# Patient Record
Sex: MALE | Race: BLACK OR AFRICAN AMERICAN | URBAN - METROPOLITAN AREA
[De-identification: names, ages, dates, MRNs, and addresses within clinical notes are randomized per-mention and may not be internally consistent; named-entity substitution may affect disease eponyms.]

---

## 2017-01-15 ENCOUNTER — INPATIENT (INPATIENT)
Facility: HOSPITAL | Age: 32
LOS: 4 days | Discharge: HOME | DRG: 881 | End: 2017-01-20
Attending: PSYCHIATRY & NEUROLOGY | Admitting: PSYCHIATRY & NEUROLOGY
Payer: MEDICAID

## 2017-01-15 VITALS
DIASTOLIC BLOOD PRESSURE: 69 MMHG | HEART RATE: 89 BPM | WEIGHT: 160.06 LBS | TEMPERATURE: 98 F | SYSTOLIC BLOOD PRESSURE: 107 MMHG | RESPIRATION RATE: 18 BRPM | OXYGEN SATURATION: 100 %

## 2017-01-15 PROCEDURE — 99285 EMERGENCY DEPT VISIT HI MDM: CPT | Mod: 25

## 2017-01-15 PROCEDURE — 93010 ELECTROCARDIOGRAM REPORT: CPT

## 2017-01-15 PROCEDURE — 99053 MED SERV 10PM-8AM 24 HR FAC: CPT

## 2017-01-15 NOTE — ED ADULT NURSE NOTE - OBJECTIVE STATEMENT
pt received into  spot A&Ox3 ambulatory appears comfortable stating he has been depressed and wants to kill himself. No hx of psychiatric diagnoses, no hx of psych admission, no hx of suicide attempts. Pt states recently multiple immediate family members have  and he is having trouble coping. Denies HI, viual/ auditory hallucinations, denies drug or ETOH use. 4 [facility] hospital bags of belongings and black duffle bag taken and placed under nursing station. Pt on 1:1 CO for safety and SI

## 2017-01-16 DIAGNOSIS — F33.2 MAJOR DEPRESSIVE DISORDER, RECURRENT SEVERE WITHOUT PSYCHOTIC FEATURES: ICD-10-CM

## 2017-01-16 LAB
ANION GAP SERPL CALC-SCNC: 6 MMOL/L — LOW (ref 9–16)
APAP SERPL-MCNC: <2 UG/ML — LOW (ref 10–30)
APPEARANCE UR: CLEAR — SIGNIFICANT CHANGE UP
BASOPHILS NFR BLD AUTO: 0.4 % — SIGNIFICANT CHANGE UP (ref 0–2)
BILIRUB UR-MCNC: NEGATIVE — SIGNIFICANT CHANGE UP
BUN SERPL-MCNC: 13 MG/DL — SIGNIFICANT CHANGE UP (ref 7–23)
CALCIUM SERPL-MCNC: 8.6 MG/DL — SIGNIFICANT CHANGE UP (ref 8.5–10.5)
CHLORIDE SERPL-SCNC: 108 MMOL/L — SIGNIFICANT CHANGE UP (ref 96–108)
CO2 SERPL-SCNC: 28 MMOL/L — SIGNIFICANT CHANGE UP (ref 22–31)
COLOR SPEC: YELLOW — SIGNIFICANT CHANGE UP
CREAT SERPL-MCNC: 0.74 MG/DL — SIGNIFICANT CHANGE UP (ref 0.5–1.3)
DIFF PNL FLD: NEGATIVE — SIGNIFICANT CHANGE UP
EOSINOPHIL NFR BLD AUTO: 4.7 % — SIGNIFICANT CHANGE UP (ref 0–6)
ETHANOL SERPL-MCNC: <3 MG/DL — SIGNIFICANT CHANGE UP
GLUCOSE SERPL-MCNC: 102 MG/DL — HIGH (ref 70–99)
GLUCOSE UR QL: NEGATIVE — SIGNIFICANT CHANGE UP
HCT VFR BLD CALC: 36 % — LOW (ref 39–50)
HGB BLD-MCNC: 11.8 G/DL — LOW (ref 13–17)
KETONES UR-MCNC: NEGATIVE — SIGNIFICANT CHANGE UP
LEUKOCYTE ESTERASE UR-ACNC: NEGATIVE — SIGNIFICANT CHANGE UP
LYMPHOCYTES # BLD AUTO: 40.2 % — SIGNIFICANT CHANGE UP (ref 13–44)
MCHC RBC-ENTMCNC: 23.6 PG — LOW (ref 27–34)
MCHC RBC-ENTMCNC: 32.8 G/DL — SIGNIFICANT CHANGE UP (ref 32–36)
MCV RBC AUTO: 72 FL — LOW (ref 80–100)
MONOCYTES NFR BLD AUTO: 7.7 % — SIGNIFICANT CHANGE UP (ref 2–14)
NEUTROPHILS NFR BLD AUTO: 47 % — SIGNIFICANT CHANGE UP (ref 43–77)
NITRITE UR-MCNC: NEGATIVE — SIGNIFICANT CHANGE UP
PCP SPEC-MCNC: SIGNIFICANT CHANGE UP
PH UR: 6.5 — SIGNIFICANT CHANGE UP (ref 4–8)
PLATELET # BLD AUTO: 187 K/UL — SIGNIFICANT CHANGE UP (ref 150–400)
POTASSIUM SERPL-MCNC: 3.7 MMOL/L — SIGNIFICANT CHANGE UP (ref 3.5–5.3)
POTASSIUM SERPL-SCNC: 3.7 MMOL/L — SIGNIFICANT CHANGE UP (ref 3.5–5.3)
PROT UR-MCNC: NEGATIVE MG/DL — SIGNIFICANT CHANGE UP
RBC # BLD: 5 M/UL — SIGNIFICANT CHANGE UP (ref 4.2–5.8)
RBC # FLD: 15 % — SIGNIFICANT CHANGE UP (ref 10.3–16.9)
SALICYLATES SERPL-MCNC: <1.7 MG/DL — LOW (ref 2.8–20)
SODIUM SERPL-SCNC: 142 MMOL/L — SIGNIFICANT CHANGE UP (ref 135–145)
SP GR SPEC: 1.02 — SIGNIFICANT CHANGE UP (ref 1–1.03)
UROBILINOGEN FLD QL: 1 E.U./DL — SIGNIFICANT CHANGE UP
WBC # BLD: 5.6 K/UL — SIGNIFICANT CHANGE UP (ref 3.8–10.5)
WBC # FLD AUTO: 5.6 K/UL — SIGNIFICANT CHANGE UP (ref 3.8–10.5)

## 2017-01-16 PROCEDURE — 90792 PSYCH DIAG EVAL W/MED SRVCS: CPT

## 2017-01-16 RX ORDER — LOPERAMIDE HCL 2 MG
2 TABLET ORAL ONCE
Qty: 0 | Refills: 0 | Status: COMPLETED | OUTPATIENT
Start: 2017-01-16 | End: 2017-01-16

## 2017-01-16 RX ORDER — SERTRALINE 25 MG/1
25 TABLET, FILM COATED ORAL DAILY
Qty: 0 | Refills: 0 | Status: DISCONTINUED | OUTPATIENT
Start: 2017-01-16 | End: 2017-01-20

## 2017-01-16 RX ORDER — INFLUENZA VIRUS VACCINE 15; 15; 15; 15 UG/.5ML; UG/.5ML; UG/.5ML; UG/.5ML
0.5 SUSPENSION INTRAMUSCULAR ONCE
Qty: 0 | Refills: 0 | Status: COMPLETED | OUTPATIENT
Start: 2017-01-16 | End: 2017-01-16

## 2017-01-16 RX ADMIN — Medication 2 MILLIGRAM(S): at 22:56

## 2017-01-16 NOTE — ED BEHAVIORAL HEALTH ASSESSMENT NOTE - SUMMARY
This is a 30 y/o man with no psych hx, presenting with 3 days of SI in setting of prolonged depressive episode. He endorses multiple neurovegetative symptoms including active SI, though is help seeking, which is why he presented. He denies having any close contacts and denies having told anyone about how he is feeling, thus no collateral could be contacted. Given his elevated risk of harm, will hospitalized and start sertraline to reduce his mood symptoms

## 2017-01-16 NOTE — ED BEHAVIORAL HEALTH ASSESSMENT NOTE - AXIS IV
Problems with primary support/Problem related to social environment/Economic problems/Occupational problems

## 2017-01-16 NOTE — ED BEHAVIORAL HEALTH ASSESSMENT NOTE - HPI (INCLUDE ILLNESS QUALITY, SEVERITY, DURATION, TIMING, CONTEXT, MODIFYING FACTORS, ASSOCIATED SIGNS AND SYMPTOMS)
This is a 30 y/o man, living with roommates, unemployed living on savings, no formal psych hx, no prior mental health treatment or evaluations, denies hx of suicidality/violence/legal trouble/substance abuse, self presenting with suicidal ideation for 3 days.    The patient reports that he's had intermittent low mood for several months, worse recently, culminating with SI for three days. He describes this in the context of his mother's death twelve years ago, and the more recent loss of a brother and another family member in the last few years. He has felt down, with difficulty focusing, which led to him losing his job as a  last year. He also notes anhedonia, as well as insomnia and low energy. He has isolated himself, stating he is no longer in touch with family, and doesn't have any friends. He's been contemplating overdosing on "pain pills," though denies that he has access to any or thought about how to get them. He's also thought about "jumping off a bridge," but has not made specific plans. His protective factors only include a desire to get help. He denies manic or psychotic symptoms, as well as substance/etOH abuse.

## 2017-01-16 NOTE — ED BEHAVIORAL HEALTH ASSESSMENT NOTE - DESCRIPTION
calm on 1:1 none Born/raised in [place]. 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 Had been working in [employer]. Never , no children. Lives in [place] with roommates

## 2017-01-16 NOTE — ED PROVIDER NOTE - ATTENDING CONTRIBUTION TO CARE
31M with hx of asthma presenting with suicidal ideation, no prior SI or attempt, presenting with worsening depression and SI. Pt has plan for jumping in front of subway. Plan for admit to psych.

## 2017-01-16 NOTE — ED PROVIDER NOTE - OBJECTIVE STATEMENT
Patient is a 31 year old male with PMH asthma who presents to ED c/o suicidal ideation. Pt reports that his mother  when he was 18, his brother murdered 2 years ago. Since then has never faced the loss of either. Over the past 2 weeks he is having worsening depression/SI. Today had thoughts of jumping in front of a subway as well as taking medications to OD. No prior history of hospitalizations, no prior SI or attempt. Is not on any medications. Denies illicit drug use.

## 2017-01-16 NOTE — ED PROVIDER NOTE - MEDICAL DECISION MAKING DETAILS
31 year old male with suicidal ideation, no prior hospitalizations or attempts. Placed on 1:1. Medical clearance labs ordered, EKG. Psych consulted, came to ED to evaluate the patient-will admit to psych floor for further evaluation.

## 2017-01-17 PROCEDURE — 99223 1ST HOSP IP/OBS HIGH 75: CPT

## 2017-01-18 PROCEDURE — 99232 SBSQ HOSP IP/OBS MODERATE 35: CPT

## 2017-01-18 RX ORDER — LOPERAMIDE HCL 2 MG
2 TABLET ORAL
Qty: 0 | Refills: 0 | Status: DISCONTINUED | OUTPATIENT
Start: 2017-01-18 | End: 2017-01-20

## 2017-01-18 RX ORDER — ACETAMINOPHEN 500 MG
650 TABLET ORAL EVERY 6 HOURS
Qty: 0 | Refills: 0 | Status: DISCONTINUED | OUTPATIENT
Start: 2017-01-18 | End: 2017-01-20

## 2017-01-18 RX ADMIN — Medication 2 MILLIGRAM(S): at 22:52

## 2017-01-18 RX ADMIN — Medication 1 TABLET(S): at 11:27

## 2017-01-18 RX ADMIN — Medication 2 MILLIGRAM(S): at 11:27

## 2017-01-19 PROCEDURE — 99232 SBSQ HOSP IP/OBS MODERATE 35: CPT

## 2017-01-19 RX ADMIN — Medication 650 MILLIGRAM(S): at 22:28

## 2017-01-19 RX ADMIN — Medication 1 TABLET(S): at 08:57

## 2017-01-19 RX ADMIN — Medication 2 MILLIGRAM(S): at 08:59

## 2017-01-20 VITALS
SYSTOLIC BLOOD PRESSURE: 108 MMHG | HEART RATE: 82 BPM | RESPIRATION RATE: 20 BRPM | DIASTOLIC BLOOD PRESSURE: 75 MMHG | TEMPERATURE: 97 F

## 2017-01-20 PROCEDURE — 99238 HOSP IP/OBS DSCHRG MGMT 30/<: CPT

## 2017-01-20 PROCEDURE — 80048 BASIC METABOLIC PNL TOTAL CA: CPT

## 2017-01-20 PROCEDURE — 85025 COMPLETE CBC W/AUTO DIFF WBC: CPT

## 2017-01-20 PROCEDURE — 80307 DRUG TEST PRSMV CHEM ANLYZR: CPT

## 2017-01-20 PROCEDURE — 36415 COLL VENOUS BLD VENIPUNCTURE: CPT

## 2017-01-20 PROCEDURE — 99285 EMERGENCY DEPT VISIT HI MDM: CPT | Mod: 25

## 2017-01-20 PROCEDURE — 81003 URINALYSIS AUTO W/O SCOPE: CPT

## 2017-01-20 PROCEDURE — 93005 ELECTROCARDIOGRAM TRACING: CPT

## 2017-01-20 RX ADMIN — Medication 1 TABLET(S): at 10:31

## 2017-01-24 DIAGNOSIS — F39 UNSPECIFIED MOOD [AFFECTIVE] DISORDER: ICD-10-CM

## 2017-01-24 DIAGNOSIS — F32.9 MAJOR DEPRESSIVE DISORDER, SINGLE EPISODE, UNSPECIFIED: ICD-10-CM

## 2017-01-24 DIAGNOSIS — Z56.0 UNEMPLOYMENT, UNSPECIFIED: ICD-10-CM

## 2017-01-24 DIAGNOSIS — J45.909 UNSPECIFIED ASTHMA, UNCOMPLICATED: ICD-10-CM

## 2017-01-24 SDOH — ECONOMIC STABILITY - INCOME SECURITY: UNEMPLOYMENT, UNSPECIFIED: Z56.0

## 2018-06-28 ENCOUNTER — EMERGENCY (EMERGENCY)
Facility: HOSPITAL | Age: 33
LOS: 1 days | Discharge: HOME | End: 2018-06-28
Admitting: EMERGENCY MEDICINE
Payer: COMMERCIAL

## 2018-06-28 VITALS
SYSTOLIC BLOOD PRESSURE: 127 MMHG | WEIGHT: 164.91 LBS | TEMPERATURE: 98 F | OXYGEN SATURATION: 100 % | HEART RATE: 63 BPM | DIASTOLIC BLOOD PRESSURE: 61 MMHG | RESPIRATION RATE: 16 BRPM

## 2018-06-28 DIAGNOSIS — Z88.0 ALLERGY STATUS TO PENICILLIN: ICD-10-CM
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
## 2018-06-28 DIAGNOSIS — Z23 ENCOUNTER FOR IMMUNIZATION: ICD-10-CM

## 2018-06-28 DIAGNOSIS — L02.213 CUTANEOUS ABSCESS OF CHEST WALL: ICD-10-CM

## 2018-06-28 DIAGNOSIS — S80.212A ABRASION, LEFT KNEE, INITIAL ENCOUNTER: ICD-10-CM

## 2018-06-28 DIAGNOSIS — J45.909 UNSPECIFIED ASTHMA, UNCOMPLICATED: ICD-10-CM
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
## 2018-06-28 DIAGNOSIS — Z91.018 ALLERGY TO OTHER FOODS: ICD-10-CM

## 2018-06-28 DIAGNOSIS — Z88.8 ALLERGY STATUS TO OTHER DRUGS, MEDICAMENTS AND BIOLOGICAL SUBSTANCES: ICD-10-CM
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
## 2018-06-28 DIAGNOSIS — B35.6 TINEA CRURIS: ICD-10-CM

## 2018-06-28 PROCEDURE — 90715 TDAP VACCINE 7 YRS/> IM: CPT

## 2018-06-28 PROCEDURE — 73562 X-RAY EXAM OF KNEE 3: CPT | Mod: 26,LT

## 2018-06-28 PROCEDURE — 10061 I&D ABSCESS COMP/MULTIPLE: CPT

## 2018-06-28 PROCEDURE — 90471 IMMUNIZATION ADMIN: CPT

## 2018-06-28 PROCEDURE — 99283 EMERGENCY DEPT VISIT LOW MDM: CPT | Mod: 25

## 2018-06-28 PROCEDURE — 73562 X-RAY EXAM OF KNEE 3: CPT

## 2018-06-28 RX ORDER — IBUPROFEN 200 MG
1 TABLET ORAL
Qty: 20 | Refills: 0
Start: 2018-06-28 | End: 2018-07-02

## 2018-06-28 RX ORDER — NYSTATIN CREAM 100000 [USP'U]/G
1 CREAM TOPICAL ONCE
Qty: 0 | Refills: 0 | Status: COMPLETED | OUTPATIENT
Start: 2018-06-28 | End: 2018-06-28

## 2018-06-28 RX ORDER — IBUPROFEN 200 MG
800 TABLET ORAL ONCE
Qty: 0 | Refills: 0 | Status: COMPLETED | OUTPATIENT
Start: 2018-06-28 | End: 2018-06-28

## 2018-06-28 RX ORDER — TETANUS TOXOID, REDUCED DIPHTHERIA TOXOID AND ACELLULAR PERTUSSIS VACCINE, ADSORBED 5; 2.5; 8; 8; 2.5 [IU]/.5ML; [IU]/.5ML; UG/.5ML; UG/.5ML; UG/.5ML
0.5 SUSPENSION INTRAMUSCULAR ONCE
Qty: 0 | Refills: 0 | Status: COMPLETED | OUTPATIENT
Start: 2018-06-28 | End: 2018-06-28

## 2018-06-28 RX ORDER — NYSTATIN CREAM 100000 [USP'U]/G
1 CREAM TOPICAL
Qty: 1 | Refills: 0
Start: 2018-06-28

## 2018-06-28 RX ADMIN — NYSTATIN CREAM 1 APPLICATION(S): 100000 CREAM TOPICAL at 06:14

## 2018-06-28 RX ADMIN — Medication 800 MILLIGRAM(S): at 06:30

## 2018-06-28 RX ADMIN — Medication 100 MILLIGRAM(S): at 06:13

## 2018-06-28 RX ADMIN — TETANUS TOXOID, REDUCED DIPHTHERIA TOXOID AND ACELLULAR PERTUSSIS VACCINE, ADSORBED 0.5 MILLILITER(S): 5; 2.5; 8; 8; 2.5 SUSPENSION INTRAMUSCULAR at 06:12

## 2018-06-28 NOTE — ED PROVIDER NOTE - CARE PLAN
Principal Discharge DX:	Abscess of chest wall Principal Discharge DX:	Abscess of chest wall  Secondary Diagnosis:	Tinea cruris  Secondary Diagnosis:	Abrasion

## 2018-06-28 NOTE — ED PROVIDER NOTE - MEDICAL DECISION MAKING DETAILS
local I& D and placed on antibiotics for localized abscess local I& D and placed on antibiotics for localized abscess, nystatin for tinea and xray reviewed for knee local I& D and placed on antibiotics for localized abscess, nystatin for tinea and xray reviewed for knee -> no acute fracture + hardware noted in plosition
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
## 2018-06-28 NOTE — ED PROVIDER NOTE - PHYSICAL EXAMINATION
localized flucuant indurated abscess at right anterior chest wall noted localized flucuant indurated abscess at right anterior chest wall noted + left knee superficial  abrasion and age indeterminate surgical; scars to LLE ( prior orif reported ) + tinea Cruis noted
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
## 2018-07-19 ENCOUNTER — EMERGENCY (EMERGENCY)
Facility: HOSPITAL | Age: 33
LOS: 1 days | Discharge: HOME | End: 2018-07-19
Attending: EMERGENCY MEDICINE | Admitting: EMERGENCY MEDICINE
Payer: COMMERCIAL

## 2018-07-19 VITALS
DIASTOLIC BLOOD PRESSURE: 69 MMHG | RESPIRATION RATE: 18 BRPM | HEART RATE: 63 BPM | TEMPERATURE: 99 F | OXYGEN SATURATION: 99 % | SYSTOLIC BLOOD PRESSURE: 120 MMHG

## 2018-07-19 VITALS
WEIGHT: 164.91 LBS | RESPIRATION RATE: 18 BRPM | HEART RATE: 57 BPM | DIASTOLIC BLOOD PRESSURE: 55 MMHG | SYSTOLIC BLOOD PRESSURE: 115 MMHG | OXYGEN SATURATION: 98 % | TEMPERATURE: 98 F

## 2018-07-19 DIAGNOSIS — Z88.1 ALLERGY STATUS TO OTHER ANTIBIOTIC AGENTS STATUS: ICD-10-CM

## 2018-07-19 DIAGNOSIS — Z79.1 LONG TERM (CURRENT) USE OF NON-STEROIDAL ANTI-INFLAMMATORIES (NSAID): ICD-10-CM

## 2018-07-19 DIAGNOSIS — S01.112A LACERATION WITHOUT FOREIGN BODY OF LEFT EYELID AND PERIOCULAR AREA, INITIAL ENCOUNTER: ICD-10-CM

## 2018-07-19 DIAGNOSIS — S09.90XA UNSPECIFIED INJURY OF HEAD, INITIAL ENCOUNTER: ICD-10-CM
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
## 2018-07-19 DIAGNOSIS — Z79.2 LONG TERM (CURRENT) USE OF ANTIBIOTICS: ICD-10-CM

## 2018-07-19 DIAGNOSIS — Z88.8 ALLERGY STATUS TO OTHER DRUGS, MEDICAMENTS AND BIOLOGICAL SUBSTANCES: ICD-10-CM

## 2018-07-19 DIAGNOSIS — J45.909 UNSPECIFIED ASTHMA, UNCOMPLICATED: ICD-10-CM
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
## 2018-07-19 DIAGNOSIS — Z91.018 ALLERGY TO OTHER FOODS: ICD-10-CM
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
## 2018-07-19 DIAGNOSIS — K04.7 PERIAPICAL ABSCESS WITHOUT SINUS: ICD-10-CM

## 2018-07-19 LAB
APPEARANCE UR: CLEAR — SIGNIFICANT CHANGE UP
BILIRUB UR-MCNC: NEGATIVE — SIGNIFICANT CHANGE UP
COLOR SPEC: YELLOW — SIGNIFICANT CHANGE UP
DIFF PNL FLD: NEGATIVE — SIGNIFICANT CHANGE UP
GLUCOSE UR QL: NEGATIVE — SIGNIFICANT CHANGE UP
KETONES UR-MCNC: NEGATIVE — SIGNIFICANT CHANGE UP
LEUKOCYTE ESTERASE UR-ACNC: NEGATIVE — SIGNIFICANT CHANGE UP
NITRITE UR-MCNC: NEGATIVE — SIGNIFICANT CHANGE UP
PH UR: 7 — SIGNIFICANT CHANGE UP (ref 5–8)
PROT UR-MCNC: NEGATIVE MG/DL — SIGNIFICANT CHANGE UP
SP GR SPEC: 1.01 — SIGNIFICANT CHANGE UP (ref 1–1.03)
UROBILINOGEN FLD QL: 0.2 E.U./DL — SIGNIFICANT CHANGE UP

## 2018-07-19 PROCEDURE — 81003 URINALYSIS AUTO W/O SCOPE: CPT

## 2018-07-19 PROCEDURE — 99283 EMERGENCY DEPT VISIT LOW MDM: CPT | Mod: 25

## 2018-07-19 PROCEDURE — 12011 RPR F/E/E/N/L/M 2.5 CM/<: CPT

## 2018-07-19 PROCEDURE — 96372 THER/PROPH/DIAG INJ SC/IM: CPT | Mod: XU

## 2018-07-19 RX ORDER — AZITHROMYCIN 500 MG/1
1000 TABLET, FILM COATED ORAL ONCE
Qty: 0 | Refills: 0 | Status: DISCONTINUED | OUTPATIENT
Start: 2018-07-19 | End: 2018-07-19

## 2018-07-19 RX ORDER — CEFTRIAXONE 500 MG/1
250 INJECTION, POWDER, FOR SOLUTION INTRAMUSCULAR; INTRAVENOUS ONCE
Qty: 0 | Refills: 0 | Status: COMPLETED | OUTPATIENT
Start: 2018-07-19 | End: 2018-07-19

## 2018-07-19 RX ORDER — AZITHROMYCIN 500 MG/1
1 TABLET, FILM COATED ORAL ONCE
Qty: 0 | Refills: 0 | Status: COMPLETED | OUTPATIENT
Start: 2018-07-19 | End: 2018-07-19

## 2018-07-19 RX ADMIN — AZITHROMYCIN 1 GRAM(S): 500 TABLET, FILM COATED ORAL at 12:38

## 2018-07-19 RX ADMIN — CEFTRIAXONE 250 MILLIGRAM(S): 500 INJECTION, POWDER, FOR SOLUTION INTRAMUSCULAR; INTRAVENOUS at 12:38
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
## 2018-07-19 NOTE — ED PROVIDER NOTE - SKIN, MLM
Skin normal color for race, warm, dry and intact. No evidence of rash. LACERATION 1.5 cm in left eyebrow

## 2018-07-19 NOTE — ED ADULT NURSE REASSESSMENT NOTE - NS ED NURSE REASSESS COMMENT FT1
laceration area irrigated with 20 ml of NS, awaiting MD Aguiar re-evaluation and stitches. Will continue to monitor. laceration area irrigated with 20 ml of NS, awaiting MD Aguiar re-evaluation and sutures. Will continue to monitor.

## 2018-07-19 NOTE — ED ADULT NURSE REASSESSMENT NOTE - NS ED NURSE REASSESS COMMENT FT1
pt remains under stable condition, pt awaiting STI treatment as requested, MD Agiuar informed and remained, pending medications orders.

## 2018-07-19 NOTE — ED PROVIDER NOTE - ENMT, MLM
Airway patent, Nasal mucosa clear. Mouth with normal mucosa. Throat has no vesicles, no oropharyngeal exudates and uvula is midline. exception: right sided fullness to gumline with tenderness but no fluctuance , molar tooth tender to palpation, small apthous ulceration

## 2018-07-19 NOTE — ED PROVIDER NOTE - MEDICAL DECISION MAKING DETAILS
+ head trauma, doubt ICH , no current symptoms cw concussion, lac repaired, dyuria likely STD, empirically treated, pt with dental infection, has dentist appt tomm, advised to keep appt.

## 2018-07-28 ENCOUNTER — EMERGENCY (EMERGENCY)
Facility: HOSPITAL | Age: 33
LOS: 1 days | Discharge: HOME | End: 2018-07-28
Attending: EMERGENCY MEDICINE | Admitting: EMERGENCY MEDICINE
Payer: MEDICAID

## 2018-07-28 ENCOUNTER — EMERGENCY (EMERGENCY)
Facility: HOSPITAL | Age: 33
LOS: 1 days | Discharge: HOME | End: 2018-07-28
Admitting: EMERGENCY MEDICINE
Payer: COMMERCIAL

## 2018-07-28 VITALS
SYSTOLIC BLOOD PRESSURE: 124 MMHG | TEMPERATURE: 98 F | HEART RATE: 63 BPM | RESPIRATION RATE: 18 BRPM | DIASTOLIC BLOOD PRESSURE: 75 MMHG | OXYGEN SATURATION: 100 %

## 2018-07-28 VITALS
SYSTOLIC BLOOD PRESSURE: 112 MMHG | OXYGEN SATURATION: 100 % | RESPIRATION RATE: 16 BRPM | WEIGHT: 169.98 LBS | DIASTOLIC BLOOD PRESSURE: 66 MMHG | TEMPERATURE: 98 F | HEART RATE: 81 BPM

## 2018-07-28 VITALS
WEIGHT: 164.91 LBS | TEMPERATURE: 98 F | SYSTOLIC BLOOD PRESSURE: 116 MMHG | RESPIRATION RATE: 16 BRPM | DIASTOLIC BLOOD PRESSURE: 73 MMHG | OXYGEN SATURATION: 100 % | HEART RATE: 73 BPM

## 2018-07-28 DIAGNOSIS — R19.7 DIARRHEA, UNSPECIFIED: ICD-10-CM

## 2018-07-28 DIAGNOSIS — Z79.1 LONG TERM (CURRENT) USE OF NON-STEROIDAL ANTI-INFLAMMATORIES (NSAID): ICD-10-CM

## 2018-07-28 DIAGNOSIS — Z48.02 ENCOUNTER FOR REMOVAL OF SUTURES: ICD-10-CM

## 2018-07-28 DIAGNOSIS — Z79.2 LONG TERM (CURRENT) USE OF ANTIBIOTICS: ICD-10-CM

## 2018-07-28 DIAGNOSIS — R11.0 NAUSEA: ICD-10-CM

## 2018-07-28 DIAGNOSIS — Z88.8 ALLERGY STATUS TO OTHER DRUGS, MEDICAMENTS AND BIOLOGICAL SUBSTANCES: ICD-10-CM

## 2018-07-28 DIAGNOSIS — S01.112D LACERATION WITHOUT FOREIGN BODY OF LEFT EYELID AND PERIOCULAR AREA, SUBSEQUENT ENCOUNTER: ICD-10-CM

## 2018-07-28 DIAGNOSIS — Z91.018 ALLERGY TO OTHER FOODS: ICD-10-CM

## 2018-07-28 DIAGNOSIS — R10.84 GENERALIZED ABDOMINAL PAIN: ICD-10-CM

## 2018-07-28 DIAGNOSIS — Z79.899 OTHER LONG TERM (CURRENT) DRUG THERAPY: ICD-10-CM

## 2018-07-28 DIAGNOSIS — Z88.0 ALLERGY STATUS TO PENICILLIN: ICD-10-CM

## 2018-07-28 DIAGNOSIS — J45.909 UNSPECIFIED ASTHMA, UNCOMPLICATED: ICD-10-CM

## 2018-07-28 DIAGNOSIS — Z88.1 ALLERGY STATUS TO OTHER ANTIBIOTIC AGENTS STATUS: ICD-10-CM

## 2018-07-28 LAB
ALBUMIN SERPL ELPH-MCNC: 3.7 G/DL — SIGNIFICANT CHANGE UP (ref 3.4–5)
ALP SERPL-CCNC: 113 U/L — SIGNIFICANT CHANGE UP (ref 40–120)
ALT FLD-CCNC: 21 U/L — SIGNIFICANT CHANGE UP (ref 12–42)
ANION GAP SERPL CALC-SCNC: 6 MMOL/L — LOW (ref 9–16)
APPEARANCE UR: CLEAR — SIGNIFICANT CHANGE UP
APTT BLD: 32.7 SEC — SIGNIFICANT CHANGE UP (ref 27.5–36.5)
AST SERPL-CCNC: 20 U/L — SIGNIFICANT CHANGE UP (ref 15–37)
BASOPHILS NFR BLD AUTO: 0.5 % — SIGNIFICANT CHANGE UP (ref 0–2)
BILIRUB SERPL-MCNC: 0.3 MG/DL — SIGNIFICANT CHANGE UP (ref 0.2–1.2)
BILIRUB UR-MCNC: NEGATIVE — SIGNIFICANT CHANGE UP
BUN SERPL-MCNC: 19 MG/DL — SIGNIFICANT CHANGE UP (ref 7–23)
CALCIUM SERPL-MCNC: 8.8 MG/DL — SIGNIFICANT CHANGE UP (ref 8.5–10.5)
CHLORIDE SERPL-SCNC: 105 MMOL/L — SIGNIFICANT CHANGE UP (ref 96–108)
CO2 SERPL-SCNC: 28 MMOL/L — SIGNIFICANT CHANGE UP (ref 22–31)
COLOR SPEC: YELLOW — SIGNIFICANT CHANGE UP
CREAT SERPL-MCNC: 0.92 MG/DL — SIGNIFICANT CHANGE UP (ref 0.5–1.3)
DIFF PNL FLD: NEGATIVE — SIGNIFICANT CHANGE UP
EOSINOPHIL NFR BLD AUTO: 4.4 % — SIGNIFICANT CHANGE UP (ref 0–6)
GLUCOSE SERPL-MCNC: 77 MG/DL — SIGNIFICANT CHANGE UP (ref 70–99)
GLUCOSE UR QL: NEGATIVE — SIGNIFICANT CHANGE UP
HCT VFR BLD CALC: 39.7 % — SIGNIFICANT CHANGE UP (ref 39–50)
HGB BLD-MCNC: 12.4 G/DL — LOW (ref 13–17)
IMM GRANULOCYTES NFR BLD AUTO: 0.2 % — SIGNIFICANT CHANGE UP (ref 0–1.5)
INR BLD: 1.06 — SIGNIFICANT CHANGE UP (ref 0.88–1.16)
KETONES UR-MCNC: NEGATIVE — SIGNIFICANT CHANGE UP
LEUKOCYTE ESTERASE UR-ACNC: NEGATIVE — SIGNIFICANT CHANGE UP
LIDOCAIN IGE QN: 246 U/L — SIGNIFICANT CHANGE UP (ref 73–393)
LYMPHOCYTES # BLD AUTO: 39.1 % — SIGNIFICANT CHANGE UP (ref 13–44)
MCHC RBC-ENTMCNC: 22.9 PG — LOW (ref 27–34)
MCHC RBC-ENTMCNC: 31.2 G/DL — LOW (ref 32–36)
MCV RBC AUTO: 73.2 FL — LOW (ref 80–100)
MONOCYTES NFR BLD AUTO: 10.5 % — SIGNIFICANT CHANGE UP (ref 2–14)
NEUTROPHILS NFR BLD AUTO: 45.3 % — SIGNIFICANT CHANGE UP (ref 43–77)
NITRITE UR-MCNC: NEGATIVE — SIGNIFICANT CHANGE UP
PH UR: 5 — SIGNIFICANT CHANGE UP (ref 5–8)
PLATELET # BLD AUTO: 175 K/UL — SIGNIFICANT CHANGE UP (ref 150–400)
POTASSIUM SERPL-MCNC: 4.1 MMOL/L — SIGNIFICANT CHANGE UP (ref 3.5–5.3)
POTASSIUM SERPL-SCNC: 4.1 MMOL/L — SIGNIFICANT CHANGE UP (ref 3.5–5.3)
PROT SERPL-MCNC: 7.8 G/DL — SIGNIFICANT CHANGE UP (ref 6.4–8.2)
PROT UR-MCNC: NEGATIVE MG/DL — SIGNIFICANT CHANGE UP
PROTHROM AB SERPL-ACNC: 11.7 SEC — SIGNIFICANT CHANGE UP (ref 9.8–12.7)
RBC # BLD: 5.42 M/UL — SIGNIFICANT CHANGE UP (ref 4.2–5.8)
RBC # FLD: 14.5 % — SIGNIFICANT CHANGE UP (ref 10.3–16.9)
SODIUM SERPL-SCNC: 139 MMOL/L — SIGNIFICANT CHANGE UP (ref 132–145)
SP GR SPEC: <=1.005 — SIGNIFICANT CHANGE UP (ref 1–1.03)
UROBILINOGEN FLD QL: 0.2 E.U./DL — SIGNIFICANT CHANGE UP
WBC # BLD: 4.3 K/UL — SIGNIFICANT CHANGE UP (ref 3.8–10.5)
WBC # FLD AUTO: 4.3 K/UL — SIGNIFICANT CHANGE UP (ref 3.8–10.5)

## 2018-07-28 PROCEDURE — 74177 CT ABD & PELVIS W/CONTRAST: CPT | Mod: 26

## 2018-07-28 PROCEDURE — 99284 EMERGENCY DEPT VISIT MOD MDM: CPT

## 2018-07-28 PROCEDURE — 99212 OFFICE O/P EST SF 10 MIN: CPT

## 2018-07-28 RX ORDER — PROCHLORPERAZINE MALEATE 5 MG
10 TABLET ORAL ONCE
Qty: 0 | Refills: 0 | Status: COMPLETED | OUTPATIENT
Start: 2018-07-28 | End: 2018-07-28

## 2018-07-28 RX ORDER — PROCHLORPERAZINE MALEATE 5 MG
1 TABLET ORAL
Qty: 21 | Refills: 0 | OUTPATIENT
Start: 2018-07-28 | End: 2018-08-03

## 2018-07-28 RX ORDER — IOHEXOL 300 MG/ML
30 INJECTION, SOLUTION INTRAVENOUS ONCE
Qty: 0 | Refills: 0 | Status: COMPLETED | OUTPATIENT
Start: 2018-07-28 | End: 2018-07-28

## 2018-07-28 RX ORDER — SODIUM CHLORIDE 9 MG/ML
3 INJECTION INTRAMUSCULAR; INTRAVENOUS; SUBCUTANEOUS ONCE
Qty: 0 | Refills: 0 | Status: COMPLETED | OUTPATIENT
Start: 2018-07-28 | End: 2018-07-28

## 2018-07-28 RX ORDER — DIPHENHYDRAMINE HCL 50 MG
25 CAPSULE ORAL ONCE
Qty: 0 | Refills: 0 | Status: COMPLETED | OUTPATIENT
Start: 2018-07-28 | End: 2018-07-28

## 2018-07-28 RX ORDER — SODIUM CHLORIDE 9 MG/ML
1000 INJECTION INTRAMUSCULAR; INTRAVENOUS; SUBCUTANEOUS ONCE
Qty: 0 | Refills: 0 | Status: COMPLETED | OUTPATIENT
Start: 2018-07-28 | End: 2018-07-28

## 2018-07-28 RX ORDER — METOCLOPRAMIDE HCL 10 MG
10 TABLET ORAL ONCE
Qty: 0 | Refills: 0 | Status: COMPLETED | OUTPATIENT
Start: 2018-07-28 | End: 2018-07-28

## 2018-07-28 RX ORDER — PROCHLORPERAZINE MALEATE 5 MG
1 TABLET ORAL
Qty: 21 | Refills: 0
Start: 2018-07-28 | End: 2018-08-03

## 2018-07-28 RX ORDER — FAMOTIDINE 10 MG/ML
20 INJECTION INTRAVENOUS ONCE
Qty: 0 | Refills: 0 | Status: COMPLETED | OUTPATIENT
Start: 2018-07-28 | End: 2018-07-28

## 2018-07-28 RX ADMIN — Medication 25 MILLIGRAM(S): at 20:15

## 2018-07-28 RX ADMIN — SODIUM CHLORIDE 1000 MILLILITER(S): 9 INJECTION INTRAMUSCULAR; INTRAVENOUS; SUBCUTANEOUS at 18:30

## 2018-07-28 RX ADMIN — Medication 104 MILLIGRAM(S): at 19:27

## 2018-07-28 RX ADMIN — Medication 10 MILLIGRAM(S): at 22:35

## 2018-07-28 RX ADMIN — SODIUM CHLORIDE 3 MILLILITER(S): 9 INJECTION INTRAMUSCULAR; INTRAVENOUS; SUBCUTANEOUS at 19:27

## 2018-07-28 RX ADMIN — IOHEXOL 30 MILLILITER(S): 300 INJECTION, SOLUTION INTRAVENOUS at 19:27

## 2018-07-28 RX ADMIN — Medication 10 MILLIGRAM(S): at 20:00

## 2018-07-28 RX ADMIN — Medication 101 MILLIGRAM(S): at 19:45

## 2018-07-28 RX ADMIN — FAMOTIDINE 20 MILLIGRAM(S): 10 INJECTION INTRAVENOUS at 19:27

## 2018-07-28 RX ADMIN — SODIUM CHLORIDE 1000 MILLILITER(S): 9 INJECTION INTRAMUSCULAR; INTRAVENOUS; SUBCUTANEOUS at 19:27

## 2018-07-28 NOTE — ED PROVIDER NOTE - PROGRESS NOTE DETAILS
pt feeling strange after reglan, will give benadryl 25mg IV for symptomatic relief Pt improved asking to leave, tolerating po. Precautions reviewed.

## 2018-07-28 NOTE — ED PROVIDER NOTE - MEDICAL DECISION MAKING DETAILS
abdominal pain with nausea and diarrhea, will send labs, give antiemetic, get CT a/p to r/o appy or other intra-abdominal infection, reassess

## 2018-07-28 NOTE — ED ADULT NURSE NOTE - OBJECTIVE STATEMENT
Pt presents to ED c/o suture removal to L eyebrow, no redness/swelling, no fevers, no drainage. Pt presents in NAD speaking full sentences ambulatory through triage.

## 2018-07-28 NOTE — ED PROVIDER NOTE - OBJECTIVE STATEMENT
pt c/o nausea, abd pain, diarrhea, non-bloody, pain generalized, non-radiating, constant, no exacerbating/alleviating factors, 6/10, thinks it was from dairy he ate by accident, no vomiting, no other complaints
(4) walks frequently

## 2018-07-28 NOTE — ED PROVIDER NOTE - MEDICAL DECISION MAKING DETAILS
pt returned for suture removal, no signs of inf, sutures removed w/o dif, no further tx indicated at this time

## 2018-07-28 NOTE — ED ADULT NURSE NOTE - PAIN: PRESENCE, MLM
Physical Therapy Daily Progress Note      Visit # : 5    [place] reports 0/10 pain today at rest.  Pt reports he back to doing about everything he wants to.  He has some discomfort after last PT but otherwise pain free.         Objective Pt present to PT today with no distress noted at rest.     Minimal edema in the arm and wrist.  Pt with firm MM noted in the Bicep.     Pt with the area of tightness in the Bicep MM is about 13 mm in diameter.     See Exercise, Manual, and Modality Logs for complete treatment.     Assessment/Plan  Pt is responding well to treatment and the knot is greatly decreasing.       Progress per Plan of Care  Re-assess next visit for possible D/C.            Manual Therapy:    12     mins  78516;  Therapeutic Exercise:    31     mins  57495;     Neuromuscular [place]:        mins  68632;    Therapeutic Activity:          mins  20566;     Gait Training:        ___  mins  65694;     Ultrasound:          mins  33885;    Electrical Stimulation:         mins  96120 ( );  Dry Needling          mins self-pay    Timed Treatment:   43   mins   Total Treatment:     48   mins    Florian Sarmiento, PT  Physical Therapist        
denies pain/discomfort

## 2018-07-28 NOTE — ED PROVIDER NOTE - PHYSICAL EXAMINATION
Comfortable, no acute distress  NCAT  PERRL, EOMI  RRR  CTAB  soft, diffusely ttp, no r/g  skin normal, no rashes  AAOx3, motor/sensory grossly intact

## 2018-07-28 NOTE — ED PROVIDER NOTE - OBJECTIVE STATEMENT
The pt is a 33 y/o M, who returns to ED for suture removal - lac repaired a wk ago. Denies pus, bleeding, swelling

## 2018-07-28 NOTE — ED PROVIDER NOTE - SKIN, MLM
fully healed, sutured L eyebrow lac w/3 sutures in place, no swelling, no erythema, no pus, no bleeding

## 2018-07-28 NOTE — ED ADULT NURSE NOTE - OBJECTIVE STATEMENT
pt is a 33 y/o male presents ambulatory with periumbilical cramps over one week with nausea, worsening today after drinking iced coffee. denies vomiting, urinary/bowel s/s, weakness. pt in NAD, resting comfortably and will continue to monitor. pt states he is lactose intolerant and does not know if the deli put soy or regular milk.

## 2018-07-28 NOTE — ED ADULT NURSE REASSESSMENT NOTE - NS ED NURSE REASSESS COMMENT FT1
Pt ambulating around department. Pt states that he finished his PO contrast. Will continue to monitor.

## 2018-11-05 ENCOUNTER — EMERGENCY (EMERGENCY)
Facility: HOSPITAL | Age: 33
LOS: 1 days | Discharge: HOME | End: 2018-11-05
Attending: EMERGENCY MEDICINE | Admitting: EMERGENCY MEDICINE
Payer: MEDICAID

## 2018-11-05 VITALS
SYSTOLIC BLOOD PRESSURE: 129 MMHG | WEIGHT: 175.05 LBS | OXYGEN SATURATION: 97 % | TEMPERATURE: 98 F | DIASTOLIC BLOOD PRESSURE: 55 MMHG | HEART RATE: 81 BPM | RESPIRATION RATE: 18 BRPM

## 2018-11-05 DIAGNOSIS — Z79.2 LONG TERM (CURRENT) USE OF ANTIBIOTICS: ICD-10-CM

## 2018-11-05 DIAGNOSIS — Z88.0 ALLERGY STATUS TO PENICILLIN: ICD-10-CM

## 2018-11-05 DIAGNOSIS — J45.909 UNSPECIFIED ASTHMA, UNCOMPLICATED: ICD-10-CM

## 2018-11-05 DIAGNOSIS — Z79.899 OTHER LONG TERM (CURRENT) DRUG THERAPY: ICD-10-CM

## 2018-11-05 DIAGNOSIS — Z79.1 LONG TERM (CURRENT) USE OF NON-STEROIDAL ANTI-INFLAMMATORIES (NSAID): ICD-10-CM

## 2018-11-05 DIAGNOSIS — R11.0 NAUSEA: ICD-10-CM

## 2018-11-05 DIAGNOSIS — Z91.010 ALLERGY TO PEANUTS: ICD-10-CM

## 2018-11-05 DIAGNOSIS — Z88.8 ALLERGY STATUS TO OTHER DRUGS, MEDICAMENTS AND BIOLOGICAL SUBSTANCES: ICD-10-CM

## 2018-11-05 PROCEDURE — 99283 EMERGENCY DEPT VISIT LOW MDM: CPT

## 2018-11-05 RX ORDER — IBUPROFEN 200 MG
1 TABLET ORAL
Qty: 12 | Refills: 0
Start: 2018-11-05 | End: 2018-11-07

## 2018-11-05 RX ORDER — IBUPROFEN 200 MG
600 TABLET ORAL ONCE
Qty: 0 | Refills: 0 | Status: COMPLETED | OUTPATIENT
Start: 2018-11-05 | End: 2018-11-05

## 2018-11-05 RX ADMIN — Medication 600 MILLIGRAM(S): at 20:01

## 2018-11-05 NOTE — ED PROVIDER NOTE - MEDICAL DECISION MAKING DETAILS
Mecca Oliva, DO: 33M with subjective fevers without focal objective findings. Counseled extensively on signs/symptoms of infection. Offered antiemetics but patient declined because patient "feels weird" from antiemetics which constitutes reported allergy. Will d/c with o/p f/u.

## 2018-11-05 NOTE — ED ADULT NURSE NOTE - NSIMPLEMENTINTERV_GEN_ALL_ED
Implemented All Universal Safety Interventions:  [place] to call system. Call bell, personal items and telephone within reach. Instruct patient to call for assistance. Room bathroom lighting operational. Non-slip footwear when patient is off stretcher. Physically safe environment: no spills, clutter or unnecessary equipment. Stretcher in lowest position, wheels locked, appropriate side rails in place.

## 2018-11-05 NOTE — ED ADULT TRIAGE NOTE - CHIEF COMPLAINT QUOTE
c/o subjective fevers, nausea, and HA x 2 days. pt in NAD, appears comfortable. as per pt, would like to be out by 10pm for his shelter.

## 2018-11-05 NOTE — ED PROVIDER NOTE - NSFOLLOWUPINSTRUCTIONS_ED_ALL_ED_FT
1. Please follow up with your PMD in 1-3 days.  2. Please return to the ED should you have any new or worsening symptoms or have any new concerns.   3. Read all attached.

## 2018-11-05 NOTE — ED PROVIDER NOTE - PHYSICAL EXAMINATION
Mecca Oliva, DO:   Gen: Well appearing, NAD  Head: NCAT  HEENT: PERRL, MMM, normal conjunctiva, anicteric, neck supple  Lung: CTAB, no adventitious sounds  CV: RRR, no murmurs  Abd: soft, NTND, no rebound or guarding, no CVAT  MSK: No edema, no visible deformities  Neuro: Ambulatory with stable gait.   Skin: Warm and dry, no evidence of rash  Psych: normal mood and affect

## 2018-11-05 NOTE — ED PROVIDER NOTE - OBJECTIVE STATEMENT
Mecca Oliva, DO: 33M hx of asthma here for nausea & subjective fevers x 2 days. No vomiting, abdominal pain, diarrhea, cough, sob, rhinorrhea, urinary symptoms, HA, visual changes, neck pain.

## 2018-11-05 NOTE — ED PROVIDER NOTE - ATTENDING CONTRIBUTION TO CARE
Patient presenting with apparent viral syndrome. VSS. MARLYS GARCIA. Happy to be tx'd Motrin. Supportive care.

## 2018-11-05 NOTE — ED ADULT NURSE NOTE - OBJECTIVE STATEMENT
c/o body aches nausea, HA, and subjective fevers. as per pt, has curfew at shelter and needs to be there by 10pm. pt in NAD, speaking in full sentences, in NAD, and will continue to monitor.

## 2019-07-03 NOTE — ED ADULT TRIAGE NOTE - ARRIVAL FROM
Contacted patient. Patient states that she lost her nortriptyline prescription. Told patient that she has refills on file at the pharmacy and that she should call the pharmacy to see if they can refill it for her. Informed patient to call back with any issues.   Home

## 2019-07-26 ENCOUNTER — EMERGENCY (EMERGENCY)
Facility: HOSPITAL | Age: 34
LOS: 1 days | Discharge: HOME | End: 2019-07-26
Attending: EMERGENCY MEDICINE | Admitting: EMERGENCY MEDICINE
Payer: SELF-PAY

## 2019-07-26 VITALS
HEIGHT: 69 IN | SYSTOLIC BLOOD PRESSURE: 126 MMHG | HEART RATE: 93 BPM | DIASTOLIC BLOOD PRESSURE: 77 MMHG | OXYGEN SATURATION: 98 % | TEMPERATURE: 99 F | WEIGHT: 169.98 LBS | RESPIRATION RATE: 18 BRPM

## 2019-07-26 LAB
ALBUMIN SERPL ELPH-MCNC: 3.9 G/DL — SIGNIFICANT CHANGE UP (ref 3.4–5)
ALP SERPL-CCNC: 120 U/L — SIGNIFICANT CHANGE UP (ref 40–120)
ALT FLD-CCNC: 19 U/L — SIGNIFICANT CHANGE UP (ref 12–42)
ANION GAP SERPL CALC-SCNC: 9 MMOL/L — SIGNIFICANT CHANGE UP (ref 9–16)
APPEARANCE UR: CLEAR — SIGNIFICANT CHANGE UP
AST SERPL-CCNC: 22 U/L — SIGNIFICANT CHANGE UP (ref 15–37)
BASOPHILS NFR BLD AUTO: 0.3 % — SIGNIFICANT CHANGE UP (ref 0–2)
BILIRUB SERPL-MCNC: 0.4 MG/DL — SIGNIFICANT CHANGE UP (ref 0.2–1.2)
BILIRUB UR-MCNC: NEGATIVE — SIGNIFICANT CHANGE UP
BUN SERPL-MCNC: 9 MG/DL — SIGNIFICANT CHANGE UP (ref 7–23)
CALCIUM SERPL-MCNC: 9.3 MG/DL — SIGNIFICANT CHANGE UP (ref 8.5–10.5)
CHLORIDE SERPL-SCNC: 104 MMOL/L — SIGNIFICANT CHANGE UP (ref 96–108)
CO2 SERPL-SCNC: 27 MMOL/L — SIGNIFICANT CHANGE UP (ref 22–31)
COLOR SPEC: YELLOW — SIGNIFICANT CHANGE UP
CREAT SERPL-MCNC: 0.91 MG/DL — SIGNIFICANT CHANGE UP (ref 0.5–1.3)
DIFF PNL FLD: NEGATIVE — SIGNIFICANT CHANGE UP
EOSINOPHIL NFR BLD AUTO: 0.6 % — SIGNIFICANT CHANGE UP (ref 0–6)
GLUCOSE SERPL-MCNC: 82 MG/DL — SIGNIFICANT CHANGE UP (ref 70–99)
GLUCOSE UR QL: NEGATIVE — SIGNIFICANT CHANGE UP
HCT VFR BLD CALC: 37.4 % — LOW (ref 39–50)
HGB BLD-MCNC: 11.7 G/DL — LOW (ref 13–17)
IMM GRANULOCYTES NFR BLD AUTO: 0.3 % — SIGNIFICANT CHANGE UP (ref 0–1.5)
KETONES UR-MCNC: NEGATIVE — SIGNIFICANT CHANGE UP
LACTATE SERPL-SCNC: 1.6 MMOL/L — SIGNIFICANT CHANGE UP (ref 0.4–2)
LEUKOCYTE ESTERASE UR-ACNC: NEGATIVE — SIGNIFICANT CHANGE UP
LYMPHOCYTES # BLD AUTO: 8.8 % — LOW (ref 13–44)
MCHC RBC-ENTMCNC: 23.1 PG — LOW (ref 27–34)
MCHC RBC-ENTMCNC: 31.3 G/DL — LOW (ref 32–36)
MCV RBC AUTO: 73.9 FL — LOW (ref 80–100)
MONOCYTES NFR BLD AUTO: 7.8 % — SIGNIFICANT CHANGE UP (ref 2–14)
NEUTROPHILS NFR BLD AUTO: 82.2 % — HIGH (ref 43–77)
NITRITE UR-MCNC: NEGATIVE — SIGNIFICANT CHANGE UP
PH UR: 7 — SIGNIFICANT CHANGE UP (ref 5–8)
PLATELET # BLD AUTO: 163 K/UL — SIGNIFICANT CHANGE UP (ref 150–400)
POTASSIUM SERPL-MCNC: 4.3 MMOL/L — SIGNIFICANT CHANGE UP (ref 3.5–5.3)
POTASSIUM SERPL-SCNC: 4.3 MMOL/L — SIGNIFICANT CHANGE UP (ref 3.5–5.3)
PROT SERPL-MCNC: 7.7 G/DL — SIGNIFICANT CHANGE UP (ref 6.4–8.2)
PROT UR-MCNC: NEGATIVE MG/DL — SIGNIFICANT CHANGE UP
RBC # BLD: 5.06 M/UL — SIGNIFICANT CHANGE UP (ref 4.2–5.8)
RBC # FLD: 14.5 % — SIGNIFICANT CHANGE UP (ref 10.3–14.5)
SODIUM SERPL-SCNC: 140 MMOL/L — SIGNIFICANT CHANGE UP (ref 132–145)
SP GR SPEC: 1.01 — SIGNIFICANT CHANGE UP (ref 1–1.03)
UROBILINOGEN FLD QL: 0.2 E.U./DL — SIGNIFICANT CHANGE UP
WBC # BLD: 10.7 K/UL — HIGH (ref 3.8–10.5)
WBC # FLD AUTO: 10.7 K/UL — HIGH (ref 3.8–10.5)

## 2019-07-26 PROCEDURE — 99284 EMERGENCY DEPT VISIT MOD MDM: CPT

## 2019-07-26 PROCEDURE — 74176 CT ABD & PELVIS W/O CONTRAST: CPT | Mod: 26

## 2019-07-26 RX ORDER — SODIUM CHLORIDE 9 MG/ML
1000 INJECTION INTRAMUSCULAR; INTRAVENOUS; SUBCUTANEOUS ONCE
Refills: 0 | Status: COMPLETED | OUTPATIENT
Start: 2019-07-26 | End: 2019-07-26

## 2019-07-26 RX ORDER — ACETAMINOPHEN 500 MG
325 TABLET ORAL ONCE
Refills: 0 | Status: COMPLETED | OUTPATIENT
Start: 2019-07-26 | End: 2019-07-26

## 2019-07-26 RX ORDER — ACETAMINOPHEN 500 MG
650 TABLET ORAL ONCE
Refills: 0 | Status: COMPLETED | OUTPATIENT
Start: 2019-07-26 | End: 2019-07-26

## 2019-07-26 RX ORDER — MULTIVIT WITH MIN/MFOLATE/K2 340-15/3 G
1 POWDER (GRAM) ORAL ONCE
Refills: 0 | Status: COMPLETED | OUTPATIENT
Start: 2019-07-26 | End: 2019-07-26

## 2019-07-26 RX ORDER — SODIUM CHLORIDE 9 MG/ML
500 INJECTION INTRAMUSCULAR; INTRAVENOUS; SUBCUTANEOUS ONCE
Refills: 0 | Status: COMPLETED | OUTPATIENT
Start: 2019-07-26 | End: 2019-07-26

## 2019-07-26 RX ORDER — IOHEXOL 300 MG/ML
30 INJECTION, SOLUTION INTRAVENOUS ONCE
Refills: 0 | Status: COMPLETED | OUTPATIENT
Start: 2019-07-26 | End: 2019-07-26

## 2019-07-26 RX ADMIN — Medication 325 MILLIGRAM(S): at 20:31

## 2019-07-26 RX ADMIN — Medication 650 MILLIGRAM(S): at 21:00

## 2019-07-26 RX ADMIN — Medication 325 MILLIGRAM(S): at 21:00

## 2019-07-26 RX ADMIN — IOHEXOL 30 MILLILITER(S): 300 INJECTION, SOLUTION INTRAVENOUS at 19:24

## 2019-07-26 RX ADMIN — SODIUM CHLORIDE 1000 MILLILITER(S): 9 INJECTION INTRAMUSCULAR; INTRAVENOUS; SUBCUTANEOUS at 20:30

## 2019-07-26 RX ADMIN — SODIUM CHLORIDE 500 MILLILITER(S): 9 INJECTION INTRAMUSCULAR; INTRAVENOUS; SUBCUTANEOUS at 20:30

## 2019-07-26 RX ADMIN — Medication 650 MILLIGRAM(S): at 20:26

## 2019-07-26 RX ADMIN — SODIUM CHLORIDE 1000 MILLILITER(S): 9 INJECTION INTRAMUSCULAR; INTRAVENOUS; SUBCUTANEOUS at 18:49

## 2019-07-26 NOTE — ED ADULT TRIAGE NOTE - CHIEF COMPLAINT QUOTE
pt states cold like symptoms x 1 month, does not want to talk about further reasons why here in triage only with MD

## 2019-07-26 NOTE — ED PROVIDER NOTE - OBJECTIVE STATEMENT
34 yo male with PMHx of asthma and syphilis , presenting to ED with complaints of abd pain, nausea, diarrhea shaking chills and some headache. Patient states he had unprotected sex last week with a new partner and although he saw the pts recent lab work with a negative status. Patient denies penile discharge or urinary frequency.

## 2019-07-26 NOTE — ED PROVIDER NOTE - CLINICAL SUMMARY MEDICAL DECISION MAKING FREE TEXT BOX
Based on exam and history will R/O appy, give fluids, obtain labs, urine, and reassess, patient allergic to contrast, zofran and toradol, will see what labs show then possible US.

## 2019-07-26 NOTE — ED PROVIDER NOTE - PROGRESS NOTE DETAILS
Due to elevated white count and tenderness RLQ, will give patient oral contrast, "allergy" was vomiting, no airway issues, hives or rash. Discussed with patient for best result will need to attempt to drink contrast, he agreed. discussed results of CT with patient, currently has no urinary symptoms of dysuria, or hematuria, ct consistent with fecal retention, discussed enlarged prostate and bladder findings, recommended outpatient follow up with urology, will add urine culture to work up.

## 2019-07-26 NOTE — ED ADULT NURSE NOTE - NSIMPLEMENTINTERV_GEN_ALL_ED
Implemented All Universal Safety Interventions:  [place] to call system. Call bell, personal items and telephone within reach. Instruct patient to call for assistance. Room bathroom lighting operational. Non-slip footwear when patient is off stretcher. Physically safe environment: no spills, clutter or unnecessary equipment. Stretcher in lowest position, wheels locked, appropriate side rails in place.

## 2019-07-26 NOTE — ED ADULT TRIAGE NOTE - NS ED NOTE AC HIGH RISK COUNTRIES
No How Severe Is Your Acne?: moderate Is This A New Presentation, Or A Follow-Up?: Acne What Type Of Note Output Would You Prefer (Optional)?: Bullet Format Additional Comments (Use Complete Sentences): Today is an average day for her acne.

## 2019-07-26 NOTE — ED PROVIDER NOTE - NSFOLLOWUPCLINICS_GEN_ALL_ED_FT
[facility]rology [facility]  Urology  [address] [place], [address]rd Floor  [place], [place] [address]  Phone: [phone]  Fax:   Follow Up Time:

## 2019-07-27 VITALS
OXYGEN SATURATION: 98 % | SYSTOLIC BLOOD PRESSURE: 123 MMHG | HEART RATE: 82 BPM | TEMPERATURE: 99 F | DIASTOLIC BLOOD PRESSURE: 87 MMHG | RESPIRATION RATE: 17 BRPM

## 2019-07-27 RX ADMIN — Medication 1 BOTTLE: at 00:26

## 2019-07-30 DIAGNOSIS — R19.7 DIARRHEA, UNSPECIFIED: ICD-10-CM

## 2019-07-30 DIAGNOSIS — R10.31 RIGHT LOWER QUADRANT PAIN: ICD-10-CM

## 2019-07-30 DIAGNOSIS — R10.33 PERIUMBILICAL PAIN: ICD-10-CM

## 2019-07-30 DIAGNOSIS — R11.0 NAUSEA: ICD-10-CM

## 2019-07-30 DIAGNOSIS — R50.9 FEVER, UNSPECIFIED: ICD-10-CM

## 2019-08-01 LAB
CULTURE RESULTS: SIGNIFICANT CHANGE UP
CULTURE RESULTS: SIGNIFICANT CHANGE UP
SPECIMEN SOURCE: SIGNIFICANT CHANGE UP
SPECIMEN SOURCE: SIGNIFICANT CHANGE UP

## 2019-08-23 ENCOUNTER — EMERGENCY (EMERGENCY)
Facility: HOSPITAL | Age: 34
LOS: 1 days | Discharge: HOME | End: 2019-08-23
Attending: EMERGENCY MEDICINE | Admitting: EMERGENCY MEDICINE
Payer: SELF-PAY

## 2019-08-23 VITALS
DIASTOLIC BLOOD PRESSURE: 71 MMHG | RESPIRATION RATE: 16 BRPM | TEMPERATURE: 98 F | WEIGHT: 182.54 LBS | HEART RATE: 96 BPM | OXYGEN SATURATION: 97 % | HEIGHT: 74 IN | SYSTOLIC BLOOD PRESSURE: 117 MMHG

## 2019-08-23 VITALS
RESPIRATION RATE: 19 BRPM | HEART RATE: 89 BPM | DIASTOLIC BLOOD PRESSURE: 68 MMHG | OXYGEN SATURATION: 99 % | TEMPERATURE: 98 F | SYSTOLIC BLOOD PRESSURE: 112 MMHG

## 2019-08-23 PROCEDURE — 99053 MED SERV 10PM-8AM 24 HR FAC: CPT

## 2019-08-23 PROCEDURE — 94640 AIRWAY INHALATION TREATMENT: CPT

## 2019-08-23 PROCEDURE — 99284 EMERGENCY DEPT VISIT MOD MDM: CPT

## 2019-08-23 PROCEDURE — 99284 EMERGENCY DEPT VISIT MOD MDM: CPT | Mod: 25

## 2019-08-23 RX ORDER — ALBUTEROL 90 UG/1
2.5 AEROSOL, METERED ORAL ONCE
Refills: 0 | Status: COMPLETED | OUTPATIENT
Start: 2019-08-23 | End: 2019-08-23

## 2019-08-23 RX ORDER — IPRATROPIUM BROMIDE 0.2 MG/ML
500 SOLUTION, NON-ORAL INHALATION ONCE
Refills: 0 | Status: COMPLETED | OUTPATIENT
Start: 2019-08-23 | End: 2019-08-23

## 2019-08-23 RX ADMIN — Medication 500 MICROGRAM(S): at 01:08

## 2019-08-23 RX ADMIN — ALBUTEROL 2.5 MILLIGRAM(S): 90 AEROSOL, METERED ORAL at 01:09

## 2019-08-23 NOTE — ED PROVIDER NOTE - OBJECTIVE STATEMENT
The pt is a 34 y/o M, who presents to ED stating "I'm here for a neb" The pt is a 32 y/o M, who presents to ED stating "I'm here for a neb" - states hx of asthma, used his MDI w/relief PTA, still feels like "chest is tight". Last attack a few mon ago, never intubated. Denies cp, cough, fevers, chills, n/v, any other c/o

## 2019-08-23 NOTE — ED PROVIDER NOTE - CLINICAL SUMMARY MEDICAL DECISION MAKING FREE TEXT BOX
pt w/hx of asthma, wanting a neb, declined steroids, used mdi pta w/some relief, never intubated/never hospitalized, well appearing, no hypoxia or tachypnea or tachycardia, given neb, pt sleeping in chair, suspect 2/2 gain for ed visit since raining outside, stable for dc, to f/u w/pmd

## 2019-08-23 NOTE — ED ADULT NURSE NOTE - NSIMPLEMENTINTERV_GEN_ALL_ED
Implemented All Universal Safety Interventions:  [place] to call system. Call bell, personal items and telephone within reach. Instruct patient to call for assistance. Room bathroom lighting operational. Non-slip footwear when patient is off stretcher. Physically safe environment: no spills, clutter or unnecessary equipment. Stretcher in lowest position, wheels locked, appropriate side rails in place.

## 2019-08-23 NOTE — ED ADULT NURSE REASSESSMENT NOTE - NS ED NURSE REASSESS COMMENT FT1
pt feels better now, instructed to take his nebulizer all the time and take 2 puffs whenever necessary and ff-up with his pulmonologist

## 2019-08-23 NOTE — ED ADULT TRIAGE NOTE - CHIEF COMPLAINT QUOTE
Patient presents to ED for SOB x 3 days.  Reports using his emergency inhaler twice a day with no relief.  "I am here for a breathing treatment." Denies any history of intubations, CP, cough.  Able to speak in full sentences with no difficulty.

## 2019-08-23 NOTE — ED ADULT TRIAGE NOTE - MODE OF ARRIVAL
Pt states he "just wants to get checked out quick and will go to my primary MD today", pt states he does not want to stay or receive any tx, pt aware of risks of leaving AMA, MD Hodges went over documents w/ pt, forms signed, pt able to ambulate safely and steadily w/out assistance, resp even and unlabored, LS clear and equal bilat, sat @ 96% on room air, preparing for d/c
Walk in

## 2019-08-23 NOTE — ED PROVIDER NOTE - ATTENDING CONTRIBUTION TO CARE
I have seen the pt, reviewed all pertinent clinical data, and I agree with the documentation/care/plan executed by ALISE Brewer.

## 2019-08-23 NOTE — ED ADULT NURSE NOTE - CHPI ED NUR SYMPTOMS NEG
no edema/no body aches/no chest pain/no chills/no fever/no cough/no headache/no diaphoresis/no hemoptysis

## 2019-08-27 DIAGNOSIS — R06.02 SHORTNESS OF BREATH: ICD-10-CM

## 2019-11-10 ENCOUNTER — EMERGENCY (EMERGENCY)
Facility: HOSPITAL | Age: 34
LOS: 1 days | Discharge: HOME | End: 2019-11-10
Attending: EMERGENCY MEDICINE | Admitting: EMERGENCY MEDICINE
Payer: COMMERCIAL

## 2019-11-10 VITALS
OXYGEN SATURATION: 97 % | SYSTOLIC BLOOD PRESSURE: 124 MMHG | RESPIRATION RATE: 16 BRPM | HEART RATE: 79 BPM | TEMPERATURE: 98 F | DIASTOLIC BLOOD PRESSURE: 61 MMHG

## 2019-11-10 LAB
APPEARANCE UR: CLEAR — SIGNIFICANT CHANGE UP
BILIRUB UR-MCNC: NEGATIVE — SIGNIFICANT CHANGE UP
COLOR SPEC: YELLOW — SIGNIFICANT CHANGE UP
DIFF PNL FLD: NEGATIVE — SIGNIFICANT CHANGE UP
GLUCOSE UR QL: NEGATIVE — SIGNIFICANT CHANGE UP
KETONES UR-MCNC: NEGATIVE — SIGNIFICANT CHANGE UP
LEUKOCYTE ESTERASE UR-ACNC: NEGATIVE — SIGNIFICANT CHANGE UP
NITRITE UR-MCNC: NEGATIVE — SIGNIFICANT CHANGE UP
PH UR: 6 — SIGNIFICANT CHANGE UP (ref 5–8)
PROT UR-MCNC: NEGATIVE MG/DL — SIGNIFICANT CHANGE UP
SP GR SPEC: 1.02 — SIGNIFICANT CHANGE UP (ref 1–1.03)
UROBILINOGEN FLD QL: 0.2 E.U./DL — SIGNIFICANT CHANGE UP

## 2019-11-10 PROCEDURE — 94640 AIRWAY INHALATION TREATMENT: CPT

## 2019-11-10 PROCEDURE — 71046 X-RAY EXAM CHEST 2 VIEWS: CPT

## 2019-11-10 PROCEDURE — 71046 X-RAY EXAM CHEST 2 VIEWS: CPT | Mod: 26

## 2019-11-10 PROCEDURE — 81003 URINALYSIS AUTO W/O SCOPE: CPT

## 2019-11-10 PROCEDURE — 99284 EMERGENCY DEPT VISIT MOD MDM: CPT

## 2019-11-10 PROCEDURE — 99283 EMERGENCY DEPT VISIT LOW MDM: CPT | Mod: 25

## 2019-11-10 RX ORDER — FLUTICASONE PROPIONATE AND SALMETEROL 50; 250 UG/1; UG/1
1 POWDER ORAL; RESPIRATORY (INHALATION)
Qty: 1 | Refills: 0
Start: 2019-11-10 | End: 2019-11-16

## 2019-11-10 RX ORDER — ALBUTEROL 90 UG/1
1 AEROSOL, METERED ORAL
Qty: 1 | Refills: 0
Start: 2019-11-10 | End: 2019-11-16

## 2019-11-10 RX ORDER — IBUPROFEN 200 MG
600 TABLET ORAL ONCE
Refills: 0 | Status: COMPLETED | OUTPATIENT
Start: 2019-11-10 | End: 2019-11-10

## 2019-11-10 RX ORDER — IPRATROPIUM/ALBUTEROL SULFATE 18-103MCG
3 AEROSOL WITH ADAPTER (GRAM) INHALATION ONCE
Refills: 0 | Status: COMPLETED | OUTPATIENT
Start: 2019-11-10 | End: 2019-11-10

## 2019-11-10 RX ADMIN — Medication 3 MILLILITER(S): at 05:27

## 2019-11-10 RX ADMIN — Medication 600 MILLIGRAM(S): at 05:50

## 2019-11-10 NOTE — ED PROVIDER NOTE - PROGRESS NOTE DETAILS
[place]: Feeling much better, CXR wnl, no SOB even w/ ambulating, comfortable for dc, outpt pmd/pulm f/u.

## 2019-11-10 NOTE — ED PROVIDER NOTE - PATIENT PORTAL LINK FT
You can access the FollowMyHealth Patient Portal offered by [facility] by registering at the following website: [email or web address]. By joining [email or web address]’s FollowMyHealth portal, you will also be able to view your health information using other applications (apps) compatible with our system.

## 2019-11-10 NOTE — ED PROVIDER NOTE - ATTENDING CONTRIBUTION TO CARE
none 34M PMH asthma p/w 2w sob, feels similar to multiple prior episodes attributed to asthma. Also since yesterday has subjective fevers, rhinorrhea/congestion, sore throat. No other systemic symptoms. Vitals wnl, exam as above. No resp distress.   ddx: Likely URI/RAD.   CXR, symptom control, reassess.

## 2019-11-10 NOTE — ED PROVIDER NOTE - CLINICAL SUMMARY MEDICAL DECISION MAKING FREE TEXT BOX
33 y/o male here with sob and uri symptoms. pt also reports dry mouth. he then complains about constipation and sometimes HA. Possible wheezing vs pt purposely trying to mimic wheezing. Suspect possible somatization. UA negative. CXR negative. Advised PMD follow-up. Do not suspect acute illness.

## 2019-11-10 NOTE — ED PROVIDER NOTE - NSFOLLOWUPINSTRUCTIONS_ED_ALL_ED_FT
Asthma, Adult  Image   Asthma is a long-term (chronic) condition that causes recurrent episodes in which the airways become tight and narrow. The airways are the passages that lead from the nose and mouth down into the lungs. Asthma episodes, also called asthma attacks, can cause coughing, wheezing, shortness of breath, and chest pain. The airways can also fill with mucus. During an attack, it can be difficult to breathe. Asthma attacks can range from minor to life threatening.  Asthma cannot be cured, but medicines and lifestyle changes can help control it and treat acute attacks.  What are the causes?  This condition is believed to be caused by inherited (genetic) and environmental factors, but its exact cause is not known.  There are many things that can bring on an asthma attack or make asthma symptoms worse (triggers). Asthma triggers are different for each person. Common triggers include:  Mold.Dust.Cigarette smoke.Cockroaches.Things that can cause allergy symptoms (allergens), such as animal dander or pollen from trees or grass.Air pollutants such as household , wood smoke, smog, or chemical odors.Cold air, weather changes, and winds (which increase molds and pollen in the air).Strong emotional expressions such as crying or laughing hard.Stress.Certain medicines (such as aspirin) or types of medicines (such as beta-blockers).Sulfites in foods and drinks. Foods and drinks that may contain sulfites include dried fruit, potato chips, and sparkling grape juice.Infections or inflammatory conditions such as the flu, a cold, or inflammation of the nasal membranes (rhinitis).Gastroesophageal reflux disease (GERD).Exercise or strenuous activity.What are the signs or symptoms?  Symptoms of this condition may occur right after asthma is triggered or many hours later. Symptoms include:  Wheezing. This can sound like whistling when you breathe.Excessive nighttime or early morning coughing.Frequent or severe coughing with a common cold.Chest tightness.Shortness of breath.Tiredness (fatigue) with minimal activity.How is this diagnosed?  This condition is diagnosed based on:  Your medical history.A physical exam.Tests, which may include:  Lung function studies and pulmonary studies (spirometry). These tests can evaluate the flow of air in your lungs.Allergy tests.Imaging tests, such as X-rays.How is this treated?  There is no cure for this condition, but treatment can help control your symptoms. Treatment for asthma usually involves:  Identifying and avoiding your asthma triggers.Using medicines to control your symptoms. Generally, two types of medicines are used to treat asthma:  Controller medicines. These help prevent asthma symptoms from occurring. They are usually taken every day.Fast-acting reliever or rescue medicines. These quickly relieve asthma symptoms by widening the narrow and tight airways. They are used as needed and provide short-term relief.Using supplemental oxygen. This may be needed during a severe episode.Using other medicines, such as:  Allergy medicines, such as antihistamines, if your asthma attacks are triggered by allergens.Immune medicines (immunomodulators). These are medicines that help control the immune system.Creating an asthma action plan. An asthma action plan is a written plan for managing and treating your asthma attacks. This plan includes:  A list of your asthma triggers and how to avoid them.Information about when medicines should be taken and when their dosage should be changed.Instructions about using a device called a peak flow meter. A peak flow meter measures how well the lungs are working and the severity of your asthma. It helps you monitor your condition.Follow these instructions at home:  Controlling your home environment     Control your home environment in the following ways to help avoid triggers and prevent asthma attacks:  Change your heating and air conditioning filter regularly.Limit your use of fireplaces and wood stoves.Get rid of pests (such as roaches and mice) and their droppings.Throw away plants if you see mold on them.Clean floors and dust surfaces regularly. Use unscented cleaning products.Try to have someone else vacuum for you regularly. Stay out of rooms while they are being vacuumed and for a short while afterward. If you vacuum, use a dust mask from a hardware store, a double-layered or microfilter vacuum  bag, or a vacuum  with a HEPA filter.Replace carpet with wood, tile, or vinyl navid. Carpet can trap dander and dust.Use allergy-proof pillows, mattress covers, and box spring covers.Keep your bedroom a trigger-free room. Avoid pets and keep windows closed when allergens are in the air.Wash beddings every week in hot water and dry them in a dryer.Use blankets that are made of polyester or cotton.Clean bathrooms and matthew with bleach. If possible, have someone repaint the walls in these rooms with mold-resistant paint. Stay out of the rooms that are being cleaned and painted.Wash your hands often with soap and water. If soap and water are not available, use hand .Do not allow anyone to smoke in your home.General instructions    Take over-the-counter and prescription medicines only as told by your health care provider.  Speak with your health care provider if you have questions about how or when to take the medicines.Make note if you are requiring more frequent dosages.Do not use any products that contain nicotine or tobacco, such as cigarettes and e-cigarettes. If you need help quitting, ask your health care provider. Also, avoid being exposed to secondhand smoke.Use a peak flow meter as told by your health care provider. Record and keep track of the readings.Understand and use the asthma action plan to help minimize, or stop an asthma attack, without needing to seek medical care.Make sure you stay up to date on your yearly vaccinations as told by your health care provider. This may include vaccines for the flu and pneumonia.Avoid outdoor activities when allergen counts are high and when air quality is low.Wear a ski mask that covers your nose and mouth during outdoor winter activities. Exercise indoors on cold days if you can.Warm up before exercising, and take time for a cool-down period after exercise.Keep all follow-up visits as told by your health care provider. This is important.Where to find more information  For information about asthma, turn to the Centers for Disease Control and Prevention at www.cdc.gov/asthma/faqs.htmFor air quality information, turn to AirNow at https://airnow.gov/Contact a health care provider if:  You have wheezing, shortness of breath, or a cough even while you are taking medicine to prevent attacks.The mucus you cough up (sputum) is thicker than usual.Your sputum changes from clear or white to yellow, green, gray, or bloody.Your medicines are causing side effects, such as a rash, itching, swelling, or trouble breathing.You need to use a reliever medicine more than 2–3 times a week.Your peak flow reading is still at 50–79% of your personal best after following your action plan for 1 hour.You have a fever.Get help right away if:  You are getting worse and do not respond to treatment during an asthma attack.You are short of breath when at rest or when doing very little physical activity.You have difficulty eating, drinking, or talking.You have chest pain or tightness.You develop a fast heartbeat or palpitations.You have a bluish color to your lips or fingernails.You are light-headed or dizzy, or you faint.Your peak flow reading is less than 50% of your personal best.You feel too tired to breathe normally.Summary  Asthma is a long-term (chronic) condition that causes recurrent episodes in which the airways become tight and narrow. These episodes can cause coughing, wheezing, shortness of breath, and chest pain.Asthma cannot be cured, but medicines and lifestyle changes can help control it and treat acute attacks.Make sure you understand how to avoid triggers and how and when to use your medicines.Asthma attacks can range from minor to life threatening. Get help right away if you have an asthma attack and do not respond to treatment with your usual rescue medicines.This information is not intended to replace advice given to you by your health care provider. Make sure you discuss any questions you have with your health care provider.    Document Released: 12/18/2006 Document Revised: 01/22/2018 Document Reviewed: 01/22/2018  [employer] Interactive Patient Education © 2019 [employer]

## 2019-11-10 NOTE — ED ADULT NURSE NOTE - NSIMPLEMENTINTERV_GEN_ALL_ED
Implemented All Fall Risk Interventions:  [place] to call system. Call bell, personal items and telephone within reach. Instruct patient to call for assistance. Room bathroom lighting operational. Non-slip footwear when patient is off stretcher. Physically safe environment: no spills, clutter or unnecessary equipment. Stretcher in lowest position, wheels locked, appropriate side rails in place. Provide visual cue, wrist band, yellow gown, etc. Monitor gait and stability. Monitor for mental status changes and reorient to person, place, and time. Review medications for side effects contributing to fall risk. Reinforce activity limits and safety measures with patient and family. Implemented All Universal Safety Interventions:  [place] to call system. Call bell, personal items and telephone within reach. Instruct patient to call for assistance. Room bathroom lighting operational. Non-slip footwear when patient is off stretcher. Physically safe environment: no spills, clutter or unnecessary equipment. Stretcher in lowest position, wheels locked, appropriate side rails in place.

## 2019-11-10 NOTE — ED PROVIDER NOTE - OBJECTIVE STATEMENT
35 y/o male with a PMHx of asthma is present here in the ED c/o sob x1.5 weeks. Pt states he sob is associated with fever x1 episode yesterday, body aches, sore throat, earache, rhinorrhea. No hx of intubation/hospitalization. Denies the following: chills, syncope, dizziness, HA, N/V, chest pain, difficulty breathing. Pt states he has been exposed to several sick people on the train.

## 2019-11-10 NOTE — ED ADULT NURSE NOTE - OBJECTIVE STATEMENT
Patient to the ED s/p fall, presents with R forehead/temporal abrasion, patient was drinking, stated that he had 3 mixed drinks. Received a 34 year old male with a chief complaint of shortness of breath and generalized body aches two weeks prior.

## 2019-11-15 DIAGNOSIS — R06.2 WHEEZING: ICD-10-CM

## 2019-11-15 DIAGNOSIS — Z79.1 LONG TERM (CURRENT) USE OF NON-STEROIDAL ANTI-INFLAMMATORIES (NSAID): ICD-10-CM

## 2019-11-15 DIAGNOSIS — Z91.018 ALLERGY TO OTHER FOODS: ICD-10-CM

## 2019-11-15 DIAGNOSIS — H92.09 OTALGIA, UNSPECIFIED EAR: ICD-10-CM

## 2019-11-15 DIAGNOSIS — Z88.6 ALLERGY STATUS TO ANALGESIC AGENT: ICD-10-CM

## 2019-11-15 DIAGNOSIS — Z79.899 OTHER LONG TERM (CURRENT) DRUG THERAPY: ICD-10-CM

## 2019-11-15 DIAGNOSIS — R50.9 FEVER, UNSPECIFIED: ICD-10-CM

## 2019-11-15 DIAGNOSIS — Z88.8 ALLERGY STATUS TO OTHER DRUGS, MEDICAMENTS AND BIOLOGICAL SUBSTANCES STATUS: ICD-10-CM

## 2019-11-15 DIAGNOSIS — R52 PAIN, UNSPECIFIED: ICD-10-CM

## 2019-11-15 DIAGNOSIS — J02.9 ACUTE PHARYNGITIS, UNSPECIFIED: ICD-10-CM

## 2019-11-15 DIAGNOSIS — Z88.1 ALLERGY STATUS TO OTHER ANTIBIOTIC AGENTS STATUS: ICD-10-CM

## 2019-11-25 VITALS
HEART RATE: 88 BPM | TEMPERATURE: 98 F | DIASTOLIC BLOOD PRESSURE: 79 MMHG | OXYGEN SATURATION: 96 % | HEIGHT: 74 IN | SYSTOLIC BLOOD PRESSURE: 128 MMHG | WEIGHT: 179.9 LBS | RESPIRATION RATE: 16 BRPM

## 2019-11-25 LAB
ALBUMIN SERPL ELPH-MCNC: 3.4 G/DL — SIGNIFICANT CHANGE UP (ref 3.4–5)
ALP SERPL-CCNC: 90 U/L — SIGNIFICANT CHANGE UP (ref 40–120)
ALT FLD-CCNC: 20 U/L — SIGNIFICANT CHANGE UP (ref 12–42)
ANION GAP SERPL CALC-SCNC: 6 MMOL/L — LOW (ref 9–16)
APPEARANCE UR: CLEAR — SIGNIFICANT CHANGE UP
AST SERPL-CCNC: 18 U/L — SIGNIFICANT CHANGE UP (ref 15–37)
BILIRUB SERPL-MCNC: 0.2 MG/DL — SIGNIFICANT CHANGE UP (ref 0.2–1.2)
BILIRUB UR-MCNC: NEGATIVE — SIGNIFICANT CHANGE UP
BUN SERPL-MCNC: 17 MG/DL — SIGNIFICANT CHANGE UP (ref 7–23)
CALCIUM SERPL-MCNC: 8.8 MG/DL — SIGNIFICANT CHANGE UP (ref 8.5–10.5)
CHLORIDE SERPL-SCNC: 108 MMOL/L — SIGNIFICANT CHANGE UP (ref 96–108)
CO2 SERPL-SCNC: 30 MMOL/L — SIGNIFICANT CHANGE UP (ref 22–31)
COLOR SPEC: YELLOW — SIGNIFICANT CHANGE UP
CREAT SERPL-MCNC: 0.89 MG/DL — SIGNIFICANT CHANGE UP (ref 0.5–1.3)
DIFF PNL FLD: NEGATIVE — SIGNIFICANT CHANGE UP
ETHANOL SERPL-MCNC: <3 MG/DL — SIGNIFICANT CHANGE UP
GLUCOSE SERPL-MCNC: 121 MG/DL — HIGH (ref 70–99)
GLUCOSE UR QL: NEGATIVE — SIGNIFICANT CHANGE UP
HCT VFR BLD CALC: 39.1 % — SIGNIFICANT CHANGE UP (ref 39–50)
HGB BLD-MCNC: 11.8 G/DL — LOW (ref 13–17)
KETONES UR-MCNC: NEGATIVE — SIGNIFICANT CHANGE UP
LEUKOCYTE ESTERASE UR-ACNC: NEGATIVE — SIGNIFICANT CHANGE UP
MCHC RBC-ENTMCNC: 22.4 PG — LOW (ref 27–34)
MCHC RBC-ENTMCNC: 30.2 GM/DL — LOW (ref 32–36)
MCV RBC AUTO: 74.2 FL — LOW (ref 80–100)
NITRITE UR-MCNC: NEGATIVE — SIGNIFICANT CHANGE UP
NRBC # BLD: 0 /100 WBCS — SIGNIFICANT CHANGE UP (ref 0–0)
PCP SPEC-MCNC: SIGNIFICANT CHANGE UP
PH UR: 6 — SIGNIFICANT CHANGE UP (ref 5–8)
PLATELET # BLD AUTO: 237 K/UL — SIGNIFICANT CHANGE UP (ref 150–400)
POTASSIUM SERPL-MCNC: 4.1 MMOL/L — SIGNIFICANT CHANGE UP (ref 3.5–5.3)
POTASSIUM SERPL-SCNC: 4.1 MMOL/L — SIGNIFICANT CHANGE UP (ref 3.5–5.3)
PROT SERPL-MCNC: 7.2 G/DL — SIGNIFICANT CHANGE UP (ref 6.4–8.2)
PROT UR-MCNC: NEGATIVE MG/DL — SIGNIFICANT CHANGE UP
RBC # BLD: 5.27 M/UL — SIGNIFICANT CHANGE UP (ref 4.2–5.8)
RBC # FLD: 15.6 % — HIGH (ref 10.3–14.5)
SODIUM SERPL-SCNC: 144 MMOL/L — SIGNIFICANT CHANGE UP (ref 132–145)
SP GR SPEC: 1.01 — SIGNIFICANT CHANGE UP (ref 1–1.03)
UROBILINOGEN FLD QL: 0.2 E.U./DL — SIGNIFICANT CHANGE UP
WBC # BLD: 5.74 K/UL — SIGNIFICANT CHANGE UP (ref 3.8–10.5)
WBC # FLD AUTO: 5.74 K/UL — SIGNIFICANT CHANGE UP (ref 3.8–10.5)

## 2019-11-25 PROCEDURE — 99232 SBSQ HOSP IP/OBS MODERATE 35: CPT

## 2019-11-25 PROCEDURE — 90792 PSYCH DIAG EVAL W/MED SRVCS: CPT | Mod: GT

## 2019-11-25 RX ORDER — ALBUTEROL 90 UG/1
2 AEROSOL, METERED ORAL EVERY 6 HOURS
Refills: 0 | Status: DISCONTINUED | OUTPATIENT
Start: 2019-11-27 | End: 2019-12-04

## 2019-11-25 RX ORDER — FOLIC ACID 0.8 MG
1 TABLET ORAL DAILY
Refills: 0 | Status: DISCONTINUED | OUTPATIENT
Start: 2019-11-27 | End: 2019-12-04

## 2019-11-25 RX ORDER — ALBUTEROL 90 UG/1
1 AEROSOL, METERED ORAL
Refills: 0 | Status: DISCONTINUED | OUTPATIENT
Start: 2019-11-27 | End: 2019-11-28

## 2019-11-25 RX ORDER — THIAMINE MONONITRATE (VIT B1) 100 MG
100 TABLET ORAL DAILY
Refills: 0 | Status: COMPLETED | OUTPATIENT
Start: 2019-11-27 | End: 2019-11-30

## 2019-11-25 RX ORDER — HALOPERIDOL DECANOATE 100 MG/ML
2 INJECTION INTRAMUSCULAR EVERY 6 HOURS
Refills: 0 | Status: DISCONTINUED | OUTPATIENT
Start: 2019-11-27 | End: 2019-12-02

## 2019-11-25 NOTE — ED BEHAVIORAL HEALTH ASSESSMENT NOTE - HPI (INCLUDE ILLNESS QUALITY, SEVERITY, DURATION, TIMING, CONTEXT, MODIFYING FACTORS, ASSOCIATED SIGNS AND SYMPTOMS)
This is a 33 y/o man, living with roommates, unemployed living on savings, PPhx MDD, 3 previous [facility] admits with last one a few years ago, not in outpatient, denies substance use history, reports 2 past SAs a few years back, Pmhx Asthma , self presenting with suicidal ideation for 3 days.    Pt reports feeling depressed This is a 35 y/o man, living with roommates, unemployed living on savings, PPhx MDD, 3 previous [facility] admits with last one a few years ago, not in outpatient, denies substance use history, reports 2 past SAs a few years back, Pmhx Asthma , self presenting with suicidal ideation for 3 days.    Pt reports feeling depressed for 3 days because the holidays have brought up thoughts of his  family members. He says its always hard during holidays since they  but this holiday he started to get Si with plan to drink himself to death. He bought liquor (8 bottles) yesterday with that plan and started to drink it but stopped himself. After thinking more he came in today to get help since he still has Si with plan  to OD on alcohol or jump in front of a car or something else. he states he would still hurt himself if he went home.   He reports changes in sleep, energy, and guilt. some anhedonia.   Reports 2 past SAs years ago of trying to jump in front of cars which swerved.     He denies HI, denies AVH/PI.   Denies manic episodes.     STates he has never been in outpatient and doesn't take psych meds, states he didn't follow up after discharges. Reports he had 2 psych admissions years ago, one at [facility] and another at [facility]. When asked about the [facility] one he states he doesn't remember.     He requests psychiatric admission for his current suicidality and to discuss meds with the inpatient provider.

## 2019-11-25 NOTE — ED BEHAVIORAL HEALTH ASSESSMENT NOTE - SUICIDE RISK FACTORS
Unable to engage in safety planning/Anhedonia/Mood Disorder current/past/Hopelessness or despair/Current mood episode

## 2019-11-25 NOTE — ED BEHAVIORAL HEALTH ASSESSMENT NOTE - ACTIVATING EVENTS/STRESSORS
Current or pending social isolation/Triggering events leading to humiliation, shame, and/or despair (e.g. Loss of relationship, financial or health status) (real or anticipated)/Non-compliant or not receiving treatment

## 2019-11-25 NOTE — ED PROVIDER NOTE - OBJECTIVE STATEMENT
Patient states he is depressed and suicidal.  Patient states that he had 2 family members that were killed in an accident many years ago and the time around the holidays are very hard for him psychiatrically.  Patient states that his suicidal thoughts started yesterday with a plan to drink himself to death with alcohol.  Patient states that he bought "20 dollars worth" of cheap liquor but only drank "2 dollars worth" then threw the rest away and decided to come here seeking help instead.  Patient reports he is still suicidal with a continued plan to drink himself to death if he "can't find help".  Patient denies hallucinations or HI.  Patient reports being psychiatrically hospitalized "many years ago" for similar symptoms.  No medical complaints.

## 2019-11-25 NOTE — ED BEHAVIORAL HEALTH ASSESSMENT NOTE - MEDICAL ISSUES AND PLAN (INCLUDE STANDING AND PRN MEDICATION)
monitor vitals as per unit protocol monitor vitals as per unit protocol. The system is not allowing me to order he advair diskus, thus ordered the standing and prn albuterol (please follow up in the AM re advair)

## 2019-11-25 NOTE — ED BEHAVIORAL HEALTH ASSESSMENT NOTE - OTHER
roommates [facility], [facility], [facility], [facility], [facility], [facility], [facility], [facility], [facility], [facility], [facility], [facility], [facility], [facility], [facility], [facility], [facility], [facility], [facility], [facility] wants to get help tele external billing

## 2019-11-25 NOTE — ED ADULT TRIAGE NOTE - CHIEF COMPLAINT QUOTE
c/o suicidal ideation/attempt. reports buying several bottles of alcohol so he can drink until he  "when you drink, it takes the pain away." reports drinking half a bottle last night and stopped drinking because he could no longer go through the plan. denies drugs. pt states "I don't want the whole hospital to know my story, I don't want to tell my story to everyone, only to a few who need to hear it."

## 2019-11-25 NOTE — ED BEHAVIORAL HEALTH ASSESSMENT NOTE - AXIS IV
Occupational problems/Problem related to social environment/Problems with primary support/Economic problems

## 2019-11-25 NOTE — ED PROVIDER NOTE - CLINICAL SUMMARY MEDICAL DECISION MAKING FREE TEXT BOX
Patient reports depressed and suicidal with plan to drink self to death.  Admits to some alcohol intake, denies drugs.  Will check labs, EKG, tox, alcohol level and consult psych when appropriate.  1:1 at all times.  In behavioral health room, all possessions wanded and with security.  Will closely observe.  No evidence of acuite medical conditions.

## 2019-11-25 NOTE — ED BEHAVIORAL HEALTH ASSESSMENT NOTE - SUMMARY
This is a 35 y/o man, living with roommates, unemployed living on savings, PPhx MDD, 3 previous [facility] admits with last one a few years ago, not in outpatient, denies substance use history, reports 2 past SAs a few years back, Pmhx Asthma , self presenting with suicidal ideation for 3 days.    Pt reports depression and SI with plan and intent in the setting of loneliness during holidays after his family members passed away years ago. He continues to report current SI with plan and intent and is unable to engage in safety planning. Pt requests and consents to inpatient psychiatric admission.   Given current SI with plan and intent, inability to engage in safety planning, not in treatment, patient is a risk to himself and would benefit from This is a 35 y/o man, living with roommates, unemployed living on savings, PPhx MDD, 3 previous [facility] admits with last one a few years ago, not in outpatient, denies substance use history, reports 2 past SAs a few years back, Pmhx Asthma , self presenting with suicidal ideation for 3 days.    Pt reports depression and SI with plan and intent in the setting of loneliness during holidays after his family members passed away years ago. He continues to report current SI with plan and intent and is unable to engage in safety planning. Pt requests and consents to inpatient psychiatric admission.   Given current SI with plan and intent, inability to engage in safety planning, not in treatment, patient is a risk to himself and would benefit from a psychiatric admission for safety and stabilization.

## 2019-11-25 NOTE — ED ADULT NURSE NOTE - NSIMPLEMENTINTERV_GEN_ALL_ED
Implemented All Universal Safety Interventions:  [place] to call system. Call bell, personal items and telephone within reach. Instruct patient to call for assistance. Room bathroom lighting operational. Non-slip footwear when patient is off stretcher. Physically safe environment: no spills, clutter or unnecessary equipment. Stretcher in lowest position, wheels locked, appropriate side rails in place.

## 2019-11-25 NOTE — ED PROVIDER NOTE - PROGRESS NOTE DETAILS
Patient remains calm and cooperative.  Case d/w Telepsych who will evaluate patient shortly. patient accepted voluntary for [facility].

## 2019-11-25 NOTE — ED BEHAVIORAL HEALTH ASSESSMENT NOTE - PSYCHIATRIC ISSUES AND PLAN (INCLUDE STANDING AND PRN MEDICATION)
defer to inpatient team since pt doesn't want to start anything right now and wants to wait for the inpatient attending to discuss options. PRN if emergent issues

## 2019-11-25 NOTE — ED BEHAVIORAL HEALTH ASSESSMENT NOTE - DESCRIPTION
Pre-Hospital Course: Patient arrived to the ED via walk-in by himself complaining of suicidal ideation with thoughts of suicide and an aborted plan last night to drink Etoh to excess in order to kill himself .    ED Course:  Patient arrived to the ED approximately 4 hours prior to consultation. Per ED and documentation patient arrived alert and oriented x3, patient had normal grooming and hygiene, patient was cooperative with ED medical and safety protocols. Patient reported depressed mood, his affect was congruent and noted to be somewhat guarded with information. Patient denied SI/HI, patient did not report or exhibit symptoms of psychosis or namrata. Patient had linear thought process and normal speech. Patient remained in good behavioral control while in the ED, did not require PRN psychiatric medication prior to assessment. Patient was able to sleep while in the ED. Patient did not have visitors to bedside. No other issues noted with ED course.     BTCM called emergency contacts in computer but no answer none Born/raised in [place]. 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 Had been working in [place]. Never , no children. Lives in [place] with roommates

## 2019-11-25 NOTE — ED BEHAVIORAL HEALTH ASSESSMENT NOTE - CURRENT MEDICATION
albuterol PRN, advair BID Advair Diskus 100 mcg-50 mcg inhalation powder:  1 each inhaled 2 times a day,   albuterol 90 mcg/inh inhalation aerosol:  1 puff(s) inhaled 2 times a day

## 2019-11-26 ENCOUNTER — INPATIENT (INPATIENT)
Facility: HOSPITAL | Age: 34
LOS: 7 days | Discharge: HOME | DRG: 885 | End: 2019-12-04
Attending: PSYCHIATRY & NEUROLOGY | Admitting: PSYCHIATRY & NEUROLOGY
Payer: MEDICAID

## 2019-11-26 DIAGNOSIS — J45.909 UNSPECIFIED ASTHMA, UNCOMPLICATED: ICD-10-CM

## 2019-11-26 DIAGNOSIS — F33.3 MAJOR DEPRESSIVE DISORDER, RECURRENT, SEVERE WITH PSYCHOTIC SYMPTOMS: ICD-10-CM

## 2019-11-26 DIAGNOSIS — F32.9 MAJOR DEPRESSIVE DISORDER, SINGLE EPISODE, UNSPECIFIED: ICD-10-CM

## 2019-11-26 LAB — HBA1C BLD-MCNC: 5.5 % — SIGNIFICANT CHANGE UP (ref 4–5.6)

## 2019-11-26 PROCEDURE — 90792 PSYCH DIAG EVAL W/MED SRVCS: CPT

## 2019-11-26 PROCEDURE — 99285 EMERGENCY DEPT VISIT HI MDM: CPT

## 2019-11-26 PROCEDURE — 93010 ELECTROCARDIOGRAM REPORT: CPT

## 2019-11-26 RX ORDER — HYDROXYZINE HCL 10 MG
50 TABLET ORAL EVERY 6 HOURS
Refills: 0 | Status: DISCONTINUED | OUTPATIENT
Start: 2019-11-26 | End: 2019-11-26

## 2019-11-26 RX ORDER — DIPHENHYDRAMINE HCL 50 MG
50 CAPSULE ORAL EVERY 6 HOURS
Refills: 0 | Status: DISCONTINUED | OUTPATIENT
Start: 2019-11-26 | End: 2019-12-03

## 2019-11-26 RX ORDER — SERTRALINE 25 MG/1
25 TABLET, FILM COATED ORAL DAILY
Refills: 0 | Status: DISCONTINUED | OUTPATIENT
Start: 2019-11-27 | End: 2019-12-03

## 2019-11-26 RX ORDER — INFLUENZA VIRUS VACCINE 15; 15; 15; 15 UG/.5ML; UG/.5ML; UG/.5ML; UG/.5ML
0.5 SUSPENSION INTRAMUSCULAR ONCE
Refills: 0 | Status: COMPLETED | OUTPATIENT
Start: 2019-11-26 | End: 2019-11-29

## 2019-11-26 RX ORDER — MIRTAZAPINE 45 MG/1
15 TABLET, ORALLY DISINTEGRATING ORAL AT BEDTIME
Refills: 0 | Status: DISCONTINUED | OUTPATIENT
Start: 2019-11-26 | End: 2019-11-29

## 2019-11-26 RX ORDER — ALBUTEROL 90 UG/1
2 AEROSOL, METERED ORAL EVERY 6 HOURS
Refills: 0 | Status: DISCONTINUED | OUTPATIENT
Start: 2019-11-26 | End: 2019-11-27

## 2019-11-26 RX ORDER — SERTRALINE 25 MG/1
25 TABLET, FILM COATED ORAL ONCE
Refills: 0 | Status: COMPLETED | OUTPATIENT
Start: 2019-11-26 | End: 2019-11-26

## 2019-11-26 RX ORDER — ACETAMINOPHEN 500 MG
500 TABLET ORAL EVERY 6 HOURS
Refills: 0 | Status: DISCONTINUED | OUTPATIENT
Start: 2019-11-26 | End: 2019-12-04

## 2019-11-26 RX ORDER — POLYETHYLENE GLYCOL 3350 17 G/17G
17 POWDER, FOR SOLUTION ORAL AT BEDTIME
Refills: 0 | Status: DISCONTINUED | OUTPATIENT
Start: 2019-11-26 | End: 2019-12-04

## 2019-11-26 RX ORDER — PANTOPRAZOLE SODIUM 20 MG/1
40 TABLET, DELAYED RELEASE ORAL
Refills: 0 | Status: DISCONTINUED | OUTPATIENT
Start: 2019-11-26 | End: 2019-12-04

## 2019-11-26 RX ADMIN — Medication 50 MILLIGRAM(S): at 23:04

## 2019-11-26 RX ADMIN — MIRTAZAPINE 15 MILLIGRAM(S): 45 TABLET, ORALLY DISINTEGRATING ORAL at 23:04

## 2019-11-26 NOTE — BEHAVIORAL HEALTH ASSESSMENT NOTE - CASE SUMMARY
34M pph MDD will be started on medications he is familiar with in order to promote compliance. Risks, benefits, and potential side effects of all medications discussed with patient. Current risk of self-harm due to mood episode will be mitigated by use of medication i.e. sertraline. Inpatient Hospitalization is necessary at this time to ensure pt safety, stabilize symptoms, and plan a safe discharge.

## 2019-11-26 NOTE — BEHAVIORAL HEALTH ASSESSMENT NOTE - SUMMARY
The patient is a 33yr old AA male, domiciled, presently unemployed, single male with past psychiatric history of possible MDD and at least one inpatient hospitalization, and PMH significant for Asthma. was transferred from the [facility] ED after he presented there reporting suicidal ideations. The patient was interviewed this morning by the inpatient team, he was extremely guarded and irritable throughout the interview, oftentimes replying in monosyllabic answers and maintaining a passive aggressive attitude towards the interviewers. Also frequently reported self conflicting claims. The ED note mentions that he had past Suicidal attempts and had been psychiatrically hospitalised 3 times in the past, But today he denied both these items. Past psychiatric history revealed that the patient had one prior hospitlaization under similar circumstances. He imparted the current crisis to the passing away of his family members, and there is a possibility of anniversary reactions as he mentioned that its mostly during the festive season that he feels like this, The psychosocial history is relevant for the murder of his brother and passing away of both parents in his 20's. MSE revealed an irritable and passive aggressive demeanor with limited verbal output and severe guarding and ambivalence towards any suggested therapy principles, in particularly psychopharmacological methods, the patient did however request multiple times for a 1:1 therapist.

## 2019-11-26 NOTE — BEHAVIORAL HEALTH ASSESSMENT NOTE - NSBHREFERDETAILS_PSY_A_CORE_FT
Pt presented to the ED with reports of depression and SI with plan and intent in the setting of loneliness during holidays after his family members passed away years ago. He continues to report current SI with plan and intent and is unable to engage in safety planning. Pt requests and consents to inpatient psychiatric admission.   Given current SI with plan and intent, inability to engage in safety planning, not in treatment, patient is a risk to himself and would benefit from a psychiatric admission for safety and stabilization

## 2019-11-26 NOTE — BEHAVIORAL HEALTH ASSESSMENT NOTE - RISK ASSESSMENT
Moderate Acute Suicide Risk - Past psychiatric history   - Relatively anomie nature ( not much social support)  - Reluctant to treatment engagement  - withdrawn to self  - Had some degree of ideation ( drinking himself to death)

## 2019-11-26 NOTE — BEHAVIORAL HEALTH ASSESSMENT NOTE - COMMENTS ON SUICIDE RISK/PROTECTIVE FACTORS:
Presently the patient himself to the ED, although not forthcoming, mentioned that he would like to engage in proper treatment planning, specially 1:1 therapy

## 2019-11-26 NOTE — BEHAVIORAL HEALTH ASSESSMENT NOTE - DIFFERENTIAL
-Major depressive disorder  - Cluster A ( paranoid ) personality disorder -Major depressive disorder  -r/o Cluster A ( paranoid ) personality disorder

## 2019-11-26 NOTE — BEHAVIORAL HEALTH ASSESSMENT NOTE - ORIENTATION OTHER
mentioned day being wednesday, but corrected himself mentioned day being Wednesday, but corrected himself

## 2019-11-26 NOTE — BEHAVIORAL HEALTH ASSESSMENT NOTE - HPI (INCLUDE ILLNESS QUALITY, SEVERITY, DURATION, TIMING, CONTEXT, MODIFYING FACTORS, ASSOCIATED SIGNS AND SYMPTOMS)
The patient is a 33yr old AA male, domiciled, presently unemployed, single male with past psychiatric history of possible MDD and at least one inpatient hospitalization, and PMH significant for Asthma. was transferred from the [facility] ED after he presented there reporting suicidal ideations. The patient was interviewed this morning by the inpatient team, he was extremely guarded and irritable thorughout the interview, oftentimes replying in monosyllabic answers and maintaining a passive aggressive attitude towards the interviewers. Also frequently reported self conflicting claims. The ED note mentions that he had past Suicidal attempts and had been psychiatrically hospitalised 3 times in the past, But today he denied both these items.    During the initial part of the interview he mentioned that he was feeling suicidal and had planned on drinking alcohol non-stop as a means to reach that target, also mentioned feeling depressed, he imparted the depressive spells of having a cyclical character and happening mostly during the festive seasons (" I lost a couple of close relatives some years back"), However the depressive episodes are ill-characterized as he denied any loss in concentration, fatigue, loss in appetite. and in the later part of the interview stated that he had been feeling depressed only for a couple of days/24hrs. The patient also did not confirm feelings of hopelessness and worthlessness even when explicitly asked for it. He denied any other concrete plans for suicide  except for the idea of drinking himself to death, ( " I just thought like I am going to kill myself by drinking alcohol"). Presently denied any current suicidal thoughts/ideas/intent.    Past psychiatric history: Extremely withdrawn initially about disclosing any past psychiatric history, mentioned that he had been in [facility] some times in the past ("I don't remember when"), and had been here for about 3-4 days, the patient also stated that he remembered being offered medications but refusing them "I just didn't want to take medications". but reported that therapy ( especially 1:1) helped a bit, the patient was also trying to hide the circumstances which bought him to the hospital initially, but later mentioned that he was feeling depressed at that time as well. The patient did not endorse any symptoms suggestive of Manic epsiodes during his lifetime, also denied any perceptual problems/ paranoid ideations/ delusional belief system. The pateint also denied any past suicidal intent/ideations/attempts    Chart review showed that the patient had presented with similar symptoms ( voicing suicidal ideations; hinting towards jumping on the tracks), in 2017, the patient was admitted and was offered Zoloft, but he refused, He was also interested in getting proper social and financial support after getting discharge during that admission. Past history as reported in that visit was significant for his brother being murdered and loosing both his parents in his early 20s, the patient had also mentioned that he had moved from [place], was sharing room with 4 people and was under stress due to the failure of acquiring a proper job and also the addictive habits of his roommates    Addiction history: Denied any alcohol/drugs history  Family history: Refused to answer  Psychosocial: Mentioned living in an apartment in [address] [place] [address], refused to let the team speak with any collateral, stated that he has a SW, will contemplate on agreeing for the team to speak with her The patient is a 33yr old AA male, domiciled, presently unemployed, single male with past psychiatric history of possible MDD and at least one inpatient hospitalization, and PMH significant for Asthma. was transferred from the [facility] ED after he presented there reporting suicidal ideations. The patient was interviewed this morning by the inpatient team, he was extremely guarded and irritable thorughout the interview, oftentimes replying in monosyllabic answers and maintaining a passive aggressive attitude towards the interviewers. Also frequently reported self conflicting claims. The ED note mentions that he had past Suicidal attempts and had been psychiatrically hospitalised 3 times in the past, But today he denied both these items.    During the initial part of the interview he mentioned that he was feeling suicidal and had planned on drinking alcohol non-stop as a means to reach that target, also mentioned feeling depressed, he imparted the depressive spells of having a cyclical character and happening mostly during the festive seasons (" I lost a couple of close relatives some years back"), However the depressive episodes are ill-characterized as he denied any loss in concentration, fatigue, loss in appetite. and in the later part of the interview stated that he had been feeling depressed only for a couple of days/24hrs. The patient also did not quite confirm feelings of hopelessness and worthlessness even when explicitly asked for it. ( " yeah I was feeling like it before coming here") He denied any other concrete plans for suicide  except for the idea of drinking himself to death, ( " I just thought like I am going to kill myself by drinking alcohol"). Presently denied any current suicidal thoughts/ideas/intent.    Past psychiatric history: Extremely withdrawn initially about disclosing any past psychiatric history, mentioned that he had been in [facility] some times in the past ("I don't remember when"), and had been here for about 3-4 days, the patient also stated that he remembered being offered medications but refusing them "I just didn't want to take medications". but reported that therapy ( especially 1:1) helped a bit, the patient was also trying to hide the circumstances which bought him to the hospital initially, but later mentioned that he was feeling depressed at that time as well. The patient did not endorse any symptoms suggestive of Manic epsiodes during his lifetime, also denied any perceptual problems/ paranoid ideations/ delusional belief system. The pateint also denied any past suicidal intent/ideations/attempts    Chart review showed that the patient had presented with similar symptoms ( voicing suicidal ideations; hinting towards jumping on the tracks), in 2017, the patient was admitted and was offered Zoloft, but he refused, He was also interested in getting proper social and financial support after getting discharge during that admission. Past history as reported in that visit was significant for his brother being murdered and loosing both his parents in his early 20s, the patient had also mentioned that he had moved from [place], was sharing room with 4 people and was under stress due to the failure of acquiring a proper job and also the addictive habits of his roommates    Addiction history: Denied any alcohol/drugs history  Family history: Refused to answer  Psychosocial: Mentioned living in an apartment in [address] [place] [address], refused to let the team speak with any collateral, stated that he has a SW, will contemplate on agreeing for the team to speak with her The patient is a 33yr old AA male, domiciled, presently unemployed, single male with past psychiatric history of possible MDD and at least one inpatient hospitalization, and PMH significant for Asthma. was transferred from the [facility] ED after he presented there reporting suicidal ideations. The patient was interviewed this morning by the inpatient team, he was extremely guarded and irritable throughout the interview, oftentimes replying in monosyllabic answers and maintaining a passive aggressive attitude towards the interviewers. Also frequently reported self conflicting claims. The ED note mentions that he had past Suicidal attempts and had been psychiatrically hospitalised 3 times in the past, But today he denied both these items.    During the initial part of the interview he mentioned that he was feeling suicidal and had planned on drinking alcohol non-stop as a means to reach that target, also mentioned feeling depressed, he imparted the depressive spells of having a cyclical character and happening mostly during the festive seasons (" I lost a couple of close relatives some years back"), However the depressive episodes are ill-characterized as he denied any loss in concentration, fatigue, loss in appetite. and in the later part of the interview stated that he had been feeling depressed only for a couple of days/24hrs. The patient also did not quite confirm feelings of hopelessness and worthlessness even when explicitly asked for it. ( " yeah I was feeling like it before coming here") He denied any other concrete plans for suicide  except for the idea of drinking himself to death, ( " I just thought like I am going to kill myself by drinking alcohol"). Presently denied any current suicidal thoughts/ideas/intent.    Past psychiatric history: Extremely withdrawn initially about disclosing any past psychiatric history, mentioned that he had been in [facility] some times in the past ("I don't remember when"), and had been here for about 3-4 days, the patient also stated that he remembered being offered medications but refusing them "I just didn't want to take medications". but reported that therapy ( especially 1:1) helped a bit, the patient was also trying to hide the circumstances which bought him to the hospital initially, but later mentioned that he was feeling depressed at that time as well. The patient did not endorse any symptoms suggestive of Manic epsiodes during his lifetime, also denied any perceptual problems/ paranoid ideations/ delusional belief system. The pateint also denied any past suicidal intent/ideations/attempts    Chart review showed that the patient had presented with similar symptoms ( voicing suicidal ideations; hinting towards jumping on the tracks), in 2017, the patient was admitted and was offered Zoloft, but he refused, He was also interested in getting proper social and financial support after getting discharge during that admission. Past history as reported in that visit was significant for his brother being murdered and loosing both his parents in his early 20s, the patient had also mentioned that he had moved from [place], was sharing room with 4 people and was under stress due to the failure of acquiring a proper job and also the addictive habits of his roommates    Addiction history: Denied any alcohol/drugs history  Family history: Refused to answer  Psychosocial: Mentioned living in an apartment in [address] [place] [address], refused to let the team speak with any collateral, stated that he has a SW, will contemplate on agreeing for the team to speak with her

## 2019-11-26 NOTE — BEHAVIORAL HEALTH ASSESSMENT NOTE - OTHER
mentioned living with roommates, was reticent to disclose much passive aggressive low frustration tolerance has lisping occasionally possible overvalued ideas possible anniversary reaction wants to engage in care

## 2019-11-26 NOTE — BEHAVIORAL HEALTH ASSESSMENT NOTE - NSBHCHARTREVIEWVS_PSY_A_CORE FT
Vital Signs Last 24 Hrs  T(C): 36.9 (26 Nov 2019 09:28), Max: 36.9 (26 Nov 2019 09:28)  T(F): 98.5 (26 Nov 2019 09:28), Max: 98.5 (26 Nov 2019 09:28)  HR: 86 (26 Nov 2019 09:28) (68 - 88)  BP: 124/72 (26 Nov 2019 09:28) (110/72 - 128/79)  BP(mean): --  RR: 18 (26 Nov 2019 09:28) (16 - 18)  SpO2: 99% (26 Nov 2019 09:28) (96% - 100%)

## 2019-11-27 PROCEDURE — 99232 SBSQ HOSP IP/OBS MODERATE 35: CPT

## 2019-11-27 NOTE — PROGRESS NOTE BEHAVIORAL HEALTH - NSBHCHARTREVIEWVS_PSY_A_CORE FT
Vital Signs Last 24 Hrs  T(C): 36.6 (27 Nov 2019 09:00), Max: 36.6 (27 Nov 2019 09:00)  T(F): 97.9 (27 Nov 2019 09:00), Max: 97.9 (27 Nov 2019 09:00)  HR: 82 (27 Nov 2019 09:00) (82 - 89)  BP: 118/84 (27 Nov 2019 09:00) (118/84 - 127/82)  BP(mean): --  RR: 18 (27 Nov 2019 09:00) (18 - 19)  SpO2: 93% (27 Nov 2019 09:00) (93% - 98%)

## 2019-11-27 NOTE — PROGRESS NOTE BEHAVIORAL HEALTH - RISK ASSESSMENT
- Past psychiatric history   - Relatively anomie nature ( not much social support)  - Reluctant to treatment engagement  - withdrawn to self  - Had some degree of ideation ( drinking himself to death) Risk at presentation  MODERATE    static factors  -Male sex  - Past psychiatric history   - Relatively anomie nature ( not much social support)  Modifiable factors  - Reluctant to treatment engagement  - withdrawn to self  - Had some degree of ideation ( drinking himself to death)    Modifiable factors like depressed mood. and treatment non-adherence would be targeted during this hospital stay and the patient would be engaged in 1:1 as well as group psychotherapy, treatment team would try to initiate and keep him on antidepressants, presently the patient endorses compliance to Mirtazapine. Risk at presentation MODERATE    Static factors:  -Male sex  - Past psychiatric history   - Relatively anomie nature ( not much social support)  Modifiable factors on admission:  - Reluctant to treatment engagement  - withdrawn to self  - Had some degree of ideation ( drinking himself to death)    Modifiable factors like depressed mood. and treatment non-adherence would be targeted during this hospital stay and the patient would be engaged in 1:1 as well as group psychotherapy, treatment team would try to initiate and keep him on antidepressants, presently the patient endorses compliance to Mirtazapine.

## 2019-11-27 NOTE — PROGRESS NOTE BEHAVIORAL HEALTH - NSBHFUPINTERVALHXFT_PSY_A_CORE
The patient was discussed in the interdisciplinary team meeting, other clinical staff brought up the apparent demanding/accusatory nature of the patient ( wanted no one to come in his room), this morning when the writer met the patient he endorsed compliance to the Mirtazapine but refusal to the Zoloft (" I don't want to take it, let me take the Remaron now"), mentioned that he had slept well. denied any current suicidal ideations/ impulses/ thoughts. In the later half of the day mentioned that he wanted to speak with a medical doctor but was guarded about any complaints and refused to discuss with the psychiatrist (" I don't want to tell you, know what forget it...."), got agitated when the patient was enquired about the collateral contact which he had mentioned during yesterday intake interview (" Yeah I would look for it, sometimes today....") The patient was discussed in the interdisciplinary team meeting, other clinical staff brought up the apparent demanding/accusatory nature of the patient (wanted no one to come in his room), this morning when the writer met the patient he endorsed compliance to the mirtazapine but refusal to the Zoloft ("I don't want to take it, let me take the Remeron now"), mentioned that he had slept well. denied any current suicidal ideations/ impulses/ thoughts. In the later half of the day mentioned that he wanted to speak with a medical doctor but was guarded about any complaints and refused to discuss with the psychiatrist (" I don't want to tell you, know what forget it...."), got agitated when the patient was enquired about the collateral contact which he had mentioned during yesterday intake interview (" Yeah I would look for it, sometimes today....")

## 2019-11-27 NOTE — PROGRESS NOTE BEHAVIORAL HEALTH - SUMMARY
The patient is a 33yr old AA male, domiciled, presently unemployed, single male with past psychiatric history of possible MDD and at least one inpatient hospitalization, and PMH significant for Asthma. was transferred from the [facility] ED after he presented there reporting suicidal ideations. The patient was interviewed this morning by the inpatient team, he was extremely guarded and irritable throughout the interview, oftentimes replying in monosyllabic answers and maintaining a passive aggressive attitude towards the interviewers. Also frequently reported self conflicting claims. The ED note mentions that he had past Suicidal attempts and had been psychiatrically hospitalised 3 times in the past, But today he denied both these items. Past psychiatric history revealed that the patient had one prior hospitlaization under similar circumstances. He imparted the current crisis to the passing away of his family members, and there is a possibility of anniversary reactions as he mentioned that its mostly during the festive season that he feels like this, The psychosocial history is relevant for the murder of his brother and passing away of both parents in his 20's. MSE revealed an irritable and passive aggressive demeanor with limited verbal output and severe guarding and ambivalence towards any suggested therapy principles, in particularly psychopharmacological methods, the patient did however request multiple times for a 1:1 therapist.  Update  11/27: There is a possibility of secondary gain going by the patient's apparent unwillingness to engage in a co-operative manner with the team , relative withdrawn and guarded demeanor as well as occasional demanding nature, But keeping in mind the past psych history, the index complaint and apparent anomie status, major depressive disorder/underlying character pathology need to be ruled out. The patient is a 33yr old AA male, domiciled, presently unemployed, single male with past psychiatric history of possible MDD and at least one inpatient hospitalization, and PMH significant for Asthma. was transferred from the [facility] ED after he presented there reporting suicidal ideations. The patient was interviewed this morning by the inpatient team, he was extremely guarded and irritable throughout the interview, oftentimes replying in monosyllabic answers and maintaining a passive aggressive attitude towards the interviewers. Also frequently reported self conflicting claims. The ED note mentions that he had past Suicidal attempts and had been psychiatrically hospitalised 3 times in the past, But today he denied both these items. Past psychiatric history revealed that the patient had one prior hospitalization under similar circumstances. He imparted the current crisis to the passing away of his family members, and there is a possibility of anniversary reactions as he mentioned that its mostly during the festive season that he feels like this, The psychosocial history is relevant for the murder of his brother and passing away of both parents in his 20's. MSE revealed an irritable and passive aggressive demeanor with limited verbal output and severe guarding and ambivalence towards any suggested therapy principles, in particularly psychopharmacological methods, the patient did however request multiple times for a 1:1 therapist.  Update  11/27: There is a possibility of secondary gain going by the patient's apparent unwillingness to engage in a co-operative manner with the team, relative withdrawn and guarded demeanor as well as occasional demanding nature, But keeping in mind the past psych history, the index complaint and apparent anomie status, major depressive disorder/underlying character pathology need to be ruled out.

## 2019-11-28 LAB
CHOLEST SERPL-MCNC: 137 MG/DL — SIGNIFICANT CHANGE UP (ref 10–199)
HDLC SERPL-MCNC: 34 MG/DL — LOW
LIPID PNL WITH DIRECT LDL SERPL: 81 MG/DL — SIGNIFICANT CHANGE UP
TOTAL CHOLESTEROL/HDL RATIO MEASUREMENT: 4 RATIO — SIGNIFICANT CHANGE UP (ref 3.4–9.6)
TRIGL SERPL-MCNC: 111 MG/DL — SIGNIFICANT CHANGE UP (ref 10–149)

## 2019-11-28 RX ORDER — BUDESONIDE AND FORMOTEROL FUMARATE DIHYDRATE 160; 4.5 UG/1; UG/1
2 AEROSOL RESPIRATORY (INHALATION)
Refills: 0 | Status: DISCONTINUED | OUTPATIENT
Start: 2019-11-28 | End: 2019-12-04

## 2019-11-28 RX ADMIN — Medication 50 MILLIGRAM(S): at 00:05

## 2019-11-28 RX ADMIN — Medication 50 MILLIGRAM(S): at 23:35

## 2019-11-28 RX ADMIN — Medication 100 MILLIGRAM(S): at 10:53

## 2019-11-28 RX ADMIN — Medication 1 MILLIGRAM(S): at 10:52

## 2019-11-28 RX ADMIN — Medication 1 TABLET(S): at 10:53

## 2019-11-28 RX ADMIN — MIRTAZAPINE 15 MILLIGRAM(S): 45 TABLET, ORALLY DISINTEGRATING ORAL at 23:36

## 2019-11-28 RX ADMIN — BUDESONIDE AND FORMOTEROL FUMARATE DIHYDRATE 2 PUFF(S): 160; 4.5 AEROSOL RESPIRATORY (INHALATION) at 17:31

## 2019-11-28 RX ADMIN — MIRTAZAPINE 15 MILLIGRAM(S): 45 TABLET, ORALLY DISINTEGRATING ORAL at 00:03

## 2019-11-29 PROCEDURE — 99232 SBSQ HOSP IP/OBS MODERATE 35: CPT

## 2019-11-29 RX ORDER — MIRTAZAPINE 45 MG/1
30 TABLET, ORALLY DISINTEGRATING ORAL AT BEDTIME
Refills: 0 | Status: DISCONTINUED | OUTPATIENT
Start: 2019-11-29 | End: 2019-12-02

## 2019-11-29 RX ADMIN — INFLUENZA VIRUS VACCINE 0.5 MILLILITER(S): 15; 15; 15; 15 SUSPENSION INTRAMUSCULAR at 18:45

## 2019-11-29 RX ADMIN — MIRTAZAPINE 30 MILLIGRAM(S): 45 TABLET, ORALLY DISINTEGRATING ORAL at 23:47

## 2019-11-29 RX ADMIN — Medication 100 MILLIGRAM(S): at 10:57

## 2019-11-29 RX ADMIN — BUDESONIDE AND FORMOTEROL FUMARATE DIHYDRATE 2 PUFF(S): 160; 4.5 AEROSOL RESPIRATORY (INHALATION) at 23:46

## 2019-11-29 RX ADMIN — Medication 50 MILLIGRAM(S): at 23:47

## 2019-11-29 RX ADMIN — BUDESONIDE AND FORMOTEROL FUMARATE DIHYDRATE 2 PUFF(S): 160; 4.5 AEROSOL RESPIRATORY (INHALATION) at 13:28

## 2019-11-29 RX ADMIN — Medication 1 MILLIGRAM(S): at 10:56

## 2019-11-29 RX ADMIN — Medication 1 TABLET(S): at 10:57

## 2019-11-29 NOTE — PROGRESS NOTE BEHAVIORAL HEALTH - NSBHCHARTREVIEWVS_PSY_A_CORE FT
Vital Signs Last 24 Hrs  T(C): 36.6 (29 Nov 2019 10:00), Max: 36.8 (28 Nov 2019 16:25)  T(F): 97.8 (29 Nov 2019 10:00), Max: 98.2 (28 Nov 2019 16:25)  HR: 82 (29 Nov 2019 10:00) (80 - 82)  BP: 116/80 (29 Nov 2019 10:00) (116/80 - 121/71)  BP(mean): --  RR: 18 (29 Nov 2019 10:00) (18 - 18)  SpO2: 100% (29 Nov 2019 10:00) (97% - 100%)

## 2019-11-29 NOTE — PROGRESS NOTE BEHAVIORAL HEALTH - SUMMARY
The patient is a 33yr old AA male, domiciled, presently unemployed, single male with past psychiatric history of possible MDD and at least one inpatient hospitalization, and PMH significant for Asthma. was transferred from the [facility] ED after he presented there reporting suicidal ideations. The patient was interviewed this morning by the inpatient team, he was extremely guarded and irritable throughout the interview, oftentimes replying in monosyllabic answers and maintaining a passive aggressive attitude towards the interviewers. Also frequently reported self conflicting claims. The ED note mentions that he had past Suicidal attempts and had been psychiatrically hospitalised 3 times in the past, But today he denied both these items. Past psychiatric history revealed that the patient had one prior hospitalization under similar circumstances. He imparted the current crisis to the passing away of his family members, and there is a possibility of anniversary reactions as he mentioned that its mostly during the festive season that he feels like this, The psychosocial history is relevant for the murder of his brother and passing away of both parents in his 20's. MSE revealed an irritable and passive aggressive demeanor with limited verbal output and severe guarding and ambivalence towards any suggested therapy principles, in particularly psychopharmacological methods, the patient did however request multiple times for a 1:1 therapist.  Update  11/27: There is a possibility of secondary gain going by the patient's apparent unwillingness to engage in a co-operative manner with the team, relative withdrawn and guarded demeanor as well as occasional demanding nature, But keeping in mind the past psych history, the index complaint and apparent anomie status, major depressive disorder/underlying character pathology need to be ruled out.  11/29: The patient is still guarded, and paranoid, fixated on the agitation showed by another patient in the unit. the patient is refusing Zoloft, however amenable to an increase in the Remaron dosing, ( going to 30mg) 1:1 Psychotherapy would started from today.  Still refusing any collateral for the team. As per previous records: "The patient is a 33yr old AA male, domiciled, presently unemployed, single male with past psychiatric history of possible MDD and at least one inpatient hospitalization, and PMH significant for Asthma. was transferred from the [facility] ED after he presented there reporting suicidal ideations. The patient was interviewed this morning by the inpatient team, he was extremely guarded and irritable throughout the interview, oftentimes replying in monosyllabic answers and maintaining a passive aggressive attitude towards the interviewers. Also frequently reported self conflicting claims. The ED note mentions that he had past Suicidal attempts and had been psychiatrically hospitalised 3 times in the past, But today he denied both these items. Past psychiatric history revealed that the patient had one prior hospitalization under similar circumstances. He imparted the current crisis to the passing away of his family members, and there is a possibility of anniversary reactions as he mentioned that its mostly during the festive season that he feels like this, The psychosocial history is relevant for the murder of his brother and passing away of both parents in his 20's. MSE revealed an irritable and passive aggressive demeanor with limited verbal output and severe guarding and ambivalence towards any suggested therapy principles, in particularly psychopharmacological methods, the patient did however request multiple times for a 1:1 therapist.  Update  11/27: There is a possibility of secondary gain going by the patient's apparent unwillingness to engage in a co-operative manner with the team, relative withdrawn and guarded demeanor as well as occasional demanding nature, But keeping in mind the past psych history, the index complaint and apparent anomie status, major depressive disorder/underlying character pathology need to be ruled out."  11/29: The patient is still guarded, and paranoid, fixated on the agitation showed by another patient in the unit. the patient is refusing Zoloft, however amenable to an increase in the Remeron dosing, (going to 30mg) 1:1 Psychotherapy would started from today.  Still refusing any collateral for the team.

## 2019-11-29 NOTE — CHART NOTE - NSCHARTNOTEFT_GEN_A_CORE
Date 11/29/19     PSYCHOLOGY CLINICIAN PROGRESS NOTE    SUBJECTIVE (IN THE PATIENT’S WORDS): “Everything is good”    OBJECTIVE:   Pt was seen and interviewed in a common area by patient’s choice. He stated that he has been having a restful stay and is taking Remeron but refusing the Zoloft. Pt stated that he came into the hospital after having suicidal thoughts and had considered drinking himself to death. Pt said that these chronic suicidal thoughts started when he was 20 or 21 years old and that they were related to “family issues.” Pt stated that his mood gets worse around holidays due to reminders of these “family issues.” Pt denied significant anxiety and denied intractable depression stating that his mood fluctuates within each day. Pt was guarded throughout the encounter and stated that he did not want to talk in more detail about his family or these family issues. Pt responded positively to writer stating that his decision to not talk about it would be respected. Pt stated he did not want to go to groups but that he was open to working with an individual therapist. He stated he takes him a while to trust people. Pt noted feeling uncomfortable around intrusive questions. Pt was concrete in his thinking and discussed his ideas about the importance of healthy food, utilizing his spirituality to cope, traditional gender roles and his ideas about why the unit functions the way it does. The pt reported concern about another pt who had been saying threatening things. 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 He stated that he lives with roommates who can cover rent/utilities and that he is officially a “guest” and will contribute when he can but does not have to pay each month. Pt was open to outpatient psychiatry and individual therapy services for discharge. He stated that he is ready to go home early next week. Pt reported that his enjoyable activities that help him cope include watching comedy, listening to music, and “staying positive.”    MENTAL STATUS EXAM    BEHAVIOR: [X] cooperative [] uncooperative [X] good EC [] poor EC [] well related [X] oddly related [X] guarded []PMA [] PMR []abnormal movements [] Other:   SPEED: [X] normal rate/rhythm/volume [] loud [] quiet [] slow  [] rapid [] pressured [] Other: _________   MOOD: [X] euthymic [] dysphoric []anxious [] irritable [] Other: ___________   AFFECT: [X] full [] expansive [] constricted [] blunted [] flat [] stable [] labile [] Other: ________________ THOUGHT PROCESS: [X] organized [] disorganized [] goal-directed [X] concrete [X] logical  [] illogical   [] circumstantial [X] tangential [] impoverished [] effusive [] repetitive [] Other:  THOUGHT CONTENT: [X] negative for delusions/suicidal ideation /homicidal ideation  [ ] positive for delusions/suicidal ideation/homicidal ideation Describe:_denied current SI  PERCEPTION: [ ] negative for auditory/ visual hallucinations  [] positive for auditory/ visual hallucinations Describe: unable to be assessed, to be followed up about ________________________________________________________   INSIGHT/JUDGMENT: [] good [)fair [X] poor        IMPULSE CONTROL: [] good [X]fair [] poor         COGNITION: [X] alert and oriented to person,time,place [] Lacks orientation to person/ time/ place. Describe: ___________________________    ASSESSMENT/DIAGNOSES (include psychological formulation, diagnosis, diagnostic rule-outs and additional considerations):   Brendon Jacobo is a 34yr old AA male, domiciled with roommates, presently unemployed, single male with past psychiatric history of possible MDD and at least one inpatient hospitalization, and PMH significant for Asthma. was transferred from the [facility] ED after he presented there reporting suicidal ideations. When the patient was interviewed by the inpatient team, he was extremely guarded and irritable throughout the interview, oftentimes replying in monosyllabic answers and maintaining a passive aggressive attitude towards the interviewers. Also frequently reported self conflicting claims. The ED note mentions that he had past Suicidal attempts and had been psychiatrically hospitalised 3 times in the past, But today he denied both these items. Past psychiatric history revealed that the patient had one prior hospitalization under similar circumstances. He imparted the current crisis to the passing away of his family members, and there is a possibility of anniversary reactions as he mentioned that its mostly during the festive season that he feels like this, The psychosocial history is relevant for the murder of his brother and passing away of both parents in his 20's. MSE revealed an irritable and passive aggressive demeanor with limited verbal output and severe guarding and ambivalence towards any suggested therapy principles, in particularly psychopharmacological methods, the patient did however request multiple times for a 1:1 therapist. Mr. 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    Pt has difficulty accepting and expressing negative emotions and wants to keep negative thoughts and emotions separate from his conscious experience and sense of self. Suicidal thoughts may be way in which pt’s distress and difficulties in functioning and relating are expressed. Pt is concrete and guarded which limit effectiveness of talk therapy but he may be responsive to forming a trusted relationship which could support him in becoming more open about the difficulties he is experiencing.     Risk Assessment (consider static vs modifiable risk factors and protective factors; comment on level of risk for dangerous behavior):     [X] suicide [ ] self-harm [] elopement [] aggression [] other:   [X] ideation	 [] intent	 [X] plan   [X] prior incidences (if checked, elaborate): Unable to assess  [ ] family history of suicide	[] family history of aggression--unable to assess family history    Current Risk Factors:   Current/Recent Stressors:  Unemployed, holiday season  Static: h/o depression, h/o prior psychiatric hospitalizations, trauma h/o, past loss, non-compliant with psychiatric outpatient follow-up    Modifiable: suicidal ideations, depressed mood, anxiety, not in therapy, poor coping skills    Protective Factors: domiciled, stated openness to individual therapy    Plan (including specific details about this individual’s assessment, treatment and plans for discharge):   1. Individual, group, and milieu therapy: (specify therapeutic orientation or specific interventions if warranted). Interpersonal therapy to address mood sx, suicidal thoughts, and interpersonal functioning  2.    Ongoing medication evaluation with: Dr. Bolaños  3.   Collateral communication with: Aim to gather some collateral contacts though pt appears to be quite isolated  4.    Discharge planning: (indicate who is participating and any plans or recommendations for treatment post-discharge): Recommended for outpatient psychiatry and psychotherapy services  5.  Specify if CAMS or other suicide-specific intervention will be initiated: Conduct safety planning with pt    Case discussed and plan reviewed with supervisor.

## 2019-11-29 NOTE — PROGRESS NOTE BEHAVIORAL HEALTH - NSBHFUPINTERVALHXFT_PSY_A_CORE
The patient was seen in his room today, he mentioned feeling a little bit better, As like his previous self, the intense guarding and paranoia continue. maintains a passive aggressive stance to the interview, enquires about every question asked, before answering "why do you ask this?", he stated sleeping well, endorsed compliance to the Mirtazapine , but not to the Zoloft (" I don't want to try two medications"),an increase in his Mirtazapine dosage was discussed with the patient and he agreed to the plan, the patient was fixated on the behavior of another patient in the unite who has been agitated as well in the recent past " its not only me, he is threatening everyone ,he is making us uncomfortable", this fact had been discussed in the interdisciplinary morning meeting as well. The patient was seen in his room today, he mentioned feeling a little bit better, the intense guarding and paranoia continue. Maintains a passive aggressive stance to the interview, enquires about every question asked, before answering "why do you ask this?", he stated sleeping well, endorsed compliance to the Mirtazapine , but not to the Zoloft (" I don't want to try two medications"),an increase in his Mirtazapine dosage was discussed with the patient and he agreed to the plan, the patient was fixated on the behavior of another patient in the unite who has been agitated as well in the recent past " its not only me, he is threatening everyone ,he is making us uncomfortable", this fact had been discussed in the interdisciplinary morning meeting as well. Pt. denies SI/HI/AH/VH.

## 2019-11-29 NOTE — PROGRESS NOTE BEHAVIORAL HEALTH - RISK ASSESSMENT
Risk at presentation MODERATE    Static factors:  -Male sex  - Past psychiatric history   - Relatively anomie nature ( not much social support)  Modifiable factors on admission:  - Reluctant to treatment engagement  - withdrawn to self  - Had some degree of ideation ( drinking himself to death)    Modifiable factors like depressed mood. and treatment non-adherence would be targeted during this hospital stay and the patient would be engaged in 1:1 as well as group psychotherapy, treatment team would try to initiate and keep him on antidepressants, presently the patient endorses compliance to Mirtazapine.

## 2019-11-30 RX ADMIN — Medication 1 TABLET(S): at 10:16

## 2019-11-30 RX ADMIN — Medication 1 MILLIGRAM(S): at 10:16

## 2019-11-30 RX ADMIN — Medication 50 MILLIGRAM(S): at 22:52

## 2019-11-30 RX ADMIN — MIRTAZAPINE 30 MILLIGRAM(S): 45 TABLET, ORALLY DISINTEGRATING ORAL at 22:52

## 2019-11-30 RX ADMIN — BUDESONIDE AND FORMOTEROL FUMARATE DIHYDRATE 2 PUFF(S): 160; 4.5 AEROSOL RESPIRATORY (INHALATION) at 22:53

## 2019-11-30 RX ADMIN — BUDESONIDE AND FORMOTEROL FUMARATE DIHYDRATE 2 PUFF(S): 160; 4.5 AEROSOL RESPIRATORY (INHALATION) at 10:21

## 2019-11-30 RX ADMIN — Medication 100 MILLIGRAM(S): at 10:16

## 2019-12-01 RX ORDER — GUANFACINE 3 MG/1
1 TABLET, EXTENDED RELEASE ORAL AT BEDTIME
Refills: 0 | Status: DISCONTINUED | OUTPATIENT
Start: 2019-12-01 | End: 2019-12-01

## 2019-12-01 RX ADMIN — Medication 1 TABLET(S): at 09:20

## 2019-12-01 RX ADMIN — Medication 1 MILLIGRAM(S): at 09:19

## 2019-12-01 RX ADMIN — Medication 50 MILLIGRAM(S): at 23:40

## 2019-12-01 RX ADMIN — Medication 100 MILLIGRAM(S): at 18:36

## 2019-12-01 RX ADMIN — BUDESONIDE AND FORMOTEROL FUMARATE DIHYDRATE 2 PUFF(S): 160; 4.5 AEROSOL RESPIRATORY (INHALATION) at 18:36

## 2019-12-01 RX ADMIN — Medication 100 MILLIGRAM(S): at 23:41

## 2019-12-01 RX ADMIN — BUDESONIDE AND FORMOTEROL FUMARATE DIHYDRATE 2 PUFF(S): 160; 4.5 AEROSOL RESPIRATORY (INHALATION) at 09:20

## 2019-12-01 RX ADMIN — MIRTAZAPINE 30 MILLIGRAM(S): 45 TABLET, ORALLY DISINTEGRATING ORAL at 23:40

## 2019-12-02 PROCEDURE — 99232 SBSQ HOSP IP/OBS MODERATE 35: CPT

## 2019-12-02 RX ORDER — MIRTAZAPINE 45 MG/1
45 TABLET, ORALLY DISINTEGRATING ORAL AT BEDTIME
Refills: 0 | Status: DISCONTINUED | OUTPATIENT
Start: 2019-12-02 | End: 2019-12-04

## 2019-12-02 RX ORDER — HALOPERIDOL DECANOATE 100 MG/ML
5 INJECTION INTRAMUSCULAR EVERY 8 HOURS
Refills: 0 | Status: DISCONTINUED | OUTPATIENT
Start: 2019-12-02 | End: 2019-12-03

## 2019-12-02 RX ADMIN — Medication 100 MILLIGRAM(S): at 23:09

## 2019-12-02 RX ADMIN — MIRTAZAPINE 45 MILLIGRAM(S): 45 TABLET, ORALLY DISINTEGRATING ORAL at 23:09

## 2019-12-02 RX ADMIN — Medication 50 MILLIGRAM(S): at 23:09

## 2019-12-02 RX ADMIN — Medication 1 MILLIGRAM(S): at 11:45

## 2019-12-02 RX ADMIN — Medication 1 TABLET(S): at 11:46

## 2019-12-02 RX ADMIN — Medication 100 MILLIGRAM(S): at 11:48

## 2019-12-02 NOTE — PROGRESS NOTE BEHAVIORAL HEALTH - NSBHFUPINTERVALHXFT_PSY_A_CORE
The patient was spoken to this morning, at first he asked whether the writer had a private chamber " I want to keep things private", interview was carried out in a secluded nook in the milieu as his roommate was in the room ,the patient mentioned that he was doing well with the Risperidone, and was lolking forwards to his discharge soon. About his aftercare plans endorsed plans of going to outpatient rehab and following up with a therapist, The patient mentioned that the suicidal ideations/thoughts had receded and mentioned off handedly that it had the recurrence akin to an anniversary reaction "you know,,i already spoke to you guys, ,about this,,,,no use going through it again", the patient mentioned that he had been speaking to the psychotherapist " I have been speaking to her, that's what 1:1 is all about right" .The patient's medication dosage for Mirtazapine would be increased in contingence to the betterment of mood with the increase in dose. The patient was spoken to this morning, at first he asked whether the writer had a private chamber "I want to keep things private", interview was carried out in a secluded nook in the milieu as his roommate was in the room ,the patient mentioned that he was doing well with the Remeron, and was looking forwards to his discharge soon. About his aftercare plans endorsed plans of going to outpatient rehab and following up with a therapist, The patient mentioned that the suicidal ideations/thoughts had receded and mentioned off handedly that it had the recurrence akin to an anniversary reaction "you know, I already spoke to you guys, ,about this, no use going through it again", the patient mentioned that he had been speaking to the psychotherapist " I have been speaking to her, that's what 1:1 is all about right."  Pt. denies SI/HI/AH/VH.

## 2019-12-02 NOTE — PROGRESS NOTE BEHAVIORAL HEALTH - SUMMARY
As per previous records: "The patient is a 33yr old AA male, domiciled, presently unemployed, single male with past psychiatric history of possible MDD and at least one inpatient hospitalization, and PMH significant for Asthma. was transferred from the [facility] ED after he presented there reporting suicidal ideations. The patient was interviewed this morning by the inpatient team, he was extremely guarded and irritable throughout the interview, oftentimes replying in monosyllabic answers and maintaining a passive aggressive attitude towards the interviewers. Also frequently reported self conflicting claims. The ED note mentions that he had past Suicidal attempts and had been psychiatrically hospitalised 3 times in the past, But today he denied both these items. Past psychiatric history revealed that the patient had one prior hospitalization under similar circumstances. He imparted the current crisis to the passing away of his family members, and there is a possibility of anniversary reactions as he mentioned that its mostly during the festive season that he feels like this, The psychosocial history is relevant for the murder of his brother and passing away of both parents in his 20's. MSE revealed an irritable and passive aggressive demeanor with limited verbal output and severe guarding and ambivalence towards any suggested therapy principles, in particularly psychopharmacological methods, the patient did however request multiple times for a 1:1 therapist.  Update  11/27: There is a possibility of secondary gain going by the patient's apparent unwillingness to engage in a co-operative manner with the team, relative withdrawn and guarded demeanor as well as occasional demanding nature, But keeping in mind the past psych history, the index complaint and apparent anomie status, major depressive disorder/underlying character pathology need to be ruled out."  11/29: The patient is still guarded, and paranoid, fixated on the agitation showed by another patient in the unit. the patient is refusing Zoloft, however amenable to an increase in the Remeron dosing, (going to 30mg) 1:1 Psychotherapy would started from today.  Still refusing any collateral for the team.  12/2: Reports improved mood and sleep, denies any suicidal ideation/impulse/thougts, Receiving 1:1 therapy, still refusing team members to speak with any collateral,. As per previous records: "The patient is a 33yr old AA male, domiciled, presently unemployed, single male with past psychiatric history of possible MDD and at least one inpatient hospitalization, and PMH significant for Asthma. was transferred from the [facility] ED after he presented there reporting suicidal ideations. The patient was interviewed this morning by the inpatient team, he was extremely guarded and irritable throughout the interview, oftentimes replying in monosyllabic answers and maintaining a passive aggressive attitude towards the interviewers. Also frequently reported self conflicting claims. The ED note mentions that he had past Suicidal attempts and had been psychiatrically hospitalised 3 times in the past, But today he denied both these items. Past psychiatric history revealed that the patient had one prior hospitalization under similar circumstances. He imparted the current crisis to the passing away of his family members, and there is a possibility of anniversary reactions as he mentioned that its mostly during the festive season that he feels like this, The psychosocial history is relevant for the murder of his brother and passing away of both parents in his 20's. MSE revealed an irritable and passive aggressive demeanor with limited verbal output and severe guarding and ambivalence towards any suggested therapy principles, in particularly psychopharmacological methods, the patient did however request multiple times for a 1:1 therapist.  Update  11/27: There is a possibility of secondary gain going by the patient's apparent unwillingness to engage in a co-operative manner with the team, relative withdrawn and guarded demeanor as well as occasional demanding nature, But keeping in mind the past psych history, the index complaint and apparent anomie status, major depressive disorder/underlying character pathology need to be ruled out.  11/29: The patient is still guarded, and paranoid, fixated on the agitation showed by another patient in the unit. the patient is refusing Zoloft, however amenable to an increase in the Remeron dosing, (going to 30mg) 1:1 Psychotherapy would started from today.  Still refusing any collateral for the team."  12/2: Reports improved mood and sleep, denies any suicidal ideation/impulse/thougts, Receiving 1:1 therapy, still refusing team members to speak with any collateral,. Will increase dose of antidepressant (Remeron).

## 2019-12-02 NOTE — CHART NOTE - NSCHARTNOTEFT_GEN_A_CORE
Date 12/2/19     PSYCHOLOGY CLINICIAN PROGRESS NOTE    SUBJECTIVE (IN THE PATIENT’S WORDS): “I'm ready to leave”    OBJECTIVE:   Pt was seen and interviewed in dining puga. Pt continues to be preoccupied with the agitation/provocations of other pts on the unit and his observations of ways that staff manage the milieu. Pt reports concern that because of the disruptions by pts and dishonesty by staff that the unit isn't safe. When asked how this makes him feel or what it brings up for him, pt declines to respond and deflects the question. Pt was more talkative in general but remains guarded when it comes to discussing anything personal. Pt denies SI, reports stable mood, and says that he wants to leave tomorrow. Pt says he is open to working with a therapist one on one when he leaves. When asked what has changed since his last hospital discharges that make him open to following through now, the pt stated "the past is the past. If I want something now that is what I want."     MENTAL STATUS EXAM    BEHAVIOR: [X] cooperative [] uncooperative [X] good EC [] poor EC [] well related [X] oddly related [X] guarded []PMA [] PMR []abnormal movements [] Other:   SPEED: [X] normal rate/rhythm/volume [] loud [] quiet [] slow  [] rapid [] pressured [] Other: _________   MOOD: [X] euthymic [] dysphoric []anxious [] irritable [] Other: ___________   AFFECT: [X] full [] expansive [] constricted [] blunted [] flat [] stable [] labile [] Other: ________________ THOUGHT PROCESS: [X] organized [] disorganized [] goal-directed [X] concrete [X] logical  [] illogical   [] circumstantial [X] tangential [] impoverished [] effusive [] repetitive [] Other:  THOUGHT CONTENT: [X] negative for delusions/suicidal ideation /homicidal ideation  [ ] positive for delusions/suicidal ideation/homicidal ideation Describe:_denied current SI  PERCEPTION: [ ] negative for auditory/ visual hallucinations  [] positive for auditory/ visual hallucinations Describe: unable to be assessed, to be followed up about ________________________________________________________   INSIGHT/JUDGMENT: [] good [)fair [X] poor        IMPULSE CONTROL: [] good [X]fair [] poor         COGNITION: [X] alert and oriented to person,time,place [] Lacks orientation to person/ time/ place. Describe: ___________________________    ASSESSMENT/DIAGNOSES (include psychological formulation, diagnosis, diagnostic rule-outs and additional considerations):   Brendon Jacobo is a 34yr old AA male, domiciled with roommates, presently unemployed, single male with past psychiatric history of possible MDD and at least one inpatient hospitalization, and PMH significant for Asthma. was transferred from the [facility] ED after he presented there reporting suicidal ideations. When the patient was interviewed by the inpatient team, he was extremely guarded and irritable throughout the interview, oftentimes replying in monosyllabic answers and maintaining a passive aggressive attitude towards the interviewers. Also frequently reported self conflicting claims. The ED note mentions that he had past Suicidal attempts and had been psychiatrically hospitalised 3 times in the past, But today he denied both these items. Past psychiatric history revealed that the patient had one prior hospitalization under similar circumstances. He imparted the current crisis to the passing away of his family members, and there is a possibility of anniversary reactions as he mentioned that its mostly during the festive season that he feels like this, The psychosocial history is relevant for the murder of his brother and passing away of both parents in his 20's. MSE revealed an irritable and passive aggressive demeanor with limited verbal output and severe guarding and ambivalence towards any suggested therapy principles, in particularly psychopharmacological methods, the patient did however request multiple times for a 1:1 therapist. Mr. 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    Pt has difficulty accepting and expressing negative emotions and wants to keep negative thoughts and emotions separate from his conscious experience and sense of self. Suicidal thoughts may be way in which pt’s distress and difficulties in functioning and relating are expressed. Pt is concrete and guarded which limit effectiveness of talk therapy but he may be responsive to forming a trusted relationship which could support him in becoming more open about the difficulties he is experiencing.     Risk Assessment (consider static vs modifiable risk factors and protective factors; comment on level of risk for dangerous behavior):     [X] suicide [ ] self-harm [] elopement [] aggression [] other:   [X] ideation	 [] intent	 [X] plan   [X] prior incidences (if checked, elaborate): Unable to assess  [ ] family history of suicide	[] family history of aggression--unable to assess family history    Current Risk Factors:   Current/Recent Stressors:  Unemployed, holiday season  Static: h/o depression, h/o prior psychiatric hospitalizations, trauma h/o, past loss, non-compliant with psychiatric outpatient follow-up    Modifiable: suicidal ideations, depressed mood, anxiety, not in therapy, poor coping skills    Protective Factors: domiciled, stated openness to individual therapy    Plan (including specific details about this individual’s assessment, treatment and plans for discharge):   1. Individual, group, and milieu therapy: (specify therapeutic orientation or specific interventions if warranted). Interpersonal therapy to address mood sx, suicidal thoughts, and interpersonal functioning  2.    Ongoing medication evaluation with: Dr. Bolaños  3.   Collateral communication with: Aim to gather some collateral contacts though pt appears to be quite isolated  4.    Discharge planning: (indicate who is participating and any plans or recommendations for treatment post-discharge): Recommended for outpatient psychiatry and psychotherapy services  5.  Specify if CAMS or other suicide-specific intervention will be initiated: Conduct safety planning with pt    Case discussed and plan reviewed with supervisor.

## 2019-12-02 NOTE — PROGRESS NOTE BEHAVIORAL HEALTH - NSBHCHARTREVIEWVS_PSY_A_CORE FT
Vital Signs Last 24 Hrs  T(C): 36.8 (02 Dec 2019 09:57), Max: 36.8 (02 Dec 2019 09:57)  T(F): 98.3 (02 Dec 2019 09:57), Max: 98.3 (02 Dec 2019 09:57)  HR: 89 (02 Dec 2019 09:57) (89 - 89)  BP: 111/62 (02 Dec 2019 09:57) (111/62 - 111/62)  BP(mean): --  RR: 20 (02 Dec 2019 09:57) (20 - 20)  SpO2: 100% (02 Dec 2019 09:57) (100% - 100%)

## 2019-12-02 NOTE — PROGRESS NOTE BEHAVIORAL HEALTH - OTHER
passive aggressive frustration tolerance improving has lisping occasionally possible overvalued ideas

## 2019-12-03 PROCEDURE — 80053 COMPREHEN METABOLIC PANEL: CPT

## 2019-12-03 PROCEDURE — 85027 COMPLETE CBC AUTOMATED: CPT

## 2019-12-03 PROCEDURE — 90686 IIV4 VACC NO PRSV 0.5 ML IM: CPT

## 2019-12-03 PROCEDURE — 94640 AIRWAY INHALATION TREATMENT: CPT

## 2019-12-03 PROCEDURE — 36415 COLL VENOUS BLD VENIPUNCTURE: CPT

## 2019-12-03 PROCEDURE — 99232 SBSQ HOSP IP/OBS MODERATE 35: CPT

## 2019-12-03 PROCEDURE — 83036 HEMOGLOBIN GLYCOSYLATED A1C: CPT

## 2019-12-03 PROCEDURE — 80061 LIPID PANEL: CPT

## 2019-12-03 PROCEDURE — 80307 DRUG TEST PRSMV CHEM ANLYZR: CPT

## 2019-12-03 PROCEDURE — 93005 ELECTROCARDIOGRAM TRACING: CPT

## 2019-12-03 PROCEDURE — 81003 URINALYSIS AUTO W/O SCOPE: CPT

## 2019-12-03 PROCEDURE — 99285 EMERGENCY DEPT VISIT HI MDM: CPT | Mod: 25

## 2019-12-03 RX ORDER — DIPHENHYDRAMINE HCL 50 MG
50 CAPSULE ORAL EVERY 6 HOURS
Refills: 0 | Status: DISCONTINUED | OUTPATIENT
Start: 2019-12-03 | End: 2019-12-04

## 2019-12-03 RX ORDER — HALOPERIDOL DECANOATE 100 MG/ML
5 INJECTION INTRAMUSCULAR EVERY 8 HOURS
Refills: 0 | Status: DISCONTINUED | OUTPATIENT
Start: 2019-12-03 | End: 2019-12-04

## 2019-12-03 RX ADMIN — Medication 50 MILLIGRAM(S): at 23:17

## 2019-12-03 RX ADMIN — Medication 100 MILLIGRAM(S): at 23:17

## 2019-12-03 RX ADMIN — Medication 1 TABLET(S): at 09:26

## 2019-12-03 RX ADMIN — Medication 1 MILLIGRAM(S): at 09:26

## 2019-12-03 RX ADMIN — Medication 100 MILLIGRAM(S): at 16:02

## 2019-12-03 RX ADMIN — Medication 100 MILLIGRAM(S): at 09:26

## 2019-12-03 NOTE — PROGRESS NOTE BEHAVIORAL HEALTH - NSBHFUPINTERVALHXFT_PSY_A_CORE
The patient was discussed in the interdisciplinary team meeting, He was found to be discharge focused and demanding for discharge, Team tried to reach the case workers number provided by the patient ( Mary Anne [phone]) but no one picked up , team also called the organization office ([facility]), as per the  the patient does not have a housing with them, The patient was intimated all the communication, however he still remained fixated on discharge, denied any suicidal/homicidal ideation/impulse/thoughts (' they are long gone "), he however retains that characteristic flavor of paranoia / suspicion which is generalized in nature, the patient has been engaging in group/milieu activities in a limited manner and has been actively engaged in 1:1 therapy with psychotherapist The patient was discussed in the interdisciplinary team meeting, He was found to be discharge focused and demanding for discharge, Team tried to reach the case workers number provided by the patient ( Mary Anne [phone]) but no one picked up , team also called the organization office ([facility]), as per the  the patient does not have a housing with them, The patient was intimated all the communication, however he still remained fixated on discharge, denied any suicidal/homicidal ideation/impulse/thoughts (' they are long gone "), he however retains that characteristic flavor of paranoia / suspicion which is generalized in nature, the patient has been engaging in group/milieu activities in a limited manner and has been actively engaged in 1:1 therapy with psychotherapist. Pt. has been eating and sleeping well. He denies SI/HI/AH/VH.

## 2019-12-03 NOTE — CHART NOTE - NSCHARTNOTEFT_GEN_A_CORE
Date 12/3/19     PSYCHOLOGY CLINICIAN PROGRESS NOTE      SUBJECTIVE (IN THE PATIENT’S WORDS): “I’m ready to go today”    OBJECTIVE:   Pt was seen and interviewed in the hallway. Initially, pt was cooperative and again preoccupied with his observations of pt and staff behavior on the unit. After writer stated that pt is planned for discharge tomorrow he verbalized that this made him upset and that “there is no reason for me to not leave today.” Pt stated that his  was on the phone with him yesterday so she should be available for consult. Pt then declined to speak further with the writer stating that he was feeling angry that he is not going home today.     MENTAL STATUS EXAM    BEHAVIOR: [X] cooperative [X] uncooperative [X] good EC [] poor EC [] well related [X] oddly related [X] guarded []PMA [] PMR []abnormal movements [] Other:   SPEED: [X] normal rate/rhythm/volume [] loud [] quiet [] slow  [] rapid [] pressured [] Other: _________   MOOD: [X] euthymic [] dysphoric []anxious [] irritable [] Other: ___________   AFFECT: [X] full [] expansive [] constricted [] blunted [] flat [] stable [] labile [] Other: ________________ THOUGHT PROCESS: [X] organized [] disorganized [] goal-directed [X] concrete [X] logical  [] illogical   [] circumstantial [X] tangential [] impoverished [] effusive [] repetitive [] Other:  THOUGHT CONTENT: [X] negative for delusions/suicidal ideation /homicidal ideation  [ ] positive for delusions/suicidal ideation/homicidal ideation Describe:_denied current   PERCEPTION: [ ] negative for auditory/ visual hallucinations  [] positive for auditory/ visual hallucinations Describe: unable to be assessed, to be followed up about ________________________________________________________   INSIGHT/JUDGMENT: [] good []fair [X] poor        IMPULSE CONTROL: [] good [X]fair [] poor         COGNITION: [X] alert and oriented to person,time,place [] Lacks orientation to person/ time/ place. Describe: ___________________________    ASSESSMENT/DIAGNOSES (include psychological formulation, diagnosis, diagnostic rule-outs and additional considerations):   Brendon Jacobo is a 34yr old AA male, domiciled with roommates, presently unemployed, single male with past psychiatric history of possible MDD and at least one inpatient hospitalization, and PMH significant for Asthma. was transferred from the [facility] ED after he presented there reporting suicidal ideations. When the patient was interviewed by the inpatient team, he was extremely guarded and irritable throughout the interview, oftentimes replying in monosyllabic answers and maintaining a passive aggressive attitude towards the interviewers. Also frequently reported self conflicting claims. The ED note mentions that he had past Suicidal attempts and had been psychiatrically hospitalised 3 times in the past, But today he denied both these items. Past psychiatric history revealed that the patient had one prior hospitalization under similar circumstances. He imparted the current crisis to the passing away of his family members, and there is a possibility of anniversary reactions as he mentioned that its mostly during the festive season that he feels like this, The psychosocial history is relevant for the murder of his brother and passing away of both parents in his 20's. MSE revealed an irritable and passive aggressive demeanor with limited verbal output and severe guarding and ambivalence towards any suggested therapy principles, in particularly psychopharmacological methods, the patient did however request multiple times for a 1:1 therapist. Mr. 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    Pt has difficulty accepting and expressing negative emotions and aims to keep negative thoughts and emotions separate from his conscious experience and sense of self. Suicidal thoughts may be way in which pt’s distress and difficulties in functioning and relating are expressed. Pt is concrete and guarded which limit effectiveness of talk therapy but he may be responsive to forming a trusted relationship which could support him in becoming more open about the difficulties he is experiencing.     Differential paranoid personality versus mood disorder with psychotic features    Risk Assessment (consider static vs modifiable risk factors and protective factors; comment on level of risk for dangerous behavior):     [X] suicide [X] self-harm [] elopement [] aggression [] other:   [X] ideation	 [] intent	 [] plan   [] prior incidences (if checked, elaborate): unable to assess; pt provides contradictory information to prior hospital reports  [] family history of suicide	[] family history of aggression    Current Risk Factors:   Current/Recent Stressors: pt unemployed, holiday season triggers "family history"  Static: h/o depression, h/o prior psychiatric hospitalizations, trauma h/o, past loss    Modifiable: suicidal ideations, depressed mood, anxiety, not in therapy, poor coping skills    Protective Factors: domiciled with roommates    Plan (including specific details about this individual’s assessment, treatment and plans for discharge):   1. Individual, group, and milieu therapy: (specify therapeutic orientation or specific interventions if warranted). Interpersonal therapy to address mood sx, suicidal thoughts, and interpersonal functioning  2.    Ongoing medication evaluation with: Dr. Bolaños  3.   Collateral communication with: Aim to gather some collateral contact through pt’s   4.    Discharge planning: (indicate who is participating and any plans or recommendations for treatment post-discharge): Recommended for outpatient psychiatry and psychotherapy services, despite not following through with past outpatient referrals pt states that he is open to therapy following discharge  5.  Specify if CAMS or other suicide-specific intervention will be initiated: Conduct safety planning with pt    Case discussed and plan reviewed with supervisor.

## 2019-12-03 NOTE — PROGRESS NOTE BEHAVIORAL HEALTH - NSBHCHARTREVIEWVS_PSY_A_CORE FT
Vital Signs Last 24 Hrs  T(C): 37 (03 Dec 2019 09:39), Max: 37.3 (02 Dec 2019 17:42)  T(F): 98.6 (03 Dec 2019 09:39), Max: 99.2 (02 Dec 2019 17:42)  HR: 90 (03 Dec 2019 09:39) (90 - 91)  BP: 106/69 (03 Dec 2019 09:39) (106/69 - 113/74)  BP(mean): --  RR: 20 (03 Dec 2019 09:39) (18 - 20)  SpO2: 98% (03 Dec 2019 09:39) (98% - 98%)

## 2019-12-03 NOTE — PROGRESS NOTE BEHAVIORAL HEALTH - OTHER
passive aggressive attitude resolving frustration tolerance improving has lisping occasionally possible overvalued ideas of paranoia

## 2019-12-03 NOTE — PROGRESS NOTE BEHAVIORAL HEALTH - SUMMARY
As per previous records: "The patient is a 33yr old AA male, domiciled, presently unemployed, single male with past psychiatric history of possible MDD and at least one inpatient hospitalization, and PMH significant for Asthma. was transferred from the [facility] ED after he presented there reporting suicidal ideations. The patient was interviewed this morning by the inpatient team, he was extremely guarded and irritable throughout the interview, oftentimes replying in monosyllabic answers and maintaining a passive aggressive attitude towards the interviewers. Also frequently reported self conflicting claims. The ED note mentions that he had past Suicidal attempts and had been psychiatrically hospitalised 3 times in the past, But today he denied both these items. Past psychiatric history revealed that the patient had one prior hospitalization under similar circumstances. He imparted the current crisis to the passing away of his family members, and there is a possibility of anniversary reactions as he mentioned that its mostly during the festive season that he feels like this, The psychosocial history is relevant for the murder of his brother and passing away of both parents in his 20's. MSE revealed an irritable and passive aggressive demeanor with limited verbal output and severe guarding and ambivalence towards any suggested therapy principles, in particularly psychopharmacological methods, the patient did however request multiple times for a 1:1 therapist.  Update  11/27: There is a possibility of secondary gain going by the patient's apparent unwillingness to engage in a co-operative manner with the team, relative withdrawn and guarded demeanor as well as occasional demanding nature, But keeping in mind the past psych history, the index complaint and apparent anomie status, major depressive disorder/underlying character pathology need to be ruled out.  11/29: The patient is still guarded, and paranoid, fixated on the agitation showed by another patient in the unit. the patient is refusing Zoloft, however amenable to an increase in the Remeron dosing, (going to 30mg) 1:1 Psychotherapy would started from today.  Still refusing any collateral for the team."  12/2: Reports improved mood and sleep, denies any suicidal ideation/impulse/thougts, Receiving 1:1 therapy, still refusing team members to speak with any collateral,. Will increase dose of antidepressant (Remeron).  12/3: Compliant to medication ( Remaron), has been consistently refusing the Zoloft, and the team feels that it is unnecessary to offer it to him in context of the mood state and the possible underlying paranoia,Hence presently the Zoloft would be discontinued. As per previous records: "The patient is a 33yr old AA male, domiciled, presently unemployed, single male with past psychiatric history of possible MDD and at least one inpatient hospitalization, and PMH significant for Asthma. was transferred from the [facility] ED after he presented there reporting suicidal ideations. The patient was interviewed this morning by the inpatient team, he was extremely guarded and irritable throughout the interview, oftentimes replying in monosyllabic answers and maintaining a passive aggressive attitude towards the interviewers. Also frequently reported self conflicting claims. The ED note mentions that he had past Suicidal attempts and had been psychiatrically hospitalised 3 times in the past, But today he denied both these items. Past psychiatric history revealed that the patient had one prior hospitalization under similar circumstances. He imparted the current crisis to the passing away of his family members, and there is a possibility of anniversary reactions as he mentioned that its mostly during the festive season that he feels like this, The psychosocial history is relevant for the murder of his brother and passing away of both parents in his 20's. MSE revealed an irritable and passive aggressive demeanor with limited verbal output and severe guarding and ambivalence towards any suggested therapy principles, in particularly psychopharmacological methods, the patient did however request multiple times for a 1:1 therapist.  Update  11/27: There is a possibility of secondary gain going by the patient's apparent unwillingness to engage in a co-operative manner with the team, relative withdrawn and guarded demeanor as well as occasional demanding nature, But keeping in mind the past psych history, the index complaint and apparent anomie status, major depressive disorder/underlying character pathology need to be ruled out.  11/29: The patient is still guarded, and paranoid, fixated on the agitation showed by another patient in the unit. the patient is refusing Zoloft, however amenable to an increase in the Remeron dosing, (going to 30mg) 1:1 Psychotherapy would started from today.  Still refusing any collateral for the team.  12/2: Reports improved mood and sleep, denies any suicidal ideation/impulse/thougts, Receiving 1:1 therapy, still refusing team members to speak with any collateral,. Will increase dose of antidepressant (Remeron)."  12/3: Compliant to medication ( Remeron), has been consistently refusing the Zoloft, and the team feels that it is unnecessary to offer it to him in context of the current mood state and the possible underlying paranoia, Hence Zoloft will be no longer offered to the pt. Paranoid Personality Disorder may be included in pt's differential diagnosis.

## 2019-12-03 NOTE — PROGRESS NOTE BEHAVIORAL HEALTH - NS ED BHA SUICIDALITY PRESENT CURRENT INTENT DETAILS COLLATERAL FT
none available at the moment
Patient not willing to share any collateral contact
Patient still not willing to share any collateral contact: mentioned that he will talk to  at 1300hrs today
see interval C/C

## 2019-12-03 NOTE — PROGRESS NOTE BEHAVIORAL HEALTH - PROBLEM SELECTOR PLAN 1
- increase Mirtazapine to 45mg QHS  - D/C Sertraline - c/w Mirtazapine to 45mg QHS  - D/C Sertraline

## 2019-12-04 VITALS
TEMPERATURE: 98 F | OXYGEN SATURATION: 96 % | RESPIRATION RATE: 18 BRPM | DIASTOLIC BLOOD PRESSURE: 76 MMHG | HEART RATE: 90 BPM | SYSTOLIC BLOOD PRESSURE: 128 MMHG

## 2019-12-04 PROCEDURE — 99232 SBSQ HOSP IP/OBS MODERATE 35: CPT

## 2019-12-04 RX ORDER — MIRTAZAPINE 45 MG/1
1 TABLET, ORALLY DISINTEGRATING ORAL
Qty: 14 | Refills: 0
Start: 2019-12-04 | End: 2019-12-17

## 2019-12-04 RX ORDER — FOLIC ACID 0.8 MG
1 TABLET ORAL
Qty: 14 | Refills: 0
Start: 2019-12-04 | End: 2019-12-17

## 2019-12-04 NOTE — PROGRESS NOTE BEHAVIORAL HEALTH - CASE SUMMARY
34M pph MDD tolerating current medications with no side effects reported or observed. Risks, benefits, and potential side effects of all medications discussed with patient. Current risk of self-harm due to mood episode will be mitigated by use of medication i.e. Remeron. Inpatient Hospitalization is necessary at this time to ensure pt safety, stabilize symptoms, and plan a safe discharge.
At time of admission:  35 y/o man, living with roommates, unemployed living on savings, PPhx MDD, 3 previous [facility] admits with last one a few years ago, not in outpatient, denies substance use history, reports 2 past SAs a few years back, Pmhx Asthma , self presenting with suicidal ideation for 3 days. ; ;Pt reports feeling depressed for 3 days because the holidays have brought up thoughts of his  family members. He says its always hard during holidays since they  but this holiday he started to get Si with plan to drink himself to death. He bought liquor (8 bottles) yesterday with that plan and started to drink it but stopped himself. After thinking more he came in today to get help since he still has Si with plan to OD on alcohol or jump in front of a car or something else. he states he would still hurt himself if he went home. ;He reports changes in sleep, energy, and guilt. some anhedonia. ;Reports 2 past SAs years ago of trying to jump in front of cars which swerved. ; ;He denies HI, denies AVH/PI. ;Denies manic episodes. ; ;STates he has never been in outpatient and doesn't take psych meds, states he didn't follow up after discharges. Reports he had 2 psych admissions years ago, one at [facility] and another at [facility]. When asked about the [facility] one he states he doesn't remember. ; ;He requests psychiatric admission for his current suicidality and to discuss meds with the inpatient provider. ; ;;:; anxous to leave after lunch; wants to go see a movie; tends to go alone; alert; oriented; no s/h i/i/p or avh; mildly anxious thought congruebnt; cog intact; normal gait; visible social in the TV lounge; fair adls; good eye contact; On Remeron for sleep;  Symbicort for asthma; stable sufficient for discharge today.  ...of a car or something else. he states he would still hurt himself if he went home. ;He reports changes in sleep, energy, and guilt. some anhedonia. ;Reports 2 past SAs years ago of trying to jump in front of cars which swerved. ; ;He denies HI, denies AVH/PI. ;Denies manic episodes. ; ;STates he has never been in outpatient and doesn't take psych meds, states he didn't follow up after discharges. Reports he had 2 psych admissions years ago, one at [facility] and another at [facility]. When asked about the [facility] one he states he doesn't remember. ; ;He requests psychiatric admission for his current suicidality and to discuss meds with the inpatient provider. ; ;;:; anxous to leave after lunch; wants to go see a movie; tends to go alone; alert; oriented; no s/h i/i/p or avh; mildly anxious thought congruebnt; cog intact; normal gait; visible social in the TV lounge; fair adls; good eye contact; On Remeron for sleep;  Symbicort for asthma; stable sufficient for discharge today.
34M pph Depression responding well to current medications with no side effects reported or observed. Risks, benefits, and potential side effects of all medications discussed with patient. Current risk of self-harm due to mood episode will be mitigated by use of medication i.e. Remeron Inpatient Hospitalization is necessary at this time to ensure pt safety, stabilize symptoms, and plan a safe discharge.
43F pph Schizoaffective D/o responding well to current medications with no side effects reported or observed. Risks, benefits, and potential side effects of all medications discussed with patient. Current risk of self-harm due to mood episode will be mitigated by use of medication i.e. Remeron. Inpatient Hospitalization is necessary at this time to ensure pt safety, stabilize symptoms, and plan a safe discharge.
Case discussed with interdisciplinary team.  Per staff, pt has been visible, pacing, oddly related, passive SI without intent/plan, guarded. This morning, pt is calm, cooperative.  Patient denies SI/HI/AH/VH, stating he feels "ok".  Sleep is "fair", appetite "average".  Compliant with meds, denies medication side effects.  Constricted affect.  Continue current medications. IGM tx.  Discharge planning.

## 2019-12-04 NOTE — PROGRESS NOTE BEHAVIORAL HEALTH - DETAILS
Denies any present suicidal ideation/impulse/thoughts
Denied any present suicidal ideation/impulse/thoughts.
Denied any present suicidal ideation/impulse/thoughts.
Denied any suicidal ideation/impulse/thoughts " Those thoughts are long gone"
Denied any suicidal ideation/impulse/thoughts.

## 2019-12-04 NOTE — PROGRESS NOTE BEHAVIORAL HEALTH - NS ED BHA MED ROS SKIN
"Nutrition Plan:  1. Breakfast daily: lean protein + whole grain carbohydrates + fruits   a. Lean protein: eggs, egg white, sliced deli meat, peanut butter, [place] lezama, low-fat cheese, low fat yogurt  b. Whole grain carbohydrates: wheat toast/English muffin/pancakes/waffles, fruit, cereals  c. Low sugar cereals: corn flakes, rice Krispy, oatmeal squares, kix   d. NOTES:  Focus on having fruits with breakfast daily    2. Healthy snacks: 1-2x/day, 150-200 calories include fruit, vegetable or low fat dairy     A. NOTES: Check nutrition fact label for serving size and calories to make smart snack choices     3. Zero calorie beverages: Water, Crystal light, Sugar free punch, Diet soda, G2, PowerAde Zero, Skim or 1%milk  a. Limit intake juice 6-8oz/day   b. NOTES: Continue with zero calorie drink choices     4. Healthy plate method using proper portions   a. Use fist to measure vegetables and starch and use palm to measure meats  b. Decrease high calorie high fat foods like avocado, cheese, eggs  c. Use healthy cooking techniques like baking, stewing roasting, grilling. Avoid frying or excessive fats like butter or oils   d. NOTES: Keep portions appropriate with one palm meat, one fist ( 1-1.25 c ) starch, and two fists fruits or vegetables ( 2-2.5c)   e. Limit intake of high fat meats like lezama, sausage, bologna, salami, fried chicken, nuggets, fast food burgers, etc - 10% or 3x/month     5. Round out fast food to look like the healthy plate!  a. Skip the fries and the sugary drink and head home for salad, steamable vegetables and a zero calorie beverage  b. Keep intake 600 calories or less when eating fast foods     6. Continue Multivitamin ONCE daily      7. Physical activity: Ensure 60+ mins "out of breath" activity daily   a. Three must haves: 1. Heart pumping 2. Sweating! 3. Breathing heavy         Yaquelin Nloan, RD, LDN  Pediatric Dietitian  [facility]   [phone]    "
No complaints

## 2019-12-04 NOTE — PROGRESS NOTE BEHAVIORAL HEALTH - SUMMARY
As per previous records: "The patient is a 33yr old AA male, domiciled, presently unemployed, single male with past psychiatric history of possible MDD and at least one inpatient hospitalization, and PMH significant for Asthma. was transferred from the [facility] ED after he presented there reporting suicidal ideations. The patient was interviewed this morning by the inpatient team, he was extremely guarded and irritable throughout the interview, oftentimes replying in monosyllabic answers and maintaining a passive aggressive attitude towards the interviewers. Also frequently reported self conflicting claims. The ED note mentions that he had past Suicidal attempts and had been psychiatrically hospitalised 3 times in the past, But today he denied both these items. Past psychiatric history revealed that the patient had one prior hospitalization under similar circumstances. He imparted the current crisis to the passing away of his family members, and there is a possibility of anniversary reactions as he mentioned that its mostly during the festive season that he feels like this, The psychosocial history is relevant for the murder of his brother and passing away of both parents in his 20's. MSE revealed an irritable and passive aggressive demeanor with limited verbal output and severe guarding and ambivalence towards any suggested therapy principles, in particularly psychopharmacological methods, the patient did however request multiple times for a 1:1 therapist.  Update  11/27: There is a possibility of secondary gain going by the patient's apparent unwillingness to engage in a co-operative manner with the team, relative withdrawn and guarded demeanor as well as occasional demanding nature, But keeping in mind the past psych history, the index complaint and apparent anomie status, major depressive disorder/underlying character pathology need to be ruled out.  11/29: The patient is still guarded, and paranoid, fixated on the agitation showed by another patient in the unit. the patient is refusing Zoloft, however amenable to an increase in the Remeron dosing, (going to 30mg) 1:1 Psychotherapy would started from today.  Still refusing any collateral for the team.  12/2: Reports improved mood and sleep, denies any suicidal ideation/impulse/thougts, Receiving 1:1 therapy, still refusing team members to speak with any collateral,. Will increase dose of antidepressant (Remeron)."  12/3: Compliant to medication ( Remeron), has been consistently refusing the Zoloft, and the team feels that it is unnecessary to offer it to him in context of the current mood state and the possible underlying paranoia, Hence Zoloft will be no longer offered to the pt. Paranoid Personality Disorder may be included in pt's differential diagnosis.  12/4: Patient stable on the medication... (Mirtazapine), zoloft stopped yesterday, presently discharge focused, endorsed outpatient treatment, discussed in interdisciplinary morning rounds and with attending a, thought to be fit for discharge.

## 2019-12-04 NOTE — PROGRESS NOTE BEHAVIORAL HEALTH - NSBHFUPINTERVALHXFT_PSY_A_CORE
The patient was discussed in the morning interdisciplinary team meeting, the patient was spoken to both before and after the team meeting, the patient denied any suicidal/homicidal ideation/impulse/thoughts, He has been stable in the unit, compliant with the medication, and was future oriented, the [facility] his [facility] had called the team yesterday and had also contracted for safety for the patient outside in the community. he also endorsed compliance to the medications, and mentioned that he would be attending outpatient medications, the patient also denied vehemently any suicidal ideation/impulse/thoughts and also didn't endorse any paranoid ideations/perceptual problems.

## 2019-12-04 NOTE — PROGRESS NOTE BEHAVIORAL HEALTH - NSBHCHARTREVIEWVS_PSY_A_CORE FT
Vital Signs Last 24 Hrs  T(C): 37 (03 Dec 2019 16:11), Max: 37 (03 Dec 2019 09:39)  T(F): 98.6 (03 Dec 2019 16:11), Max: 98.6 (03 Dec 2019 09:39)  HR: 90 (03 Dec 2019 16:11) (90 - 90)  BP: 125/70 (03 Dec 2019 16:11) (106/69 - 125/70)  BP(mean): --  RR: 20 (03 Dec 2019 16:11) (20 - 20)  SpO2: 98% (03 Dec 2019 16:11) (98% - 98%) Vital Signs Last 24 Hrs  T(C): 36.7 (04 Dec 2019 09:00), Max: 37 (03 Dec 2019 16:11)  T(F): 98 (04 Dec 2019 09:00), Max: 98.6 (03 Dec 2019 16:11)  HR: 90 (04 Dec 2019 09:00) (90 - 90)  BP: 128/76 (04 Dec 2019 09:00) (125/70 - 128/76)  BP(mean): --  RR: 18 (04 Dec 2019 09:00) (18 - 20)  SpO2: 96% (04 Dec 2019 09:00) (96% - 98%)

## 2019-12-04 NOTE — PROGRESS NOTE BEHAVIORAL HEALTH - OTHER
frustration tolerance improving has lisping occasionally possible overvalued ideas of paranoia frustration tolerance much improved

## 2019-12-04 NOTE — PROGRESS NOTE BEHAVIORAL HEALTH - RISK ASSESSMENT
Risk at presentation MODERATE    Static factors:  -Male sex  - Past psychiatric history   - Relatively anomie nature ( not much social support)  Modifiable factors on admission:  - Reluctant to treatment engagement  - withdrawn to self  - Had some degree of ideation ( drinking himself to death)    Modifiable factors like depressed mood. and treatment non-adherence would be targeted during this hospital stay and the patient would be engaged in 1:1 as well as group psychotherapy, treatment team would try to initiate and keep him on antidepressants, presently the patient endorses compliance to Mirtazapine.  The modifiable factors were targeted and managed during his inpatient stay, he was also compliant with the medication, engaged in 1:1 psychotherapy and applied himself productively in some of the group and milieu activities . Consequently there was some upliftment in the mood, the irritability subsided, he was able to plan in future treatment plans after discharge. During the time of discharge the patient denied any suicidal plans/thoughts/ideas/impulses and mentioned that he would follow up as an outpatient basis. Presently the Acute suicidal risk is Low/Minimal.

## 2019-12-04 NOTE — PROGRESS NOTE BEHAVIORAL HEALTH - NS ED BHA MED ROS CARDIOVASCULAR
No complaints
no loss of consciousness, no gait abnormality, no headache, no sensory deficits, and no weakness.

## 2019-12-04 NOTE — PROGRESS NOTE BEHAVIORAL HEALTH - NSBHCHARTREVIEWLAB_PSY_A_CORE FT
Hemoglobin A1C, Whole Blood (11.26.19 @ 15:42)    Hemoglobin A1C, Whole Blood: 5.5  Lipid Profile (11.28.19 @ 06:43)    Total Cholesterol/HDL Ratio Measurement: 4.0 RATIO    Cholesterol, Serum: 137 mg/dL    Triglycerides, Serum: 111 mg/dL    HDL Cholesterol, Serum: 34: HDL Levels >/= 60 mg/dL are considered beneficial and a "negative" risk  factor.    Comprehensive Metabolic Panel (11.25.19 @ 15:54)    Sodium, Serum: 144 mmoL/L    Potassium, Serum: 4.1 mmol/L    Chloride, Serum: 108 mmol/L    Carbon Dioxide, Serum: 30 mmol/L    Anion Gap, Serum: 6 mmoL/L    Blood Urea Nitrogen, Serum: 17 mg/dL    Creatinine, Serum: 0.89 mg/dL    Glucose, Serum: 121 mg/dL    Calcium, Total Serum: 8.8 mg/dL    Protein Total, Serum: 7.2 g/dL    Albumin, Serum: 3.4 g/dL    Bilirubin Total, Serum: 0.2 mg/dL    Alkaline Phosphatase, Serum: 90 U/L    Aspartate Aminotransferase (AST/SGOT): 18 U/L    Alanine Aminotransferase (ALT/SGPT): 20 U/L    eGFR if Non : 113: 3M2    eGFR if African American: 131 mL/min/1.73M2
Patient refused
Hemoglobin A1C, Whole Blood (11.26.19 @ 15:42)    Hemoglobin A1C, Whole Blood: 5.5: Reference Range                 4.0-5.6%       High risk (prediabetic)        5.7-6.4%       Diabetic, diagnostic             >=6.5%       ADA diabetic treatment goal       <7.0%  Reference ranges are based upon the 2010 recommendations of the American  Diabetes Association.  Interpretation may vary for children and  adolescents. %  Lipid Profile (11.28.19 @ 06:43)    Total Cholesterol/HDL Ratio Measurement: 4.0 RATIO    Cholesterol, Serum: 137 mg/dL    Triglycerides, Serum: 111 mg/dL    HDL Cholesterol, Serum: 34: HDL Levels >/= 60 mg/dL are considered beneficial and a "negative" risk  factor.  Effective 08/15/2018: New reference range and interpretive comment. mg/dL    Direct LDL: 81: LDL Cholesterol --- Interpretive Comment (for adults 18 and over)  Below 70                  Ideal for people at very high risk of heart  disease  Below 100                Ideal for people at risk of heart disease  100 - 129                  Near [place]  130 - 159                  Borderline high  160 - 189                  High  190 and Above        Very high mg/dL

## 2019-12-04 NOTE — PROGRESS NOTE BEHAVIORAL HEALTH - PRIMARY DX
Severe episode of recurrent major depressive disorder, with psychotic features

## 2019-12-04 NOTE — PROGRESS NOTE BEHAVIORAL HEALTH - NSBHADMITMEDEDUDETAILS_A_CORE FT
Risks, benefits, and potential side effects of all medications discussed with patient.

## 2019-12-06 DIAGNOSIS — F33.3 MAJOR DEPRESSIVE DISORDER, RECURRENT, SEVERE WITH PSYCHOTIC SYMPTOMS: ICD-10-CM

## 2019-12-06 DIAGNOSIS — R45.851 SUICIDAL IDEATIONS: ICD-10-CM

## 2019-12-06 DIAGNOSIS — F17.200 NICOTINE DEPENDENCE, UNSPECIFIED, UNCOMPLICATED: ICD-10-CM

## 2019-12-06 DIAGNOSIS — J45.909 UNSPECIFIED ASTHMA, UNCOMPLICATED: ICD-10-CM

## 2019-12-26 NOTE — ED BEHAVIORAL HEALTH ASSESSMENT NOTE - NS ED BHA REVIEW OF ED CHART AVAILABLE IMAGING REVIEWED
Pt admitted d/t complete heart block; pt is sp PPM placement. 12/25 +2 edema L ankle. Skin assessment shows nose laceration and L chest surgical incision. Pt reports that he has been eating well with a good appetite. Po intake is varied 0-100% per EMR and is at % as of recent (pt didn't feel well on one day). Pt denies any nasuea/abdominal pain with meals. Pt denies chewing/swallowing difficulties. Pt has NKFA/intolerances. Pt does not take vitamins/supplements pta. No food preferences r/t culture/Mandaeism. Last BM was this am (12/26), pt denies diarrhea/constipation. Pt denies any recent changes in wt but is unsure UBW. Reports that dosing wt of 168 lbs sounds accurate. No physical signs of muscle wasting/fat loss detected upon RD observation. None available

## 2020-01-01 ENCOUNTER — EMERGENCY (EMERGENCY)
Facility: HOSPITAL | Age: 35
LOS: 1 days | Discharge: HOME | End: 2020-01-01
Attending: EMERGENCY MEDICINE | Admitting: EMERGENCY MEDICINE
Payer: MEDICAID

## 2020-01-01 VITALS
TEMPERATURE: 98 F | DIASTOLIC BLOOD PRESSURE: 68 MMHG | HEART RATE: 80 BPM | SYSTOLIC BLOOD PRESSURE: 109 MMHG | OXYGEN SATURATION: 100 % | RESPIRATION RATE: 18 BRPM

## 2020-01-01 VITALS
OXYGEN SATURATION: 100 % | DIASTOLIC BLOOD PRESSURE: 78 MMHG | HEIGHT: 74 IN | WEIGHT: 179.9 LBS | HEART RATE: 75 BPM | SYSTOLIC BLOOD PRESSURE: 117 MMHG | RESPIRATION RATE: 16 BRPM | TEMPERATURE: 97 F

## 2020-01-01 DIAGNOSIS — R05 COUGH: ICD-10-CM

## 2020-01-01 DIAGNOSIS — J45.901 UNSPECIFIED ASTHMA WITH (ACUTE) EXACERBATION: ICD-10-CM

## 2020-01-01 DIAGNOSIS — J06.9 ACUTE UPPER RESPIRATORY INFECTION, UNSPECIFIED: ICD-10-CM

## 2020-01-01 LAB
ALBUMIN SERPL ELPH-MCNC: 4.5 G/DL — SIGNIFICANT CHANGE UP (ref 3.3–5)
ALP SERPL-CCNC: 97 U/L — SIGNIFICANT CHANGE UP (ref 40–120)
ALT FLD-CCNC: 17 U/L — SIGNIFICANT CHANGE UP (ref 10–45)
ANION GAP SERPL CALC-SCNC: 11 MMOL/L — SIGNIFICANT CHANGE UP (ref 5–17)
AST SERPL-CCNC: 24 U/L — SIGNIFICANT CHANGE UP (ref 10–40)
BASOPHILS # BLD AUTO: 0.05 K/UL — SIGNIFICANT CHANGE UP (ref 0–0.2)
BASOPHILS NFR BLD AUTO: 0.9 % — SIGNIFICANT CHANGE UP (ref 0–2)
BILIRUB SERPL-MCNC: 0.3 MG/DL — SIGNIFICANT CHANGE UP (ref 0.2–1.2)
BUN SERPL-MCNC: 25 MG/DL — HIGH (ref 7–23)
CALCIUM SERPL-MCNC: 9.6 MG/DL — SIGNIFICANT CHANGE UP (ref 8.4–10.5)
CHLORIDE SERPL-SCNC: 102 MMOL/L — SIGNIFICANT CHANGE UP (ref 96–108)
CO2 SERPL-SCNC: 25 MMOL/L — SIGNIFICANT CHANGE UP (ref 22–31)
CREAT SERPL-MCNC: 0.93 MG/DL — SIGNIFICANT CHANGE UP (ref 0.5–1.3)
EOSINOPHIL # BLD AUTO: 0.14 K/UL — SIGNIFICANT CHANGE UP (ref 0–0.5)
EOSINOPHIL NFR BLD AUTO: 2.8 % — SIGNIFICANT CHANGE UP (ref 0–6)
FLU A RESULT: SIGNIFICANT CHANGE UP
FLU A RESULT: SIGNIFICANT CHANGE UP
FLUAV AG NPH QL: SIGNIFICANT CHANGE UP
FLUBV AG NPH QL: SIGNIFICANT CHANGE UP
GLUCOSE SERPL-MCNC: 78 MG/DL — SIGNIFICANT CHANGE UP (ref 70–99)
HCT VFR BLD CALC: 42.2 % — SIGNIFICANT CHANGE UP (ref 39–50)
HGB BLD-MCNC: 12.9 G/DL — LOW (ref 13–17)
LYMPHOCYTES # BLD AUTO: 1.92 K/UL — SIGNIFICANT CHANGE UP (ref 1–3.3)
LYMPHOCYTES # BLD AUTO: 37.6 % — SIGNIFICANT CHANGE UP (ref 13–44)
MCHC RBC-ENTMCNC: 22.6 PG — LOW (ref 27–34)
MCHC RBC-ENTMCNC: 30.6 GM/DL — LOW (ref 32–36)
MCV RBC AUTO: 74 FL — LOW (ref 80–100)
MONOCYTES # BLD AUTO: 0.56 K/UL — SIGNIFICANT CHANGE UP (ref 0–0.9)
MONOCYTES NFR BLD AUTO: 11 % — SIGNIFICANT CHANGE UP (ref 2–14)
NEUTROPHILS # BLD AUTO: 2.15 K/UL — SIGNIFICANT CHANGE UP (ref 1.8–7.4)
NEUTROPHILS NFR BLD AUTO: 42.2 % — LOW (ref 43–77)
PLATELET # BLD AUTO: 230 K/UL — SIGNIFICANT CHANGE UP (ref 150–400)
POTASSIUM SERPL-MCNC: 4.2 MMOL/L — SIGNIFICANT CHANGE UP (ref 3.5–5.3)
POTASSIUM SERPL-SCNC: 4.2 MMOL/L — SIGNIFICANT CHANGE UP (ref 3.5–5.3)
PROT SERPL-MCNC: 7.9 G/DL — SIGNIFICANT CHANGE UP (ref 6–8.3)
RBC # BLD: 5.7 M/UL — SIGNIFICANT CHANGE UP (ref 4.2–5.8)
RBC # FLD: 14.9 % — HIGH (ref 10.3–14.5)
RSV RESULT: SIGNIFICANT CHANGE UP
RSV RNA RESP QL NAA+PROBE: SIGNIFICANT CHANGE UP
SODIUM SERPL-SCNC: 138 MMOL/L — SIGNIFICANT CHANGE UP (ref 135–145)
WBC # BLD: 5.1 K/UL — SIGNIFICANT CHANGE UP (ref 3.8–10.5)
WBC # FLD AUTO: 5.1 K/UL — SIGNIFICANT CHANGE UP (ref 3.8–10.5)

## 2020-01-01 PROCEDURE — 36415 COLL VENOUS BLD VENIPUNCTURE: CPT

## 2020-01-01 PROCEDURE — 99285 EMERGENCY DEPT VISIT HI MDM: CPT

## 2020-01-01 PROCEDURE — 87631 RESP VIRUS 3-5 TARGETS: CPT

## 2020-01-01 PROCEDURE — 99284 EMERGENCY DEPT VISIT MOD MDM: CPT | Mod: 25

## 2020-01-01 PROCEDURE — 96360 HYDRATION IV INFUSION INIT: CPT

## 2020-01-01 PROCEDURE — 80053 COMPREHEN METABOLIC PANEL: CPT

## 2020-01-01 PROCEDURE — 94640 AIRWAY INHALATION TREATMENT: CPT

## 2020-01-01 PROCEDURE — 85025 COMPLETE CBC W/AUTO DIFF WBC: CPT

## 2020-01-01 RX ORDER — ALBUTEROL 90 UG/1
2 AEROSOL, METERED ORAL
Qty: 1 | Refills: 0
Start: 2020-01-01 | End: 2020-01-30

## 2020-01-01 RX ORDER — SODIUM CHLORIDE 9 MG/ML
1000 INJECTION INTRAMUSCULAR; INTRAVENOUS; SUBCUTANEOUS ONCE
Refills: 0 | Status: COMPLETED | OUTPATIENT
Start: 2020-01-01 | End: 2020-01-01

## 2020-01-01 RX ORDER — IPRATROPIUM/ALBUTEROL SULFATE 18-103MCG
3 AEROSOL WITH ADAPTER (GRAM) INHALATION ONCE
Refills: 0 | Status: COMPLETED | OUTPATIENT
Start: 2020-01-01 | End: 2020-01-01

## 2020-01-01 RX ORDER — IBUPROFEN 200 MG
600 TABLET ORAL ONCE
Refills: 0 | Status: COMPLETED | OUTPATIENT
Start: 2020-01-01 | End: 2020-01-01

## 2020-01-01 RX ORDER — PSEUDOEPHEDRINE HCL 30 MG
30 TABLET ORAL ONCE
Refills: 0 | Status: COMPLETED | OUTPATIENT
Start: 2020-01-01 | End: 2020-01-01

## 2020-01-01 RX ADMIN — SODIUM CHLORIDE 1000 MILLILITER(S): 9 INJECTION INTRAMUSCULAR; INTRAVENOUS; SUBCUTANEOUS at 13:34

## 2020-01-01 RX ADMIN — Medication 60 MILLIGRAM(S): at 14:41

## 2020-01-01 RX ADMIN — SODIUM CHLORIDE 1000 MILLILITER(S): 9 INJECTION INTRAMUSCULAR; INTRAVENOUS; SUBCUTANEOUS at 12:25

## 2020-01-01 RX ADMIN — Medication 600 MILLIGRAM(S): at 13:00

## 2020-01-01 RX ADMIN — Medication 30 MILLIGRAM(S): at 12:24

## 2020-01-01 RX ADMIN — Medication 600 MILLIGRAM(S): at 12:24

## 2020-01-01 RX ADMIN — Medication 3 MILLILITER(S): at 12:25

## 2020-01-01 NOTE — ED PROVIDER NOTE - OBJECTIVE STATEMENT
35 yo male with h/o asthma, in the ER c/o sore throat, nasal congestion, sinus HA, cough, chest tightness, wheezes, constipations. Pt states his symptoms started 2 weeks ago, but asthma exacerbation is more for the past week. Pt has been using his rescue inhaler and advair with minimal relief.   Pt also reports fever yesterday.

## 2020-01-01 NOTE — ED ADULT NURSE NOTE - OBJECTIVE STATEMENT
pt with hx asthma states that he is having trouble breathing x 2 weeks , gradually getting worse with productive cough, yellow sputum ,vomited once yesterday and had a fever

## 2020-01-01 NOTE — ED PROVIDER NOTE - MUSCULOSKELETAL, MLM
Spine and all extremities grossly appears normal, range of motion is not limited, no muscle or joint tenderness

## 2020-01-01 NOTE — ED ADULT TRIAGE NOTE - CHIEF COMPLAINT QUOTE
Patient c/o cough with trouble breathing and chest pain for 1 week , also constipation for 4 days , denies any vomiting nor abdominal pain . No fever nor chills . History of asthma .

## 2020-01-01 NOTE — ED PROVIDER NOTE - CLINICAL SUMMARY MEDICAL DECISION MAKING FREE TEXT BOX
35 yo male with h/o asthma, in the ER with flu-like symptoms and asthma exacerbations. pt afebrile, in NAD. labs- no acute findings, flu- negative. Pt improved after nebulizer treatment. plan : dc home with Po Prednisone, albuterol, colace.

## 2020-01-01 NOTE — ED PROVIDER NOTE - NSFOLLOWUPINSTRUCTIONS_ED_ALL_ED_FT
I have discussed the discharge plan with the patient. The patient agrees with the plan, as discussed.  The patient understands Emergency Department diagnosis is a preliminary diagnosis often based on limited information and that the patient must adhere to the follow-up plan as discussed.  The patient understands that if the symptoms worsen or if prescribed medications do not have the desired/planned effect that the patient may return to the Emergency Department at any time for further evaluation and treatment.    Asthma, Adult     Asthma is a long-term (chronic) condition that causes recurrent episodes in which the airways become tight and narrow. The airways are the passages that lead from the nose and mouth down into the lungs. Asthma episodes, also called asthma attacks, can cause coughing, wheezing, shortness of breath, and chest pain. The airways can also fill with mucus. During an attack, it can be difficult to breathe. Asthma attacks can range from minor to life threatening.  Asthma cannot be cured, but medicines and lifestyle changes can help control it and treat acute attacks.  What are the causes?  This condition is believed to be caused by inherited (genetic) and environmental factors, but its exact cause is not known.  There are many things that can bring on an asthma attack or make asthma symptoms worse (triggers). Asthma triggers are different for each person. Common triggers include:  Mold.Dust.Cigarette smoke.Cockroaches.Things that can cause allergy symptoms (allergens), such as animal dander or pollen from trees or grass.Air pollutants such as household , wood smoke, smog, or chemical odors.Cold air, weather changes, and winds (which increase molds and pollen in the air).Strong emotional expressions such as crying or laughing hard.Stress.Certain medicines (such as aspirin) or types of medicines (such as beta-blockers).Sulfites in foods and drinks. Foods and drinks that may contain sulfites include dried fruit, potato chips, and sparkling grape juice.Infections or inflammatory conditions such as the flu, a cold, or inflammation of the nasal membranes (rhinitis).Gastroesophageal reflux disease (GERD).Exercise or strenuous activity.What are the signs or symptoms?  Symptoms of this condition may occur right after asthma is triggered or many hours later. Symptoms include:  Wheezing. This can sound like whistling when you breathe.Excessive nighttime or early morning coughing.Frequent or severe coughing with a common cold.Chest tightness.Shortness of breath.Tiredness (fatigue) with minimal activity.How is this diagnosed?  This condition is diagnosed based on:  Your medical history.A physical exam.Tests, which may include:  Lung function studies and pulmonary studies (spirometry). These tests can evaluate the flow of air in your lungs.Allergy tests.Imaging tests, such as X-rays.How is this treated?  There is no cure for this condition, but treatment can help control your symptoms. Treatment for asthma usually involves:  Identifying and avoiding your asthma triggers.Using medicines to control your symptoms. Generally, two types of medicines are used to treat asthma:  Controller medicines. These help prevent asthma symptoms from occurring. They are usually taken every day.Fast-acting reliever or rescue medicines. These quickly relieve asthma symptoms by widening the narrow and tight airways. They are used as needed and provide short-term relief.Using supplemental oxygen. This may be needed during a severe episode.Using other medicines, such as:  Allergy medicines, such as antihistamines, if your asthma attacks are triggered by allergens.Immune medicines (immunomodulators). These are medicines that help control the immune system.Creating an asthma action plan. An asthma action plan is a written plan for managing and treating your asthma attacks. This plan includes:  A list of your asthma triggers and how to avoid them.Information about when medicines should be taken and when their dosage should be changed.Instructions about using a device called a peak flow meter. A peak flow meter measures how well the lungs are working and the severity of your asthma. It helps you monitor your condition.Follow these instructions at home:  Controlling your home environment     Control your home environment in the following ways to help avoid triggers and prevent asthma attacks:  Change your heating and air conditioning filter regularly.Limit your use of fireplaces and wood stoves.Get rid of pests (such as roaches and mice) and their droppings.Throw away plants if you see mold on them.Clean floors and dust surfaces regularly. Use unscented cleaning products.Try to have someone else vacuum for you regularly. Stay out of rooms while they are being vacuumed and for a short while afterward. If you vacuum, use a dust mask from a hardware store, a double-layered or microfilter vacuum  bag, or a vacuum  with a HEPA filter.Replace carpet with wood, tile, or vinyl navid. Carpet can trap dander and dust.Use allergy-proof pillows, mattress covers, and box spring covers.Keep your bedroom a trigger-free room. Avoid pets and keep windows closed when allergens are in the air.Wash beddings every week in hot water and dry them in a dryer.Use blankets that are made of polyester or cotton.Clean bathrooms and matthew with bleach. If possible, have someone repaint the walls in these rooms with mold-resistant paint. Stay out of the rooms that are being cleaned and painted.Wash your hands often with soap and water. If soap and water are not available, use hand .Do not allow anyone to smoke in your home.General instructions    Take over-the-counter and prescription medicines only as told by your health care provider.  Speak with your health care provider if you have questions about how or when to take the medicines.Make note if you are requiring more frequent dosages.Do not use any products that contain nicotine or tobacco, such as cigarettes and e-cigarettes. If you need help quitting, ask your health care provider. Also, avoid being exposed to secondhand smoke.Use a peak flow meter as told by your health care provider. Record and keep track of the readings.Understand and use the asthma action plan to help minimize, or stop an asthma attack, without needing to seek medical care.Make sure you stay up to date on your yearly vaccinations as told by your health care provider. This may include vaccines for the flu and pneumonia.Avoid outdoor activities when allergen counts are high and when air quality is low.Wear a ski mask that covers your nose and mouth during outdoor winter activities. Exercise indoors on cold days if you can.Warm up before exercising, and take time for a cool-down period after exercise.Keep all follow-up visits as told by your health care provider. This is important.Where to find more information  For information about asthma, turn to the Centers for Disease Control and Prevention at www.cdc.gov/asthma/faqs.htmFor air quality information, turn to AirNow at https://airnow.gov/Contact a health care provider if:  You have wheezing, shortness of breath, or a cough even while you are taking medicine to prevent attacks.The mucus you cough up (sputum) is thicker than usual.Your sputum changes from clear or white to yellow, green, gray, or bloody.Your medicines are causing side effects, such as a rash, itching, swelling, or trouble breathing.You need to use a reliever medicine more than 2–3 times a week.Your peak flow reading is still at 50–79% of your personal best after following your action plan for 1 hour.You have a fever.Get help right away if:  You are getting worse and do not respond to treatment during an asthma attack.You are short of breath when at rest or when doing very little physical activity.You have difficulty eating, drinking, or talking.You have chest pain or tightness.You develop a fast heartbeat or palpitations.You have a bluish color to your lips or fingernails.You are light-headed or dizzy, or you faint.Your peak flow reading is less than 50% of your personal best.You feel too tired to breathe normally.Summary  Asthma is a long-term (chronic) condition that causes recurrent episodes in which the airways become tight and narrow. These episodes can cause coughing, wheezing, shortness of breath, and chest pain.Asthma cannot be cured, but medicines and lifestyle changes can help control it and treat acute attacks.Make sure you understand how to avoid triggers and how and when to use your medicines.Asthma attacks can range from minor to life threatening. Get help right away if you have an asthma attack and do not respond to treatment with your usual rescue medicines.This information is not intended to replace advice given to you by your health care provider. Make sure you discuss any questions you have with your health care provider.

## 2020-01-01 NOTE — ED PROVIDER NOTE - ATTENDING CONTRIBUTION TO CARE
I discussed the plan of care of the patient directly with the PA and examined the patient while in the Emergency Department. I agree with the HPI and PE as documented by the PA.  Pt is a 35yo m, h/o asthma, who p/w 2 wks of uri sx's including cough, sorethroat, nasal congestion, chest tightness, wheezing. + fever yesterday. No n/v/d, abd pain. No leg pain, swelling. Has been using alb/ advair with no improvement. WNWD m in nad. + s1, s2, rrr. Lungs with minimal exp wheezes b/l, no rales/ rhonchi. No leg edema, calf tenderness/ cords. 2+ pulses b/l. Labs reviewed and wnl including wbc. Flu neg. Pt tx'd w/ prednisone, albuterol, and sudafed w/ improvement of sx's.  post tx. Suspect viral uri w/ associated asthma exacerbation. ED evaluation and management discussed with the patient in detail.  Close PMD follow up encouraged.  Strict ED return instructions discussed in detail and patient given the opportunity to ask any questions about their discharge diagnosis and instructions. Patient verbalized understanding.

## 2020-02-08 ENCOUNTER — EMERGENCY (EMERGENCY)
Facility: HOSPITAL | Age: 35
LOS: 1 days | Discharge: HOME | End: 2020-02-08
Admitting: EMERGENCY MEDICINE
Payer: MEDICAID

## 2020-02-08 VITALS
HEART RATE: 82 BPM | DIASTOLIC BLOOD PRESSURE: 74 MMHG | SYSTOLIC BLOOD PRESSURE: 113 MMHG | OXYGEN SATURATION: 97 % | TEMPERATURE: 98 F | RESPIRATION RATE: 18 BRPM

## 2020-02-08 VITALS
SYSTOLIC BLOOD PRESSURE: 112 MMHG | RESPIRATION RATE: 18 BRPM | OXYGEN SATURATION: 100 % | HEART RATE: 78 BPM | TEMPERATURE: 98 F | DIASTOLIC BLOOD PRESSURE: 70 MMHG

## 2020-02-08 PROCEDURE — 99284 EMERGENCY DEPT VISIT MOD MDM: CPT

## 2020-02-08 RX ORDER — IPRATROPIUM/ALBUTEROL SULFATE 18-103MCG
3 AEROSOL WITH ADAPTER (GRAM) INHALATION ONCE
Refills: 0 | Status: COMPLETED | OUTPATIENT
Start: 2020-02-08 | End: 2020-02-08

## 2020-02-08 RX ORDER — AZITHROMYCIN 500 MG/1
2000 TABLET, FILM COATED ORAL ONCE
Refills: 0 | Status: COMPLETED | OUTPATIENT
Start: 2020-02-08 | End: 2020-02-08

## 2020-02-08 RX ORDER — GENTAMICIN SULFATE 40 MG/ML
240 VIAL (ML) INJECTION ONCE
Refills: 0 | Status: COMPLETED | OUTPATIENT
Start: 2020-02-08 | End: 2020-02-08

## 2020-02-08 RX ADMIN — Medication 3 MILLILITER(S): at 09:04

## 2020-02-08 RX ADMIN — AZITHROMYCIN 2000 MILLIGRAM(S): 500 TABLET, FILM COATED ORAL at 09:04

## 2020-02-08 RX ADMIN — Medication 240 MILLIGRAM(S): at 09:04

## 2020-02-08 NOTE — ED PROVIDER NOTE - CLINICAL SUMMARY MEDICAL DECISION MAKING FREE TEXT BOX
Pt with URI symptoms including chest tightness and wheezing and possible STD exposure s/p episode of unprotected sex. Will give duoneb for symptom relief. Pt refused  exam and HIV/syphilis testing and is requesting treatment. Will empirically treat for GC.  GC culture sent. Abstinence and safe sex precautions given. Will send prescription of inhaler to pharmacy if needed. Pt with URI symptoms including chest tightness and wheezing and possible STD exposure s/p episode of unprotected sex. Will give duoneb for symptom relief. Pt refused  exam and HIV/syphilis testing and is requesting treatment. Will empirically treat for GC/CT.  GC/CT labs sent. Abstinence and safe sex precautions given.

## 2020-02-08 NOTE — ED PROVIDER NOTE - ENMT, MLM
Airway patent, Nasal mucosa clear. Mouth with normal mucosa. Throat: slight pharyngeal erythema, no vesicles, no oropharyngeal exudates and uvula is midline.

## 2020-02-08 NOTE — ED PROVIDER NOTE - NSFOLLOWUPCLINICS_GEN_ALL_ED_FT
[facility]  Family Medicine  [address]  [place], [place] [address]  Phone: [phone]  Fax:   Follow Up Time: 1-3 Days

## 2020-02-08 NOTE — ED ADULT NURSE NOTE - NSIMPLEMENTINTERV_GEN_ALL_ED
Implemented All Universal Safety Interventions:  [place] to call system. Call bell, personal items and telephone within reach. Instruct patient to call for assistance. Room bathroom lighting operational. Non-slip footwear when patient is off stretcher. Physically safe environment: no spills, clutter or unnecessary equipment. Stretcher in lowest position, wheels locked, appropriate side rails in place.

## 2020-02-08 NOTE — ED PROVIDER NOTE - OBJECTIVE STATEMENT
33 yo M w/ PMHx of asthma presents with 1 week of URI symptoms. Pt c/o bilateral earache, nasal congestion, sore throat, dry cough; pt states URI symptoms triggered asthma for the last 2-3 days. Pt also reports slight chest tightness and wheezing, but pt no longer has an inhaler. While in the ED, pt requests STD testing and empirical treatment due to unprotected sexual intercourse with a new female partner 2 days ago and c/o dysuria x 2 days. Pt is refusing  exam and HIV and syphilis testing; pt wants routine urethritis empirical treatment. Denies fever, chills, hematuria, penile d/c, testicular pain, scrotal swelling, oral/genital lesions, drooling, aphasia, abdominal pain, N/V/D/C, change in bowel function, flank pain, rash, HA, dizziness, SOB, CP, palpitations, diaphoresis, cough, and malaise.

## 2020-02-08 NOTE — ED ADULT TRIAGE NOTE - CHIEF COMPLAINT QUOTE
walk in patient with complaints of bilateral ear ache and sore throat as well as burning on urination.

## 2020-02-08 NOTE — ED PROVIDER NOTE - PATIENT PORTAL LINK FT
You can access the FollowMyHealth Patient Portal offered by [facility] by registering at the following website: [email or web address]. By joining [employer]’s FollowMyHealth portal, you will also be able to view your health information using other applications (apps) compatible with our system.

## 2020-02-08 NOTE — ED PROVIDER NOTE - NSFOLLOWUPINSTRUCTIONS_ED_ALL_ED_FT
Viral Respiratory Infection    A viral respiratory infection is an illness that affects parts of the body used for breathing, like the lungs, nose, and throat. It is caused by a germ called a virus. Symptoms can include runny nose, coughing, sneezing, fatigue, body aches, sore throat, fever, or headache. Over the counter medicine can be used to manage the symptoms but the infection typically goes away on its own in 5 to 10 days.     SEEK IMMEDIATE MEDICAL CARE IF YOU HAVE ANY OF THE FOLLOWING SYMPTOMS: shortness of breath, chest pain, fever over 10 days, or lightheadedness/dizziness.    PLEASE FOLLOW-UP WITH YOUR PRIMARY CARE DOCTOR IN 1-3 DAYS FOR FURTHER EVALUATION.  PLEASE TAKE ALL PAPERWORK FROM TODAY'S VISIT TO YOUR PRIMARY DOCTOR.  IF YOU DO NOT HAVE A PRIMARY CARE DOCTOR PLEASE REFER TO THE OFFICE/CLINIC INFORMATION GIVEN ABOVE.  YOU MAY ALSO CALL [phone] AND ASK FOR MS. CRISTIAN OLIVO.  SHE CAN HELP YOU MAKE A FOLLOW-UP APPOINTMENT.  HER HOURS ARE 11AM-7PM MONDAY - FRIDAY.    PLEASE RETURN TO THE ER IMMEDIATELY OR CALL [phone] FOR ANY HIGH FEVER, CHEST PAIN, TROUBLE BREATHING, VOMITING, SEVERE PAIN, OR ANY OTHER CONCERNS

## 2020-02-09 LAB
CULTURE RESULTS: SIGNIFICANT CHANGE UP
SPECIMEN SOURCE: SIGNIFICANT CHANGE UP

## 2020-02-10 LAB
C TRACH RRNA SPEC QL NAA+PROBE: SIGNIFICANT CHANGE UP
N GONORRHOEA RRNA SPEC QL NAA+PROBE: SIGNIFICANT CHANGE UP
SPECIMEN SOURCE: SIGNIFICANT CHANGE UP

## 2020-02-14 DIAGNOSIS — R05 COUGH: ICD-10-CM

## 2020-02-14 DIAGNOSIS — B34.9 VIRAL INFECTION, UNSPECIFIED: ICD-10-CM

## 2020-02-14 DIAGNOSIS — J45.909 UNSPECIFIED ASTHMA, UNCOMPLICATED: ICD-10-CM

## 2020-02-14 DIAGNOSIS — Z79.51 LONG TERM (CURRENT) USE OF INHALED STEROIDS: ICD-10-CM

## 2020-03-01 ENCOUNTER — OUTPATIENT (OUTPATIENT)
Dept: OUTPATIENT SERVICES | Facility: HOSPITAL | Age: 35
LOS: 1 days | End: 2020-03-01
Payer: MEDICAID

## 2020-03-01 PROCEDURE — G9001: CPT

## 2020-03-11 NOTE — ED BEHAVIORAL HEALTH ASSESSMENT NOTE - ADDITIONAL DETAILS ALL
Date of Service: 03/10/2020    HISTORY:  This is a 58-year-old retired [employer] gentleman with left knee pain.  Working diagnosis of arthritis with an MCL strain along with patellofemoral chondrosis.  When I saw him last on 01/20/2020, I put him on Diclofenac, that has helped quite a bit.  In the interim, he did get a dry socket after a molar removal and the Diclofenac did not help very much, so he went on Ibuprofen, which did help.  He suspended the Diclofenac, but now is back on the Diclofenac after the dry socket improved with treatment.  He has been back on the Diclofenac.  He does feel better.  Pain is 2/10.  He has not been through any kind of therapy.  He has his own home gym.  No other associated signs or symptoms.    REVIEW OF SYSTEMS:  Does not volunteer fever, chills, constitutional symptoms, chest pain, shortness of breath.    PHYSICAL EXAMINATION:    Well-developed, well-nourished 58-year-old gentleman in no distress.  He is alert, oriented, appropriate to exam.  Left knee does not show profound effusion.  Range of motion grossly intact.  No calf pain, cords or evidence of DVT.  Does walk with a minimally antalgic gait.    RADIOGRAPHS:  Deferred.    IMPRESSION:  Patellofemoral chondrosis osteoarthritis, left knee, with medial collateral ligament strain.    PLAN:  He is doing quite well.  I will refill his Diclofenac.  He is doing well with that.  We spent quite a bit of time talking about the pathology.  Refill his Diclofenac.  We did talk about physical therapy.  He is interested in doing that, but more of a home exercise approach.  I will write for that.  He will see me in about 2 months to monitor his progress.  He seems to be extremely pleased with this course of action.      Dictated By: Nicolas Jara MD  Signing Provider: Nicolas Jara MD    SS/luis daniel ([id number])  DD: 03/10/2020 15:34:58 TD: 03/11/2020 08:33:01    Copy Sent To:    see HPI

## 2020-03-27 ENCOUNTER — EMERGENCY (EMERGENCY)
Facility: HOSPITAL | Age: 35
LOS: 1 days | Discharge: HOME | End: 2020-03-27
Attending: EMERGENCY MEDICINE
Payer: MEDICAID

## 2020-03-27 VITALS
RESPIRATION RATE: 17 BRPM | HEART RATE: 80 BPM | DIASTOLIC BLOOD PRESSURE: 57 MMHG | SYSTOLIC BLOOD PRESSURE: 104 MMHG | TEMPERATURE: 98 F | OXYGEN SATURATION: 99 %

## 2020-03-27 VITALS
WEIGHT: 184.97 LBS | SYSTOLIC BLOOD PRESSURE: 119 MMHG | HEIGHT: 74 IN | HEART RATE: 94 BPM | TEMPERATURE: 98 F | OXYGEN SATURATION: 98 % | RESPIRATION RATE: 16 BRPM | DIASTOLIC BLOOD PRESSURE: 76 MMHG

## 2020-03-27 DIAGNOSIS — F43.23 ADJUSTMENT DISORDER WITH MIXED ANXIETY AND DEPRESSED MOOD: ICD-10-CM

## 2020-03-27 LAB
AMPHET UR-MCNC: NEGATIVE — SIGNIFICANT CHANGE UP
ANION GAP SERPL CALC-SCNC: 4 MMOL/L — LOW (ref 5–17)
ANISOCYTOSIS BLD QL: SLIGHT — SIGNIFICANT CHANGE UP
APAP SERPL-MCNC: <2 UG/ML — LOW (ref 10–30)
APPEARANCE UR: CLEAR — SIGNIFICANT CHANGE UP
BARBITURATES UR SCN-MCNC: NEGATIVE — SIGNIFICANT CHANGE UP
BASOPHILS # BLD AUTO: 0.02 K/UL — SIGNIFICANT CHANGE UP (ref 0–0.2)
BASOPHILS NFR BLD AUTO: 0.3 % — SIGNIFICANT CHANGE UP (ref 0–2)
BENZODIAZ UR-MCNC: NEGATIVE — SIGNIFICANT CHANGE UP
BILIRUB UR-MCNC: NEGATIVE — SIGNIFICANT CHANGE UP
BUN SERPL-MCNC: 20 MG/DL — HIGH (ref 7–18)
CALCIUM SERPL-MCNC: 8.4 MG/DL — SIGNIFICANT CHANGE UP (ref 8.4–10.5)
CHLORIDE SERPL-SCNC: 105 MMOL/L — SIGNIFICANT CHANGE UP (ref 96–108)
CO2 SERPL-SCNC: 27 MMOL/L — SIGNIFICANT CHANGE UP (ref 22–31)
COCAINE METAB.OTHER UR-MCNC: NEGATIVE — SIGNIFICANT CHANGE UP
COLOR SPEC: YELLOW — SIGNIFICANT CHANGE UP
CREAT SERPL-MCNC: 0.91 MG/DL — SIGNIFICANT CHANGE UP (ref 0.5–1.3)
DIFF PNL FLD: NEGATIVE — SIGNIFICANT CHANGE UP
EOSINOPHIL # BLD AUTO: 0.13 K/UL — SIGNIFICANT CHANGE UP (ref 0–0.5)
EOSINOPHIL NFR BLD AUTO: 2.3 % — SIGNIFICANT CHANGE UP (ref 0–6)
ETHANOL SERPL-MCNC: <3 MG/DL — SIGNIFICANT CHANGE UP (ref 0–10)
GLUCOSE SERPL-MCNC: 86 MG/DL — SIGNIFICANT CHANGE UP (ref 70–99)
GLUCOSE UR QL: NEGATIVE — SIGNIFICANT CHANGE UP
HCT VFR BLD CALC: 39.1 % — SIGNIFICANT CHANGE UP (ref 39–50)
HGB BLD-MCNC: 12.3 G/DL — LOW (ref 13–17)
HYPOCHROMIA BLD QL: SLIGHT — SIGNIFICANT CHANGE UP
IMM GRANULOCYTES NFR BLD AUTO: 0.2 % — SIGNIFICANT CHANGE UP (ref 0–1.5)
KETONES UR-MCNC: NEGATIVE — SIGNIFICANT CHANGE UP
LEUKOCYTE ESTERASE UR-ACNC: NEGATIVE — SIGNIFICANT CHANGE UP
LYMPHOCYTES # BLD AUTO: 1.66 K/UL — SIGNIFICANT CHANGE UP (ref 1–3.3)
LYMPHOCYTES # BLD AUTO: 28.9 % — SIGNIFICANT CHANGE UP (ref 13–44)
MANUAL SMEAR VERIFICATION: SIGNIFICANT CHANGE UP
MCHC RBC-ENTMCNC: 23.2 PG — LOW (ref 27–34)
MCHC RBC-ENTMCNC: 31.5 GM/DL — LOW (ref 32–36)
MCV RBC AUTO: 73.6 FL — LOW (ref 80–100)
METHADONE UR-MCNC: NEGATIVE — SIGNIFICANT CHANGE UP
MICROCYTES BLD QL: SLIGHT — SIGNIFICANT CHANGE UP
MONOCYTES # BLD AUTO: 0.87 K/UL — SIGNIFICANT CHANGE UP (ref 0–0.9)
MONOCYTES NFR BLD AUTO: 15.2 % — HIGH (ref 2–14)
NEUTROPHILS # BLD AUTO: 3.05 K/UL — SIGNIFICANT CHANGE UP (ref 1.8–7.4)
NEUTROPHILS NFR BLD AUTO: 53.1 % — SIGNIFICANT CHANGE UP (ref 43–77)
NITRITE UR-MCNC: NEGATIVE — SIGNIFICANT CHANGE UP
NRBC # BLD: 0 /100 WBCS — SIGNIFICANT CHANGE UP (ref 0–0)
OPIATES UR-MCNC: NEGATIVE — SIGNIFICANT CHANGE UP
OVALOCYTES BLD QL SMEAR: SLIGHT — SIGNIFICANT CHANGE UP
PCP SPEC-MCNC: SIGNIFICANT CHANGE UP
PCP UR-MCNC: NEGATIVE — SIGNIFICANT CHANGE UP
PH UR: 6 — SIGNIFICANT CHANGE UP (ref 5–8)
PLAT MORPH BLD: NORMAL — SIGNIFICANT CHANGE UP
PLATELET # BLD AUTO: 193 K/UL — SIGNIFICANT CHANGE UP (ref 150–400)
PLATELET COUNT - ESTIMATE: NORMAL — SIGNIFICANT CHANGE UP
POIKILOCYTOSIS BLD QL AUTO: SLIGHT — SIGNIFICANT CHANGE UP
POTASSIUM SERPL-MCNC: 4.1 MMOL/L — SIGNIFICANT CHANGE UP (ref 3.5–5.3)
POTASSIUM SERPL-SCNC: 4.1 MMOL/L — SIGNIFICANT CHANGE UP (ref 3.5–5.3)
PROT UR-MCNC: NEGATIVE — SIGNIFICANT CHANGE UP
RBC # BLD: 5.31 M/UL — SIGNIFICANT CHANGE UP (ref 4.2–5.8)
RBC # FLD: 14.7 % — HIGH (ref 10.3–14.5)
RBC BLD AUTO: ABNORMAL
SALICYLATES SERPL-MCNC: <1.7 MG/DL — LOW (ref 2.8–20)
SODIUM SERPL-SCNC: 136 MMOL/L — SIGNIFICANT CHANGE UP (ref 135–145)
SP GR SPEC: 1.02 — SIGNIFICANT CHANGE UP (ref 1.01–1.02)
THC UR QL: NEGATIVE — SIGNIFICANT CHANGE UP
UROBILINOGEN FLD QL: NEGATIVE — SIGNIFICANT CHANGE UP
WBC # BLD: 5.74 K/UL — SIGNIFICANT CHANGE UP (ref 3.8–10.5)
WBC # FLD AUTO: 5.74 K/UL — SIGNIFICANT CHANGE UP (ref 3.8–10.5)

## 2020-03-27 PROCEDURE — 80048 BASIC METABOLIC PNL TOTAL CA: CPT

## 2020-03-27 PROCEDURE — 90792 PSYCH DIAG EVAL W/MED SRVCS: CPT | Mod: 95

## 2020-03-27 PROCEDURE — 36415 COLL VENOUS BLD VENIPUNCTURE: CPT

## 2020-03-27 PROCEDURE — 85027 COMPLETE CBC AUTOMATED: CPT

## 2020-03-27 PROCEDURE — 81003 URINALYSIS AUTO W/O SCOPE: CPT

## 2020-03-27 PROCEDURE — 99285 EMERGENCY DEPT VISIT HI MDM: CPT

## 2020-03-27 PROCEDURE — 73130 X-RAY EXAM OF HAND: CPT | Mod: 26,RT

## 2020-03-27 PROCEDURE — 80307 DRUG TEST PRSMV CHEM ANLYZR: CPT

## 2020-03-27 PROCEDURE — 73130 X-RAY EXAM OF HAND: CPT

## 2020-03-27 PROCEDURE — 93005 ELECTROCARDIOGRAM TRACING: CPT
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
## 2020-03-27 NOTE — ED BEHAVIORAL HEALTH ASSESSMENT NOTE - SUICIDE RISK FACTORS
Current mood episode/Mood Disorder current/past/Unable to engage in safety planning/Anhedonia/Hopelessness or despair Unable to engage in safety planning None Known

## 2020-03-27 NOTE — ED PROVIDER NOTE - PROGRESS NOTE DETAILS
10:45AM: Discussed with Dr. Nguyen, telepsych, will evaluate patient   12:10PM: Cleared by Telepsych, Dr. Nguyen. Can follow up as outpatient.

## 2020-03-27 NOTE — ED PROVIDER NOTE - CLINICAL SUMMARY MEDICAL DECISION MAKING FREE TEXT BOX
35 yo M with depression and suicidal ideations. Also with depression. Evaluated by telepsych who recommends that patient follow up outpatient. No current need to inpatient admission. Discussed follow up care with patient and agreeable for discharge. Nontoxic and medically stable for discharge. Return precautions provided and patient understands to return to the ED for worsening signs and symptoms. Instructed to follow up with primary care physician/specialist and agreeable. Patient's questions answered and given copies of lab results.

## 2020-03-27 NOTE — ED BEHAVIORAL HEALTH ASSESSMENT NOTE - DIFFERENTIAL
adjustment disorder with disturbance of mood and anxiety  r/o JAMES  r/o schizoid personality disorder  r/o malingering

## 2020-03-27 NOTE — ED BEHAVIORAL HEALTH ASSESSMENT NOTE - RISK ASSESSMENT
See assessment of intrusive thoughts above, which while they may be frightening, do not themselves represent increased risk of SA. Patient denies risk of SA and NSSI. He denies chronic risk factors and acute risk factors modifiable by admission. Low Acute Suicide Risk

## 2020-03-27 NOTE — ED BEHAVIORAL HEALTH ASSESSMENT NOTE - CURRENT MEDICATION
Advair Diskus 100 mcg-50 mcg inhalation powder:  1 each inhaled 2 times a day,   albuterol 90 mcg/inh inhalation aerosol:  1 puff(s) inhaled 2 times a day denies
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
## 2020-03-27 NOTE — ED PROVIDER NOTE - NSFOLLOWUPCLINICS_GEN_ALL_ED_FT
[facility]  Psychiatry  [id number] [place] [address]  Phone: [phone]  Fax:   Follow Up Time:

## 2020-03-27 NOTE — ED BEHAVIORAL HEALTH ASSESSMENT NOTE - MARITAL STATUS
Single Cheiloplasty (Less Than 50%) Text: A decision was made to reconstruct the defect with a  cheiloplasty.  The defect was undermined extensively.  Additional obicularis oris muscle was excised with a 15 blade scalpel.  The defect was converted into a full thickness wedge, of less than 50% of the vertical height of the lip, to facilite a better cosmetic result.  Small vessels were then tied off with 5-0 monocyrl. The obicularis oris, superficial fascia, adipose and dermis were then reapproximated.  After the deeper layers were approximated the epidermis was reapproximated with particular care given to realign the vermillion border.

## 2020-03-27 NOTE — ED PROVIDER NOTE - NSFOLLOWUPINSTRUCTIONS_ED_ALL_ED_FT
you were seen today for your depression. you were evaluated by the psychiatrist and they recommended you follow up was an outpatient. Please return to the Emergency Department for worsening signs or symptoms.
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
## 2020-03-27 NOTE — ED PROVIDER NOTE - PATIENT PORTAL LINK FT
You can access the FollowMyHealth Patient Portal offered by [facility] by registering at the following website: [email or web address]. By joining [facility]’s FollowMyHealth portal, you will also be able to view your health information using other applications (apps) compatible with our system.

## 2020-03-29 LAB — DRUG SCREEN, SERUM: SIGNIFICANT CHANGE UP

## 2020-03-30 DIAGNOSIS — Z71.89 OTHER SPECIFIED COUNSELING: ICD-10-CM

## 2020-04-11 ENCOUNTER — EMERGENCY (EMERGENCY)
Facility: HOSPITAL | Age: 35
LOS: 1 days | Discharge: HOME | End: 2020-04-11
Attending: EMERGENCY MEDICINE | Admitting: EMERGENCY MEDICINE
Payer: MEDICAID

## 2020-04-11 ENCOUNTER — INPATIENT (INPATIENT)
Facility: HOSPITAL | Age: 35
LOS: 12 days | Discharge: HOME | End: 2020-04-24
Attending: PSYCHIATRY & NEUROLOGY | Admitting: PSYCHIATRY & NEUROLOGY
Payer: MEDICAID

## 2020-04-11 VITALS
TEMPERATURE: 99 F | SYSTOLIC BLOOD PRESSURE: 128 MMHG | HEART RATE: 87 BPM | OXYGEN SATURATION: 97 % | DIASTOLIC BLOOD PRESSURE: 64 MMHG | RESPIRATION RATE: 18 BRPM

## 2020-04-11 VITALS
HEART RATE: 95 BPM | RESPIRATION RATE: 18 BRPM | SYSTOLIC BLOOD PRESSURE: 140 MMHG | TEMPERATURE: 97 F | DIASTOLIC BLOOD PRESSURE: 56 MMHG | WEIGHT: 175.05 LBS | HEIGHT: 69 IN | OXYGEN SATURATION: 98 %

## 2020-04-11 DIAGNOSIS — R45.851 SUICIDAL IDEATIONS: ICD-10-CM

## 2020-04-11 DIAGNOSIS — Z88.0 ALLERGY STATUS TO PENICILLIN: ICD-10-CM

## 2020-04-11 DIAGNOSIS — Z79.51 LONG TERM (CURRENT) USE OF INHALED STEROIDS: ICD-10-CM

## 2020-04-11 DIAGNOSIS — F32.9 MAJOR DEPRESSIVE DISORDER, SINGLE EPISODE, UNSPECIFIED: ICD-10-CM

## 2020-04-11 DIAGNOSIS — Z88.8 ALLERGY STATUS TO OTHER DRUGS, MEDICAMENTS AND BIOLOGICAL SUBSTANCES: ICD-10-CM

## 2020-04-11 DIAGNOSIS — F33.2 MAJOR DEPRESSIVE DISORDER, RECURRENT SEVERE WITHOUT PSYCHOTIC FEATURES: ICD-10-CM

## 2020-04-11 DIAGNOSIS — Z79.52 LONG TERM (CURRENT) USE OF SYSTEMIC STEROIDS: ICD-10-CM

## 2020-04-11 DIAGNOSIS — J45.909 UNSPECIFIED ASTHMA, UNCOMPLICATED: ICD-10-CM

## 2020-04-11 DIAGNOSIS — Z91.018 ALLERGY TO OTHER FOODS: ICD-10-CM

## 2020-04-11 LAB
ALBUMIN SERPL ELPH-MCNC: 3.7 G/DL — SIGNIFICANT CHANGE UP (ref 3.4–5)
ALP SERPL-CCNC: 114 U/L — SIGNIFICANT CHANGE UP (ref 40–120)
ALT FLD-CCNC: 71 U/L — HIGH (ref 12–42)
ANION GAP SERPL CALC-SCNC: 8 MMOL/L — LOW (ref 9–16)
APPEARANCE UR: CLEAR — SIGNIFICANT CHANGE UP
AST SERPL-CCNC: 42 U/L — HIGH (ref 15–37)
BASOPHILS NFR BLD AUTO: 0.4 % — SIGNIFICANT CHANGE UP (ref 0–2)
BILIRUB SERPL-MCNC: 0.2 MG/DL — SIGNIFICANT CHANGE UP (ref 0.2–1.2)
BILIRUB UR-MCNC: NEGATIVE — SIGNIFICANT CHANGE UP
BUN SERPL-MCNC: 24 MG/DL — HIGH (ref 7–23)
CALCIUM SERPL-MCNC: 9 MG/DL — SIGNIFICANT CHANGE UP (ref 8.5–10.5)
CHLORIDE SERPL-SCNC: 104 MMOL/L — SIGNIFICANT CHANGE UP (ref 96–108)
CO2 SERPL-SCNC: 26 MMOL/L — SIGNIFICANT CHANGE UP (ref 22–31)
COLOR SPEC: YELLOW — SIGNIFICANT CHANGE UP
CREAT SERPL-MCNC: 1.01 MG/DL — SIGNIFICANT CHANGE UP (ref 0.5–1.3)
DIFF PNL FLD: NEGATIVE — SIGNIFICANT CHANGE UP
EOSINOPHIL NFR BLD AUTO: 2.3 % — SIGNIFICANT CHANGE UP (ref 0–6)
ETHANOL SERPL-MCNC: <3 MG/DL — SIGNIFICANT CHANGE UP
GLUCOSE SERPL-MCNC: 96 MG/DL — SIGNIFICANT CHANGE UP (ref 70–99)
GLUCOSE UR QL: NEGATIVE — SIGNIFICANT CHANGE UP
HCT VFR BLD CALC: 42.1 % — SIGNIFICANT CHANGE UP (ref 39–50)
HGB BLD-MCNC: 13.2 G/DL — SIGNIFICANT CHANGE UP (ref 13–17)
KETONES UR-MCNC: NEGATIVE — SIGNIFICANT CHANGE UP
LEUKOCYTE ESTERASE UR-ACNC: NEGATIVE — SIGNIFICANT CHANGE UP
LYMPHOCYTES # BLD AUTO: 25.8 % — SIGNIFICANT CHANGE UP (ref 13–44)
MAGNESIUM SERPL-MCNC: 2.2 MG/DL — SIGNIFICANT CHANGE UP (ref 1.6–2.6)
MCHC RBC-ENTMCNC: 23 PG — LOW (ref 27–34)
MCHC RBC-ENTMCNC: 31.4 GM/DL — LOW (ref 32–36)
MCV RBC AUTO: 73.5 FL — LOW (ref 80–100)
MONOCYTES NFR BLD AUTO: 9.1 % — SIGNIFICANT CHANGE UP (ref 2–14)
NEUTROPHILS NFR BLD AUTO: 62.3 % — SIGNIFICANT CHANGE UP (ref 43–77)
NITRITE UR-MCNC: NEGATIVE — SIGNIFICANT CHANGE UP
PCP SPEC-MCNC: SIGNIFICANT CHANGE UP
PH UR: 7 — SIGNIFICANT CHANGE UP (ref 5–8)
PLATELET # BLD AUTO: 260 K/UL — SIGNIFICANT CHANGE UP (ref 150–400)
POTASSIUM SERPL-MCNC: 4.1 MMOL/L — SIGNIFICANT CHANGE UP (ref 3.5–5.3)
POTASSIUM SERPL-SCNC: 4.1 MMOL/L — SIGNIFICANT CHANGE UP (ref 3.5–5.3)
PROT SERPL-MCNC: 8.1 G/DL — SIGNIFICANT CHANGE UP (ref 6.4–8.2)
PROT UR-MCNC: NEGATIVE MG/DL — SIGNIFICANT CHANGE UP
RAPID RVP RESULT: SIGNIFICANT CHANGE UP
RBC # BLD: 5.73 M/UL — SIGNIFICANT CHANGE UP (ref 4.2–5.8)
RBC # FLD: 14.7 % — HIGH (ref 10.3–14.5)
SARS-COV-2 RNA SPEC QL NAA+PROBE: SIGNIFICANT CHANGE UP
SODIUM SERPL-SCNC: 138 MMOL/L — SIGNIFICANT CHANGE UP (ref 132–145)
SP GR SPEC: 1.01 — SIGNIFICANT CHANGE UP (ref 1–1.03)
UROBILINOGEN FLD QL: 0.2 E.U./DL — SIGNIFICANT CHANGE UP
WBC # BLD: 7.28 K/UL — SIGNIFICANT CHANGE UP (ref 3.8–10.5)
WBC # FLD AUTO: 7.28 K/UL — SIGNIFICANT CHANGE UP (ref 3.8–10.5)

## 2020-04-11 PROCEDURE — 99218: CPT

## 2020-04-11 PROCEDURE — 93010 ELECTROCARDIOGRAM REPORT: CPT

## 2020-04-11 PROCEDURE — 90792 PSYCH DIAG EVAL W/MED SRVCS: CPT | Mod: GC,95

## 2020-04-11 NOTE — ED CDU PROVIDER INITIAL DAY NOTE - ATTENDING CONTRIBUTION TO CARE
34 y.o. male with SI, states he wants to drown himself, medically clear for psych eval, COVID negative, accepted to psych hospital for inpatient stabilization at [facility].

## 2020-04-11 NOTE — ED BEHAVIORAL HEALTH ASSESSMENT NOTE - SUMMARY
Patient is a 34 year old domiciled, intermittently employed male with pmhx of moderate intermittent asthma (triggered by dust and cold air; using inhaler daily) and pphx of MDD presenting with worsening suicidal ideation and insomnia in the context of non compliance with outpatient care. He notes suicidal ideation with plan to jump into lake but has not made any further preparatory acts and denies prior aborted or interrupted suicide attempts. Patient notes that he stopped taking remeron medication as he felt he no longer needed it which seems to coincide with subsequent decline in mood and further deterioration. Patient presented to  with similar presentation within the past 2 weeks. Patient is evasive about his roommate and denies any sources of collateral and also endorses worsening social isolation. Given sore throat, elevation in LFTs and asthma hx as well as elevated risk of viral exposure due to ED presentations, will need to r/o Covid-19 prior to admission to inpatient psychiatry.

## 2020-04-11 NOTE — ED CDU PROVIDER INITIAL DAY NOTE - MEDICAL DECISION MAKING DETAILS
35 y/o M presents to ED c/o SI.  Pt evaluated by psych.  Covid negative.  Pt admitted voluntary to [facility] with Dr. Olmos accepting.

## 2020-04-11 NOTE — ED ADULT TRIAGE NOTE - CHIEF COMPLAINT QUOTE
Pt walked in with complaint of suicidal ideations, denies homicidal ideation.  History of depression, noncompliant with medication.  Stop taking six months ago.

## 2020-04-11 NOTE — ED CDU PROVIDER DISPOSITION NOTE - ATTENDING CONTRIBUTION TO CARE
34 y.o. male with SI, states he wants to drown himself, medically clear for psych eval, COVID negative, accepted to psych hospital for inpatient stabilization at [facility].

## 2020-04-11 NOTE — ED BEHAVIORAL HEALTH ASSESSMENT NOTE - DESCRIPTION
Patient walked into ED. Started on 1:1 and compliant with mask.  VSS throughout admission to ED and patient calm, compliant and sleeping throughout admission to ED. CMP with mild transaminitis. Endorses moderate asthma triggered by cold weather and allergens and uses rescue inhaler daily; to his knowledge, was intubated as a child for asthma exacerbation Endorses 12th grade education.

## 2020-04-11 NOTE — ED BEHAVIORAL HEALTH ASSESSMENT NOTE - OTHER
Roommate - living with roommate for the past 3 years erythematous sclera bilaterally rocking at times injected conjuctiva  bilaterally external billing

## 2020-04-11 NOTE — ED PROVIDER NOTE - PROGRESS NOTE DETAILS
Psychiatry recommends admission but requests pt be tested for Covid prior to admission.  Test ordered.

## 2020-04-11 NOTE — ED BEHAVIORAL HEALTH ASSESSMENT NOTE - HPI (INCLUDE ILLNESS QUALITY, SEVERITY, DURATION, TIMING, CONTEXT, MODIFYING FACTORS, ASSOCIATED SIGNS AND SYMPTOMS)
34 year old male with hx of moderate intermittent asthma (triggered by dust and cold air; using inhaler daily), MDD presenting with worsening suicidal ideation, insomnia. notes SI with plan to jump into a lake and that these thoughts are worse at night; has not done any research in terms of identifying a specific lake but states that he cannot swim. Denies any other plans.  Denies prior suicide attempts. Notes 4 prior lifetime hospitalizations. Notes an increased amount of sleep during the day with worse sleep at night with less energy during the day for the past 2 months. Denies issues with concentration or focus. Notes worsening appetite. Denies feelings of guilt or worthlessness. Endorses depressed mood and desire to self isolate. Denies auditory or visual hallucinations. Denies HI, intent or plan. Denies anxiety. Denies issues in relationship with his roommate. Has not been working as much due to covid and denies anxiety about getting ill. Denies ETOH use, or THC use. Denies cocaine use. Denies fhx of psychiatric illness or suicide. Currently, he is not taking any medication for depression as he stopped taking it, stating that he felt well enough to stop taking it after being prescribed at discharge during last hospitalization. Does not see a therapist currently. Has not followed up with outpatient since discharge from hospital as he did not think that he needed it. [place] and that he only has a few friends in [place]; has lived in [place] for 3 years. Has 12th grade education. Denies access to firearms or to weapons. 34 year old male with hx of moderate intermittent asthma (triggered by dust and cold air; using inhaler daily), MDD presenting with worsening suicidal ideation, insomnia. notes SI with plan to jump into a lake and that these thoughts are worse at night; has not done any research in terms of identifying a specific lake but states that he cannot swim. Denies any other plans.  Denies prior suicide attempts. Notes 4 prior lifetime hospitalizations. Notes an increased amount of sleep during the day with worse sleep at night with less energy during the day for the past 2 months. Denies issues with concentration or focus. Notes worsening appetite. Denies feelings of guilt or worthlessness. Endorses depressed mood and desire to self isolate. Denies auditory or visual hallucinations. Denies HI, intent or plan. Denies anxiety. Denies issues in relationship with his roommate. Has not been working as much due to covid and denies anxiety about getting ill. Denies ETOH use, or THC use. Denies cocaine use. Denies fhx of psychiatric illness or suicide. Currently, he is not taking any medication for depression as he stopped taking it, stating that he felt well enough to stop taking it after being prescribed at discharge during last hospitalization. Does not see a therapist currently. Has not followed up with outpatient since discharge from hospital as he did not think that he needed it. States [place]" and that he only has a few friends in [place]; has lived in [place] for 3 years. Has 12th grade education. Denies access to firearms or to weapons.    When asked for collateral information, patient states that Zarina (name in the chart) is his aunt, and that his roommate is "on vacation in [place]." Patient then states that he doesn't have anyone else that can be contacted for collateral. Patient is a 34 year old domiciled, intermittently employed male with pmhx of moderate intermittent asthma (triggered by dust and cold air; using inhaler daily) and pphx of MDD presenting with worsening suicidal ideation and insomnia. He notes SI with plan to jump into a lake and says that these thoughts are worse at night. He has not done any research in terms of identifying a specific lake but states that he cannot swim. Denies any triggering events. Denies any other plans.  Denies prior suicide attempts. He notes 4 prior lifetime hospitalizations. He notes an increased amount of sleep during the day with worse sleep at night and less energy during the day for the past 2 months. He denies issues with concentration or focus. Notes worsening appetite. Denies feelings of guilt or worthlessness but does endorse depressed mood and desire to self isolate. He denies auditory or visual hallucinations. He denies HI, intent or plan. He denies anxiety. Denies issues in relationship with his roommate. Has not been working as much due to covid and denies anxiety about getting ill. Denies ETOH use, or THC use. Denies cocaine use. Denies fhx of psychiatric illness or suicide. Currently, he is not taking any medication for depression as he stopped taking it, stating that he felt well enough to stop taking it after being prescribed at discharge during last hospitalization. Does not see a therapist currently. Has not followed up with outpatient since discharge from hospital as he did not think that he needed it. States that [place]" and that he has a few friends in [place]; has lived in [place] for 3 years. Has 12th grade education. Denies access to firearms or to weapons.    When asked for collateral information, patient states that Zarina (name in the chart) is his aunt, and that his roommate is "on vacation in [place]." Patient then states that he doesn't have anyone else that can be contacted for collateral. Patient also provides different address than home address that is documented in the chart.     Per chart review, patient with similar presentation to outside hospital within the past 2 weeks.

## 2020-04-11 NOTE — ED BEHAVIORAL HEALTH NOTE - BEHAVIORAL HEALTH NOTE
Patient with negative result for COVID19. Continues to endorse suicidal ideation with a plan to jump into a lake and drown self. Can plan to admit to inpatient pending bed availability. Patient made aware of plan.     Tessy Hughes MD MPH   Tele Psychiatry Service

## 2020-04-11 NOTE — ED BEHAVIORAL HEALTH ASSESSMENT NOTE - OTHER PAST PSYCHIATRIC HISTORY (INCLUDE DETAILS REGARDING ONSET, COURSE OF ILLNESS, INPATIENT/OUTPATIENT TREATMENT)
Notes 4 prior hospitalizations. Per chart review, previously saw a therapist intermittently 1 year ago.

## 2020-04-11 NOTE — ED CDU PROVIDER INITIAL DAY NOTE - OBJECTIVE STATEMENT
Pt is 35 yo male with pmhx of depression presents with SI x 1 day.  Pt reports feelings of wanting to drown himself.  Pt denies any alcohol, drug use, denies any ingestion.  Pt well appearing, NAD.  No other medical complaints.  Per old charts pt was evaluated by psych 2 weeks ago while at [facility].

## 2020-04-11 NOTE — ED BEHAVIORAL HEALTH ASSESSMENT NOTE - DETAILS
Intermittent thoughts of suicide; worse at night mild sore throat and ear pain Plan discussed with ED Nurse Practitioner

## 2020-04-11 NOTE — ED CDU PROVIDER DISPOSITION NOTE - CLINICAL COURSE
33 y/o M presents to ED c/o SI.  Pt evaluated by psych.  Covid negative.  Pt admitted voluntary to [facility] with Dr. Shepard accepting.

## 2020-04-11 NOTE — ED ADULT NURSE REASSESSMENT NOTE - NS ED NURSE REASSESS COMMENT FT1
EMS present for transfer to [facility]. Patient is AOx4, cooperative with staff, telepsych called for huddle. VS approved for transfer, plan of care explained.

## 2020-04-11 NOTE — ED BEHAVIORAL HEALTH ASSESSMENT NOTE - SUICIDE PROTECTIVE FACTORS
Identifies reasons for living/Anabaptist beliefs/Supportive social network of family or friends/Engaged in work or school

## 2020-04-11 NOTE — ED BEHAVIORAL HEALTH ASSESSMENT NOTE - CASE SUMMARY
Patient is a 34 year old domiciled, intermittently employed male with pmhx of moderate intermittent asthma (triggered by dust and cold air; using inhaler daily) and pphx of MDD presenting with worsening suicidal ideation and insomnia in the context of non compliance with outpatient care. He notes suicidal ideation with plan to jump into lake but has not made any further preparatory acts and denies prior aborted or interrupted suicide attempts. Patient notes that he stopped taking remeron medication as he felt he no longer needed it which seems to coincide with subsequent decline in mood and further deterioration. Patient presented to  with similar presentation within the past 2 weeks. Patient is evasive about his roommate and denies any sources of collateral and also endorses worsening social isolation. Given sore throat, elevation in LFTs and asthma hx as well as elevated risk of viral exposure due to ED presentations, covid swab completed and pending results at this time. Will hold in ED until results and then likely transfer to psychiatric facility if patient continues to be suicidal

## 2020-04-11 NOTE — ED PROVIDER NOTE - OBJECTIVE STATEMENT
Pt is 35 yo male with pmhx of depression presents with SI x 1 day.  Pt reports feelings of wanting to drown himself.  Pt denies any alcohol, drug use, denies any ingestion.  Pt well appearing, NAD.  No other medical complaints.  Per old charts pt was evaluated by psych 2 weeks ago while at [facility].

## 2020-04-12 VITALS — HEIGHT: 74 IN | WEIGHT: 198.42 LBS | TEMPERATURE: 99 F

## 2020-04-12 RX ORDER — IBUPROFEN 200 MG
400 TABLET ORAL EVERY 6 HOURS
Refills: 0 | Status: DISCONTINUED | OUTPATIENT
Start: 2020-04-12 | End: 2020-04-24

## 2020-04-12 RX ORDER — BUDESONIDE AND FORMOTEROL FUMARATE DIHYDRATE 160; 4.5 UG/1; UG/1
2 AEROSOL RESPIRATORY (INHALATION)
Refills: 0 | Status: DISCONTINUED | OUTPATIENT
Start: 2020-04-12 | End: 2020-04-24

## 2020-04-12 RX ORDER — MIRTAZAPINE 45 MG/1
7.5 TABLET, ORALLY DISINTEGRATING ORAL AT BEDTIME
Refills: 0 | Status: DISCONTINUED | OUTPATIENT
Start: 2020-04-12 | End: 2020-04-13

## 2020-04-12 RX ORDER — SENNA PLUS 8.6 MG/1
1 TABLET ORAL DAILY
Refills: 0 | Status: DISCONTINUED | OUTPATIENT
Start: 2020-04-12 | End: 2020-04-24

## 2020-04-12 RX ORDER — POLYETHYLENE GLYCOL 3350 17 G/17G
17 POWDER, FOR SOLUTION ORAL DAILY
Refills: 0 | Status: DISCONTINUED | OUTPATIENT
Start: 2020-04-12 | End: 2020-04-24

## 2020-04-12 RX ORDER — MIRTAZAPINE 45 MG/1
7.5 TABLET, ORALLY DISINTEGRATING ORAL ONCE
Refills: 0 | Status: COMPLETED | OUTPATIENT
Start: 2020-04-12 | End: 2020-04-12

## 2020-04-12 RX ORDER — ALBUTEROL 90 UG/1
2 AEROSOL, METERED ORAL EVERY 6 HOURS
Refills: 0 | Status: DISCONTINUED | OUTPATIENT
Start: 2020-04-12 | End: 2020-04-24

## 2020-04-12 RX ORDER — DIPHENHYDRAMINE HCL 50 MG
50 CAPSULE ORAL EVERY 6 HOURS
Refills: 0 | Status: DISCONTINUED | OUTPATIENT
Start: 2020-04-12 | End: 2020-04-24

## 2020-04-12 RX ORDER — DIPHENHYDRAMINE HCL 50 MG
50 CAPSULE ORAL ONCE
Refills: 0 | Status: DISCONTINUED | OUTPATIENT
Start: 2020-04-12 | End: 2020-04-24

## 2020-04-12 RX ORDER — DIPHENHYDRAMINE HCL 50 MG
50 CAPSULE ORAL EVERY 6 HOURS
Refills: 0 | Status: DISCONTINUED | OUTPATIENT
Start: 2020-04-12 | End: 2020-04-12

## 2020-04-12 RX ORDER — HYDROXYZINE HCL 10 MG
25 TABLET ORAL EVERY 6 HOURS
Refills: 0 | Status: DISCONTINUED | OUTPATIENT
Start: 2020-04-12 | End: 2020-04-24

## 2020-04-12 RX ADMIN — Medication 50 MILLIGRAM(S): at 21:48

## 2020-04-12 RX ADMIN — Medication 400 MILLIGRAM(S): at 21:45

## 2020-04-12 RX ADMIN — BUDESONIDE AND FORMOTEROL FUMARATE DIHYDRATE 2 PUFF(S): 160; 4.5 AEROSOL RESPIRATORY (INHALATION) at 21:44

## 2020-04-12 RX ADMIN — MIRTAZAPINE 7.5 MILLIGRAM(S): 45 TABLET, ORALLY DISINTEGRATING ORAL at 21:44

## 2020-04-12 RX ADMIN — MIRTAZAPINE 7.5 MILLIGRAM(S): 45 TABLET, ORALLY DISINTEGRATING ORAL at 01:11

## 2020-04-12 RX ADMIN — Medication 400 MILLIGRAM(S): at 01:12

## 2020-04-12 RX ADMIN — BUDESONIDE AND FORMOTEROL FUMARATE DIHYDRATE 2 PUFF(S): 160; 4.5 AEROSOL RESPIRATORY (INHALATION) at 09:45

## 2020-04-12 RX ADMIN — Medication 50 MILLIGRAM(S): at 01:11

## 2020-04-12 NOTE — CHART NOTE - NSCHARTNOTEFT_GEN_A_CORE
Screening Medical Evaluation  Patient Admitted from: [facility] ED    [facility] admitting diagnosis: Major Depressive Disorder with single episode    PAST MEDICAL & SURGICAL HISTORY:  Asthma  No significant past surgical history        Allergies    amoxicillin (Unknown)  Ativan (Hives)  iodine (Stomach Upset; Vomiting; Nausea)  Nuts (Unknown)  Reglan (Unknown)  Toradol (Hives)  Zofran (Unknown)    Intolerances        Social History:     FAMILY HISTORY:  No pertinent family history in first degree relatives      MEDICATIONS  (STANDING):  budesonide  80 MICROgram(s)/formoterol 4.5 MICROgram(s) Inhaler 2 Puff(s) Inhalation two times a day  mirtazapine 7.5 milliGRAM(s) Oral once  mirtazapine 7.5 milliGRAM(s) Oral at bedtime    MEDICATIONS  (PRN):  ALBUTerol    90 MICROgram(s) HFA Inhaler 2 Puff(s) Inhalation every 6 hours PRN Bronchospasm  diphenhydrAMINE 50 milliGRAM(s) Oral every 6 hours PRN insomnia/agitation  diphenhydrAMINE   Injectable 50 milliGRAM(s) IntraMuscular once PRN Combative behavior  hydrOXYzine hydrochloride 25 milliGRAM(s) Oral every 6 hours PRN anxiety  ibuprofen  Tablet. 400 milliGRAM(s) Oral every 6 hours PRN Moderate Pain (4 - 6)  polyethylene glycol 3350 17 Gram(s) Oral daily PRN constipation  senna 1 Tablet(s) Oral daily PRN constipation      Vital Signs Last 24 Hrs  T(C): 37.1 (2020 00:12), Max: 37.1 (2020 21:13)  T(F): 98.7 (2020 00:12), Max: 98.7 (2020 21:13)  HR: 87 (2020 21:13) (82 - 95)  BP: 128/64 (2020 21:13) (127/65 - 140/56)  BP(mean): --  RR: 18 (2020 21:13) (18 - 18)  SpO2: 97% (2020 21:13) (97% - 98%)  CAPILLARY BLOOD GLUCOSE            PHYSICAL EXAM:  GENERAL: NAD, well-developed  HEAD:  Atraumatic, Normocephalic  EYES: EOMI, PERRLA, conjunctiva and sclera clear  NECK: Supple, No JVD  CHEST/LUNG: Clear to auscultation bilaterally; No wheeze  HEART: Regular rate and rhythm; No murmurs, rubs, or gallops  ABDOMEN: Soft, Nontender, Nondistended; Bowel sounds present  EXTREMITIES:  2+ Peripheral Pulses, No clubbing, cyanosis, or edema  PSYCH: AAOx3  NEUROLOGY: non-focal  SKIN: No rashes or lesions    LABS:                        13.2   7.28  )-----------( 260      ( 2020 06:37 )             42.1     04-11    138  |  104  |  24<H>  ----------------------------<  96  4.1   |  26  |  1.01    Ca    9.0      2020 06:37  Mg     2.2     -    TPro  8.1  /  Alb  3.7  /  TBili  0.2  /  DBili  x   /  AST  42<H>  /  ALT  71<H>  /  AlkPhos  114  04-11          Urinalysis Basic - ( 2020 06:37 )    Color: Yellow / Appearance: Clear / S.015 / pH: x  Gluc: x / Ketone: NEGATIVE  / Bili: NEGATIVE / Urobili: 0.2 E.U./dL   Blood: x / Protein: NEGATIVE mg/dL / Nitrite: NEGATIVE   Leuk Esterase: NEGATIVE / RBC: x / WBC x   Sq Epi: x / Non Sq Epi: x / Bacteria: x        RADIOLOGY & ADDITIONAL TESTS:    Assessment and Plan:  33 y/o male with PMHx of asthma presents to [facility] with an admitting diagnosis of Major Depressive Disorder with single episode. Pt states he fell down stairs 2 days ago and landed on side. Pt complains of pain to lower back, Motrin ordered. Pt also complains of constipation. Pt denies any other medical complaints including chest pain, N/V/D, abdominal pain, SOB, dysuria, blurry vision, headache, dizziness.     1. Major Depressive Disorder- will continue medications as per psychiatric team     2. Asthma- continue Albuterol inhaler    3. Constipation- Miralax and Senna ordered

## 2020-04-13 LAB — TSH SERPL-MCNC: 3.21 UIU/ML — SIGNIFICANT CHANGE UP (ref 0.27–4.2)

## 2020-04-13 PROCEDURE — 99222 1ST HOSP IP/OBS MODERATE 55: CPT

## 2020-04-13 PROCEDURE — 99232 SBSQ HOSP IP/OBS MODERATE 35: CPT | Mod: GC

## 2020-04-13 RX ORDER — HALOPERIDOL DECANOATE 100 MG/ML
5 INJECTION INTRAMUSCULAR EVERY 6 HOURS
Refills: 0 | Status: DISCONTINUED | OUTPATIENT
Start: 2020-04-13 | End: 2020-04-24

## 2020-04-13 RX ORDER — HALOPERIDOL DECANOATE 100 MG/ML
5 INJECTION INTRAMUSCULAR ONCE
Refills: 0 | Status: DISCONTINUED | OUTPATIENT
Start: 2020-04-13 | End: 2020-04-24

## 2020-04-13 RX ORDER — MIRTAZAPINE 45 MG/1
15 TABLET, ORALLY DISINTEGRATING ORAL AT BEDTIME
Refills: 0 | Status: DISCONTINUED | OUTPATIENT
Start: 2020-04-13 | End: 2020-04-15

## 2020-04-13 RX ORDER — BACITRACIN ZINC 500 UNIT/G
1 OINTMENT IN PACKET (EA) TOPICAL
Refills: 0 | Status: COMPLETED | OUTPATIENT
Start: 2020-04-13 | End: 2020-04-18

## 2020-04-13 RX ORDER — EPINEPHRINE 0.3 MG/.3ML
0.3 INJECTION INTRAMUSCULAR; SUBCUTANEOUS ONCE
Refills: 0 | Status: DISCONTINUED | OUTPATIENT
Start: 2020-04-13 | End: 2020-04-24

## 2020-04-13 RX ORDER — RISPERIDONE 4 MG/1
0.5 TABLET ORAL AT BEDTIME
Refills: 0 | Status: DISCONTINUED | OUTPATIENT
Start: 2020-04-13 | End: 2020-04-23

## 2020-04-13 RX ADMIN — Medication 50 MILLIGRAM(S): at 21:27

## 2020-04-13 RX ADMIN — MIRTAZAPINE 15 MILLIGRAM(S): 45 TABLET, ORALLY DISINTEGRATING ORAL at 21:24

## 2020-04-13 RX ADMIN — Medication 1 TABLET(S): at 10:20

## 2020-04-13 RX ADMIN — BUDESONIDE AND FORMOTEROL FUMARATE DIHYDRATE 2 PUFF(S): 160; 4.5 AEROSOL RESPIRATORY (INHALATION) at 08:28

## 2020-04-13 RX ADMIN — Medication 400 MILLIGRAM(S): at 10:20

## 2020-04-13 NOTE — CONSULT NOTE ADULT - ASSESSMENT
34 y.o. M with h/o asthma, admitted for depression, developed a small scrotal folliculitis, no signs of cellulitis.     1: Scrotal folliculitis:  - warm water soaks and bacitracin ointment 2X daily    -Observe for signs of cellulitis.     2. Elevated AST/ALT, mild, asymptomatic.   -Trend LFT  -Check acute viral hepatitis panel   -Outpatient f/u     3. H/o asthma, asymptomatic.  -Monitor for signs of exac.  -Albuterol MDI PRN for SOB    4. Depression  -management as per Psych

## 2020-04-13 NOTE — CONSULT NOTE ADULT - SUBJECTIVE AND OBJECTIVE BOX
HPI: 34 y.o. M with h/o asthma, admitted for depression developed a small lump at scrotum. denies any fever or chills, no dysuria.     PAST MEDICAL & SURGICAL HISTORY:  Asthma  No significant past surgical history      Review of Systems:   CONSTITUTIONAL: No fever, weight loss, or fatigue  EYES: No eye pain, visual disturbances, or discharge  ENMT:  No difficulty hearing, tinnitus, vertigo; No sinus or throat pain  NECK: No pain or stiffness  RESPIRATORY: No cough, wheezing, chills or hemoptysis; No shortness of breath  CARDIOVASCULAR: No chest pain, palpitations, dizziness, or leg swelling  GASTROINTESTINAL: No abdominal or epigastric pain. No nausea, vomiting, or hematemesis; No diarrhea or constipation. No melena or hematochezia.  GENITOURINARY: No dysuria, frequency, hematuria, or incontinence, (+) a small lump at scrotum   NEUROLOGICAL: No headaches, memory loss, loss of strength, numbness, or tremors  SKIN: No itching, burning, rashes, or lesions   LYMPH NODES: No enlarged glands  ENDOCRINE: No heat or cold intolerance; No hair loss  MUSCULOSKELETAL: No joint pain or swelling; No muscle, back, or extremity pain  HEME/LYMPH: No easy bruising, or bleeding gums  ALLERY AND IMMUNOLOGIC: No hives or eczema    Allergies    amoxicillin (Unknown)  Ativan (Hives)  iodine (Stomach Upset; Vomiting; Nausea)  Nuts (Unknown)  Reglan (Unknown)  Toradol (Hives)  Zofran (Unknown)    Intolerances        Social History:  Not sexually active X 1 month     FAMILY HISTORY:  No pertinent family history in first degree relatives      MEDICATIONS  (STANDING):  budesonide  80 MICROgram(s)/formoterol 4.5 MICROgram(s) Inhaler 2 Puff(s) Inhalation two times a day  mirtazapine 15 milliGRAM(s) Oral at bedtime  multivitamin 1 Tablet(s) Oral daily  risperiDONE   Tablet 0.5 milliGRAM(s) Oral at bedtime    MEDICATIONS  (PRN):  ALBUTerol    90 MICROgram(s) HFA Inhaler 2 Puff(s) Inhalation every 6 hours PRN Bronchospasm  diphenhydrAMINE 50 milliGRAM(s) Oral every 6 hours PRN insomnia/agitation  diphenhydrAMINE   Injectable 50 milliGRAM(s) IntraMuscular once PRN Combative behavior  hydrOXYzine hydrochloride 25 milliGRAM(s) Oral every 6 hours PRN anxiety  ibuprofen  Tablet. 400 milliGRAM(s) Oral every 6 hours PRN Moderate Pain (4 - 6)  polyethylene glycol 3350 17 Gram(s) Oral daily PRN constipation  senna 1 Tablet(s) Oral daily PRN constipation      Vital Signs Last 24 Hrs  T(C): 36.9 (13 Apr 2020 10:51), Max: 37.2 (12 Apr 2020 22:38)  T(F): 98.4 (13 Apr 2020 10:51), Max: 98.9 (12 Apr 2020 22:38)  HR: -- 84  BP: --110/68  BP(mean): --  RR: --16  SpO2: --  CAPILLARY BLOOD GLUCOSE            PHYSICAL EXAM: Focused exam performed due to Covid 19 pandemic.   GENERAL: NAD, well-developed  HEAD:  Atraumatic, Normocephalic  EYES: EOMI, conjunctiva and sclera clear  NECK: Supple, No JVD  CHEST/LUNG: Breathing comfortably   HEART: P 84  ABDOMEN: Soft, Nontender, Nondistended  : (+) a small folliculitis on the scrotum skin, no surrounding erythema    EXTREMITIES:  No edema  NEUROLOGY: Moves all extremities      LABS: Comprehensive Metabolic Panel (04.11.20 @ 06:37)    Sodium, Serum: 138 mmoL/L    Potassium, Serum: 4.1 mmol/L    Chloride, Serum: 104 mmol/L    Carbon Dioxide, Serum: 26 mmol/L    Anion Gap, Serum: 8 mmoL/L    Blood Urea Nitrogen, Serum: 24 mg/dL    Creatinine, Serum: 1.01 mg/dL    Glucose, Serum: 96 mg/dL    Calcium, Total Serum: 9.0 mg/dL    Protein Total, Serum: 8.1 g/dL    Albumin, Serum: 3.7 g/dL    Bilirubin Total, Serum: 0.2: Use of this assay is not recommended for patients undergoing treatment  with eltrombopag due to the potential for falsely elevated results mg/dL    Alkaline Phosphatase, Serum: 114 U/L    Aspartate Aminotransferase (AST/SGOT): 42 U/L    Alanine Aminotransferase (ALT/SGPT): 71 U/L    eGFR if Non : 98: Interpretative comment  The units for eGFR are mL/min/1.73M2 (normalized body surface area). The  eGFR is calculated from a serum creatinine using the CKD-EPI equation.  Other variables required for calculation are race, age and sex. Among  patients with chronic kidney disease (CKD), the eGFR is useful in  determining the stage of disease according to KDOQI CKD classification.  All eGFR results are reported numerically with the following  interpretation.          GFR                    With                 Without     (ml/min/1.73 m2)    Kidney Damage       Kidney Damage        >= 90                    Stage 1                     Normal        60-89                    Stage 2                     Decreased GFR        30-59     Stage 3                     Stage 3        15-29                    Stage 4                     Stage 4        < 15                      Stage 5                     Stage 5  Each stage of CKD assumes that the associated GFR level has been in  effect for at least 3 months. Determination of stages one and two (with  eGFR > 59 ml/min/m2) requires estimation of kidney damage for at least 3  months as defined by structural or functional abnormalities.  Limitations: All estimates of GFR will be less accurate for patients at  extremes of muscle mass (including but not limited to frail elderly,  critically ill, or cancer patients), those with unusual diets, and those  with conditions associated with reduced secretion or extrarenal  elimination of creatinine. The eGFR equation is not recommended for use  in patients with unstable creatinine levels. mL/min/1.73M2    eGFR if African American: 114 mL/min/1.73M2    Complete Blood Count + Automated Diff (04.11.20 @ 06:37)    WBC Count: 7.28 K/uL    RBC Count: 5.73 M/uL    Hemoglobin: 13.2 g/dL    Hematocrit: 42.1 %    Mean Cell Volume: 73.5 fl    Mean Cell Hemoglobin: 23.0 pg    Mean Cell Hemoglobin Conc: 31.4 gm/dL    Red Cell Distrib Width: 14.7 %    Platelet Count - Automated: 260 K/uL    Auto Neutrophil %: 62.3: Differential percentages must be correlated with absolute numbers for  clinical significance. %    Auto Lymphocyte %: 25.8 %    Auto Monocyte %: 9.1 %    Auto Eosinophil %: 2.3 %    Auto Basophil %: 0.4 %                        EKG(personally reviewed):    RADIOLOGY & ADDITIONAL TESTS:    Imaging Personally Reviewed:    Consultant(s) Notes Reviewed:      Care Discussed with Consultants/Other Providers:

## 2020-04-14 LAB
ALBUMIN SERPL ELPH-MCNC: 4.2 G/DL — SIGNIFICANT CHANGE UP (ref 3.3–5)
ALP SERPL-CCNC: 78 U/L — SIGNIFICANT CHANGE UP (ref 40–120)
ALT FLD-CCNC: 63 U/L — HIGH (ref 4–41)
ANION GAP SERPL CALC-SCNC: 10 MMO/L — SIGNIFICANT CHANGE UP (ref 7–14)
AST SERPL-CCNC: 32 U/L — SIGNIFICANT CHANGE UP (ref 4–40)
BILIRUB SERPL-MCNC: 0.4 MG/DL — SIGNIFICANT CHANGE UP (ref 0.2–1.2)
BUN SERPL-MCNC: 16 MG/DL — SIGNIFICANT CHANGE UP (ref 7–23)
CALCIUM SERPL-MCNC: 9.7 MG/DL — SIGNIFICANT CHANGE UP (ref 8.4–10.5)
CHLORIDE SERPL-SCNC: 101 MMOL/L — SIGNIFICANT CHANGE UP (ref 98–107)
CHOLEST SERPL-MCNC: 155 MG/DL — SIGNIFICANT CHANGE UP (ref 120–199)
CO2 SERPL-SCNC: 26 MMOL/L — SIGNIFICANT CHANGE UP (ref 22–31)
CREAT SERPL-MCNC: 0.86 MG/DL — SIGNIFICANT CHANGE UP (ref 0.5–1.3)
GLUCOSE SERPL-MCNC: 104 MG/DL — HIGH (ref 70–99)
HAV IGM SER-ACNC: NONREACTIVE — SIGNIFICANT CHANGE UP
HBA1C BLD-MCNC: 5.8 % — HIGH (ref 4–5.6)
HBV CORE IGM SER-ACNC: NONREACTIVE — SIGNIFICANT CHANGE UP
HBV SURFACE AG SER-ACNC: NONREACTIVE — SIGNIFICANT CHANGE UP
HCV AB S/CO SERPL IA: 0.13 S/CO — SIGNIFICANT CHANGE UP (ref 0–0.99)
HCV AB SERPL-IMP: SIGNIFICANT CHANGE UP
HDLC SERPL-MCNC: 34 MG/DL — LOW (ref 35–55)
LIPID PNL WITH DIRECT LDL SERPL: 110 MG/DL — SIGNIFICANT CHANGE UP
POTASSIUM SERPL-MCNC: 3.8 MMOL/L — SIGNIFICANT CHANGE UP (ref 3.5–5.3)
POTASSIUM SERPL-SCNC: 3.8 MMOL/L — SIGNIFICANT CHANGE UP (ref 3.5–5.3)
PROT SERPL-MCNC: 7.7 G/DL — SIGNIFICANT CHANGE UP (ref 6–8.3)
SODIUM SERPL-SCNC: 137 MMOL/L — SIGNIFICANT CHANGE UP (ref 135–145)
TRIGL SERPL-MCNC: 83 MG/DL — SIGNIFICANT CHANGE UP (ref 10–149)

## 2020-04-14 PROCEDURE — 99232 SBSQ HOSP IP/OBS MODERATE 35: CPT | Mod: GC

## 2020-04-14 RX ADMIN — Medication 1 APPLICATION(S): at 08:29

## 2020-04-14 RX ADMIN — Medication 1 TABLET(S): at 08:29

## 2020-04-14 RX ADMIN — Medication 400 MILLIGRAM(S): at 22:52

## 2020-04-14 RX ADMIN — Medication 1 APPLICATION(S): at 21:48

## 2020-04-14 RX ADMIN — MIRTAZAPINE 15 MILLIGRAM(S): 45 TABLET, ORALLY DISINTEGRATING ORAL at 21:48

## 2020-04-14 RX ADMIN — Medication 50 MILLIGRAM(S): at 22:17

## 2020-04-15 PROCEDURE — 99232 SBSQ HOSP IP/OBS MODERATE 35: CPT | Mod: GC

## 2020-04-15 RX ORDER — MIRTAZAPINE 45 MG/1
30 TABLET, ORALLY DISINTEGRATING ORAL AT BEDTIME
Refills: 0 | Status: DISCONTINUED | OUTPATIENT
Start: 2020-04-15 | End: 2020-04-24

## 2020-04-15 RX ADMIN — MIRTAZAPINE 30 MILLIGRAM(S): 45 TABLET, ORALLY DISINTEGRATING ORAL at 21:56

## 2020-04-15 RX ADMIN — BUDESONIDE AND FORMOTEROL FUMARATE DIHYDRATE 2 PUFF(S): 160; 4.5 AEROSOL RESPIRATORY (INHALATION) at 08:27

## 2020-04-15 RX ADMIN — Medication 50 MILLIGRAM(S): at 21:58

## 2020-04-15 RX ADMIN — Medication 1 TABLET(S): at 08:28

## 2020-04-15 RX ADMIN — Medication 400 MILLIGRAM(S): at 21:58

## 2020-04-15 RX ADMIN — Medication 400 MILLIGRAM(S): at 08:27

## 2020-04-15 RX ADMIN — HALOPERIDOL DECANOATE 5 MILLIGRAM(S): 100 INJECTION INTRAMUSCULAR at 22:19

## 2020-04-16 RX ADMIN — Medication 1 TABLET(S): at 10:00

## 2020-04-16 RX ADMIN — RISPERIDONE 0.5 MILLIGRAM(S): 4 TABLET ORAL at 22:11

## 2020-04-16 RX ADMIN — Medication 1 APPLICATION(S): at 22:11

## 2020-04-16 RX ADMIN — MIRTAZAPINE 30 MILLIGRAM(S): 45 TABLET, ORALLY DISINTEGRATING ORAL at 22:11

## 2020-04-16 RX ADMIN — BUDESONIDE AND FORMOTEROL FUMARATE DIHYDRATE 2 PUFF(S): 160; 4.5 AEROSOL RESPIRATORY (INHALATION) at 10:00

## 2020-04-16 RX ADMIN — BUDESONIDE AND FORMOTEROL FUMARATE DIHYDRATE 2 PUFF(S): 160; 4.5 AEROSOL RESPIRATORY (INHALATION) at 22:11

## 2020-04-16 RX ADMIN — Medication 50 MILLIGRAM(S): at 22:11

## 2020-04-17 PROCEDURE — 99231 SBSQ HOSP IP/OBS SF/LOW 25: CPT | Mod: GC

## 2020-04-17 RX ADMIN — Medication 400 MILLIGRAM(S): at 21:53

## 2020-04-17 RX ADMIN — Medication 1 APPLICATION(S): at 22:08

## 2020-04-17 RX ADMIN — Medication 1 APPLICATION(S): at 10:23

## 2020-04-17 RX ADMIN — Medication 1 TABLET(S): at 12:15

## 2020-04-17 RX ADMIN — BUDESONIDE AND FORMOTEROL FUMARATE DIHYDRATE 2 PUFF(S): 160; 4.5 AEROSOL RESPIRATORY (INHALATION) at 21:54

## 2020-04-17 RX ADMIN — Medication 400 MILLIGRAM(S): at 12:14

## 2020-04-17 RX ADMIN — MIRTAZAPINE 30 MILLIGRAM(S): 45 TABLET, ORALLY DISINTEGRATING ORAL at 21:52

## 2020-04-17 RX ADMIN — Medication 50 MILLIGRAM(S): at 21:52

## 2020-04-18 RX ADMIN — Medication 50 MILLIGRAM(S): at 21:58

## 2020-04-18 RX ADMIN — Medication 1 APPLICATION(S): at 09:48

## 2020-04-18 RX ADMIN — BUDESONIDE AND FORMOTEROL FUMARATE DIHYDRATE 2 PUFF(S): 160; 4.5 AEROSOL RESPIRATORY (INHALATION) at 09:49

## 2020-04-18 RX ADMIN — MIRTAZAPINE 30 MILLIGRAM(S): 45 TABLET, ORALLY DISINTEGRATING ORAL at 21:59

## 2020-04-18 RX ADMIN — Medication 400 MILLIGRAM(S): at 09:49

## 2020-04-18 RX ADMIN — Medication 1 TABLET(S): at 09:49

## 2020-04-18 RX ADMIN — Medication 400 MILLIGRAM(S): at 21:58

## 2020-04-19 RX ADMIN — Medication 400 MILLIGRAM(S): at 08:41

## 2020-04-19 RX ADMIN — MIRTAZAPINE 30 MILLIGRAM(S): 45 TABLET, ORALLY DISINTEGRATING ORAL at 21:44

## 2020-04-19 RX ADMIN — Medication 1 TABLET(S): at 08:40

## 2020-04-19 RX ADMIN — Medication 400 MILLIGRAM(S): at 21:44

## 2020-04-19 RX ADMIN — BUDESONIDE AND FORMOTEROL FUMARATE DIHYDRATE 2 PUFF(S): 160; 4.5 AEROSOL RESPIRATORY (INHALATION) at 08:40

## 2020-04-20 PROCEDURE — 99231 SBSQ HOSP IP/OBS SF/LOW 25: CPT

## 2020-04-20 RX ADMIN — Medication 50 MILLIGRAM(S): at 22:35

## 2020-04-20 RX ADMIN — Medication 400 MILLIGRAM(S): at 22:35

## 2020-04-20 RX ADMIN — MIRTAZAPINE 30 MILLIGRAM(S): 45 TABLET, ORALLY DISINTEGRATING ORAL at 22:35

## 2020-04-21 RX ADMIN — BUDESONIDE AND FORMOTEROL FUMARATE DIHYDRATE 2 PUFF(S): 160; 4.5 AEROSOL RESPIRATORY (INHALATION) at 21:34

## 2020-04-21 RX ADMIN — Medication 400 MILLIGRAM(S): at 21:32

## 2020-04-21 RX ADMIN — Medication 1 TABLET(S): at 08:54

## 2020-04-21 RX ADMIN — Medication 400 MILLIGRAM(S): at 08:54

## 2020-04-21 RX ADMIN — Medication 50 MILLIGRAM(S): at 21:32

## 2020-04-21 RX ADMIN — MIRTAZAPINE 30 MILLIGRAM(S): 45 TABLET, ORALLY DISINTEGRATING ORAL at 21:32

## 2020-04-21 RX ADMIN — BUDESONIDE AND FORMOTEROL FUMARATE DIHYDRATE 2 PUFF(S): 160; 4.5 AEROSOL RESPIRATORY (INHALATION) at 08:54

## 2020-04-22 RX ADMIN — BUDESONIDE AND FORMOTEROL FUMARATE DIHYDRATE 2 PUFF(S): 160; 4.5 AEROSOL RESPIRATORY (INHALATION) at 21:54

## 2020-04-22 RX ADMIN — MIRTAZAPINE 30 MILLIGRAM(S): 45 TABLET, ORALLY DISINTEGRATING ORAL at 21:54

## 2020-04-22 RX ADMIN — BUDESONIDE AND FORMOTEROL FUMARATE DIHYDRATE 2 PUFF(S): 160; 4.5 AEROSOL RESPIRATORY (INHALATION) at 08:53

## 2020-04-22 RX ADMIN — Medication 1 TABLET(S): at 08:53

## 2020-04-22 RX ADMIN — Medication 50 MILLIGRAM(S): at 21:54

## 2020-04-22 RX ADMIN — Medication 400 MILLIGRAM(S): at 09:00

## 2020-04-23 VITALS — TEMPERATURE: 97 F

## 2020-04-23 PROCEDURE — 99232 SBSQ HOSP IP/OBS MODERATE 35: CPT

## 2020-04-23 RX ORDER — MIRTAZAPINE 45 MG/1
1 TABLET, ORALLY DISINTEGRATING ORAL
Qty: 30 | Refills: 0
Start: 2020-04-23 | End: 2020-05-22

## 2020-04-23 RX ORDER — ALBUTEROL 90 UG/1
2 AEROSOL, METERED ORAL
Qty: 1 | Refills: 0
Start: 2020-04-23

## 2020-04-23 RX ORDER — FLUTICASONE PROPIONATE AND SALMETEROL 50; 250 UG/1; UG/1
0 POWDER ORAL; RESPIRATORY (INHALATION)
Qty: 0 | Refills: 0 | DISCHARGE

## 2020-04-23 RX ORDER — BUDESONIDE AND FORMOTEROL FUMARATE DIHYDRATE 160; 4.5 UG/1; UG/1
2 AEROSOL RESPIRATORY (INHALATION)
Qty: 1 | Refills: 0
Start: 2020-04-23

## 2020-04-23 RX ADMIN — MIRTAZAPINE 30 MILLIGRAM(S): 45 TABLET, ORALLY DISINTEGRATING ORAL at 22:01

## 2020-04-23 RX ADMIN — Medication 400 MILLIGRAM(S): at 08:37

## 2020-04-23 RX ADMIN — Medication 1 TABLET(S): at 08:36

## 2020-04-23 RX ADMIN — Medication 50 MILLIGRAM(S): at 22:01

## 2020-04-23 RX ADMIN — BUDESONIDE AND FORMOTEROL FUMARATE DIHYDRATE 2 PUFF(S): 160; 4.5 AEROSOL RESPIRATORY (INHALATION) at 22:01

## 2020-04-23 RX ADMIN — BUDESONIDE AND FORMOTEROL FUMARATE DIHYDRATE 2 PUFF(S): 160; 4.5 AEROSOL RESPIRATORY (INHALATION) at 08:36

## 2020-04-23 RX ADMIN — Medication 400 MILLIGRAM(S): at 22:01

## 2020-04-24 PROCEDURE — 99238 HOSP IP/OBS DSCHRG MGMT 30/<: CPT

## 2020-04-24 RX ADMIN — Medication 400 MILLIGRAM(S): at 08:30

## 2020-04-24 RX ADMIN — Medication 1 TABLET(S): at 08:30

## 2020-04-24 RX ADMIN — BUDESONIDE AND FORMOTEROL FUMARATE DIHYDRATE 2 PUFF(S): 160; 4.5 AEROSOL RESPIRATORY (INHALATION) at 08:31

## 2020-05-08 ENCOUNTER — EMERGENCY (EMERGENCY)
Facility: HOSPITAL | Age: 35
LOS: 1 days | Discharge: HOME | End: 2020-05-08
Attending: EMERGENCY MEDICINE | Admitting: EMERGENCY MEDICINE
Payer: MEDICAID

## 2020-05-08 VITALS
WEIGHT: 195.11 LBS | RESPIRATION RATE: 18 BRPM | HEIGHT: 74 IN | SYSTOLIC BLOOD PRESSURE: 144 MMHG | TEMPERATURE: 98 F | HEART RATE: 84 BPM | OXYGEN SATURATION: 98 % | DIASTOLIC BLOOD PRESSURE: 72 MMHG

## 2020-05-08 PROCEDURE — 73130 X-RAY EXAM OF HAND: CPT | Mod: 26,RT

## 2020-05-08 PROCEDURE — 29125 APPL SHORT ARM SPLINT STATIC: CPT

## 2020-05-08 PROCEDURE — 26600 TREAT METACARPAL FRACTURE: CPT | Mod: 54

## 2020-05-08 PROCEDURE — 99284 EMERGENCY DEPT VISIT MOD MDM: CPT | Mod: 25,57

## 2020-05-08 RX ORDER — ACETAMINOPHEN 500 MG
650 TABLET ORAL ONCE
Refills: 0 | Status: COMPLETED | OUTPATIENT
Start: 2020-05-08 | End: 2020-05-08

## 2020-05-08 RX ADMIN — Medication 650 MILLIGRAM(S): at 16:27

## 2020-05-08 NOTE — ED PROVIDER NOTE - DIAGNOSTIC INTERPRETATION
ER Physician: Darren Beasley  INTERPRETATION:  + acute fracture; no soft tissue swelling noted; normal bony alignment.

## 2020-05-08 NOTE — ED PROVIDER NOTE - CARE PROVIDER_API CALL
Nelson Contreras)  [facility]  Orthopedics  [address] [place] [address]  Phone: [phone]  Fax: [phone]  Follow Up Time:

## 2020-05-08 NOTE — ED PROVIDER NOTE - MUSCULOSKELETAL, MLM
Normal distal motor and sensory of the median, ulnar and radial nerves.  Motor intact for ED, HARMONY, EDM of the involved digit exluding thumb.  AP, APB, APL intact for thumb.  Sensation intact to light touch of the involved digit.  Cap refill < 3 seconds. Normal distal motor and sensory of the median, ulnar and radial nerves.  Motor intact for ED, HARMONY, EDM of the involved digit excluding thumb.  AP, APB, APL intact for thumb.  Sensation intact to light touch of the involved digit.  Cap refill < 3 seconds.

## 2020-05-08 NOTE — ED PROVIDER NOTE - PATIENT PORTAL LINK FT
You can access the FollowMyHealth Patient Portal offered by [facility] by registering at the following website: [email or web address]. By joining [employer]’s FollowMyHealth portal, you will also be able to view your health information using other applications (apps) compatible with our system.
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
## 2020-05-08 NOTE — ED PROVIDER NOTE - CLINICAL SUMMARY MEDICAL DECISION MAKING FREE TEXT BOX
33 y/o RHD male with blunt injury to right hand. +Tenderness over dorsum of hand and 4th and 5th MCP area. No deformities. Neurovascularly intact distally. X-ray and ice packs ordered. 35 y/o RHD male with blunt injury to right hand. +Tenderness over dorsum of hand and 4th and 5th MCP area. No deformities. Neurovascularly intact distally. X-ray with evidence of 4th and 5th base of the metacarpal fracture.  Volar splint placed.  TIFFANIE, orthopedic follow up
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
## 2020-05-12 DIAGNOSIS — S62.314A DISPLACED FRACTURE OF BASE OF FOURTH METACARPAL BONE, RIGHT HAND, INITIAL ENCOUNTER FOR CLOSED FRACTURE: ICD-10-CM

## 2020-05-12 DIAGNOSIS — Z88.1 ALLERGY STATUS TO OTHER ANTIBIOTIC AGENTS STATUS: ICD-10-CM

## 2020-05-12 DIAGNOSIS — Z88.8 ALLERGY STATUS TO OTHER DRUGS, MEDICAMENTS AND BIOLOGICAL SUBSTANCES STATUS: ICD-10-CM
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
## 2020-05-12 DIAGNOSIS — S62.316A DISPLACED FRACTURE OF BASE OF FIFTH METACARPAL BONE, RIGHT HAND, INITIAL ENCOUNTER FOR CLOSED FRACTURE: ICD-10-CM

## 2020-05-12 DIAGNOSIS — Z91.018 ALLERGY TO OTHER FOODS: ICD-10-CM

## 2020-05-12 DIAGNOSIS — M79.641 PAIN IN RIGHT HAND: ICD-10-CM

## 2020-07-02 NOTE — BEHAVIORAL HEALTH ASSESSMENT NOTE - PROBLEM/PLAN-2
[FreeTextEntry1] : Referring Physician: Dr. Mando Vargas\par \par Dear Mando:\par \par Mr. Macdonald was seen in the [facility] today. For our records, please allow me to summarize the history and my findings.\par \par This pleasant 67 year old man has a cardiovascular history significant for obesity, CAD s/p stent to prox RCA and LAD (2018), Paroxysmal Afib s/p DCCV (9/2019), started on Amiodarone, and subsequently had an AF ablation (10/2019) by Dr. Franklin in [facility]. He is currently taking Xarelto for OAC. He is here for evaluation for an implantable loop recorder for Afib monitoring. \par \par Since his ablation in 10/2019, he has had two known recurrent episodes of AF, despite Amiodarone therapy. The first one was in 11/2019, two weeks after his ablation, which self resolved after a few hours. He subsequently had another episode of Afib during a routine stress test in 2/2020 and was sent to [facility] via ambulance for DCCV but self converted to NSR in the interim. He had a cardiac cath at that time which revealed normal cors/patent stents. \par \par Today, he reports no clinical symptoms of AF. He utilizes his apple watch to monitor his heart rate and feels that he is aware when he is in Afib. Mr. Macdonald  denies any recent history of chest pain, shortness of breath, palpitations, dizziness, or syncope.\par   DISPLAY PLAN FREE TEXT

## 2020-09-20 ENCOUNTER — EMERGENCY (EMERGENCY)
Facility: HOSPITAL | Age: 35
LOS: 1 days | Discharge: HOME | End: 2020-09-20
Attending: EMERGENCY MEDICINE
Payer: MEDICAID

## 2020-09-20 VITALS
DIASTOLIC BLOOD PRESSURE: 67 MMHG | TEMPERATURE: 98 F | RESPIRATION RATE: 16 BRPM | OXYGEN SATURATION: 98 % | HEIGHT: 74 IN | HEART RATE: 78 BPM | SYSTOLIC BLOOD PRESSURE: 106 MMHG | WEIGHT: 179.9 LBS

## 2020-09-20 LAB
ALBUMIN SERPL ELPH-MCNC: 3.4 G/DL — LOW (ref 3.5–5)
ALP SERPL-CCNC: 135 U/L — HIGH (ref 40–120)
ALT FLD-CCNC: 20 U/L DA — SIGNIFICANT CHANGE UP (ref 10–60)
ANION GAP SERPL CALC-SCNC: 4 MMOL/L — LOW (ref 5–17)
ANISOCYTOSIS BLD QL: SLIGHT — SIGNIFICANT CHANGE UP
AST SERPL-CCNC: 13 U/L — SIGNIFICANT CHANGE UP (ref 10–40)
BASOPHILS # BLD AUTO: 0.02 K/UL — SIGNIFICANT CHANGE UP (ref 0–0.2)
BASOPHILS NFR BLD AUTO: 0.4 % — SIGNIFICANT CHANGE UP (ref 0–2)
BILIRUB SERPL-MCNC: 0.2 MG/DL — SIGNIFICANT CHANGE UP (ref 0.2–1.2)
BUN SERPL-MCNC: 18 MG/DL — SIGNIFICANT CHANGE UP (ref 7–18)
CALCIUM SERPL-MCNC: 8.1 MG/DL — LOW (ref 8.4–10.5)
CHLORIDE SERPL-SCNC: 111 MMOL/L — HIGH (ref 96–108)
CK SERPL-CCNC: 180 U/L — SIGNIFICANT CHANGE UP (ref 35–232)
CO2 SERPL-SCNC: 26 MMOL/L — SIGNIFICANT CHANGE UP (ref 22–31)
CREAT SERPL-MCNC: 0.77 MG/DL — SIGNIFICANT CHANGE UP (ref 0.5–1.3)
ELLIPTOCYTES BLD QL SMEAR: SLIGHT — SIGNIFICANT CHANGE UP
EOSINOPHIL # BLD AUTO: 0.22 K/UL — SIGNIFICANT CHANGE UP (ref 0–0.5)
EOSINOPHIL NFR BLD AUTO: 4.1 % — SIGNIFICANT CHANGE UP (ref 0–6)
GLUCOSE SERPL-MCNC: 88 MG/DL — SIGNIFICANT CHANGE UP (ref 70–99)
HCT VFR BLD CALC: 34.5 % — LOW (ref 39–50)
HGB BLD-MCNC: 11.1 G/DL — LOW (ref 13–17)
HYPOCHROMIA BLD QL: SLIGHT — SIGNIFICANT CHANGE UP
IMM GRANULOCYTES NFR BLD AUTO: 0.2 % — SIGNIFICANT CHANGE UP (ref 0–1.5)
LYMPHOCYTES # BLD AUTO: 2.05 K/UL — SIGNIFICANT CHANGE UP (ref 1–3.3)
LYMPHOCYTES # BLD AUTO: 37.8 % — SIGNIFICANT CHANGE UP (ref 13–44)
MANUAL SMEAR VERIFICATION: SIGNIFICANT CHANGE UP
MCHC RBC-ENTMCNC: 23.6 PG — LOW (ref 27–34)
MCHC RBC-ENTMCNC: 32.2 GM/DL — SIGNIFICANT CHANGE UP (ref 32–36)
MCV RBC AUTO: 73.4 FL — LOW (ref 80–100)
MICROCYTES BLD QL: SLIGHT — SIGNIFICANT CHANGE UP
MONOCYTES # BLD AUTO: 0.56 K/UL — SIGNIFICANT CHANGE UP (ref 0–0.9)
MONOCYTES NFR BLD AUTO: 10.3 % — SIGNIFICANT CHANGE UP (ref 2–14)
NEUTROPHILS # BLD AUTO: 2.56 K/UL — SIGNIFICANT CHANGE UP (ref 1.8–7.4)
NEUTROPHILS NFR BLD AUTO: 47.2 % — SIGNIFICANT CHANGE UP (ref 43–77)
NRBC # BLD: 0 /100 WBCS — SIGNIFICANT CHANGE UP (ref 0–0)
PLAT MORPH BLD: NORMAL — SIGNIFICANT CHANGE UP
PLATELET # BLD AUTO: 213 K/UL — SIGNIFICANT CHANGE UP (ref 150–400)
PLATELET COUNT - ESTIMATE: NORMAL — SIGNIFICANT CHANGE UP
POIKILOCYTOSIS BLD QL AUTO: SLIGHT — SIGNIFICANT CHANGE UP
POLYCHROMASIA BLD QL SMEAR: SLIGHT — SIGNIFICANT CHANGE UP
POTASSIUM SERPL-MCNC: 3.7 MMOL/L — SIGNIFICANT CHANGE UP (ref 3.5–5.3)
POTASSIUM SERPL-SCNC: 3.7 MMOL/L — SIGNIFICANT CHANGE UP (ref 3.5–5.3)
PROT SERPL-MCNC: 7 G/DL — SIGNIFICANT CHANGE UP (ref 6–8.3)
RBC # BLD: 4.7 M/UL — SIGNIFICANT CHANGE UP (ref 4.2–5.8)
RBC # FLD: 14.8 % — HIGH (ref 10.3–14.5)
RBC BLD AUTO: ABNORMAL
SODIUM SERPL-SCNC: 141 MMOL/L — SIGNIFICANT CHANGE UP (ref 135–145)
TROPONIN I SERPL-MCNC: <0.015 NG/ML — SIGNIFICANT CHANGE UP (ref 0–0.04)
WBC # BLD: 5.42 K/UL — SIGNIFICANT CHANGE UP (ref 3.8–10.5)
WBC # FLD AUTO: 5.42 K/UL — SIGNIFICANT CHANGE UP (ref 3.8–10.5)

## 2020-09-20 PROCEDURE — 99284 EMERGENCY DEPT VISIT MOD MDM: CPT

## 2020-09-20 PROCEDURE — 84484 ASSAY OF TROPONIN QUANT: CPT

## 2020-09-20 PROCEDURE — 85025 COMPLETE CBC W/AUTO DIFF WBC: CPT

## 2020-09-20 PROCEDURE — 82550 ASSAY OF CK (CPK): CPT

## 2020-09-20 PROCEDURE — 80053 COMPREHEN METABOLIC PANEL: CPT

## 2020-09-20 PROCEDURE — 71046 X-RAY EXAM CHEST 2 VIEWS: CPT

## 2020-09-20 PROCEDURE — 99283 EMERGENCY DEPT VISIT LOW MDM: CPT | Mod: 25

## 2020-09-20 PROCEDURE — 36415 COLL VENOUS BLD VENIPUNCTURE: CPT

## 2020-09-20 PROCEDURE — 71046 X-RAY EXAM CHEST 2 VIEWS: CPT | Mod: 26

## 2020-09-20 PROCEDURE — 93005 ELECTROCARDIOGRAM TRACING: CPT

## 2020-09-20 RX ORDER — SODIUM CHLORIDE 9 MG/ML
1000 INJECTION INTRAMUSCULAR; INTRAVENOUS; SUBCUTANEOUS ONCE
Refills: 0 | Status: COMPLETED | OUTPATIENT
Start: 2020-09-20 | End: 2020-09-20

## 2020-09-20 RX ADMIN — SODIUM CHLORIDE 1000 MILLILITER(S): 9 INJECTION INTRAMUSCULAR; INTRAVENOUS; SUBCUTANEOUS at 04:26

## 2020-09-20 NOTE — ED PROVIDER NOTE - OBJECTIVE STATEMENT
34 yr old male with hx of asthma presents to ed c/o cp, sob, rib pain and myalgia x 2wks worse past 1 wk.  pt had covid 1 wk ago and was neg. recently had hiv also neg. no fever, no chills, no visual changes, no headache, no numbness or tingling, no cough, no palpitations, no leg swelling, no syncope, no abd pain, no n/v/d, no dysuria, no rashes. no sti, no drug or alcohol. no sick contact.

## 2020-09-20 NOTE — ED ADULT NURSE NOTE - OBJECTIVE STATEMENT
the patient is a 34 yrs old Male complaining of right rib pain  chest pain and generalized body pain

## 2020-09-20 NOTE — ED ADULT TRIAGE NOTE - CHIEF COMPLAINT QUOTE
" I have right rib pain, chest pain, body pain and short of breath for couple of weeks now "  patient claims had COVID 19 test last week with negative results

## 2020-09-20 NOTE — ED PROVIDER NOTE - CLINICAL SUMMARY MEDICAL DECISION MAKING FREE TEXT BOX
pt with asthma presents to ed c/o cp, sob, rib pain and myalgia x 2wks worse past 1 wk.  pt had covid 1 wk ago and was neg    likely anxiety r/o lytes imbalance vs DM vs anemia vs arrhthymia.  ekg normal. basic labs and cxr, fluids, re-assess.

## 2020-09-20 NOTE — ED PROVIDER NOTE - PATIENT PORTAL LINK FT
You can access the FollowMyHealth Patient Portal offered by [facility] by registering at the following website: [email or web address]. By joining [employer]’s FollowMyHealth portal, you will also be able to view your health information using other applications (apps) compatible with our system.

## 2020-10-01 NOTE — ED BEHAVIORAL HEALTH ASSESSMENT NOTE - COLLATERAL SOURCE
None available O-T Plasty Text: The defect edges were debeveled with a #15 scalpel blade.  Given the location of the defect, shape of the defect and the proximity to free margins an O-T plasty was deemed most appropriate.  Using a sterile surgical marker, an appropriate O-T plasty was drawn incorporating the defect and placing the expected incisions within the relaxed skin tension lines where possible.    The area thus outlined was incised deep to adipose tissue with a #15 scalpel blade.  The skin margins were undermined to an appropriate distance in all directions utilizing iris scissors.

## 2020-10-12 ENCOUNTER — EMERGENCY (EMERGENCY)
Facility: HOSPITAL | Age: 35
LOS: 1 days | Discharge: HOME | End: 2020-10-12
Admitting: EMERGENCY MEDICINE
Payer: MEDICAID

## 2020-10-12 VITALS
OXYGEN SATURATION: 97 % | RESPIRATION RATE: 16 BRPM | HEIGHT: 74 IN | HEART RATE: 72 BPM | DIASTOLIC BLOOD PRESSURE: 78 MMHG | TEMPERATURE: 98 F | SYSTOLIC BLOOD PRESSURE: 121 MMHG | WEIGHT: 179.9 LBS

## 2020-10-12 DIAGNOSIS — R42 DIZZINESS AND GIDDINESS: ICD-10-CM

## 2020-10-12 DIAGNOSIS — Z88.1 ALLERGY STATUS TO OTHER ANTIBIOTIC AGENTS STATUS: ICD-10-CM

## 2020-10-12 DIAGNOSIS — R51.9 HEADACHE, UNSPECIFIED: ICD-10-CM

## 2020-10-12 DIAGNOSIS — Z91.018 ALLERGY TO OTHER FOODS: ICD-10-CM

## 2020-10-12 DIAGNOSIS — R09.81 NASAL CONGESTION: ICD-10-CM

## 2020-10-12 DIAGNOSIS — R06.02 SHORTNESS OF BREATH: ICD-10-CM

## 2020-10-12 DIAGNOSIS — Z88.6 ALLERGY STATUS TO ANALGESIC AGENT: ICD-10-CM

## 2020-10-12 DIAGNOSIS — R07.89 OTHER CHEST PAIN: ICD-10-CM

## 2020-10-12 DIAGNOSIS — R53.83 OTHER FATIGUE: ICD-10-CM

## 2020-10-12 DIAGNOSIS — R50.9 FEVER, UNSPECIFIED: ICD-10-CM

## 2020-10-12 DIAGNOSIS — Z88.8 ALLERGY STATUS TO OTHER DRUGS, MEDICAMENTS AND BIOLOGICAL SUBSTANCES: ICD-10-CM

## 2020-10-12 LAB
ALBUMIN SERPL ELPH-MCNC: 3.9 G/DL — SIGNIFICANT CHANGE UP (ref 3.4–5)
ALP SERPL-CCNC: 86 U/L — SIGNIFICANT CHANGE UP (ref 40–120)
ALT FLD-CCNC: 22 U/L — SIGNIFICANT CHANGE UP (ref 12–42)
AMPHET UR-MCNC: NEGATIVE — SIGNIFICANT CHANGE UP
ANION GAP SERPL CALC-SCNC: 5 MMOL/L — LOW (ref 9–16)
ANISOCYTOSIS BLD QL: SLIGHT — SIGNIFICANT CHANGE UP
APPEARANCE UR: CLEAR — SIGNIFICANT CHANGE UP
AST SERPL-CCNC: 17 U/L — SIGNIFICANT CHANGE UP (ref 15–37)
BARBITURATES UR SCN-MCNC: NEGATIVE — SIGNIFICANT CHANGE UP
BASOPHILS # BLD AUTO: 0.02 K/UL — SIGNIFICANT CHANGE UP (ref 0–0.2)
BASOPHILS NFR BLD AUTO: 0.4 % — SIGNIFICANT CHANGE UP (ref 0–2)
BENZODIAZ UR-MCNC: NEGATIVE — SIGNIFICANT CHANGE UP
BILIRUB SERPL-MCNC: 0.3 MG/DL — SIGNIFICANT CHANGE UP (ref 0.2–1.2)
BILIRUB UR-MCNC: NEGATIVE — SIGNIFICANT CHANGE UP
BUN SERPL-MCNC: 17 MG/DL — SIGNIFICANT CHANGE UP (ref 7–23)
CALCIUM SERPL-MCNC: 8.7 MG/DL — SIGNIFICANT CHANGE UP (ref 8.5–10.5)
CHLORIDE SERPL-SCNC: 109 MMOL/L — HIGH (ref 96–108)
CO2 SERPL-SCNC: 31 MMOL/L — SIGNIFICANT CHANGE UP (ref 22–31)
COCAINE METAB.OTHER UR-MCNC: NEGATIVE — SIGNIFICANT CHANGE UP
COLOR SPEC: YELLOW — SIGNIFICANT CHANGE UP
CREAT SERPL-MCNC: 0.98 MG/DL — SIGNIFICANT CHANGE UP (ref 0.5–1.3)
DIFF PNL FLD: NEGATIVE — SIGNIFICANT CHANGE UP
EOSINOPHIL # BLD AUTO: 0.09 K/UL — SIGNIFICANT CHANGE UP (ref 0–0.5)
EOSINOPHIL NFR BLD AUTO: 1.6 % — SIGNIFICANT CHANGE UP (ref 0–6)
GLUCOSE BLDC GLUCOMTR-MCNC: 86 MG/DL — SIGNIFICANT CHANGE UP (ref 70–99)
GLUCOSE SERPL-MCNC: 86 MG/DL — SIGNIFICANT CHANGE UP (ref 70–99)
GLUCOSE UR QL: NEGATIVE — SIGNIFICANT CHANGE UP
HCT VFR BLD CALC: 41.9 % — SIGNIFICANT CHANGE UP (ref 39–50)
HGB BLD-MCNC: 12.9 G/DL — LOW (ref 13–17)
HIV 1 & 2 AB SERPL IA.RAPID: SIGNIFICANT CHANGE UP
IMM GRANULOCYTES NFR BLD AUTO: 0.2 % — SIGNIFICANT CHANGE UP (ref 0–1.5)
KETONES UR-MCNC: NEGATIVE — SIGNIFICANT CHANGE UP
LEUKOCYTE ESTERASE UR-ACNC: NEGATIVE — SIGNIFICANT CHANGE UP
LYMPHOCYTES # BLD AUTO: 2.58 K/UL — SIGNIFICANT CHANGE UP (ref 1–3.3)
LYMPHOCYTES # BLD AUTO: 46.3 % — HIGH (ref 13–44)
MANUAL SMEAR VERIFICATION: SIGNIFICANT CHANGE UP
MCHC RBC-ENTMCNC: 22.7 PG — LOW (ref 27–34)
MCHC RBC-ENTMCNC: 30.8 GM/DL — LOW (ref 32–36)
MCV RBC AUTO: 73.8 FL — LOW (ref 80–100)
METHADONE UR-MCNC: NEGATIVE — SIGNIFICANT CHANGE UP
MICROCYTES BLD QL: SLIGHT — SIGNIFICANT CHANGE UP
MONOCYTES # BLD AUTO: 0.6 K/UL — SIGNIFICANT CHANGE UP (ref 0–0.9)
MONOCYTES NFR BLD AUTO: 10.8 % — SIGNIFICANT CHANGE UP (ref 2–14)
NEUTROPHILS # BLD AUTO: 2.27 K/UL — SIGNIFICANT CHANGE UP (ref 1.8–7.4)
NEUTROPHILS NFR BLD AUTO: 40.7 % — LOW (ref 43–77)
NITRITE UR-MCNC: NEGATIVE — SIGNIFICANT CHANGE UP
NRBC # BLD: 0 /100 WBCS — SIGNIFICANT CHANGE UP (ref 0–0)
OPIATES UR-MCNC: NEGATIVE — SIGNIFICANT CHANGE UP
OVALOCYTES BLD QL SMEAR: SLIGHT — SIGNIFICANT CHANGE UP
PCP SPEC-MCNC: SIGNIFICANT CHANGE UP
PCP UR-MCNC: NEGATIVE — SIGNIFICANT CHANGE UP
PH UR: 6 — SIGNIFICANT CHANGE UP (ref 5–8)
PLAT MORPH BLD: NORMAL — SIGNIFICANT CHANGE UP
PLATELET # BLD AUTO: 201 K/UL — SIGNIFICANT CHANGE UP (ref 150–400)
POIKILOCYTOSIS BLD QL AUTO: SLIGHT — SIGNIFICANT CHANGE UP
POTASSIUM SERPL-MCNC: 3.9 MMOL/L — SIGNIFICANT CHANGE UP (ref 3.5–5.3)
POTASSIUM SERPL-SCNC: 3.9 MMOL/L — SIGNIFICANT CHANGE UP (ref 3.5–5.3)
PROT SERPL-MCNC: 7.5 G/DL — SIGNIFICANT CHANGE UP (ref 6.4–8.2)
PROT UR-MCNC: NEGATIVE MG/DL — SIGNIFICANT CHANGE UP
RBC # BLD: 5.68 M/UL — SIGNIFICANT CHANGE UP (ref 4.2–5.8)
RBC # FLD: 15.5 % — HIGH (ref 10.3–14.5)
RBC BLD AUTO: ABNORMAL
SODIUM SERPL-SCNC: 145 MMOL/L — SIGNIFICANT CHANGE UP (ref 132–145)
SP GR SPEC: 1.02 — SIGNIFICANT CHANGE UP (ref 1–1.03)
THC UR QL: NEGATIVE — SIGNIFICANT CHANGE UP
UROBILINOGEN FLD QL: 0.2 E.U./DL — SIGNIFICANT CHANGE UP
WBC # BLD: 5.57 K/UL — SIGNIFICANT CHANGE UP (ref 3.8–10.5)
WBC # FLD AUTO: 5.57 K/UL — SIGNIFICANT CHANGE UP (ref 3.8–10.5)

## 2020-10-12 PROCEDURE — 99285 EMERGENCY DEPT VISIT HI MDM: CPT

## 2020-10-12 PROCEDURE — 93010 ELECTROCARDIOGRAM REPORT: CPT

## 2020-10-12 PROCEDURE — 70450 CT HEAD/BRAIN W/O DYE: CPT | Mod: 26

## 2020-10-12 RX ORDER — SODIUM CHLORIDE 9 MG/ML
1000 INJECTION INTRAMUSCULAR; INTRAVENOUS; SUBCUTANEOUS ONCE
Refills: 0 | Status: COMPLETED | OUTPATIENT
Start: 2020-10-12 | End: 2020-10-12

## 2020-10-12 RX ORDER — ACETAMINOPHEN 500 MG
2 TABLET ORAL
Qty: 20 | Refills: 0
Start: 2020-10-12

## 2020-10-12 RX ORDER — MECLIZINE HCL 12.5 MG
1 TABLET ORAL
Qty: 10 | Refills: 0
Start: 2020-10-12

## 2020-10-12 RX ADMIN — SODIUM CHLORIDE 1000 MILLILITER(S): 9 INJECTION INTRAMUSCULAR; INTRAVENOUS; SUBCUTANEOUS at 22:59

## 2020-10-12 NOTE — ED PROVIDER NOTE - PATIENT PORTAL LINK FT
You can access the FollowMyHealth Patient Portal offered by [facility] by registering at the following website: [email or web address]. By joining [employer]’s FollowMyHealth portal, you will also be able to view your health information using other applications (apps) compatible with our system.

## 2020-10-12 NOTE — ED PROVIDER NOTE - PROVIDER TOKENS
PROVIDER:[TOKEN:[[id number]]],PROVIDER:[TOKEN:[[id number]]],PROVIDER:[TOKEN:[[id number]]]

## 2020-10-12 NOTE — ED ADULT TRIAGE NOTE - CHIEF COMPLAINT QUOTE
headache/dizziness x 1 week   unable to differentiate what is worse today, gait steady, speech clear, no neuro deficits appreciated  in triage  additional multiple somatic complaints

## 2020-10-12 NOTE — ED PROVIDER NOTE - CARE PROVIDER_API CALL
Lora Grey)  Neurology; Vascular Neurology  [address], [address] [place] [address]  Phone: [phone]  Fax: [phone]  Follow Up Time:     Kam Alonzo  CARDIOVASCULAR DISEASE  [address] [facility] [place] [address]  Phone: [phone]  Fax: [phone]  Follow Up Time:     PATRICIA RAMOS  INTERNAL MEDICINE  [address] [address]  Phone: [phone]  Fax: [phone]  Follow Up Time:

## 2020-10-12 NOTE — ED PROVIDER NOTE - NSFOLLOWUPINSTRUCTIONS_ED_ALL_ED_FT
Chest Pain    Chest pain can be caused by many different conditions which may or may not be dangerous. Causes include heartburn, lung infections, heart attack, blood clot in lungs, skin infections, strain or damage to muscle, cartilage, or bones, etc. In addition to a history and physical examination, an electrocardiogram (ECG) or other lab tests may have been performed to determine the cause of your chest pain. Follow up with your primary care provider or with a cardiologist as instructed.     SEEK IMMEDIATE MEDICAL CARE IF YOU HAVE ANY OF THE FOLLOWING SYMPTOMS: worsening chest pain, coughing up blood, unexplained back/neck/jaw pain, severe abdominal pain, dizziness or lightheadedness, fainting, shortness of breath, sweaty or clammy skin, vomiting, or racing heart beat. These symptoms may represent a serious problem that is an emergency. Do not wait to see if the symptoms will go away. Get medical help right away. Call 911 and do not drive yourself to the hospital.     Headache    A headache is pain or discomfort felt around the head or neck area. The specific cause of a headache may not be found as there are many types including tension headaches, migraine headaches, and cluster headaches. Watch your condition for any changes. Things you can do to manage your pain include taking over the counter and prescription medications as instructed by your health care provider, lying down in a dark quiet room, limiting stress, getting regular sleep, and refraining from alcohol and tobacco products.    SEEK IMMEDIATE MEDICAL CARE IF YOU HAVE ANY OF THE FOLLOWING SYMPTOMS: fever, vomiting, stiff neck, loss of vision, problems with speech, muscle weakness, loss of balance, trouble walking, passing out, or confusion.

## 2020-10-12 NOTE — ED PROVIDER NOTE - CARE PROVIDERS DIRECT ADDRESSES
[email or web address]

## 2020-10-12 NOTE — ED PROVIDER NOTE - PHYSICAL EXAMINATION
Vital Signs - nursing notes reviewed and confirmed  Gen - WDWN, anxious appearing, comfortable and non-toxic appearing, speaking in full sentences   Skin - warm, dry, intact  HEENT - AT/NC, PERRL, EOMI, no conjunctival injection, moist oral mucosa, TM intact b/l with good cone of lights, o/p clear with no erythema, edema, or exudate, uvula midline, airway patent, neck supple and NT, FROM, no JVD or carotid bruits b/l, no palpable nodes  CV - S1S2, R/R/R  Resp - respiration non-labored, CTAB, symmetric bs b/l, no r/r/w  GI - NABS, soft, ND, NT, no rebound or guarding, no CVAT b/l   MS - w/w/p, no c/c/e, calves supple and NT, distal pulses symmetric b/l, brisk cap refills, +SILT  Neuro - AxOx3, no focal neuro deficits, CN II-XII grossly intact, cerebellar function intact, negative pronator drift, negative nystagmus, ambulatory without gait disturbance Vital Signs - nursing notes reviewed and confirmed  Gen - WDWN M, anxious appearing, comfortable and non-toxic appearing, speaking in full sentences   Skin - warm, dry, intact  HEENT - AT/NC, PERRL, EOMI, no conjunctival injection, moist oral mucosa, TM intact b/l with good cone of lights, o/p clear with no erythema, edema, or exudate, uvula midline, airway patent, neck supple and NT, FROM, no JVD or carotid bruits b/l, no palpable nodes  CV - S1S2, R/R/R  Resp - respiration non-labored, CTAB, symmetric bs b/l, no r/r/w  GI - NABS, soft, ND, NT, no rebound or guarding, no CVAT b/l   MS - w/w/p, no c/c/e, calves supple and NT, distal pulses symmetric b/l, brisk cap refills, +SILT  Neuro - AxOx3, no focal neuro deficits, CN II-XII grossly intact, cerebellar function intact, negative pronator drift, negative nystagmus, ambulatory without gait disturbance

## 2020-10-12 NOTE — ED ADULT NURSE NOTE - OBJECTIVE STATEMENT
patient complaining of headache, chest pain, sob, dizziness, anxiety. denies vertigo, loss/change in consciousness, tinnitus, rhinitis, focal cognitive deficits, changes in medications, diet, exercise, weight gain/weight loss. Multiple ER visits with similar complaints, no primary health care provider. Pertinent PMH includes Asthma, lungs are clear to auscultation. Will continue to assess.

## 2020-10-12 NOTE — ED PROVIDER NOTE - CLINICAL SUMMARY MEDICAL DECISION MAKING FREE TEXT BOX
pt p/w myriad of somatic complaints x 1 month, has been seen at [facility] couple of weeks ago for similar cx with unremarkable w/u, no outpt f/u thus far, and now with persistent sx, afebrile, neurologically intact on exam, EKG with non ischemic findings and labs consistent with baseline, counseling and reassurance provided, AFVSS at time of d/c, pt non-toxic appearing, results, ddx, and f/u plans discussed with pt at bedside, encouraged outpt f/u, strict return precautions discussed, prompt return to ER for any worsening or new sx, pt verbalized understanding.

## 2020-10-12 NOTE — ED PROVIDER NOTE - OBJECTIVE STATEMENT
35 yo M with PMHx of asthma, no h/o intubation, baseline peak flow unknown, presenting c/o CP, HA, dizziness, fatigue and generalized malaise x 1 month.  Pt was seen and evaluated at [facility] 2-3 wks ago for similar cx, s/p basic labs and CXR/EKG with unremarkable findings, s/p COVID and rapid HIV testing last month with negative findings.  Reports persistent sx with sharp pain across entire chest with associated HA, vertigo sensation, nasal congestion and subjective SOB.  Denies fever, chills, anosmia, ageusia, wheezing, hemoptysis, orthopnea, palpitations, peripheral edema, stridors, focal weakness, sore throat, tinnitus, lightheadedness, N/V/D/C, abdominal pain, change in urinary/bowel function, night sweats, rash, and weight loss.

## 2020-10-13 VITALS
RESPIRATION RATE: 16 BRPM | SYSTOLIC BLOOD PRESSURE: 125 MMHG | OXYGEN SATURATION: 98 % | DIASTOLIC BLOOD PRESSURE: 83 MMHG | TEMPERATURE: 98 F

## 2020-10-15 PROBLEM — Z00.00 ENCOUNTER FOR PREVENTIVE HEALTH EXAMINATION: Status: ACTIVE | Noted: 2020-10-15

## 2020-12-01 ENCOUNTER — OUTPATIENT (OUTPATIENT)
Dept: OUTPATIENT SERVICES | Facility: HOSPITAL | Age: 35
LOS: 1 days | End: 2020-12-01
Payer: MEDICAID

## 2020-12-11 ENCOUNTER — EMERGENCY (EMERGENCY)
Facility: HOSPITAL | Age: 35
LOS: 1 days | Discharge: ANOTHER HOSPITAL | End: 2020-12-11
Attending: EMERGENCY MEDICINE
Payer: MEDICAID

## 2020-12-11 ENCOUNTER — INPATIENT (INPATIENT)
Facility: HOSPITAL | Age: 35
LOS: 11 days | Discharge: HOME | DRG: 751 | End: 2020-12-23
Attending: PSYCHIATRY & NEUROLOGY | Admitting: PSYCHIATRY & NEUROLOGY
Payer: MEDICAID

## 2020-12-11 VITALS
OXYGEN SATURATION: 99 % | SYSTOLIC BLOOD PRESSURE: 120 MMHG | WEIGHT: 169.98 LBS | HEART RATE: 93 BPM | DIASTOLIC BLOOD PRESSURE: 65 MMHG | HEIGHT: 74 IN | TEMPERATURE: 98 F | RESPIRATION RATE: 18 BRPM

## 2020-12-11 VITALS — RESPIRATION RATE: 18 BRPM | WEIGHT: 162.04 LBS | HEIGHT: 74 IN | TEMPERATURE: 98 F | OXYGEN SATURATION: 100 %

## 2020-12-11 VITALS
DIASTOLIC BLOOD PRESSURE: 72 MMHG | SYSTOLIC BLOOD PRESSURE: 108 MMHG | RESPIRATION RATE: 20 BRPM | HEART RATE: 84 BPM | OXYGEN SATURATION: 98 % | TEMPERATURE: 99 F

## 2020-12-11 DIAGNOSIS — F33.9 MAJOR DEPRESSIVE DISORDER, RECURRENT, UNSPECIFIED: ICD-10-CM

## 2020-12-11 DIAGNOSIS — R45.851 SUICIDAL IDEATIONS: ICD-10-CM

## 2020-12-11 DIAGNOSIS — Z20.828 CONTACT WITH AND (SUSPECTED) EXPOSURE TO OTHER VIRAL COMMUNICABLE DISEASES: ICD-10-CM

## 2020-12-11 LAB
ALBUMIN SERPL ELPH-MCNC: 3.9 G/DL — SIGNIFICANT CHANGE UP (ref 3.4–5)
ALP SERPL-CCNC: 126 U/L — HIGH (ref 40–120)
ALT FLD-CCNC: 30 U/L — SIGNIFICANT CHANGE UP (ref 12–42)
ANION GAP SERPL CALC-SCNC: 9 MMOL/L — SIGNIFICANT CHANGE UP (ref 9–16)
ANISOCYTOSIS BLD QL: SLIGHT — SIGNIFICANT CHANGE UP
AST SERPL-CCNC: 28 U/L — SIGNIFICANT CHANGE UP (ref 15–37)
BASOPHILS # BLD AUTO: 0.02 K/UL — SIGNIFICANT CHANGE UP (ref 0–0.2)
BASOPHILS NFR BLD AUTO: 0.4 % — SIGNIFICANT CHANGE UP (ref 0–2)
BILIRUB SERPL-MCNC: 0.2 MG/DL — SIGNIFICANT CHANGE UP (ref 0.2–1.2)
BUN SERPL-MCNC: 27 MG/DL — HIGH (ref 7–23)
CALCIUM SERPL-MCNC: 8.8 MG/DL — SIGNIFICANT CHANGE UP (ref 8.5–10.5)
CHLORIDE SERPL-SCNC: 105 MMOL/L — SIGNIFICANT CHANGE UP (ref 96–108)
CO2 SERPL-SCNC: 26 MMOL/L — SIGNIFICANT CHANGE UP (ref 22–31)
CREAT SERPL-MCNC: 1.11 MG/DL — SIGNIFICANT CHANGE UP (ref 0.5–1.3)
EOSINOPHIL # BLD AUTO: 0.15 K/UL — SIGNIFICANT CHANGE UP (ref 0–0.5)
EOSINOPHIL NFR BLD AUTO: 2.7 % — SIGNIFICANT CHANGE UP (ref 0–6)
GLUCOSE SERPL-MCNC: 99 MG/DL — SIGNIFICANT CHANGE UP (ref 70–99)
IMM GRANULOCYTES NFR BLD AUTO: 0.2 % — SIGNIFICANT CHANGE UP (ref 0–1.5)
LYMPHOCYTES # BLD AUTO: 2.25 K/UL — SIGNIFICANT CHANGE UP (ref 1–3.3)
LYMPHOCYTES # BLD AUTO: 40.8 % — SIGNIFICANT CHANGE UP (ref 13–44)
MACROCYTES BLD QL: SLIGHT — SIGNIFICANT CHANGE UP
MANUAL SMEAR VERIFICATION: SIGNIFICANT CHANGE UP
MONOCYTES # BLD AUTO: 0.55 K/UL — SIGNIFICANT CHANGE UP (ref 0–0.9)
MONOCYTES NFR BLD AUTO: 10 % — SIGNIFICANT CHANGE UP (ref 2–14)
NEUTROPHILS # BLD AUTO: 2.53 K/UL — SIGNIFICANT CHANGE UP (ref 1.8–7.4)
NEUTROPHILS NFR BLD AUTO: 45.9 % — SIGNIFICANT CHANGE UP (ref 43–77)
NRBC # BLD: 0 /100 WBCS — SIGNIFICANT CHANGE UP (ref 0–0)
OVALOCYTES BLD QL SMEAR: SLIGHT — SIGNIFICANT CHANGE UP
PLAT MORPH BLD: NORMAL — SIGNIFICANT CHANGE UP
PLATELET COUNT - ESTIMATE: NORMAL — SIGNIFICANT CHANGE UP
POIKILOCYTOSIS BLD QL AUTO: SLIGHT — SIGNIFICANT CHANGE UP
POTASSIUM SERPL-MCNC: 4.4 MMOL/L — SIGNIFICANT CHANGE UP (ref 3.5–5.3)
POTASSIUM SERPL-SCNC: 4.4 MMOL/L — SIGNIFICANT CHANGE UP (ref 3.5–5.3)
PROT SERPL-MCNC: 7.8 G/DL — SIGNIFICANT CHANGE UP (ref 6.4–8.2)
RBC BLD AUTO: ABNORMAL
SARS-COV-2 RNA SPEC QL NAA+PROBE: SIGNIFICANT CHANGE UP
SODIUM SERPL-SCNC: 140 MMOL/L — SIGNIFICANT CHANGE UP (ref 132–145)

## 2020-12-11 PROCEDURE — 83036 HEMOGLOBIN GLYCOSYLATED A1C: CPT

## 2020-12-11 PROCEDURE — 87635 SARS-COV-2 COVID-19 AMP PRB: CPT

## 2020-12-11 PROCEDURE — 94640 AIRWAY INHALATION TREATMENT: CPT

## 2020-12-11 PROCEDURE — 90792 PSYCH DIAG EVAL W/MED SRVCS: CPT | Mod: 95

## 2020-12-11 PROCEDURE — 99218: CPT

## 2020-12-11 PROCEDURE — 86769 SARS-COV-2 COVID-19 ANTIBODY: CPT

## 2020-12-11 PROCEDURE — 83540 ASSAY OF IRON: CPT

## 2020-12-11 PROCEDURE — 85025 COMPLETE CBC W/AUTO DIFF WBC: CPT

## 2020-12-11 PROCEDURE — 36415 COLL VENOUS BLD VENIPUNCTURE: CPT

## 2020-12-11 PROCEDURE — 82728 ASSAY OF FERRITIN: CPT

## 2020-12-11 PROCEDURE — 93010 ELECTROCARDIOGRAM REPORT: CPT

## 2020-12-11 PROCEDURE — 80061 LIPID PANEL: CPT

## 2020-12-11 PROCEDURE — 84443 ASSAY THYROID STIM HORMONE: CPT

## 2020-12-11 PROCEDURE — 83020 HEMOGLOBIN ELECTROPHORESIS: CPT

## 2020-12-11 RX ORDER — DIPHENHYDRAMINE HCL 50 MG
50 CAPSULE ORAL EVERY 6 HOURS
Refills: 0 | Status: DISCONTINUED | OUTPATIENT
Start: 2020-12-11 | End: 2020-12-23

## 2020-12-11 RX ORDER — MIRTAZAPINE 45 MG/1
15 TABLET, ORALLY DISINTEGRATING ORAL AT BEDTIME
Refills: 0 | Status: DISCONTINUED | OUTPATIENT
Start: 2020-12-11 | End: 2020-12-18

## 2020-12-11 RX ORDER — CHLORPROMAZINE HCL 10 MG
50 TABLET ORAL EVERY 6 HOURS
Refills: 0 | Status: DISCONTINUED | OUTPATIENT
Start: 2020-12-11 | End: 2020-12-23

## 2020-12-11 RX ORDER — HALOPERIDOL DECANOATE 100 MG/ML
5 INJECTION INTRAMUSCULAR EVERY 6 HOURS
Refills: 0 | Status: DISCONTINUED | OUTPATIENT
Start: 2020-12-11 | End: 2020-12-23

## 2020-12-11 RX ADMIN — MIRTAZAPINE 15 MILLIGRAM(S): 45 TABLET, ORALLY DISINTEGRATING ORAL at 21:03

## 2020-12-11 NOTE — ED BEHAVIORAL HEALTH NOTE - BEHAVIORAL HEALTH NOTE
Received patient in signout:     Briefly:     Patient is a 35 year old domiciled, single, intermittently employed male with a hx of major depressive disorder, five prior psychiatric hospitalizations including [facility] L4 -20;  hx of suicidal ideation but no known suicide attempts/ self injury; pmhx of moderate intermittent asthma (triggered by dust and cold air; using inhaler daily)  presenting with suicidal ideations.    Initial assessment was limited as pt appeared inordinately guarded and unwilling to speak in the presence of 1:1. He maintained his fear about suicidal ideation and was held for reassessment.     On exam today, pt says that he lives in [place], does not pay rent at the moment and does not work due to COVID (previously was a caregiver for sick and disabled persons). He says he is living off of savings. He says he has limited social life, no friends. His mother has passed away, his brother has passed away and his grandmother was one of his only family members left. He found out 3 days ago that she had  - presumably of natural causes and he became acutely depressed and suicidal. he said that prior to receiving this news, he had not been depressed and that his life was usual state of health. Since finding out about grandma's death, he has had thoughts of overdosing, thoughts of jumping into traffic, thoughts of jumping into a lake. He said the thoughts were getting worse and worse and so yesterday presented to the hospital "before I did something." He continues to feel unsafe, feels that his behavior needs to be monitored and is requesting voluntary psychiatric admission.     MSE: calm, cooperative young man, dressed in street clothes. Slightly disheveled, poor eye contact, distantly related. Speech was soft but regular rhythm and volume. Mood is "sad", affect constricted, thought process grossly organized, thought content with acute SI, with method but no plan, no AVH, i/J fair.     Assessment  35y with history of depression - some psychosis in the past but unclear if full diagnosis of psychotic spectrum illness - presents with acute SI 3 days after learning of  grandmother's death. He continues to report SI with method, though no intent or plan, feels unsafe in the community and believes that his behavior needs to be monitored. Suspicion for malingering is low - pt is domiciled, lives rent free and is not known to overutilize hospital resources. Moreover - this question of psychosis in the past might explain why he seems to display limited ability to problem solve or grieve appropriately about this news and instead has become acutely suicidal. He is appropriate for voluntary admission which is what he seeks. Will plan for admission.     Plan  defer medicine to inpatient team  9.13 admission

## 2020-12-11 NOTE — ED BEHAVIORAL HEALTH ASSESSMENT NOTE - DESCRIPTION
pt walked into ED dressed in a sweater and jeans. cooperative and compliant with blood draw and procedures.   slept prior to interview but woke and cooperated.     COVID Exposure Screen- Patient    1.	*In the past 14 days, have you been around anyone with a positive COVID-19 test?*   (  ) Yes   ( x ) No   (  ) Unknown- Reason (e.g. patient uncertain, sedated, refusing to answer, etc.):  ______  IF YES PROCEED TO QUESTION #2. IF NO or UNKNOWN, PLEASE SKIP TO QUESTION #7  2.	Were you within 6 feet of them for at least 15 minutes? (  ) Yes   (  ) No   (  ) Unknown- Reason: ______    3.	Have you provided care for them? (  ) Yes   (  ) No   (  ) Unknown- Reason: ______    4.	Have you had direct physical contact with them (touched, hugged, or kissed them)?  (  ) Yes   (  ) No    (  ) Unknown- Reason: ______    5.	Have you shared eating or drinking utensils with them? (  ) Yes   (  ) No    (  ) Unknown- Reason: ______    6.	Have they sneezed, coughed, or somehow got respiratory droplets on you? (  ) Yes   (  ) No    (  ) Unknown- Reason: ______      7.	*Have you been out of [place] within the past 14 days?*  (  ) Yes   (  x) No   (  ) Unknown- Reason (e.g. patient uncertain, sedated, refusing to answer, etc.): _______  IF YES PLEASE ANSWER THE FOLLOWING QUESTIONS:  8.	Which state/country have you been to? ______   9.	Were you there over 24 hours? (  ) Yes   (  ) No    (  ) Unknown- Reason: ______    10.	What date did you return to [facility]? ______ Endorses moderate asthma triggered by cold weather and allergens and uses rescue inhaler daily; to his knowledge, was intubated as a child for asthma exacerbation Endorses 12th grade education. see ED course     COVID Exposure Screen- Patient    1.	*In the past 14 days, have you been around anyone with a positive COVID-19 test?*   (  ) Yes   ( x ) No   (  ) Unknown- Reason (e.g. patient uncertain, sedated, refusing to answer, etc.):  ______  IF YES PROCEED TO QUESTION #2. IF NO or UNKNOWN, PLEASE SKIP TO QUESTION #7  2.	Were you within 6 feet of them for at least 15 minutes? (  ) Yes   (  ) No   (  ) Unknown- Reason: ______    3.	Have you provided care for them? (  ) Yes   (  ) No   (  ) Unknown- Reason: ______    4.	Have you had direct physical contact with them (touched, hugged, or kissed them)?  (  ) Yes   (  ) No    (  ) Unknown- Reason: ______    5.	Have you shared eating or drinking utensils with them? (  ) Yes   (  ) No    (  ) Unknown- Reason: ______    6.	Have they sneezed, coughed, or somehow got respiratory droplets on you? (  ) Yes   (  ) No    (  ) Unknown- Reason: ______      7.	*Have you been out of [place] within the past 14 days?*  (  ) Yes   (  x) No   (  ) Unknown- Reason (e.g. patient uncertain, sedated, refusing to answer, etc.): _______  IF YES PLEASE ANSWER THE FOLLOWING QUESTIONS:  8.	Which state/country have you been to? ______   9.	Were you there over 24 hours? (  ) Yes   (  ) No    (  ) Unknown- Reason: ______    10.	What date did you return to [facility]? ______

## 2020-12-11 NOTE — ED CDU PROVIDER INITIAL DAY NOTE - CROS ED CONS ALL NEG
Ongoing SW/CM Assessment/Plan of Care Note     See SW/CM flowsheets for goals and other objective data.    Patient/Family discharge goal (s):  Goal #1: Adjustment/coping issues addressed  Goal #2: Establish present or future health care decison-maker  Goal #3: Discharge to other institution(s) arranged    PT Recommendation:  Recommendation for Discharge: PT: Sub-acute nursing home    OT Recommendation:  Recommendations for Discharge: OT: Sub-acute nursing home    SLP Recommendation:  Recommendations for Discharge: SLP: possible post acute speech therapy;  Needs 24/7 supervision and assist    Disposition:  Planned Discharge Destination: Rehabilitation/Skilled Care    Progress note:   DILLON spoke to Annika [phone] [facility]. Court date remains pending at this time.     SW informed Annika referral will be initiated to [facility] in [place] once court date is established. Annika requested SW also initiate another referral to [facility] once court date is established.     Per RN patient is currently on a mini team. Advised RN subacute placement may be difficult if patient is on a mini team. SW will continue to follow.          negative...

## 2020-12-11 NOTE — ED BEHAVIORAL HEALTH ASSESSMENT NOTE - RISK ASSESSMENT
hx of major depressive disorder, suicidal ideation, hospitalizations  seeming stressors of unemployment     acute risk elevated by report of active suicidal ideation Moderate Acute Suicide Risk

## 2020-12-11 NOTE — ED CDU PROVIDER DISPOSITION NOTE - CLINICAL COURSE
Pt is covid negative.  Stable on 1:1 here in ED and medically cleared.  Accepted to [facility] via volunatry admission.

## 2020-12-11 NOTE — PATIENT PROFILE BEHAVIORAL HEALTH - REASON FOR ADMISSION
Patient is a 35 year-old Black male, dom[place] the city with a roommate, intermittently employed, walked into the ED complained of depression with suicidal ideation,  s/p his grandmother’s death 5 days ago.  Patient has a previous history of Major Depressive Disorder, with five prior hospitalizations including last at [facility], [facility], on 11/4/2020; he has a history of suicidal ideation, but no known suicidal attempt or self-injury.  During admission interview at [facility], patient observed guarded and evasive, paranoid about the 1:1 and demanding to have a more private interview.  He admitted to having suicidal ideation with plan to OD on pills since the death of a family member; he denied manic symptoms and denied auditory/visual hallucinations.  He is admitted on a 913 voluntary status.  His admitting diagnosis is Severe Episode of Major Depressive Disorder, with Psychotic Features.  His admitting physician is Dr. Forrest.  During admission interview here at , patient hyper verbal, restless, but cooperative, pleasant upon approach, admitted to having suicidal ideation, but does not have any plans.  His medical history includes Asthma and has allergies to Amoxicillin, Toradol, Zofran, Reglan, Peanuts and Pine Nuts, Ativan and Iodine.  Patient stated he wants to restart Remeron which he has not been taking at home. He denied auditory/visual hallucinations.  He is currently pacing the hallway.  Continue supportive care and safety; continue monitor patient closely.

## 2020-12-11 NOTE — ED BEHAVIORAL HEALTH ASSESSMENT NOTE - SUMMARY
Patient is a 35 year old domiciled, single, intermittently employed male with a hx of major depressive disorder, five prior psychiatric hospitalizations including [facility] L4 4/11-4/21/20;  hx of suicidal ideation but no known suicide attempts/ self injury; pmhx of moderate intermittent asthma (triggered by dust and cold air; using inhaler daily)  presenting with suicidal ideations.  pt is guarded and evasive during assessment. he engages superficially citing the lack of privacy in his eyes. he endorses suicidal ideation with plan to overdose on pills. per chart review/psyckes he has a hx of major depressive disorder and prior hospitalizations, and episodes of suicidal ideation but no suicide attempts. utox is negative. no collateral provided. given the possibility of elevated risk pt should be held in ED, but a more thorough assessment and collateral would be indicated prior to an admission decision. will hold pt in ED and attempt to re engage in interview, and encourage pt to provide collateral contact.

## 2020-12-11 NOTE — ED ADULT NURSE NOTE - NS ED BHA HEROIN OPIATES
PRE-SEDATION ASSESSMENT    CONSENT  Consent for procedure and sedation obtained: Yes    MEDICAL HISTORY  Significant medical/surgical history: Yes  Past Complications with Sedation/Anesthesia: No  Significant Family History: No  Smoking History: Yes  Alcohol/Drug abuse: No  Possible Pregnancy (LMP): No  Cardiac History: Yes  Respiratory History: Yes    PHYSICAL EXAM  History and Physical Reviewed: Patient has valid H&P within 30 days. I have reviewed and there are no changes.  Airway Risk History: No previous complications  Airway Anatomy : Class III  Heart : Abnormal  Lungs : Normal  LOC/Mental Status : Normal    OTHER FINDINGS  Reviewed current medications and allergies: Yes  Pertinent lab/diagnostic test reviewed: Yes    SEDATION RISK ASSESSMENT  Risk Status ASA: Class IV - Incapacitating systemic disease that is a constant threat to life  Plan for Sedation: Moderate Sedation  Indications for Procedure/Pre-Procedure Diagnosis and Planned Procedure: Multivessel disease and severe aortic stenosis  EKG Monitoring: Yes     None known

## 2020-12-11 NOTE — ED BEHAVIORAL HEALTH ASSESSMENT NOTE - DETAILS
reports current suicidal ideation with plan since death of family member none Plan discussed with  attending MD

## 2020-12-11 NOTE — ED BEHAVIORAL HEALTH ASSESSMENT NOTE - HPI (INCLUDE ILLNESS QUALITY, SEVERITY, DURATION, TIMING, CONTEXT, MODIFYING FACTORS, ASSOCIATED SIGNS AND SYMPTOMS)
Patient is a 35 year old domiciled, single, intermittently employed male with a hx of major depressive disorder, five prior psychiatric hospitalizations including [facility] L4 4/11-4/21/20;  hx of suicidal ideation but no known suicide attempts/ self injury; pmhx of moderate intermittent asthma (triggered by dust and cold air; using inhaler daily)  presenting with suicidal ideations.   pt is guarded and evasive throughout interview, focussed on the presence of the 1:1, asking repeatedly for the 1:1 to leave so that he may speak more privately, when informed this wasn't possible, said "ok, I don't want to talk too much" and "i'll just be giving you the surface story." states he is depressed and having suicidal ideation with plan to overdose on pills since the death of a family member, but wont' say which family member. despite the electronic record of a hospitaliztion to [facility] this year in april he says his last psych hospitalizatio was "years ago" and he used to take remeron but has not taken it "for a while"   he endorses depressed mood, anhedonia, hopelessness and suicidal ideation as above, dneies neurovegetative sx's, denies homicidal ideation, denies ah/vh or any manic or psychotic sx's.   denies substance use. states he is unemployed and lives with a roomate, and that he takes albuterol and advair for asthma.   pt refused to provide any phone number for collateral contact, saying again his focus was on "privacy."  dc summary from [facility] hospitalization reviewed. pt diagnosed with mdd with psychosis, stabilized on remeron 30mg, offered risperdal as well but refused to take it.

## 2020-12-11 NOTE — ED BEHAVIORAL HEALTH NOTE - BEHAVIORAL HEALTH NOTE
===================  PRE-HOSPITAL COURSE  ===================  SOURCE: Chart    DETAILS: Patient arrived via walk-in complaining of SI    ============  ED COURSE   ============  SOURCE: Chart, ED     ARRIVAL: Patient arrived via walk-in    BELONGINGS: No items of note    BEHAVIOR: Patient arrived to the ED alert and oriented x3, patient was cooperative with ED medical and safety protocols. Patient had fair grooming/hygiene. He reported SI with thoughts of jumping in a lake, denied HI, did not report or exhibit overt psychotic/manic symptoms. Patient had linear thought process/normal speech. Affect appeared euthymic, eye contact fair, he remained in good behavioral control prior to assessment and was able to sleep during the ED course.     TREATMENT: No PRN psychiatric medication prior to assessment     VISITORS: None     ========================  COLLATERAL  ========================    None available at this time

## 2020-12-11 NOTE — ED CDU PROVIDER INITIAL DAY NOTE - MEDICAL DECISION MAKING DETAILS
Awaiting dispo from telepsych.  Remains on 1:1.  Medically cleared by Dr. Hope on the overnight shift.

## 2020-12-11 NOTE — ED PROVIDER NOTE - OBJECTIVE STATEMENT
35 yom pw SI.  reports recent death in family.  domiciled.  did not specify plan.  previous hospitalizations, most recent [facility].  denies hi/hallucination.  hx of MDD

## 2020-12-11 NOTE — ED PROVIDER NOTE - PROGRESS NOTE DETAILS
I accepted sign out from Dr. sen.  pt on 1:1 for SI 2/2 recent loss in his family.  Evaluated by telepsych overnight but would like to re-evaluate him this morning.  I will place on observation while we await psych dispo.

## 2020-12-11 NOTE — ED PROVIDER NOTE - PHYSICAL EXAMINATION
Physical Exam  GEN: Awake, alert, non-toxic appearing, NCAT  EYES: full EOMI,  ENT: External inspection normal, normal voice,   HEAD: atraumatic  NECK: FROM neck, supple, no meningismus, trachea midline, no JVD  CV: rrr,   RESP: cta bl, no tachypnea, no hypoxia, no resp distress,  GI: +BS, Soft, nontender, no guarding/rebound,   MSK: FROM all 4 extremities,   NEURO: Oriented x3, CN 2-12 grossly intact, normal motor, landeros x 4, steady gait  PSYCH: calm, cooperative, speech w/ normal rate/volume, logical thought process,

## 2020-12-11 NOTE — ED CDU PROVIDER INITIAL DAY NOTE - PROGRESS NOTE DETAILS
I spoke with  from Telepsych and they would like to admit pt via voluntary admission for SI.  Found bed at [facility] but would need negative covid swab.  Swab not performed overnight so will perform now.

## 2020-12-11 NOTE — ED ADULT TRIAGE NOTE - CHIEF COMPLAINT QUOTE
Pt walks in c/o suicidal thoughts related to his grandmother's death 1 year ago. Pt denies any plan and denies homicidal ideation. PMH of depression, no prior self-harm/suicide attempts.

## 2020-12-11 NOTE — ED ADULT NURSE REASSESSMENT NOTE - NS ED NURSE REASSESS COMMENT FT1
Pt resting in stretcher, nad at this time, spoke to telepsych and pending AM dispo. 1:1 constant observation and safety measures maintained.

## 2020-12-11 NOTE — ED ADULT NURSE NOTE - OBJECTIVE STATEMENT
35y male presents to ED c/o SI over last 3-4 days. States grandmother  a year ago. Has become depressed and have had "thoughts of jumping in a lake". No attempts in the past, denies HI or t/v/a hallucination. No drug use or ETOH use.

## 2020-12-11 NOTE — ED BEHAVIORAL HEALTH ASSESSMENT NOTE - OTHER PAST PSYCHIATRIC HISTORY (INCLUDE DETAILS REGARDING ONSET, COURSE OF ILLNESS, INPATIENT/OUTPATIENT TREATMENT)
Notes 5 prior hospitalizations last at [facility] 4 in april 2020.  Per chart review, previously saw a therapist intermittently 1 year ago.

## 2020-12-12 DIAGNOSIS — F33.9 MAJOR DEPRESSIVE DISORDER, RECURRENT, UNSPECIFIED: ICD-10-CM

## 2020-12-12 LAB
A1C WITH ESTIMATED AVERAGE GLUCOSE RESULT: 5.5 % — SIGNIFICANT CHANGE UP (ref 4–5.6)
CHOLEST SERPL-MCNC: 140 MG/DL — SIGNIFICANT CHANGE UP
ESTIMATED AVERAGE GLUCOSE: 111 MG/DL — SIGNIFICANT CHANGE UP (ref 68–114)
HDLC SERPL-MCNC: 37 MG/DL — LOW
LIPID PNL WITH DIRECT LDL SERPL: 87 MG/DL — SIGNIFICANT CHANGE UP
NON HDL CHOLESTEROL: 103 MG/DL — SIGNIFICANT CHANGE UP
TRIGL SERPL-MCNC: 79 MG/DL — SIGNIFICANT CHANGE UP
TSH SERPL-MCNC: 1.05 UU/ML — SIGNIFICANT CHANGE UP (ref 0.34–4.82)

## 2020-12-12 PROCEDURE — 99223 1ST HOSP IP/OBS HIGH 75: CPT

## 2020-12-12 RX ADMIN — MIRTAZAPINE 15 MILLIGRAM(S): 45 TABLET, ORALLY DISINTEGRATING ORAL at 22:09

## 2020-12-12 NOTE — BEHAVIORAL HEALTH ASSESSMENT NOTE - NSBHCHARTREVIEWVS_PSY_A_CORE FT
Vital Signs Last 24 Hrs  T(C): 36.6 (12 Dec 2020 08:40), Max: 37 (11 Dec 2020 13:30)  T(F): 97.8 (12 Dec 2020 08:40), Max: 98.6 (11 Dec 2020 13:30)  HR: 84 (11 Dec 2020 13:30) (84 - 84)  BP: 108/72 (11 Dec 2020 13:30) (108/72 - 108/72)  BP(mean): --  RR: 18 (12 Dec 2020 08:40) (18 - 20)  SpO2: 99% (12 Dec 2020 08:40) (98% - 100%)

## 2020-12-12 NOTE — BEHAVIORAL HEALTH ASSESSMENT NOTE - NSBHCHARTREVIEWINVESTIGATE_PSY_A_CORE FT
< from: 12 Lead ECG (09.20.20 @ 03:16) >    Ventricular Rate 73 BPM    Atrial Rate 73 BPM    P-R Interval 210 ms    QRS Duration 96 ms    Q-T Interval 356 ms    QTC Calculation(Bazett) 392 ms    P Axis 64 degrees    R Axis 48 degrees    T Axis 34 degrees    Diagnosis Line Sinus rhythm with 1st degree A-V block  Early repolarization  Otherwise normal ECG    Confirmed by CHARY SUTHERLAND, [facility] ([id number]) on 9/20/2020 1:03:16 PM    < end of copied text >

## 2020-12-12 NOTE — BEHAVIORAL HEALTH ASSESSMENT NOTE - HPI (INCLUDE ILLNESS QUALITY, SEVERITY, DURATION, TIMING, CONTEXT, MODIFYING FACTORS, ASSOCIATED SIGNS AND SYMPTOMS)
Patient is a 35 year old domiciled, single, intermittently employed male with a hx of major depressive disorder, five prior psychiatric hospitalizations including [facility] L4 4/11-4/21/20;  hx of suicidal ideation but no known suicide attempts/ self injury; pmhx of moderate intermittent asthma (triggered by dust and cold air; using inhaler daily)  presenting with suicidal ideations.   pt is guarded and evasive throughout interview, focussed on the presence of the 1:1, asking repeatedly for the 1:1 to leave so that he may speak more privately, when informed this wasn't possible, said "ok, I don't want to talk too much" and "i'll just be giving you the surface story." states he is depressed and having suicidal ideation with plan to overdose on pills since the death of a family member, but wont' say which family member. despite the electronic record of a hospitalization to [facility] this year in april he says his last psych hospitalization was "years ago" and he used to take remeron but has not taken it "for a while"   he endorses depressed mood, anhedonia, hopelessness and suicidal ideation as above, denies neurovegetative sx's, denies homicidal ideation, denies ah/vh or any manic or psychotic sx's.   Denies substance use. states he is unemployed and lives with a roommate, and that he takes albuterol and Advair for asthma.   pt refused to provide any phone number for collateral contact, saying again his focus was on "privacy."  dc summary from [facility] hospitalization reviewed. pt diagnosed with mdd with psychosis, stabilized on Remeron 30mg, offered Risperdal as well but refused to take it.    12/12/2020: Patient a 36 y/o single AAM, unemployed, on PA and domiciled with a roommate for past 3 years. He has hx of Depression, prefers to talk in private, suspicious that he prefers not to divulge his issues lucie others. He was last hospitalized at  for 1-2 weeks and was on Remeron, which helped him in the past as it helped with appetite and overall wellbeing. He endorses that he was last hospitalized in April, but does not remember the name of the hospital. He added that he is not hearing voices, feels a little depressed and wants to take Remeron, was explained the s/e of Remeron. He has fair sleep/appetite. He denies current S/H/I/P, denied any substance abuse hx including Cigarettes, ETOH and THC also. He also wants Benadryl to help with sleep.    Medically he has Asthma, not in crisis or has SOB, has albuterol as PRN and also has 30 lbs weight loss in 4 months, and wants to take Ensure BID and also was advised to speak with Hospitalist.    Plan: To continue with Remeron 15 mg HS and titrate as needed          Benadryl 50 mg HS-Sleep

## 2020-12-12 NOTE — BEHAVIORAL HEALTH ASSESSMENT NOTE - RISK ASSESSMENT
Moderate Acute Suicide Risk hx of major depressive disorder, suicidal ideation, hospitalizations  seeming stressors of unemployment     acute risk elevated by report of active suicidal ideation

## 2020-12-12 NOTE — BEHAVIORAL HEALTH ASSESSMENT NOTE - DESCRIPTION
Endorses moderate asthma triggered by cold weather and allergens and uses rescue inhaler daily; to his knowledge, was intubated as a child for asthma exacerbation

## 2020-12-12 NOTE — BEHAVIORAL HEALTH ASSESSMENT NOTE - NSBHCHARTREVIEWLAB_PSY_A_CORE FT
12.4   5.51  )-----------( 271      ( 11 Dec 2020 01:44 )             40.0   12-11    140  |  105  |  27<H>  ----------------------------<  99  4.4   |  26  |  1.11    Ca    8.8      11 Dec 2020 01:44    TPro  7.8  /  Alb  3.9  /  TBili  0.2  /  DBili  x   /  AST  28  /  ALT  30  /  AlkPhos  126<H>  12-11

## 2020-12-13 PROCEDURE — 99231 SBSQ HOSP IP/OBS SF/LOW 25: CPT

## 2020-12-13 RX ADMIN — MIRTAZAPINE 15 MILLIGRAM(S): 45 TABLET, ORALLY DISINTEGRATING ORAL at 21:41

## 2020-12-13 NOTE — H&P ADULT - NSHPPHYSICALEXAM_GEN_ALL_CORE
HEENT:   pupils equal and reactive, EOMI, no oropharyngeal lesions, erythema, exudates, oral thrush    NECK:   supple, no carotid bruits, no palpable lymph nodes, no thyromegaly    CV:  +S1, +S2, regular, no murmurs or rubs    RESP:   lungs clear to auscultation bilaterally, no wheezing, rales, rhonchi, good air entry bilaterally    BREAST:  not examined    GI:  abdomen soft, non-tender, non-distended, normal BS, no bruits, no abdominal masses, no palpable masses    RECTAL:  not examined    :  not examined    MSK:   normal muscle tone, no atrophy, no rigidity, no contractions    EXT:   no clubbing, no cyanosis, no edema, no calf pain, swelling or erythema    VASCULAR:  pulses equal and symmetric in the upper and lower extremities    NEURO:  AAOX3, no focal neurological deficits, follows all commands, able to move extremities spontaneously    SKIN:  no ulcers, lesions or rashes

## 2020-12-13 NOTE — H&P ADULT - HISTORY OF PRESENT ILLNESS
Patient is a 35 year old domiciled, single, intermittently employed male transferred from [facility]  with a hx of major depressive disorder, five prior psychiatric hospitalizations including [facility] L4 -20;  hx of suicidal ideation but no known suicide attempts/ self injury; PmHx of moderate persistent asthma (triggered by dust and cold air; using Advair inhaler daily and PRN Albuterol) and "anemia" for which he has taken Iron and folic acid in the past, presented with suicidal ideations.   Patient reports a 30 Lb weight loss (190 lbs to 160 lbs) over the past few months. He reports that since his grandmothers  this year (from dementia and "diabetic coma") he's been more depressed. Denies cough, night sweats, fever/chills, hemoptysis or fatigue. Patient is requesting a MVI and Ensure twice a day.

## 2020-12-13 NOTE — H&P ADULT - PROBLEM SELECTOR PLAN 3
- Likely Thal Minor  - No prior work up available in EMR  - check Fe, Ferritin  - Consider Hb electrophoresis as out pt - Likely Thal Minor  - No prior work up available in EMR  - check Fe, Ferritin  - Consider Hb electrophoresis

## 2020-12-13 NOTE — PROGRESS NOTE BEHAVIORAL HEALTH - NSBHFUPINTERVALHXFT_PSY_A_CORE
Patient is a 35 year old domiciled, single, intermittently employed male with a hx of major depressive disorder, five prior psychiatric hospitalizations including [facility] L4 4/11-4/21/20;  hx of suicidal ideation but no known suicide attempts/ self injury; pmhx of moderate intermittent asthma (triggered by dust and cold air; using inhaler daily)  presenting with suicidal ideations. Patient is guarded and evasive during assessment. he engages superficially citing the lack of privacy in his eyes. He endorses suicidal ideation.    Patient was seen today AM on arrival. He was visible in unit most of the time with no hesitation. He soon make friends with other peers, he added that he lives in [place] and was admitted by Tele-Psych due to SI and also wanted to get back on Remeron  which was given when he was admitted at [facility]. He agreed to take Remeron 30 mg HS and later wanted to have a dose of Benadryl 50 mg to help with sleep. He denied drug abuse hx, U-Tox was negative.

## 2020-12-14 DIAGNOSIS — D50.9 IRON DEFICIENCY ANEMIA, UNSPECIFIED: ICD-10-CM

## 2020-12-14 LAB
FERRITIN SERPL-MCNC: 52 NG/ML — SIGNIFICANT CHANGE UP (ref 30–400)
IRON SATN MFR SERPL: 51 UG/DL — SIGNIFICANT CHANGE UP (ref 45–165)
SARS-COV-2 IGG SERPL QL IA: NEGATIVE — SIGNIFICANT CHANGE UP
SARS-COV-2 IGM SERPL IA-ACNC: <0.1 INDEX — SIGNIFICANT CHANGE UP

## 2020-12-14 PROCEDURE — 99222 1ST HOSP IP/OBS MODERATE 55: CPT

## 2020-12-14 PROCEDURE — 83020 HEMOGLOBIN ELECTROPHORESIS: CPT | Mod: 26

## 2020-12-14 PROCEDURE — 99232 SBSQ HOSP IP/OBS MODERATE 35: CPT

## 2020-12-14 RX ORDER — ALBUTEROL 90 UG/1
2 AEROSOL, METERED ORAL EVERY 6 HOURS
Refills: 0 | Status: DISCONTINUED | OUTPATIENT
Start: 2020-12-14 | End: 2020-12-23

## 2020-12-14 RX ORDER — BUDESONIDE AND FORMOTEROL FUMARATE DIHYDRATE 160; 4.5 UG/1; UG/1
2 AEROSOL RESPIRATORY (INHALATION)
Refills: 0 | Status: DISCONTINUED | OUTPATIENT
Start: 2020-12-14 | End: 2020-12-23

## 2020-12-14 RX ADMIN — MIRTAZAPINE 15 MILLIGRAM(S): 45 TABLET, ORALLY DISINTEGRATING ORAL at 22:42

## 2020-12-14 RX ADMIN — BUDESONIDE AND FORMOTEROL FUMARATE DIHYDRATE 2 PUFF(S): 160; 4.5 AEROSOL RESPIRATORY (INHALATION) at 09:10

## 2020-12-14 NOTE — PROGRESS NOTE BEHAVIORAL HEALTH - NSBHFUPINTERVALHXFT_PSY_A_CORE
Patient is a 35 year old domiciled, single, intermittently employed male with a hx of major depressive disorder, five prior psychiatric hospitalizations including H L4 4/11-4/21/20;  hx of suicidal ideation but no known suicide attempts/ self injury; pmhx of moderate intermittent asthma treated with Advair and prn Albuterol at home.    Pt seen  in his room . Pt sleepy  and frequently dozing off mid sentence. The pt was amenable to ongoing treatment and was seen later in the day , the pt's  appearing more alert and engaged with therapeutic activities on the unit. Plan discussed to observe the pt with current Remeron dose of 15 mg po qhs  and to continue to observe the pt for signs of more overt depression or anxiety. Currently, the pt denies SI /HI . The pt also denies any h/o AH or VH  though he remains somewhat guarded and cautious as to what he reveals.

## 2020-12-14 NOTE — PROGRESS NOTE BEHAVIORAL HEALTH - NSBHFUPINTERVALCCFT_PSY_A_CORE
" I'm a little sleepy because I didn't get to sleep until 3 am because there was a lot of drama going on."

## 2020-12-15 PROCEDURE — 99232 SBSQ HOSP IP/OBS MODERATE 35: CPT

## 2020-12-15 RX ORDER — ACETAMINOPHEN 500 MG
650 TABLET ORAL EVERY 6 HOURS
Refills: 0 | Status: DISCONTINUED | OUTPATIENT
Start: 2020-12-15 | End: 2020-12-23

## 2020-12-15 RX ORDER — MAGNESIUM HYDROXIDE 400 MG/1
30 TABLET, CHEWABLE ORAL DAILY
Refills: 0 | Status: DISCONTINUED | OUTPATIENT
Start: 2020-12-15 | End: 2020-12-23

## 2020-12-15 RX ORDER — MULTIVIT-MIN/FERROUS GLUCONATE 9 MG/15 ML
1 LIQUID (ML) ORAL DAILY
Refills: 0 | Status: DISCONTINUED | OUTPATIENT
Start: 2020-12-16 | End: 2020-12-23

## 2020-12-15 RX ADMIN — BUDESONIDE AND FORMOTEROL FUMARATE DIHYDRATE 2 PUFF(S): 160; 4.5 AEROSOL RESPIRATORY (INHALATION) at 09:18

## 2020-12-15 RX ADMIN — Medication 1 TABLET(S): at 13:03

## 2020-12-15 RX ADMIN — Medication 650 MILLIGRAM(S): at 21:58

## 2020-12-15 RX ADMIN — Medication 650 MILLIGRAM(S): at 20:14

## 2020-12-15 RX ADMIN — MIRTAZAPINE 15 MILLIGRAM(S): 45 TABLET, ORALLY DISINTEGRATING ORAL at 20:13

## 2020-12-15 NOTE — PROGRESS NOTE BEHAVIORAL HEALTH - NSBHFUPINTERVALCCFT_PSY_A_CORE
" My right ear is hurting and it's connected to my throat so I know that will start hurting too. "     Hospitalist consult requested 12/15/2020 as to pt 1 day h/o right ear pain. Pt afebrile. Vital signs stable. PMH significant for moderate asthma well controlled with Spiriva .

## 2020-12-15 NOTE — PROGRESS NOTE BEHAVIORAL HEALTH - NSBHFUPINTERVALHXFT_PSY_A_CORE
Pt seen . Pt sleepy  and frequently dozing off mid sentence. The pt was amenable to ongoing treatment and was seen later in the day , the pt's  appearing more alert and engaged with therapeutic activities on the unit. Plan discussed to observe the pt with current Remeron dose of 15 mg po qhs  and to continue to observe the pt for signs of more overt depression or anxiety. Currently, the pt denies SI /HI . The pt also denies any h/o AH or VH  though he remains somewhat guarded and cautious as to what he reveals. Pt reported ongoing DFA and had reportedly been taking a higher dose of Remeron with good effect prior to his admission  to hospital. Plan to increase dose to 30 mg po qhs.

## 2020-12-16 DIAGNOSIS — Z71.89 OTHER SPECIFIED COUNSELING: ICD-10-CM

## 2020-12-16 LAB — SARS-COV-2 RNA SPEC QL NAA+PROBE: SIGNIFICANT CHANGE UP

## 2020-12-16 PROCEDURE — 99232 SBSQ HOSP IP/OBS MODERATE 35: CPT

## 2020-12-16 RX ORDER — AZITHROMYCIN 500 MG/1
250 TABLET, FILM COATED ORAL EVERY 24 HOURS
Refills: 0 | Status: COMPLETED | OUTPATIENT
Start: 2020-12-17 | End: 2020-12-20

## 2020-12-16 RX ORDER — AZITHROMYCIN 500 MG/1
500 TABLET, FILM COATED ORAL ONCE
Refills: 0 | Status: COMPLETED | OUTPATIENT
Start: 2020-12-16 | End: 2020-12-16

## 2020-12-16 RX ADMIN — MIRTAZAPINE 15 MILLIGRAM(S): 45 TABLET, ORALLY DISINTEGRATING ORAL at 20:48

## 2020-12-16 RX ADMIN — AZITHROMYCIN 500 MILLIGRAM(S): 500 TABLET, FILM COATED ORAL at 23:06

## 2020-12-16 RX ADMIN — Medication 1 TABLET(S): at 09:26

## 2020-12-16 RX ADMIN — Medication 650 MILLIGRAM(S): at 09:26

## 2020-12-16 RX ADMIN — Medication 650 MILLIGRAM(S): at 20:53

## 2020-12-16 RX ADMIN — Medication 650 MILLIGRAM(S): at 10:42

## 2020-12-16 NOTE — PROGRESS NOTE ADULT - ASSESSMENT
36 y/o male with history of Asthma and major depression disorder with report of right ear ache    # Right OM  -Z pack x 5 days  -Tylenol PRN for pain   -Check CBC    # Asthma  stable   -c/w PRN Albuterol   -c/w Symbicort

## 2020-12-16 NOTE — PROGRESS NOTE ADULT - SUBJECTIVE AND OBJECTIVE BOX
34 y/o male with history of Asthma admitted to inpatient psychiatry for depression. Pt reports that he is having right ear pain x 4 days. Has sore throat. Denies fever, chills, cough, CP, wheezing, SOB, hearing loss or dizziness.   Repeat COVID-19 test was negative today.     Vital Signs Last 24 Hrs  T(F): 97.1 (16 Dec 2020 14:33), Max: 97.1 (16 Dec 2020 14:33)  HR: 87  BP: 123/81  RR: 18 (16 Dec 2020 07:45) (18 - 18)  SpO2: 100% (16 Dec 2020 07:45) (100% - 100%)    Gen: alert and oriented x 3, NAD  Head: NC/AT  EENT: +mild erythema of right TM. No bulging or perforation. EOM I. Nares patent. Throat: clear, no erythema or lesions  Cardiac: S1 s2 regular  Lungs: CTA b/l, no wheezing or crackles   Abd: + BS, soft, NT

## 2020-12-16 NOTE — PROGRESS NOTE BEHAVIORAL HEALTH - NSBHFUPINTERVALCCFT_PSY_A_CORE
Pt with recent c/o right ear pain and sore throat.      Hospitalist consult requested 12/15/2020 and phone contact 12/16/2020. Plan to repeat COVID -19 PCR test  12/16/2020 and to keep pt in his room with face mask pending result. Pt amenable to plan.  as to pt 1 day h/o right ear pain. Pt afebrile. Vital signs stable. " Why do I have to stay in my room with the door closed? I feel like I'm being punished."    Pt  to be seen by Hospitalist 12/16/2020 for recent c/o right ear pain and sore throat.      Hospitalist consult requested 12/15/2020 and phone contact 12/16/2020. Plan to repeat COVID -19 PCR test  12/16/2020 and to keep pt in his room with face mask pending result. Pt amenable to plan.  as to pt 1 day h/o right ear pain. Pt afebrile. Vital signs stable.   REPEAT COVID-19 PCR NEGATIVE ON 12/16/2020

## 2020-12-16 NOTE — PROGRESS NOTE BEHAVIORAL HEALTH - NSBHFUPINTERVALHXFT_PSY_A_CORE
Pt seen  in his room. Appeared comfortable and in NAD. . The pt was amenable to ongoing treatment and was seen later in the day , the pt's  appearing more alert and engaged with therapeutic activities on the unit. Plan discussed to observe the pt with current Remeron dose of 15 mg po qhs  and to continue to observe the pt for signs of more overt depression or anxiety. Currently, the pt denies SI /HI . The pt also denies any h/o AH or VH  though he remains somewhat guarded and cautious as to what he reveals. Pt reported ongoing DFA and had reportedly been taking a higher dose of Remeron with good effect prior to his admission  to hospital. Plan to increase dose  of Remeron to 30 mg po qhs  for alleviation of depression and associated insomnia. with DFA. Pt seen  in his room. Appeared comfortable and in NAD. . The pt appeared manifestly capable of understanding the need for him to briefly remain isolated in his room with the door closed pending the return of a negative COVID- 19 PCR retest. Instead, the pt appeared to willfully, in an oppositional and defiant manner which belied his true intellect, to keep venturing out into the hallway to complain to peers and staff " that it feels like I am being punished when I want to watch TV." The pt was repeatedly reminded  and rerouted back to his room by multiple staff  including Nursing Administration staff  with ongoing pt support and reassurance that the ' isolation' was temporary pending repeat COVID neg test result.         Prior to this brief incident, the pt has remained largely in emotional control aside from some brief pt childish disruptive displays seemingly of a volitional nature.         A review of the pt's past medical history seems to suggest the pt has frequented hospital ED in the [place] and in [place] for myriad somatic complaints which have largely resolved on their own. Of note, the pt, who has been consistently vague and rather evasive as to collateral  family/ friends/ work/ school history requested on multiple occasions by hospital staff in an effort to aid in the pt's treatment formulation and disposition planning, has remained non disclosing . The pt , who is currently unemployed, did reveal to staff that he is also now homeless despite his earlier report prior to admission of living in [place] with a roommate.    The pt continues to deny SI /HI /AH /VH . Pt is tolerating previously helpful Remeron q hs for insomnia and depression. Plan to increase the dose to 30 mg po qhs  as per prior treatment reports.  The pt's judgment and insight are rather poor with likely pt secondary gain involved related to his most recent hospital admission as above.    The pt has continued to be educated by hospital staff as to the importance of CD COVID-19 guidelines including wearing a face mask, social distancing and good hand hygiene. The pt expressed an understanding of these guidelines and was urged to fully comply with them.

## 2020-12-17 LAB
BASOPHILS # BLD AUTO: 0.03 K/UL — SIGNIFICANT CHANGE UP (ref 0–0.2)
BASOPHILS NFR BLD AUTO: 0.6 % — SIGNIFICANT CHANGE UP (ref 0–2)
EOSINOPHIL # BLD AUTO: 0.17 K/UL — SIGNIFICANT CHANGE UP (ref 0–0.5)
EOSINOPHIL NFR BLD AUTO: 3.2 % — SIGNIFICANT CHANGE UP (ref 0–6)
HCT VFR BLD CALC: 43.5 % — SIGNIFICANT CHANGE UP (ref 39–50)
HGB BLD-MCNC: 13.1 G/DL — SIGNIFICANT CHANGE UP (ref 13–17)
IMM GRANULOCYTES NFR BLD AUTO: 0.2 % — SIGNIFICANT CHANGE UP (ref 0–1.5)
LYMPHOCYTES # BLD AUTO: 2.44 K/UL — SIGNIFICANT CHANGE UP (ref 1–3.3)
LYMPHOCYTES # BLD AUTO: 46 % — HIGH (ref 13–44)
MCHC RBC-ENTMCNC: 22.9 PG — LOW (ref 27–34)
MCHC RBC-ENTMCNC: 30.1 GM/DL — LOW (ref 32–36)
MCV RBC AUTO: 75.9 FL — LOW (ref 80–100)
MONOCYTES # BLD AUTO: 0.71 K/UL — SIGNIFICANT CHANGE UP (ref 0–0.9)
MONOCYTES NFR BLD AUTO: 13.4 % — SIGNIFICANT CHANGE UP (ref 2–14)
NEUTROPHILS # BLD AUTO: 1.95 K/UL — SIGNIFICANT CHANGE UP (ref 1.8–7.4)
NEUTROPHILS NFR BLD AUTO: 36.6 % — LOW (ref 43–77)
PLATELET # BLD AUTO: 208 K/UL — SIGNIFICANT CHANGE UP (ref 150–400)
RBC # BLD: 5.73 M/UL — SIGNIFICANT CHANGE UP (ref 4.2–5.8)
RBC # FLD: 15.3 % — HIGH (ref 10.3–14.5)
WBC # BLD: 5.31 K/UL — SIGNIFICANT CHANGE UP (ref 3.8–10.5)
WBC # FLD AUTO: 5.31 K/UL — SIGNIFICANT CHANGE UP (ref 3.8–10.5)

## 2020-12-17 PROCEDURE — 99232 SBSQ HOSP IP/OBS MODERATE 35: CPT

## 2020-12-17 RX ADMIN — BUDESONIDE AND FORMOTEROL FUMARATE DIHYDRATE 2 PUFF(S): 160; 4.5 AEROSOL RESPIRATORY (INHALATION) at 07:43

## 2020-12-17 RX ADMIN — BUDESONIDE AND FORMOTEROL FUMARATE DIHYDRATE 2 PUFF(S): 160; 4.5 AEROSOL RESPIRATORY (INHALATION) at 20:00

## 2020-12-17 RX ADMIN — Medication 650 MILLIGRAM(S): at 12:09

## 2020-12-17 RX ADMIN — Medication 650 MILLIGRAM(S): at 00:00

## 2020-12-17 RX ADMIN — AZITHROMYCIN 250 MILLIGRAM(S): 500 TABLET, FILM COATED ORAL at 20:55

## 2020-12-17 RX ADMIN — Medication 650 MILLIGRAM(S): at 09:23

## 2020-12-17 RX ADMIN — MIRTAZAPINE 15 MILLIGRAM(S): 45 TABLET, ORALLY DISINTEGRATING ORAL at 20:55

## 2020-12-17 RX ADMIN — Medication 1 TABLET(S): at 09:20

## 2020-12-17 NOTE — PROGRESS NOTE BEHAVIORAL HEALTH - NSBHFUPINTERVALHXFT_PSY_A_CORE
Pt appears more comfortable today  and was not staying in his room today.  Pt is concerned about his homelessness.  He is denying any suicidal ideation intent or plan .

## 2020-12-18 LAB
HEMOGLOBIN INTERPRETATION: SIGNIFICANT CHANGE UP
HGB A MFR BLD: 97.5 % — SIGNIFICANT CHANGE UP (ref 95.8–98)
HGB A2 MFR BLD: 2.5 % — SIGNIFICANT CHANGE UP (ref 2–3.2)

## 2020-12-18 PROCEDURE — 99232 SBSQ HOSP IP/OBS MODERATE 35: CPT

## 2020-12-18 RX ORDER — LANOLIN ALCOHOL/MO/W.PET/CERES
3 CREAM (GRAM) TOPICAL AT BEDTIME
Refills: 0 | Status: DISCONTINUED | OUTPATIENT
Start: 2020-12-18 | End: 2020-12-23

## 2020-12-18 RX ORDER — DIPHENHYDRAMINE HCL 50 MG
50 CAPSULE ORAL AT BEDTIME
Refills: 0 | Status: DISCONTINUED | OUTPATIENT
Start: 2020-12-18 | End: 2020-12-23

## 2020-12-18 RX ORDER — MIRTAZAPINE 45 MG/1
30 TABLET, ORALLY DISINTEGRATING ORAL AT BEDTIME
Refills: 0 | Status: DISCONTINUED | OUTPATIENT
Start: 2020-12-18 | End: 2020-12-22

## 2020-12-18 RX ADMIN — BUDESONIDE AND FORMOTEROL FUMARATE DIHYDRATE 2 PUFF(S): 160; 4.5 AEROSOL RESPIRATORY (INHALATION) at 09:33

## 2020-12-18 RX ADMIN — BUDESONIDE AND FORMOTEROL FUMARATE DIHYDRATE 2 PUFF(S): 160; 4.5 AEROSOL RESPIRATORY (INHALATION) at 21:34

## 2020-12-18 RX ADMIN — AZITHROMYCIN 250 MILLIGRAM(S): 500 TABLET, FILM COATED ORAL at 20:27

## 2020-12-18 RX ADMIN — MIRTAZAPINE 30 MILLIGRAM(S): 45 TABLET, ORALLY DISINTEGRATING ORAL at 20:27

## 2020-12-18 RX ADMIN — Medication 3 MILLIGRAM(S): at 20:27

## 2020-12-18 RX ADMIN — Medication 1 TABLET(S): at 09:48

## 2020-12-18 NOTE — PROGRESS NOTE BEHAVIORAL HEALTH - NSBHFUPINTERVALCCFT_PSY_A_CORE
" I'm starting to feel  better."    Pt   seen by Hospitalist 12/16/2020 for recent c/o right ear pain and sore throat.  Pt begun on a 5 day course of Zithromax  for right OM.      Hospitalist consult requested 12/15/2020 and phone contact 12/16/2020. Plan to repeat COVID -19 PCR test  12/16/2020 and to keep pt in his room with face mask pending result. Pt amenable to plan.  as to pt 1 day h/o right ear pain. Pt afebrile. Vital signs stable.   REPEAT COVID-19 PCR NEGATIVE ON 12/16/2020

## 2020-12-18 NOTE — PROGRESS NOTE BEHAVIORAL HEALTH - NSBHFUPINTERVALHXFT_PSY_A_CORE
Pt seen  in the day  room. Appeared comfortable and in NAD.  Pt abiding by the CDC COVID-19 guidelines by wearing a face mask, social distancing and using good hand hygiene. Pt in better spirits . Resolving right OM with decreased pain. No sore throat and  pt remains afebrile . Pt completing a 5 day course of Zithromax 250 mg po q day for Right OM. Pt appeared more age appropriate and less silly and oppositional than he had earlier presented in the context of his Right OM and his need to briefly self isolate pending repeat COVID -19 PCR testing which was again NEGATIVE on 12/16/2020.        The   pt has remained largely in emotional control aside from some brief pt childish disruptive displays seemingly of a volitional nature as above.        Though having recently told a couple staff members that the pt was homeless, possibly in an effort to obtain clothes/ boots from hospital supply, he is not in fact homeless and told SW Ms. Duke that he has been living with a nice couple in the [place] area and that he is able to return there after DC from hospital when he is clinically stable. The pt also is amenable to having outpatient therapy in place for him near his home in the [facility].    The pt continues to deny SI /HI /AH /VH . Pt is tolerating previously helpful Remeron q hs for insomnia and depression. Plan to increase the dose to 30 mg po qhs  as per prior treatment reports.  The pt's judgment and insight are rather poor with likely pt secondary gain involved related to his most recent hospital admission as above.    The pt has continued to be educated by hospital staff as to the importance of CD COVID-19 guidelines including wearing a face mask, social distancing and good hand hygiene. The pt expressed an understanding of these guidelines and was urged to fully comply with them.

## 2020-12-19 RX ADMIN — AZITHROMYCIN 250 MILLIGRAM(S): 500 TABLET, FILM COATED ORAL at 21:43

## 2020-12-19 RX ADMIN — MIRTAZAPINE 30 MILLIGRAM(S): 45 TABLET, ORALLY DISINTEGRATING ORAL at 21:42

## 2020-12-19 RX ADMIN — Medication 50 MILLIGRAM(S): at 21:42

## 2020-12-19 RX ADMIN — Medication 1 TABLET(S): at 09:31

## 2020-12-19 RX ADMIN — BUDESONIDE AND FORMOTEROL FUMARATE DIHYDRATE 2 PUFF(S): 160; 4.5 AEROSOL RESPIRATORY (INHALATION) at 09:47

## 2020-12-19 RX ADMIN — BUDESONIDE AND FORMOTEROL FUMARATE DIHYDRATE 2 PUFF(S): 160; 4.5 AEROSOL RESPIRATORY (INHALATION) at 20:58

## 2020-12-19 RX ADMIN — Medication 650 MILLIGRAM(S): at 18:17

## 2020-12-19 RX ADMIN — Medication 3 MILLIGRAM(S): at 21:42

## 2020-12-20 PROCEDURE — 99232 SBSQ HOSP IP/OBS MODERATE 35: CPT

## 2020-12-20 RX ADMIN — BUDESONIDE AND FORMOTEROL FUMARATE DIHYDRATE 2 PUFF(S): 160; 4.5 AEROSOL RESPIRATORY (INHALATION) at 19:55

## 2020-12-20 RX ADMIN — Medication 3 MILLIGRAM(S): at 20:58

## 2020-12-20 RX ADMIN — MIRTAZAPINE 30 MILLIGRAM(S): 45 TABLET, ORALLY DISINTEGRATING ORAL at 20:58

## 2020-12-20 RX ADMIN — AZITHROMYCIN 250 MILLIGRAM(S): 500 TABLET, FILM COATED ORAL at 20:58

## 2020-12-20 RX ADMIN — Medication 1 TABLET(S): at 10:41

## 2020-12-20 NOTE — PROGRESS NOTE BEHAVIORAL HEALTH - NSBHFUPINTERVALCCFT_PSY_A_CORE
" I'm doing alright."    Pt   seen by Hospitalist 12/16/2020 for recent c/o right ear pain and sore throat.  Pt begun on a 5 day course of Zithromax  for right OM.      Hospitalist consult requested 12/15/2020 and phone contact 12/16/2020. Plan to repeat COVID -19 PCR test  12/16/2020 and to keep pt in his room with face mask pending result. Pt amenable to plan.  as to pt 1 day h/o right ear pain. Pt afebrile. Vital signs stable.   REPEAT COVID-19 PCR NEGATIVE ON 12/16/2020

## 2020-12-20 NOTE — PROGRESS NOTE BEHAVIORAL HEALTH - NSBHFUPINTERVALHXFT_PSY_A_CORE
Pt seen  in the day  room. Appeared comfortable and in NAD.  Pt abiding by the CDC COVID-19 guidelines by wearing a face mask, social distancing and using good hand hygiene. Pt in better spirits . Resolving right OM with decreased pain. No sore throat and  pt remains afebrile . Pt completing a 5 day course of Zithromax 250 mg po q day for Right OM. Pt appeared more age appropriate and less silly and oppositional than he had earlier presented in the context of his Right OM and his need to briefly self isolate pending repeat COVID -19 PCR testing which was again NEGATIVE on 12/16/2020.        The   pt has remained largely in emotional control aside from some brief pt childish disruptive displays seemingly of a volitional nature as above.        Though having recently told a couple staff members that the pt was homeless, possibly in an effort to obtain clothes/ boots from hospital supply, he is not in fact homeless and told SW Ms. Duke that he has been living with a nice couple in the [place] area and that he is able to return there after DC from hospital when he is clinically stable. The pt also is amenable to having outpatient therapy in place for him near his home in the [facility].    The pt continues to deny SI /HI /AH /VH . Pt is tolerating previously helpful Remeron q hs for insomnia and depression. Plan to increase the dose to 30 mg po qhs  as per prior treatment reports.  The pt's judgment and insight are rather poor with likely pt secondary gain involved related to his most recent hospital admission as above.    The pt has continued to be educated by hospital staff as to the importance of CD COVID-19 guidelines including wearing a face mask, social distancing and good hand hygiene. The pt expressed an understanding of these guidelines and was urged to fully comply with them.

## 2020-12-21 PROCEDURE — 99232 SBSQ HOSP IP/OBS MODERATE 35: CPT

## 2020-12-21 RX ADMIN — BUDESONIDE AND FORMOTEROL FUMARATE DIHYDRATE 2 PUFF(S): 160; 4.5 AEROSOL RESPIRATORY (INHALATION) at 21:02

## 2020-12-21 RX ADMIN — BUDESONIDE AND FORMOTEROL FUMARATE DIHYDRATE 2 PUFF(S): 160; 4.5 AEROSOL RESPIRATORY (INHALATION) at 10:27

## 2020-12-21 RX ADMIN — Medication 3 MILLIGRAM(S): at 21:21

## 2020-12-21 RX ADMIN — MIRTAZAPINE 30 MILLIGRAM(S): 45 TABLET, ORALLY DISINTEGRATING ORAL at 21:21

## 2020-12-21 RX ADMIN — Medication 1 TABLET(S): at 09:23

## 2020-12-21 RX ADMIN — Medication 650 MILLIGRAM(S): at 09:23

## 2020-12-21 NOTE — DISCHARGE NOTE BEHAVIORAL HEALTH - NSBHDCTHERAPYFT_PSY_A_CORE
Therapeutic milieu, individual and group therapy, medication management, psychoeducation, safety planning, discharge planning.

## 2020-12-21 NOTE — DISCHARGE NOTE BEHAVIORAL HEALTH - NSBHDCCASEMGRFT_PSY_A_CORE
You were enrolled in [facility] while in the hospital.  You can call ([phone]) to see who you are assigned to.

## 2020-12-21 NOTE — DISCHARGE NOTE BEHAVIORAL HEALTH - NSBHDCCRISISPLAN1FT_PSY_A_CORE
Tell a trusted friend or family member, tell staff at the clinic, go to the nearest emergency room.  You may call 911 for assistance.

## 2020-12-21 NOTE — DISCHARGE NOTE BEHAVIORAL HEALTH - CARE PROVIDER_API CALL
tba, tba  Pt to have followup appointment at [facility]  for ongoing medical and psychiatric treatment  Phone: (   )    -  Fax: (   )    -  Follow Up Time:

## 2020-12-21 NOTE — DISCHARGE NOTE BEHAVIORAL HEALTH - PROVIDER TOKENS
FREE:[LAST:[tba],FIRST:[tba],PHONE:[(   )    -],FAX:[(   )    -],ADDRESS:[Pt to have followup appointment at [facility]  for ongoing medical and psychiatric treatment]]

## 2020-12-21 NOTE — DISCHARGE NOTE BEHAVIORAL HEALTH - REASON FOR ADMISSION
Transfer From [facility]  Referral Details:	Depression with SI  Patient's Chief Complaint: "suicidal thoughts" Transfer From [facility] on 12/11/2020 to [facility]   Referral Details:	Depression with SI  Patient's Chief Complaint: "suicidal thoughts"

## 2020-12-21 NOTE — DISCHARGE NOTE BEHAVIORAL HEALTH - NSBHDCLABSFT_PSY_A_CORE
CBC with diff, CMP q 6 monthly with current med regimen  Monthly monitoring of vital signs including weight  PCP follow up of pt asthma, pt s/p Right OM treatment with 5 day course of Zithromax

## 2020-12-21 NOTE — DISCHARGE NOTE BEHAVIORAL HEALTH - NSBHDCHOUSING_PSY_A_CORE
[address]  [address] [place] [address]  (Cell is now off but will be paid:  [phone])/home [address]  [address] [place] [address]  Pt does not currently have an active cell phone.  He can be reached by email at:  [email or web address]

## 2020-12-21 NOTE — PROGRESS NOTE BEHAVIORAL HEALTH - NSBHFUPINTERVALHXFT_PSY_A_CORE
Pt seen  in  his room.  Appeared comfortable and in NAD.  Pt abiding by the CDC COVID-19 guidelines by wearing a face mask, social distancing and using good hand hygiene. Pt in better spirits . Resolving right OM with decreased pain. No sore throat and  pt remains afebrile . Pt completed a 5 day course of Zithromax 250 mg po q day for Right OM. Pt more animated and pleasant and cooperative. Chatty and interested in learning more about the COVID-19 vaccines being developed and offered over time to various tiers of people related to there level of urgency.        The   pt has remained largely in emotional control and he continues to deny SI /HI /AH /VH.        Though having recently told a couple staff members that the pt was homeless, possibly in an effort to obtain clothes/ boots from hospital supply, he is not in fact homeless and told SW Ms. Duke that he has been living with a nice couple in the [facility] and that he is able to return there after DC from hospital when he is clinically stable. The pt also is amenable to having outpatient therapy in place for him near his home in the city.     The pt continues to deny SI /HI /AH /VH . Pt is tolerating previously helpful Remeron q hs for insomnia and depression. Plan to increase the dose to 30 mg po qhs  as per prior treatment reports.  The pt's judgment and insight are rather poor with likely pt secondary gain involved related to his most recent hospital admission as above.    The pt has continued to be educated by hospital staff as to the importance of CD COVID-19 guidelines including wearing a face mask, social distancing and good hand hygiene. The pt expressed an understanding of these guidelines and was urged to fully comply with them.

## 2020-12-21 NOTE — DISCHARGE NOTE BEHAVIORAL HEALTH - NSBHDCTESTSFT_PSY_A_CORE
TSH=1.05  Fasting lipids  Cholesterol = 140  TG=79  HgA1C= 5.5  COVID-19 PCR [id number]/11/2020  COVID-19 AB NEGATIVE 12/14/2020  Urine tox negative for substances of abuse/misuse  No studies are pending

## 2020-12-21 NOTE — DISCHARGE NOTE BEHAVIORAL HEALTH - NSBHDCMEDICALFT_PSY_A_CORE
asthma- moderate, in good control asthma- moderate, in good cont  ·right OM ( resolving) s/p 5 day Z pack completed

## 2020-12-21 NOTE — DISCHARGE NOTE BEHAVIORAL HEALTH - NSBHDCSUICSAFETYFT_PSY_A_CORE
1. Safety planning reviewed and understood by the pt.  2.Pt to have access to ongoing outpatient treatment team at [facility]  3.Pt to be given a list of 24/7 Crisis Hotline tel # he can call should new safety concerns arise  4.Pt can contact Dr Gtz at [facility] prior to pt follow up appointment for any questions/concerns  5.Pt aware he can always return to the nearest hospital ED 24/7 for emergency evaluation should new safety concerns arise

## 2020-12-21 NOTE — DISCHARGE NOTE BEHAVIORAL HEALTH - NSBHDCSUICPROTECTFT_PSY_A_CORE
Protective factors- pt is intelligent, creative  Mitigating factors- pt amenable to inpatient admission and is amenable to medication trial, pt can superficially establish rapport with select staff.

## 2020-12-21 NOTE — DISCHARGE NOTE BEHAVIORAL HEALTH - NSBHDCREFEROTHERFT_PSY_A_CORE
You were referred to [facility] and enrolled while you were on [facility] at [facility].  You can call ([phone]) to see who you are assigned to.

## 2020-12-21 NOTE — DISCHARGE NOTE BEHAVIORAL HEALTH - NSBHDCCRISISPROB1FT_PSY_A_CORE
Any thoughts of hurting yourself or others or return of any of the acute symptoms you had prior to admission.

## 2020-12-21 NOTE — DISCHARGE NOTE BEHAVIORAL HEALTH - NSBHDCADDR1FT_A_CORE
[address] [place]  [address] [address] [place]  [address]    Appt:  Monday 12/28 at 4:00 with Samantha Elaine.  ***Appointment will be by telehealth.  You will receive an email giving you the link to the appointment. Please look for email*** [address] [place]  [address]    Appt:  Monday 12/28 at 4:00 pm with Samantha Elaine.  ***Appointment will be by telehealth.  You will receive an email giving you the link to the appointment. Please look for email***

## 2020-12-21 NOTE — DISCHARGE NOTE BEHAVIORAL HEALTH - NSBHDCSUICFCTRMIT_PSY_A_CORE
- pt amenable to inpatient admission and is amenable to medication trial, pt can superficially establish rapport

## 2020-12-21 NOTE — DISCHARGE NOTE BEHAVIORAL HEALTH - HPI (INCLUDE ILLNESS QUALITY, SEVERITY, DURATION, TIMING, CONTEXT, MODIFYING FACTORS, ASSOCIATED SIGNS AND SYMPTOMS)
HPI (include illness quality, severity, duration, timing, context, modifying factors, associated signs and symptoms): Patient is a 35 year old domiciled, single, intermittently employed male with a hx of major depressive disorder, five prior psychiatric hospitalizations including [facility] L4 4/11-4/21/20;  hx of suicidal ideation but no known suicide attempts/ self injury; pmhx of moderate intermittent asthma (triggered by dust and cold air; using inhaler daily)  presenting with suicidal ideations.   pt is guarded and evasive throughout interview, focussed on the presence of the 1:1, asking repeatedly for the 1:1 to leave so that he may speak more privately, when informed this wasn't possible, said "ok, I don't want to talk too much" and "i'll just be giving you the surface story." states he is depressed and having suicidal ideation with plan to overdose on pills since the death of a family member, but wont' say which family member. despite the electronic record of a hospitalization to [facility] this year in april he says his last psych hospitalization was "years ago" and he used to take remeron but has not taken it "for a while"   he endorses depressed mood, anhedonia, hopelessness and suicidal ideation as above, denies neurovegetative sx's, denies homicidal ideation, denies ah/vh or any manic or psychotic sx's.   Denies substance use. states he is unemployed and lives with a roommate, and that he takes albuterol and Advair for asthma.   pt refused to provide any phone number for collateral contact, saying again his focus was on "privacy."  dc summary from [facility] hospitalization reviewed. pt diagnosed with mdd with psychosis, stabilized on Remeron 30mg, offered Risperdal as well but refused to take it.    12/12/2020: Patient a 36 y/o single AAM, unemployed, on PA and domiciled with a roommate for past 3 years. He has hx of Depression, prefers to talk in private, suspicious that he prefers not to divulge his issues lucie others. He was last hospitalized at [facility] for 1-2 weeks and was on Remeron, which helped him in the past as it helped with appetite and overall wellbeing. He endorses that he was last hospitalized in April, but does not remember the name of the hospital. He added that he is not hearing voices, feels a little depressed and wants to take Remeron, was explained the s/e of Remeron. He has fair sleep/appetite. He denies current S/H/I/P, denied any substance abuse hx including Cigarettes, ETOH and THC also. He also wants Benadryl to help with sleep.    Medically he has Asthma, not in crisis or has SOB, has albuterol as PRN and also has 30 lbs weight loss in 4 months, and wants to take Ensure BID and also was advised to speak with Hospitalist. Patient is a 35 year old domiciled, single, intermittently employed male with a hx of major depressive disorder, five prior psychiatric hospitalizations including [facility] L4 4/11-4/21/20;  hx of suicidal ideation but no known suicide attempts/ self injury; pmhx of moderate intermittent asthma (triggered by dust and cold air; using inhaler daily)  presenting with suicidal ideations. Principal diagnosis at discharge: Major depression-recurrent w/o psychotic features     The pt is a 35 year-old AA male with a h/o recurrent depression who was admitted to [address] inpatient psychiatry on 12/11/2020 after transfer from [facility]  for acute safety and further evaluation and treatment of his recurrent depression with reported passive SI . Principal diagnosis at discharge: Major depression-recurrent w/o psychotic features     The pt is a 35 year-old AA male with a h/o recurrent depression who was admitted to [address] inpatient psychiatry on 12/11/2020 after transfer from [facility]  for acute safety and further evaluation and treatment of his recurrent depression with reported passive SI .·                       Course of Hospitalization          The pt. appears to have benefitted from the safety and structure of the inpatient hospital unit with ongoing multimodal treatment of his depression.  Pt seen  prior to his discharge home from hospital on 12/23/2020.  .He remains somewhat hyperactive and impulsive  with poor boundaries but becoming better able to respond to hospital staff redirection  when the pt's boundaries become less clear and appropriate. Pt still reporting rather poor sleep  and plan discussed to increase Remeron dose to previous 45 mg  po qhs to aid with pt depressive symptoms and associated  insomnia. The pt reported currently tolerating his Remeron med regimen without side effects and with slow clinical improvement.  	      The pt  has begun attending more therapy groups though he still requires much staff support and encouragement before he will agree. The pt is  amenable to having outpatient therapy in place for him near his home in the [facility].  The pt continues overly talkative in spurts  and appears to have difficulty with focus and attention as his anxiety tends to interfere with his ability to fully engage in a complete conversation.   	 The pt continues to deny SI /HI /AH /VH . Pt is tolerating previously helpful Remeron q hs for insomnia and depression. Plan to increase the dose to 30 mg po qhs  as per prior treatment reports.  The pt's judgment and insight are rather poor with likely pt secondary gain involved related to his most recent hospital admission as above.    The pt has continued to be educated by hospital staff as to the importance of CD COVID-19 guidelines including wearing a face mask, social distancing and good hand hygiene. The pt expressed an understanding of these guidelines and was urged to fully comply with them. The pt was educated and appears to understand that after pt DC home, should he develop new onset cough, SOB or fever, that he should immediately followup with his outpatient PCP to be rechecked  to r/o COVID .

## 2020-12-21 NOTE — DISCHARGE NOTE BEHAVIORAL HEALTH - NSBHDCSWCOMMENTSFT_PSY_A_CORE
Pt was educated about the importance of attending outpatient mental health treatment, taking his medication as prescribed and about his discharge plan.  He was also educated about safety pecautions for Covid 19 including wearing a mask, hand hygiene and social distancing and advised to seek medical attention should he have any symptoms.  He was provided with a written handout of Covid 19 symptoms and management in his discharge packet.

## 2020-12-21 NOTE — DISCHARGE NOTE BEHAVIORAL HEALTH - NSBHDCMEDSFT_PSY_A_CORE
MEDICATIONS  (STANDING):  budesonide 160 MICROgram(s)/formoterol 4.5 MICROgram(s) Inhaler 2 Puff(s) Inhalation two times a day  melatonin 3 milliGRAM(s) Oral at bedtime  mirtazapine 45 milliGRAM(s) Oral at bedtime  multivitamin/minerals 1 Tablet(s) Oral daily    MEDICATIONS  (PRN):  acetaminophen   Tablet .. 650 milliGRAM(s) Oral every 6 hours PRN Temp greater or equal to 38C (100.4F), Mild Pain (1 - 3), Moderate Pain (4 - 6)  ALBUTerol    90 MICROgram(s) HFA Inhaler 2 Puff(s) Inhalation every 6 hours PRN Shortness of Breath and/or Wheezing  aluminum hydroxide/magnesium hydroxide/simethicone Suspension 30 milliLiter(s) Oral every 6 hours PRN Dyspepsia  chlorproMAZINE    Tablet 50 milliGRAM(s) Oral every 6 hours PRN Mod Agitation  diphenhydrAMINE   Injectable 50 milliGRAM(s) IntraMuscular every 6 hours PRN Rash and/or Itching  diphenhydrAMINE   Injectable 50 milliGRAM(s) IntraMuscular at bedtime PRN Insomnia  haloperidol    Injectable 5 milliGRAM(s) IntraMuscular every 6 hours PRN Sev Agitation  magnesium hydroxide Suspension 30 milliLiter(s) Oral daily PRN Constipation Discharge medications  budesonide 160 MICROgram(s)/formoterol 4.5 MICROgram(s) Inhaler 2 Puff(s) Inhalation two times a day  melatonin 3 milliGRAM(s) Oral at bedtime  mirtazapine 45 milliGRAM(s) Oral at bedtime  multivitamin/minerals 1 Tablet(s) Oral daily    MEDICATIONS  (PRN):  ALBUTerol    90 MICROgram(s) HFA Inhaler 2 Puff(s) Inhalation every 6 hours PRN Shortness of Breath and/or Wheezing   The pt. has been educated to continue the medications until told by his outpatient provider to stop

## 2020-12-21 NOTE — DISCHARGE NOTE BEHAVIORAL HEALTH - FAMILY HISTORY OF PSYCHIATRIC ILLNESS
Family hx of mental illness/ substance abuse denied.    Pt is single, no children.   Pt was born and raised in [place].  Parents were not . Father was not part of pt's life. He does not know where his father is.  Mom  when pt was 18. Pt had one brother who is also . Pt has 5 sisters and 1 brother who he reportedly keeps in touch with by [employer].  Pt states he had "a lot of structure" in his life.  Grandmother reportedly raised him and "spoiled" him.  Pt reports "good" childhood.  Pt graduated HS in [place].  Pt came to [place] in 2016 and is living with a couple who are friends of his family in [place]. 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  His friends do not require that he pay rent.    Pt states precipitating events to his depression included not being in treatment or on medication and finding out his grandmother who raised him  3 days prior to admission.

## 2020-12-21 NOTE — DISCHARGE NOTE BEHAVIORAL HEALTH - MEDICATION SUMMARY - MEDICATIONS TO STOP TAKING
I will STOP taking the medications listed below when I get home from the hospital:    mirtazapine 30 mg oral tablet  -- 1 tab(s) by mouth once a day (at bedtime)

## 2020-12-21 NOTE — DISCHARGE NOTE BEHAVIORAL HEALTH - CONDITIONS AT DISCHARGE
Pt is A&Ox4 and denies any pain/discomfort. Pt denies SI/HI/I/P, denies AH/VH. Pt is A&Ox4 and denies any pain/discomfort. Pt denies SI/HI/I/P, denies AH/VH. Pt was educated on medication information, discharge instructions, and covid-19 info by RN and . Pt has all valuables and belongings in his possession and is in clothing appropriate to weather and situation.

## 2020-12-22 PROCEDURE — 99232 SBSQ HOSP IP/OBS MODERATE 35: CPT

## 2020-12-22 RX ORDER — MIRTAZAPINE 45 MG/1
45 TABLET, ORALLY DISINTEGRATING ORAL AT BEDTIME
Refills: 0 | Status: DISCONTINUED | OUTPATIENT
Start: 2020-12-22 | End: 2020-12-23

## 2020-12-22 RX ADMIN — BUDESONIDE AND FORMOTEROL FUMARATE DIHYDRATE 2 PUFF(S): 160; 4.5 AEROSOL RESPIRATORY (INHALATION) at 20:00

## 2020-12-22 RX ADMIN — Medication 650 MILLIGRAM(S): at 12:43

## 2020-12-22 RX ADMIN — MIRTAZAPINE 45 MILLIGRAM(S): 45 TABLET, ORALLY DISINTEGRATING ORAL at 21:30

## 2020-12-22 RX ADMIN — Medication 650 MILLIGRAM(S): at 09:33

## 2020-12-22 RX ADMIN — Medication 1 TABLET(S): at 09:33

## 2020-12-22 RX ADMIN — BUDESONIDE AND FORMOTEROL FUMARATE DIHYDRATE 2 PUFF(S): 160; 4.5 AEROSOL RESPIRATORY (INHALATION) at 09:25

## 2020-12-22 NOTE — PROGRESS NOTE BEHAVIORAL HEALTH - AXIS III
Asthma  right OM ( resolving)
Asthma
Asthma  right OM ( resolving) s/p 5 day Z pack
Asthma  right OM ( resolving)
Asthma  right OM ( resolving) s/p 5 day Z pack
Asthma

## 2020-12-22 NOTE — PROGRESS NOTE BEHAVIORAL HEALTH - PROBLEM SELECTOR PLAN 1
1. Remeron to be increased to 30  mg  po q HS ( depression/ insomnia)  2. Benadryl 50 mg HS for insomnia  3.Disposition planning ongoing
1. Remeron increased to 30  mg  po q HS ( depression/ insomnia)  2. Benadryl 50 mg HS prn for insomnia  3.Melatonin 3 mg po qhs ( insomnia)  3.Disposition planning ongoing with plan to refer the pt for outpatient therapy and med management near his home in [facility]
1. Remeron increased to 30  mg  po q HS ( depression/ insomnia)  2. Benadryl 50 mg HS prn for insomnia  3.Melatonin 3 mg po qhs ( insomnia)  3.Disposition planning ongoing with plan to refer the pt for outpatient therapy and med management near his home in [facility]
1. Remeron 15 mg HS and titrate as needed for stability  2. Benadryl 50 mg HS for Sleep
1. Remeron to be increased to 30  mg  po q HS ( depression/ insomnia)  2. Benadryl 50 mg HS for insomnia  3.Disposition planning ongoing
1. Remeron to be increased to 30  mg  po q HS ( depression/ insomnia)  2. Benadryl 50 mg HS for insomnia  3.Disposition planning ongoing
1. Remeron 15 mg HS and titrate as needed for stability  2. Benadryl 50 mg HS for Sleep
1. Remeron to be increased to 45   mg  po q HS ( depression/ insomnia)  2. Benadryl 50 mg HS prn for insomnia  3.Melatonin 3 mg po qhs ( insomnia)  3.Disposition planning ongoing with plan to refer the pt for outpatient therapy and med management near his home in [facility]
1. Remeron increased to 30  mg  po q HS ( depression/ insomnia)  2. Benadryl 50 mg HS prn for insomnia  3.Melatonin 3 mg po qhs ( insomnia)  3.Disposition planning ongoing with plan to refer the pt for outpatient therapy and med management near his home in [facility]

## 2020-12-22 NOTE — PROGRESS NOTE BEHAVIORAL HEALTH - THOUGHT PROCESS
Linear/Normal reasoning
Linear/Normal reasoning
Linear/Impaired reasoning
Linear/Normal reasoning
Linear/Impaired reasoning
Linear/Overinclusive/Perseverative/Normal reasoning

## 2020-12-22 NOTE — PROGRESS NOTE BEHAVIORAL HEALTH - NSBHFUPINTERVALHXFT_PSY_A_CORE
Pt seen  .He remains somewhat hyperactive and impulsive  with poor boundaries but becoming better able to respond to hospital staff redirection  when the pt's boundaries become less clear and appropriate. Pt still reporting rather poor sleep  and plan discussed to increase Remeron dose to previous 45 mg  po qhs to aid with pt depressive symptoms and associated  insomnia. The pt reported currently tolerating his Remeron med regimen without side effects and with slow clinical improvement.        The pt  has begun attending more therapy groups though he still requires much staff support and encouragement before he will agree. The pt is  amenable to having outpatient therapy in place for him near his home in the [facility].  The pt continues overly talkative in spurts  and appears to have difficulty with focus and attention as his anxiety tends to interfere with his ability to fully engage in a complete conversation.    The pt continues to deny SI /HI /AH /VH . Pt is tolerating previously helpful Remeron q hs for insomnia and depression. Plan to increase the dose to 30 mg po qhs  as per prior treatment reports.  The pt's judgment and insight are rather poor with likely pt secondary gain involved related to his most recent hospital admission as above.    The pt has continued to be educated by hospital staff as to the importance of CD COVID-19 guidelines including wearing a face mask, social distancing and good hand hygiene. The pt expressed an understanding of these guidelines and was urged to fully comply with them. The pt was educated and appears to understand that after pt DC home, should he develop new onset cough, SOB or fever, that he should immediately followup with his outpatient PCP to be rechecked  to r/o COVID .

## 2020-12-22 NOTE — PROGRESS NOTE BEHAVIORAL HEALTH - BODY HABITUS
Average build
Well nourished/Average build

## 2020-12-22 NOTE — PROGRESS NOTE BEHAVIORAL HEALTH - OTHER
rapid but not pressured anxious ruminations slowly becoming less labile " I feel good now I know I will be home before [place] still intrusive at times but better able to redirect himself with staff support variable

## 2020-12-22 NOTE — PROGRESS NOTE BEHAVIORAL HEALTH - PROBLEM SELECTOR PROBLEM 1
Major depression, recurrent, chronic

## 2020-12-22 NOTE — PROGRESS NOTE BEHAVIORAL HEALTH - RISK ASSESSMENT
low risk for suicide  Acute risk factors- pt now denies current SI and his depression is improving with restart of Remeron qhs reportedly in the context of a family member's demise, sleep and appetite continue to improve  Chronic factors- pt with a h/o 5 reported prior inpatient admissions with similar vague clinical presentation without strong pt endorsement of actual depression or anxiety  Protective factors- pt is intelligent, creative  Mitigating factors- pt amenable to inpatient admission and is amenable to medication trial, pt can superficially establish rapport with select staff
low risk for suicide  Acute risk factors-  sleep is still rather poor .pt now denies current SI and his depression is improving with increase  Remeron qhs reportedly in the context of a family member's demise, sleep and appetite continue to improve  Chronic factors- pt with a h/o 5 reported prior inpatient admissions with similar vague clinical presentation without strong pt endorsement of actual depression or anxiety  Protective factors- pt is intelligent, creative  Mitigating factors- pt amenable to inpatient admission and is amenable to medication trial, pt can superficially establish rapport with select staff
low risk for suicide  Acute risk factors- pt now denies current SI and his depression is improving with restart of Remeron qhs reportedly in the context of a family member's demise  Chronic factors- pt with a h/o 5 reported prior inpatient admissions with similar vague clinical presentation without strong pt endorsement of actual depression or anxiety  Protective factors- pt is intelligent, creative  Mitigating factors- pt amenable to inpatient admission and is amenable to medication trial, pt can superficially establish rapport with select staff
low risk for suicide  Acute risk factors- recent reported SI w/o plan or intent  reportedly in the context of a family member's demise  Chronic factors- pt with a h/o 5 reported prior inpatient admissions with similar vague clinical presentation without strong pt endorsement of actual depression or anxiety  Protective factors- pt is intelligent, creative  Mitigating factors- pt amenable to inpatient admission and is amenable to medication trial, pt can superficially establish rapport with select staff
low risk for suicide  Acute risk factors- pt now denies current SI and his depression is improving with restart of Remeron qhs reportedly in the context of a family member's demise  Chronic factors- pt with a h/o 5 reported prior inpatient admissions with similar vague clinical presentation without strong pt endorsement of actual depression or anxiety  Protective factors- pt is intelligent, creative  Mitigating factors- pt amenable to inpatient admission and is amenable to medication trial, pt can superficially establish rapport with select staff

## 2020-12-22 NOTE — PROGRESS NOTE BEHAVIORAL HEALTH - NSBHADMITIPOBSFT_PSY_A_CORE
Not Suicidal now

## 2020-12-22 NOTE — PROGRESS NOTE BEHAVIORAL HEALTH - NSBHCHARTREVIEWIMAGING_PSY_A_CORE FT
MEDICATIONS  (STANDING):  azithromycin   Tablet 250 milliGRAM(s) Oral every 24 hours  budesonide 160 MICROgram(s)/formoterol 4.5 MICROgram(s) Inhaler 2 Puff(s) Inhalation two times a day  melatonin 3 milliGRAM(s) Oral at bedtime  mirtazapine 30 milliGRAM(s) Oral at bedtime  multivitamin/minerals 1 Tablet(s) Oral daily    MEDICATIONS  (PRN):  acetaminophen   Tablet .. 650 milliGRAM(s) Oral every 6 hours PRN Temp greater or equal to 38C (100.4F), Mild Pain (1 - 3), Moderate Pain (4 - 6)  ALBUTerol    90 MICROgram(s) HFA Inhaler 2 Puff(s) Inhalation every 6 hours PRN Shortness of Breath and/or Wheezing  aluminum hydroxide/magnesium hydroxide/simethicone Suspension 30 milliLiter(s) Oral every 6 hours PRN Dyspepsia  chlorproMAZINE    Tablet 50 milliGRAM(s) Oral every 6 hours PRN Mod Agitation  diphenhydrAMINE   Injectable 50 milliGRAM(s) IntraMuscular every 6 hours PRN Rash and/or Itching  diphenhydrAMINE   Injectable 50 milliGRAM(s) IntraMuscular at bedtime PRN Insomnia  haloperidol    Injectable 5 milliGRAM(s) IntraMuscular every 6 hours PRN Sev Agitation  magnesium hydroxide Suspension 30 milliLiter(s) Oral daily PRN Constipation
MEDICATIONS  (STANDING):  azithromycin   Tablet 250 milliGRAM(s) Oral every 24 hours  budesonide 160 MICROgram(s)/formoterol 4.5 MICROgram(s) Inhaler 2 Puff(s) Inhalation two times a day  melatonin 3 milliGRAM(s) Oral at bedtime  mirtazapine 30 milliGRAM(s) Oral at bedtime  multivitamin/minerals 1 Tablet(s) Oral daily    MEDICATIONS  (PRN):  acetaminophen   Tablet .. 650 milliGRAM(s) Oral every 6 hours PRN Temp greater or equal to 38C (100.4F), Mild Pain (1 - 3), Moderate Pain (4 - 6)  ALBUTerol    90 MICROgram(s) HFA Inhaler 2 Puff(s) Inhalation every 6 hours PRN Shortness of Breath and/or Wheezing  aluminum hydroxide/magnesium hydroxide/simethicone Suspension 30 milliLiter(s) Oral every 6 hours PRN Dyspepsia  chlorproMAZINE    Tablet 50 milliGRAM(s) Oral every 6 hours PRN Mod Agitation  diphenhydrAMINE   Injectable 50 milliGRAM(s) IntraMuscular every 6 hours PRN Rash and/or Itching  diphenhydrAMINE   Injectable 50 milliGRAM(s) IntraMuscular at bedtime PRN Insomnia  haloperidol    Injectable 5 milliGRAM(s) IntraMuscular every 6 hours PRN Sev Agitation  magnesium hydroxide Suspension 30 milliLiter(s) Oral daily PRN Constipation
MEDICATIONS  (STANDING):  budesonide 160 MICROgram(s)/formoterol 4.5 MICROgram(s) Inhaler 2 Puff(s) Inhalation two times a day  mirtazapine 15 milliGRAM(s) Oral at bedtime    MEDICATIONS  (PRN):  acetaminophen   Tablet .. 650 milliGRAM(s) Oral every 6 hours PRN Temp greater or equal to 38C (100.4F), Mild Pain (1 - 3), Moderate Pain (4 - 6)  ALBUTerol    90 MICROgram(s) HFA Inhaler 2 Puff(s) Inhalation every 6 hours PRN Shortness of Breath and/or Wheezing  aluminum hydroxide/magnesium hydroxide/simethicone Suspension 30 milliLiter(s) Oral every 6 hours PRN Dyspepsia  chlorproMAZINE    Tablet 50 milliGRAM(s) Oral every 6 hours PRN Mod Agitation  diphenhydrAMINE   Injectable 50 milliGRAM(s) IntraMuscular every 6 hours PRN Rash and/or Itching  haloperidol    Injectable 5 milliGRAM(s) IntraMuscular every 6 hours PRN Sev Agitation  magnesium hydroxide Suspension 30 milliLiter(s) Oral daily PRN Constipation
MEDICATIONS  (STANDING):  budesonide 160 MICROgram(s)/formoterol 4.5 MICROgram(s) Inhaler 2 Puff(s) Inhalation two times a day  mirtazapine 15 milliGRAM(s) Oral at bedtime  multivitamin/minerals 1 Tablet(s) Oral daily    MEDICATIONS  (PRN):  acetaminophen   Tablet .. 650 milliGRAM(s) Oral every 6 hours PRN Temp greater or equal to 38C (100.4F), Mild Pain (1 - 3), Moderate Pain (4 - 6)  ALBUTerol    90 MICROgram(s) HFA Inhaler 2 Puff(s) Inhalation every 6 hours PRN Shortness of Breath and/or Wheezing  aluminum hydroxide/magnesium hydroxide/simethicone Suspension 30 milliLiter(s) Oral every 6 hours PRN Dyspepsia  chlorproMAZINE    Tablet 50 milliGRAM(s) Oral every 6 hours PRN Mod Agitation  diphenhydrAMINE   Injectable 50 milliGRAM(s) IntraMuscular every 6 hours PRN Rash and/or Itching  haloperidol    Injectable 5 milliGRAM(s) IntraMuscular every 6 hours PRN Sev Agitation  magnesium hydroxide Suspension 30 milliLiter(s) Oral daily PRN Constipation
MEDICATIONS  (STANDING):  budesonide 160 MICROgram(s)/formoterol 4.5 MICROgram(s) Inhaler 2 Puff(s) Inhalation two times a day  mirtazapine 15 milliGRAM(s) Oral at bedtime    MEDICATIONS  (PRN):  ALBUTerol    90 MICROgram(s) HFA Inhaler 2 Puff(s) Inhalation every 6 hours PRN Shortness of Breath and/or Wheezing  chlorproMAZINE    Tablet 50 milliGRAM(s) Oral every 6 hours PRN Mod Agitation  diphenhydrAMINE   Injectable 50 milliGRAM(s) IntraMuscular every 6 hours PRN Rash and/or Itching  haloperidol    Injectable 5 milliGRAM(s) IntraMuscular every 6 hours PRN Sev Agitation
MEDICATIONS  (STANDING):  budesonide 160 MICROgram(s)/formoterol 4.5 MICROgram(s) Inhaler 2 Puff(s) Inhalation two times a day  mirtazapine 15 milliGRAM(s) Oral at bedtime  multivitamin/minerals 1 Tablet(s) Oral daily    MEDICATIONS  (PRN):  acetaminophen   Tablet .. 650 milliGRAM(s) Oral every 6 hours PRN Temp greater or equal to 38C (100.4F), Mild Pain (1 - 3), Moderate Pain (4 - 6)  ALBUTerol    90 MICROgram(s) HFA Inhaler 2 Puff(s) Inhalation every 6 hours PRN Shortness of Breath and/or Wheezing  aluminum hydroxide/magnesium hydroxide/simethicone Suspension 30 milliLiter(s) Oral every 6 hours PRN Dyspepsia  chlorproMAZINE    Tablet 50 milliGRAM(s) Oral every 6 hours PRN Mod Agitation  diphenhydrAMINE   Injectable 50 milliGRAM(s) IntraMuscular every 6 hours PRN Rash and/or Itching  haloperidol    Injectable 5 milliGRAM(s) IntraMuscular every 6 hours PRN Sev Agitation  magnesium hydroxide Suspension 30 milliLiter(s) Oral daily PRN Constipation
MEDICATIONS  (STANDING):  azithromycin   Tablet 250 milliGRAM(s) Oral every 24 hours  budesonide 160 MICROgram(s)/formoterol 4.5 MICROgram(s) Inhaler 2 Puff(s) Inhalation two times a day  melatonin 3 milliGRAM(s) Oral at bedtime  mirtazapine 30 milliGRAM(s) Oral at bedtime  multivitamin/minerals 1 Tablet(s) Oral daily    MEDICATIONS  (PRN):  acetaminophen   Tablet .. 650 milliGRAM(s) Oral every 6 hours PRN Temp greater or equal to 38C (100.4F), Mild Pain (1 - 3), Moderate Pain (4 - 6)  ALBUTerol    90 MICROgram(s) HFA Inhaler 2 Puff(s) Inhalation every 6 hours PRN Shortness of Breath and/or Wheezing  aluminum hydroxide/magnesium hydroxide/simethicone Suspension 30 milliLiter(s) Oral every 6 hours PRN Dyspepsia  chlorproMAZINE    Tablet 50 milliGRAM(s) Oral every 6 hours PRN Mod Agitation  diphenhydrAMINE   Injectable 50 milliGRAM(s) IntraMuscular every 6 hours PRN Rash and/or Itching  diphenhydrAMINE   Injectable 50 milliGRAM(s) IntraMuscular at bedtime PRN Insomnia  haloperidol    Injectable 5 milliGRAM(s) IntraMuscular every 6 hours PRN Sev Agitation  magnesium hydroxide Suspension 30 milliLiter(s) Oral daily PRN Constipation
MEDICATIONS  (STANDING):  azithromycin   Tablet 250 milliGRAM(s) Oral every 24 hours  budesonide 160 MICROgram(s)/formoterol 4.5 MICROgram(s) Inhaler 2 Puff(s) Inhalation two times a day  melatonin 3 milliGRAM(s) Oral at bedtime  mirtazapine 30 milliGRAM(s) Oral at bedtime  multivitamin/minerals 1 Tablet(s) Oral daily    MEDICATIONS  (PRN):  acetaminophen   Tablet .. 650 milliGRAM(s) Oral every 6 hours PRN Temp greater or equal to 38C (100.4F), Mild Pain (1 - 3), Moderate Pain (4 - 6)  ALBUTerol    90 MICROgram(s) HFA Inhaler 2 Puff(s) Inhalation every 6 hours PRN Shortness of Breath and/or Wheezing  aluminum hydroxide/magnesium hydroxide/simethicone Suspension 30 milliLiter(s) Oral every 6 hours PRN Dyspepsia  chlorproMAZINE    Tablet 50 milliGRAM(s) Oral every 6 hours PRN Mod Agitation  diphenhydrAMINE   Injectable 50 milliGRAM(s) IntraMuscular every 6 hours PRN Rash and/or Itching  diphenhydrAMINE   Injectable 50 milliGRAM(s) IntraMuscular at bedtime PRN Insomnia  haloperidol    Injectable 5 milliGRAM(s) IntraMuscular every 6 hours PRN Sev Agitation  magnesium hydroxide Suspension 30 milliLiter(s) Oral daily PRN Constipation

## 2020-12-22 NOTE — PROGRESS NOTE BEHAVIORAL HEALTH - SUMMARY
The pt is a 35 year-old AA male with a h/o recurrent depression who was admitted to [address] inpatient psychiatry on 12/11/2020 after transfer from [facility]  for acute safety and further evaluation and treatment of his recurrent depression with reported passive SI .   Plan for restart of Remeron qhs to treat the pt's depression and his associated insomnia
The pt is a 35 year-old AA male with a h/o recurrent depression who was admitted to [address] inpatient psychiatry on 12/11/2020 after transfer from [facility]  for acute safety and further evaluation and treatment of his recurrent depression with reported passive SI .   Plan for restart of Remeron qhs to treat the pt's depression and his associated insomnia
The pt is a 35 year-old AA male with a h/o recurrent depression who was admitted to [address] inpatient psychiatry on 12/11/2020 after transfer from [facility]  for acute safety and further evaluation and treatment of his recurrent depression with reported passive SI .   Plan for restart of Remeron qhs to treat the pt's depression and his associated insomnia
Patient is a 35 year old domiciled, single, intermittently employed male with a hx of major depressive disorder, five prior psychiatric hospitalizations including [facility] 4/11-4/21/20;  hx of suicidal ideation but no known suicide attempts/ self injury; pmhx of moderate intermittent asthma (triggered by dust and cold air; using inhaler daily)  presenting with suicidal ideations.  pt is guarded and evasive during assessment. he engages superficially citing the lack of privacy in his eyes. he endorses suicidal ideation with plan to overdose on pills. per chart review/psyckes he has a hx of major depressive disorder and prior hospitalizations, and episodes of suicidal ideation but no suicide attempts. utox is negative. no collateral provided. given the possibility of elevated risk pt should be held in ED, but a more thorough assessment and collateral would be indicated prior to an admission decision. will hold pt in ED and attempt to re engage in interview, and encourage pt to provide collateral contact.
The pt is a 35 year-old AA male with a h/o recurrent depression who was admitted to [address] inpatient psychiatry on 12/11/2020 after transfer from [facility]  for acute safety and further evaluation and treatment of his recurrent depression with reported passive SI .   Plan for restart of Remeron qhs to treat the pt's depression and his associated insomnia

## 2020-12-22 NOTE — PROGRESS NOTE BEHAVIORAL HEALTH - NSBHCHARTREVIEWLAB_PSY_A_CORE FT
Repeat COVID -19  PCR testing NEGATIVE  ON 12/16/2020
Repeat COVID -19  PCR testing NEGATIVE  ON 12/16/2020
12.4   5.51  )-----------( 271      ( 11 Dec 2020 01:44 )             40.0   12-11    140  |  105  |  27<H>  ----------------------------<  99  4.4   |  26  |  1.11    Ca    8.8      11 Dec 2020 01:44    TPro  7.8  /  Alb  3.9  /  TBili  0.2  /  DBili  x   /  AST  28  /  ALT  30  /  AlkPhos  126<H>  12-11
12.4   5.51  )-----------( 271      ( 11 Dec 2020 01:44 )             40.0   12-11    140  |  105  |  27<H>  ----------------------------<  99  4.4   |  26  |  1.11    Ca    8.8      11 Dec 2020 01:44    TPro  7.8  /  Alb  3.9  /  TBili  0.2  /  DBili  x   /  AST  28  /  ALT  30  /  AlkPhos  126<H>  12-11
Repeat COVID -19  PCR testing NEGATIVE  ON 12/16/2020

## 2020-12-22 NOTE — PROGRESS NOTE BEHAVIORAL HEALTH - NSBHCHARTREVIEWINVESTIGATE_PSY_A_CORE FT
< from: 12 Lead ECG (09.20.20 @ 03:16) >    Ventricular Rate 73 BPM    Atrial Rate 73 BPM    P-R Interval 210 ms    QRS Duration 96 ms    Q-T Interval 356 ms    QTC Calculation(Bazett) 392 ms    P Axis 64 degrees    R Axis 48 degrees    T Axis 34 degrees    Diagnosis Line Sinus rhythm with 1st degree A-V block  Early repolarization  Otherwise normal ECG    Confirmed by CHARY SUTHERLAND, [facility] ([id number]) on 9/20/2020 1:03:16 PM    < end of copied text >
< from: 12 Lead ECG (09.20.20 @ 03:16) >    Ventricular Rate 73 BPM    Atrial Rate 73 BPM    P-R Interval 210 ms    QRS Duration 96 ms    Q-T Interval 356 ms    QTC Calculation(Bazett) 392 ms    P Axis 64 degrees    R Axis 48 degrees    T Axis 34 degrees    Diagnosis Line Sinus rhythm with 1st degree A-V block  Early repolarization  Otherwise normal ECG    Confirmed by CHARY SUTHERLAND, [facility] ([id number]) on 9/20/2020 1:03:16 PM    < end of copied text >
< from: 12 Lead ECG (09.20.20 @ 03:16) >    Ventricular Rate 73 BPM    Atrial Rate 73 BPM    P-R Interval 210 ms    QRS Duration 96 ms    Q-T Interval 356 ms    QTC Calculation(Bazett) 392 ms    P Axis 64 degrees    R Axis 48 degrees    T Axis 34 degrees    Diagnosis Line Sinus rhythm with 1st degree A-V block  Early repolarization  Otherwise normal ECG    Confirmed by CHARY SUTHERLAND, [facility] ([id number]) on 9/20/2020 1:03:16 PM    < end of copied text >
< from: 12 Lead ECG (09.20.20 @ 03:16) >    Ventricular Rate 73 BPM    Atrial Rate 73 BPM    P-R Interval 210 ms    QRS Duration 96 ms    Q-T Interval 356 ms    QTC Calculation(Bazett) 392 ms    P Axis 64 degrees    R Axis 48 degrees    T Axis 34 degrees    Diagnosis Line Sinus rhythm with 1st degree A-V block  Early repolarization  Otherwise normal ECG    Confirmed by CHARY SUTHERLAND, [facility] ([id number]) on 9/20/2020 1:03:16 PM    < end of copied text >
< from: 12 Lead ECG (09.20.20 @ 03:16) >    Ventricular Rate 73 BPM    Atrial Rate 73 BPM    P-R Interval 210 ms    QRS Duration 96 ms    Q-T Interval 356 ms    QTC Calculation(Bazett) 392 ms    P Axis 64 degrees    R Axis 48 degrees    T Axis 34 degrees    Diagnosis Line Sinus rhythm with 1st degree A-V block  Early repolarization  Otherwise normal ECG    Confirmed by CHARY SUTHERLAND, [facility] ([id number]) on 9/20/2020 1:03:16 PM    < end of copied text >
< from: 12 Lead ECG (09.20.20 @ 03:16) >    Ventricular Rate 73 BPM    Atrial Rate 73 BPM    P-R Interval 210 ms    QRS Duration 96 ms    Q-T Interval 356 ms    QTC Calculation(Bazett) 392 ms    P Axis 64 degrees    R Axis 48 degrees    T Axis 34 degrees    Diagnosis Line Sinus rhythm with 1st degree A-V block  Early repolarization  Otherwise normal ECG    Confirmed by CHARY SUTHERLAND, [facility] ([id number]) on 9/20/2020 1:03:16 PM    < end of copied text >
< from: 12 Lead ECG (09.20.20 @ 03:16) >    Ventricular Rate 73 BPM    Atrial Rate 73 BPM    P-R Interval 210 ms    QRS Duration 96 ms    Q-T Interval 356 ms    QTC Calculation(Bazett) 392 ms    P Axis 64 degrees    R Axis 48 degrees    T Axis 34 degrees    Diagnosis Line Sinus rhythm with 1st degree A-V block  Early repolarization  Otherwise normal ECG    Confirmed by CHARY SUTHERLAND, [facility] ([id number]) on 9/20/2020 1:03:16 PM    < end of copied text >
< from: 12 Lead ECG (09.20.20 @ 03:16) >    Ventricular Rate 73 BPM    Atrial Rate 73 BPM    P-R Interval 210 ms    QRS Duration 96 ms    Q-T Interval 356 ms    QTC Calculation(Bazett) 392 ms    P Axis 64 degrees    R Axis 48 degrees    T Axis 34 degrees    Diagnosis Line Sinus rhythm with 1st degree A-V block  Early repolarization  Otherwise normal ECG    Confirmed by CHARY SUTHERLAND, [facility] ([id number]) on 9/20/2020 1:03:16 PM    < end of copied text >
< from: 12 Lead ECG (09.20.20 @ 03:16) >    Ventricular Rate 73 BPM    Atrial Rate 73 BPM    P-R Interval 210 ms    QRS Duration 96 ms    Q-T Interval 356 ms    QTC Calculation(Bazett) 392 ms    P Axis 64 degrees    R Axis 48 degrees    T Axis 34 degrees    Diagnosis Line Sinus rhythm with 1st degree A-V block  Early repolarization  Otherwise normal ECG    Confirmed by CHARY SUTHERLAND, [facility] ([id number]) on 9/20/2020 1:03:16 PM    < end of copied text >

## 2020-12-22 NOTE — PROGRESS NOTE BEHAVIORAL HEALTH - LANGUAGE
Normal naming
Normal naming
No abnormalities noted
No abnormalities noted
Normal naming

## 2020-12-22 NOTE — PROGRESS NOTE BEHAVIORAL HEALTH - THOUGHT CONTENT
Unremarkable
Preoccupations/Ruminations/Other

## 2020-12-22 NOTE — PROGRESS NOTE BEHAVIORAL HEALTH - NSBHPTASSESSDT_PSY_A_CORE
13-Dec-2020 14:44
14-Dec-2020
15-Dec-2020
16-Dec-2020
22-Dec-2020
18-Dec-2020
21-Dec-2020
17-Dec-2020 16:31
20-Dec-2020

## 2020-12-23 VITALS — TEMPERATURE: 98 F | OXYGEN SATURATION: 99 % | RESPIRATION RATE: 18 BRPM

## 2020-12-23 PROCEDURE — 99239 HOSP IP/OBS DSCHRG MGMT >30: CPT

## 2020-12-23 RX ORDER — MULTIVIT-MIN/FERROUS GLUCONATE 9 MG/15 ML
1 LIQUID (ML) ORAL
Qty: 0 | Refills: 0 | DISCHARGE
Start: 2020-12-23

## 2020-12-23 RX ORDER — LANOLIN ALCOHOL/MO/W.PET/CERES
1 CREAM (GRAM) TOPICAL
Qty: 14 | Refills: 1
Start: 2020-12-23 | End: 2021-01-19

## 2020-12-23 RX ORDER — MIRTAZAPINE 45 MG/1
1 TABLET, ORALLY DISINTEGRATING ORAL
Qty: 14 | Refills: 1
Start: 2020-12-23 | End: 2021-01-19

## 2020-12-23 RX ADMIN — Medication 1 TABLET(S): at 09:16

## 2020-12-23 RX ADMIN — BUDESONIDE AND FORMOTEROL FUMARATE DIHYDRATE 2 PUFF(S): 160; 4.5 AEROSOL RESPIRATORY (INHALATION) at 09:10

## 2020-12-23 RX ADMIN — Medication 650 MILLIGRAM(S): at 09:20

## 2020-12-23 NOTE — CHART NOTE - NSCHARTNOTEFT_GEN_A_CORE
Patient does not have a phone number to be reached after discharge today.  Number he left tried and it is an office number.  A woman answered and said there she does not know Brendon.  Aftercare appointments will be checked and documented for compliance at a later date.

## 2020-12-31 DIAGNOSIS — D50.9 IRON DEFICIENCY ANEMIA, UNSPECIFIED: ICD-10-CM

## 2020-12-31 DIAGNOSIS — I44.0 ATRIOVENTRICULAR BLOCK, FIRST DEGREE: ICD-10-CM

## 2020-12-31 DIAGNOSIS — Z88.1 ALLERGY STATUS TO OTHER ANTIBIOTIC AGENTS STATUS: ICD-10-CM

## 2020-12-31 DIAGNOSIS — Z91.018 ALLERGY TO OTHER FOODS: ICD-10-CM

## 2020-12-31 DIAGNOSIS — Z20.828 CONTACT WITH AND (SUSPECTED) EXPOSURE TO OTHER VIRAL COMMUNICABLE DISEASES: ICD-10-CM

## 2020-12-31 DIAGNOSIS — R45.851 SUICIDAL IDEATIONS: ICD-10-CM

## 2020-12-31 DIAGNOSIS — F33.2 MAJOR DEPRESSIVE DISORDER, RECURRENT SEVERE WITHOUT PSYCHOTIC FEATURES: ICD-10-CM

## 2020-12-31 DIAGNOSIS — Z91.09 OTHER ALLERGY STATUS, OTHER THAN TO DRUGS AND BIOLOGICAL SUBSTANCES: ICD-10-CM

## 2020-12-31 DIAGNOSIS — J45.40 MODERATE PERSISTENT ASTHMA, UNCOMPLICATED: ICD-10-CM

## 2020-12-31 DIAGNOSIS — J02.9 ACUTE PHARYNGITIS, UNSPECIFIED: ICD-10-CM

## 2020-12-31 DIAGNOSIS — H66.91 OTITIS MEDIA, UNSPECIFIED, RIGHT EAR: ICD-10-CM

## 2020-12-31 DIAGNOSIS — F51.05 INSOMNIA DUE TO OTHER MENTAL DISORDER: ICD-10-CM

## 2020-12-31 DIAGNOSIS — Z56.0 UNEMPLOYMENT, UNSPECIFIED: ICD-10-CM

## 2020-12-31 SDOH — ECONOMIC STABILITY - INCOME SECURITY: UNEMPLOYMENT, UNSPECIFIED: Z56.0

## 2021-01-06 NOTE — ED ADULT TRIAGE NOTE - BMI (KG/M2)
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 Tx'ed for Hypert, lipidemia and seen by Dr palacios for new L foot wound and endocrinology fopr HgbA1c of 9.9. Regimen for DM changed per endoctine
23.4

## 2021-01-09 ENCOUNTER — EMERGENCY (EMERGENCY)
Facility: HOSPITAL | Age: 36
LOS: 1 days | Discharge: HOME | End: 2021-01-09
Attending: EMERGENCY MEDICINE | Admitting: EMERGENCY MEDICINE
Payer: MEDICAID

## 2021-01-09 VITALS
TEMPERATURE: 98 F | OXYGEN SATURATION: 98 % | RESPIRATION RATE: 16 BRPM | DIASTOLIC BLOOD PRESSURE: 77 MMHG | SYSTOLIC BLOOD PRESSURE: 123 MMHG | WEIGHT: 178.57 LBS | HEIGHT: 74 IN | HEART RATE: 88 BPM

## 2021-01-09 VITALS
SYSTOLIC BLOOD PRESSURE: 120 MMHG | TEMPERATURE: 98 F | OXYGEN SATURATION: 99 % | HEART RATE: 78 BPM | RESPIRATION RATE: 18 BRPM | DIASTOLIC BLOOD PRESSURE: 70 MMHG

## 2021-01-09 DIAGNOSIS — Z20.822 CONTACT WITH AND (SUSPECTED) EXPOSURE TO COVID-19: ICD-10-CM

## 2021-01-09 DIAGNOSIS — J06.9 ACUTE UPPER RESPIRATORY INFECTION, UNSPECIFIED: ICD-10-CM

## 2021-01-09 DIAGNOSIS — R06.02 SHORTNESS OF BREATH: ICD-10-CM

## 2021-01-09 LAB
ALBUMIN SERPL ELPH-MCNC: 4.3 G/DL — SIGNIFICANT CHANGE UP (ref 3.3–5)
ALP SERPL-CCNC: 118 U/L — SIGNIFICANT CHANGE UP (ref 40–120)
ALT FLD-CCNC: 22 U/L — SIGNIFICANT CHANGE UP (ref 10–45)
ANION GAP SERPL CALC-SCNC: 13 MMOL/L — SIGNIFICANT CHANGE UP (ref 5–17)
AST SERPL-CCNC: 26 U/L — SIGNIFICANT CHANGE UP (ref 10–40)
BASOPHILS # BLD AUTO: 0.03 K/UL — SIGNIFICANT CHANGE UP (ref 0–0.2)
BASOPHILS NFR BLD AUTO: 0.4 % — SIGNIFICANT CHANGE UP (ref 0–2)
BILIRUB SERPL-MCNC: <0.2 MG/DL — SIGNIFICANT CHANGE UP (ref 0.2–1.2)
BUN SERPL-MCNC: 18 MG/DL — SIGNIFICANT CHANGE UP (ref 7–23)
CALCIUM SERPL-MCNC: 9.4 MG/DL — SIGNIFICANT CHANGE UP (ref 8.4–10.5)
CHLORIDE SERPL-SCNC: 100 MMOL/L — SIGNIFICANT CHANGE UP (ref 96–108)
CO2 SERPL-SCNC: 27 MMOL/L — SIGNIFICANT CHANGE UP (ref 22–31)
CREAT SERPL-MCNC: 0.77 MG/DL — SIGNIFICANT CHANGE UP (ref 0.5–1.3)
EOSINOPHIL # BLD AUTO: 0.13 K/UL — SIGNIFICANT CHANGE UP (ref 0–0.5)
EOSINOPHIL NFR BLD AUTO: 1.7 % — SIGNIFICANT CHANGE UP (ref 0–6)
GLUCOSE SERPL-MCNC: 114 MG/DL — HIGH (ref 70–99)
HCT VFR BLD CALC: 41.6 % — SIGNIFICANT CHANGE UP (ref 39–50)
HGB BLD-MCNC: 12.6 G/DL — LOW (ref 13–17)
IMM GRANULOCYTES NFR BLD AUTO: 0.4 % — SIGNIFICANT CHANGE UP (ref 0–1.5)
LYMPHOCYTES # BLD AUTO: 2.24 K/UL — SIGNIFICANT CHANGE UP (ref 1–3.3)
LYMPHOCYTES # BLD AUTO: 30 % — SIGNIFICANT CHANGE UP (ref 13–44)
MCHC RBC-ENTMCNC: 22.3 PG — LOW (ref 27–34)
MCHC RBC-ENTMCNC: 30.3 GM/DL — LOW (ref 32–36)
MCV RBC AUTO: 73.8 FL — LOW (ref 80–100)
MONOCYTES # BLD AUTO: 0.75 K/UL — SIGNIFICANT CHANGE UP (ref 0–0.9)
MONOCYTES NFR BLD AUTO: 10 % — SIGNIFICANT CHANGE UP (ref 2–14)
NEUTROPHILS # BLD AUTO: 4.29 K/UL — SIGNIFICANT CHANGE UP (ref 1.8–7.4)
NEUTROPHILS NFR BLD AUTO: 57.5 % — SIGNIFICANT CHANGE UP (ref 43–77)
NRBC # BLD: 0 /100 WBCS — SIGNIFICANT CHANGE UP (ref 0–0)
PLATELET # BLD AUTO: 242 K/UL — SIGNIFICANT CHANGE UP (ref 150–400)
POTASSIUM SERPL-MCNC: 4.3 MMOL/L — SIGNIFICANT CHANGE UP (ref 3.5–5.3)
POTASSIUM SERPL-SCNC: 4.3 MMOL/L — SIGNIFICANT CHANGE UP (ref 3.5–5.3)
PROT SERPL-MCNC: 7.4 G/DL — SIGNIFICANT CHANGE UP (ref 6–8.3)
RBC # BLD: 5.64 M/UL — SIGNIFICANT CHANGE UP (ref 4.2–5.8)
RBC # FLD: 14.9 % — HIGH (ref 10.3–14.5)
SARS-COV-2 RNA SPEC QL NAA+PROBE: SIGNIFICANT CHANGE UP
SODIUM SERPL-SCNC: 140 MMOL/L — SIGNIFICANT CHANGE UP (ref 135–145)
WBC # BLD: 7.47 K/UL — SIGNIFICANT CHANGE UP (ref 3.8–10.5)
WBC # FLD AUTO: 7.47 K/UL — SIGNIFICANT CHANGE UP (ref 3.8–10.5)

## 2021-01-09 PROCEDURE — U0005: CPT

## 2021-01-09 PROCEDURE — 96374 THER/PROPH/DIAG INJ IV PUSH: CPT

## 2021-01-09 PROCEDURE — 71046 X-RAY EXAM CHEST 2 VIEWS: CPT

## 2021-01-09 PROCEDURE — 99284 EMERGENCY DEPT VISIT MOD MDM: CPT | Mod: 25

## 2021-01-09 PROCEDURE — 99285 EMERGENCY DEPT VISIT HI MDM: CPT | Mod: 25

## 2021-01-09 PROCEDURE — 36415 COLL VENOUS BLD VENIPUNCTURE: CPT

## 2021-01-09 PROCEDURE — U0003: CPT

## 2021-01-09 PROCEDURE — 94640 AIRWAY INHALATION TREATMENT: CPT

## 2021-01-09 PROCEDURE — 71046 X-RAY EXAM CHEST 2 VIEWS: CPT | Mod: 26

## 2021-01-09 PROCEDURE — 96375 TX/PRO/DX INJ NEW DRUG ADDON: CPT

## 2021-01-09 PROCEDURE — 85025 COMPLETE CBC W/AUTO DIFF WBC: CPT

## 2021-01-09 PROCEDURE — 80053 COMPREHEN METABOLIC PANEL: CPT

## 2021-01-09 RX ORDER — SODIUM CHLORIDE 9 MG/ML
1000 INJECTION INTRAMUSCULAR; INTRAVENOUS; SUBCUTANEOUS ONCE
Refills: 0 | Status: COMPLETED | OUTPATIENT
Start: 2021-01-09 | End: 2021-01-09

## 2021-01-09 RX ORDER — IPRATROPIUM/ALBUTEROL SULFATE 18-103MCG
3 AEROSOL WITH ADAPTER (GRAM) INHALATION
Refills: 0 | Status: COMPLETED | OUTPATIENT
Start: 2021-01-09 | End: 2021-01-09

## 2021-01-09 RX ORDER — ACETAMINOPHEN 500 MG
1000 TABLET ORAL ONCE
Refills: 0 | Status: COMPLETED | OUTPATIENT
Start: 2021-01-09 | End: 2021-01-09

## 2021-01-09 RX ORDER — DEXAMETHASONE 0.5 MG/5ML
10 ELIXIR ORAL ONCE
Refills: 0 | Status: COMPLETED | OUTPATIENT
Start: 2021-01-09 | End: 2021-01-09

## 2021-01-09 RX ADMIN — Medication 400 MILLIGRAM(S): at 03:45

## 2021-01-09 RX ADMIN — Medication 3 MILLILITER(S): at 04:06

## 2021-01-09 RX ADMIN — Medication 102 MILLIGRAM(S): at 03:25

## 2021-01-09 RX ADMIN — SODIUM CHLORIDE 1000 MILLILITER(S): 9 INJECTION INTRAMUSCULAR; INTRAVENOUS; SUBCUTANEOUS at 03:25

## 2021-01-09 RX ADMIN — Medication 3 MILLILITER(S): at 03:25

## 2021-01-09 NOTE — ED ADULT NURSE NOTE - NSIMPLEMENTINTERV_GEN_ALL_ED
Implemented All Universal Safety Interventions:  [place] to call system. Call bell, personal items and telephone within reach. Instruct patient to call for assistance. Room bathroom lighting operational. Non-slip footwear when patient is off stretcher. Physically safe environment: no spills, clutter or unnecessary equipment. Stretcher in lowest position, wheels locked, appropriate side rails in place.

## 2021-01-09 NOTE — ED PROVIDER NOTE - ENMT, MLM
Airway patent, Nasal mucosa clear. Mouth with normal mucosa. Throat has no vesicles, no oropharyngeal exudates and uvula is midline. TMs clear b/l. no drooling, no stridor, no trismus.

## 2021-01-09 NOTE — ED ADULT NURSE NOTE - OBJECTIVE STATEMENT
received A&OX3 35years old male pt with hx of Asthma with c/o of " I have chest pressure and earaches." currently, pt is not vomiting, no nausea, no chills, no n/v/d. no redness or swelling in the ears. currently, lung sounds clear to auscultate

## 2021-01-09 NOTE — ED PROVIDER NOTE - NSFOLLOWUPINSTRUCTIONS_ED_ALL_ED_FT
Follow-up with your primary care physician.       Viral Respiratory Infection      A respiratory infection is an illness that affects part of the respiratory system, such as the lungs, nose, or throat. A respiratory infection that is caused by a virus is called a viral respiratory infection.  Common types of viral respiratory infections include:  •A cold.      •The flu (influenza).      •A respiratory syncytial virus (RSV) infection.        What are the causes?    This condition is caused by a virus.      What are the signs or symptoms?  Symptoms of this condition include:  •A stuffy or runny nose.      •Yellow or green nasal discharge.      •A cough.      •Sneezing.      •Fatigue.      •Achy muscles.      •A sore throat.      •Sweating or chills.      •A fever.      •A headache.        How is this diagnosed?  This condition may be diagnosed based on:  •Your symptoms.      •A physical exam.      •Testing of nasal swabs.        How is this treated?  This condition may be treated with medicines, such as:  •Antiviral medicine. This may shorten the length of time a person has symptoms.      •Expectorants. These make it easier to cough up mucus.      •Decongestant nasal sprays.      •Acetaminophen or NSAIDs to relieve fever and pain.      Antibiotic medicines are not prescribed for viral infections. This is because antibiotics are designed to kill bacteria. They are not effective against viruses.      Follow these instructions at home:     Managing pain and congestion     •Take over-the-counter and prescription medicines only as told by your health care provider.      •If you have a sore throat, gargle with a salt-water mixture 3–4 times a day or as needed. To make a salt-water mixture, completely dissolve ½–1 tsp of salt in 1 cup of warm water.      •Use nose drops made from salt water to ease congestion and soften raw skin around your nose.      •Drink enough fluid to keep your urine pale yellow. This helps prevent dehydration and helps loosen up mucus.      General instructions     •Rest as much as possible.      • Do not drink alcohol.      • Do not use any products that contain nicotine or tobacco, such as cigarettes and e-cigarettes. If you need help quitting, ask your health care provider.      •Keep all follow-up visits as told by your health care provider. This is important.        How is this prevented?     •Get an annual flu shot. You may get the flu shot in late summer, fall, or winter. Ask your health care provider when you should get your flu shot.    •Avoid exposing others to your respiratory infection.  •Stay home from work or school as told by your health care provider.      •Wash your hands with soap and water often, especially after you cough or sneeze. If soap and water are not available, use alcohol-based hand .        •Avoid contact with people who are sick during cold and flu season. This is generally fall and winter.        Contact a health care provider if:    •Your symptoms last for 10 days or longer.      •Your symptoms get worse over time.      •You have a fever.      •You have severe sinus pain in your face or forehead.      •The glands in your jaw or neck become very swollen.        Get help right away if you:    •Feel pain or pressure in your chest.      •Have shortness of breath.      •Faint or feel like you will faint.      •Have severe and persistent vomiting.      •Feel confused or disoriented.        Summary    •A respiratory infection is an illness that affects part of the respiratory system, such as the lungs, nose, or throat. A respiratory infection that is caused by a virus is called a viral respiratory infection.      •Common types of viral respiratory infections are a cold, influenza, and respiratory syncytial virus (RSV) infection.      •Symptoms of this condition include a stuffy or runny nose, cough, sneezing, fatigue, achy muscles, sore throat, and fevers or chills.      •Antibiotic medicines are not prescribed for viral infections. This is because antibiotics are designed to kill bacteria. They are not effective against viruses.      This information is not intended to replace advice given to you by your health care provider. Make sure you discuss any questions you have with your health care provider.

## 2021-01-09 NOTE — ED PROVIDER NOTE - PATIENT PORTAL LINK FT
You can access the FollowMyHealth Patient Portal offered by [facility] by registering at the following website: [email or web address]. By joining [employer]’s FollowMyHealth portal, you will also be able to view your health information using other applications (apps) compatible with our system.

## 2021-01-09 NOTE — ED PROVIDER NOTE - PROGRESS NOTE DETAILS
no resp distress, speaking in full sentences, resting comfortably in ED  I have discussed the discharge plan with the patient. The patient agrees with the plan, as discussed.  The patient understands Emergency Department diagnosis is a preliminary diagnosis often based on limited information and that the patient must adhere to the follow-up plan as discussed.  The patient understands that if the symptoms worsen the patient may return to the Emergency Department at any time for further evaluation and treatment.

## 2021-01-09 NOTE — ED PROVIDER NOTE - NSFOLLOWUPCLINICS_GEN_ALL_ED_FT
[facility]  Family Medicine  [address] [place] [address]  Phone: [phone]  Fax:   Follow Up Time:     [address]  [address] [place] [address]  Phone: [phone]  Fax: [phone]  Follow Up Time:

## 2021-01-09 NOTE — ED PROVIDER NOTE - CLINICAL SUMMARY MEDICAL DECISION MAKING FREE TEXT BOX
cough, ear pain, sore throat, sob, however no resp distress, no airway compromise, no hypoxia, no tachypnea, no tachycardia. doubt pe. doubt acs.   -check labs, ivf  -cxr  -duonebs

## 2021-01-09 NOTE — ED ADULT TRIAGE NOTE - CHIEF COMPLAINT QUOTE
Pt presents to ER c/o SOB, bilateral ear pain and sore throat for ~1 week. Pt also reports "I feel dehydrated", intermittent non-productive cough, and earlier "I sneezed out blood"

## 2021-01-09 NOTE — ED PROVIDER NOTE - OBJECTIVE STATEMENT
35M hx asthma, depression, c/o cough, sob, ear pain, sore throat. pt  has been having symptoms ongoing for past few weeks.   feels tightness when he tries to take in a deep breath.   has tried inhaler without relief. no fevers.  no hx of intubations.  no sick contacts. no recent travel. no LE swelling.

## 2021-01-11 NOTE — ED ADULT NURSE NOTE - SUICIDE SCREENING QUESTION 3
CC:  Viktoria Schumacher is here today for kidney stones, the patient needs a referral to a urologist.    Medications: medications verified and updated  Refills needed today?  NO  denies Latex allergy or sensitivity  Patient would like communication of their results via:      Cell Phone:   Telephone Information:   Mobile [phone]     Okay to leave a message containing results? Yes   Health Maintenance Due   Topic Date Due   • Depression Screening  06/24/2020   • Cervical Cancer Screening  07/25/2020   • Influenza Vaccine (1) 09/01/2020       Patient is due for topics as listed above but is not proceeding with Immunization(s) Influenza at this time. The patient said she had a pap in 2018            Reviewed overdue health maintenance topics with patient.   Patient refused

## 2021-02-01 ENCOUNTER — EMERGENCY (EMERGENCY)
Facility: HOSPITAL | Age: 36
LOS: 1 days | Discharge: HOME | End: 2021-02-01
Attending: EMERGENCY MEDICINE | Admitting: EMERGENCY MEDICINE
Payer: MEDICAID

## 2021-02-01 VITALS
OXYGEN SATURATION: 98 % | RESPIRATION RATE: 18 BRPM | DIASTOLIC BLOOD PRESSURE: 83 MMHG | SYSTOLIC BLOOD PRESSURE: 138 MMHG | HEART RATE: 89 BPM | HEIGHT: 74 IN | TEMPERATURE: 98 F | WEIGHT: 184.97 LBS

## 2021-02-01 DIAGNOSIS — Z79.51 LONG TERM (CURRENT) USE OF INHALED STEROIDS: ICD-10-CM

## 2021-02-01 DIAGNOSIS — Z88.0 ALLERGY STATUS TO PENICILLIN: ICD-10-CM

## 2021-02-01 DIAGNOSIS — J45.909 UNSPECIFIED ASTHMA, UNCOMPLICATED: ICD-10-CM

## 2021-02-01 DIAGNOSIS — J98.01 ACUTE BRONCHOSPASM: ICD-10-CM

## 2021-02-01 DIAGNOSIS — Z88.5 ALLERGY STATUS TO NARCOTIC AGENT: ICD-10-CM

## 2021-02-01 DIAGNOSIS — F32.9 MAJOR DEPRESSIVE DISORDER, SINGLE EPISODE, UNSPECIFIED: ICD-10-CM

## 2021-02-01 DIAGNOSIS — Z88.8 ALLERGY STATUS TO OTHER DRUGS, MEDICAMENTS AND BIOLOGICAL SUBSTANCES: ICD-10-CM

## 2021-02-01 DIAGNOSIS — Z79.899 OTHER LONG TERM (CURRENT) DRUG THERAPY: ICD-10-CM

## 2021-02-01 DIAGNOSIS — Z88.6 ALLERGY STATUS TO ANALGESIC AGENT: ICD-10-CM

## 2021-02-01 DIAGNOSIS — Z91.018 ALLERGY TO OTHER FOODS: ICD-10-CM

## 2021-02-01 DIAGNOSIS — R07.89 OTHER CHEST PAIN: ICD-10-CM

## 2021-02-01 PROCEDURE — 93010 ELECTROCARDIOGRAM REPORT: CPT

## 2021-02-01 PROCEDURE — 71046 X-RAY EXAM CHEST 2 VIEWS: CPT | Mod: 26

## 2021-02-01 PROCEDURE — 99284 EMERGENCY DEPT VISIT MOD MDM: CPT | Mod: 25

## 2021-02-01 RX ORDER — ALBUTEROL 90 UG/1
2 AEROSOL, METERED ORAL ONCE
Refills: 0 | Status: COMPLETED | OUTPATIENT
Start: 2021-02-01 | End: 2021-02-01

## 2021-02-01 RX ADMIN — ALBUTEROL 2 PUFF(S): 90 AEROSOL, METERED ORAL at 03:53

## 2021-02-01 NOTE — ED PROVIDER NOTE - OBJECTIVE STATEMENT
34 yo male hx asthma presents c/o chest tightness " I need a breathing treatment". Vague historian, hx of several visits here for same.

## 2021-02-01 NOTE — ED PROVIDER NOTE - PATIENT PORTAL LINK FT
You can access the FollowMyHealth Patient Portal offered by [facility] by registering at the following website: [email or web address]. By joining [employer]’s FollowMyHealth portal, you will also be able to view your health information using other applications (apps) compatible with our system.

## 2021-02-01 NOTE — ED PROVIDER NOTE - CLINICAL SUMMARY MEDICAL DECISION MAKING FREE TEXT BOX
will rx albuterol mdi. ekg wnl. will rx albuterol mdi. ekg wnl.  Diff dx includes PTX, pneumonia, bronchospasm.

## 2021-02-01 NOTE — ED ADULT NURSE NOTE - OBJECTIVE STATEMENT
36 yo M c/o multiple medical complaints. Pt reports feeling "short of breath with chest pain and dizziness" for weeks. Pt noted to be in no distress. No dyspnea, diaphoresis, breathing spontaneous and nonlabored. Denies N/V/D, headache, fever/chills, numbness/tingling, change in bowel or bladder habits. Pt speaking in full complete sentences, ambulatory with steady gait.

## 2021-02-03 ENCOUNTER — EMERGENCY (EMERGENCY)
Facility: HOSPITAL | Age: 36
LOS: 1 days | Discharge: HOME | End: 2021-02-03
Attending: EMERGENCY MEDICINE | Admitting: EMERGENCY MEDICINE
Payer: MEDICAID

## 2021-02-03 VITALS
WEIGHT: 179.9 LBS | HEIGHT: 74 IN | SYSTOLIC BLOOD PRESSURE: 117 MMHG | RESPIRATION RATE: 16 BRPM | DIASTOLIC BLOOD PRESSURE: 70 MMHG | TEMPERATURE: 98 F | OXYGEN SATURATION: 100 % | HEART RATE: 79 BPM

## 2021-02-03 DIAGNOSIS — R06.02 SHORTNESS OF BREATH: ICD-10-CM

## 2021-02-03 DIAGNOSIS — R42 DIZZINESS AND GIDDINESS: ICD-10-CM

## 2021-02-03 DIAGNOSIS — M54.5 LOW BACK PAIN: ICD-10-CM

## 2021-02-03 DIAGNOSIS — Z20.822 CONTACT WITH AND (SUSPECTED) EXPOSURE TO COVID-19: ICD-10-CM

## 2021-02-03 DIAGNOSIS — Z88.8 ALLERGY STATUS TO OTHER DRUGS, MEDICAMENTS AND BIOLOGICAL SUBSTANCES STATUS: ICD-10-CM

## 2021-02-03 DIAGNOSIS — Z88.1 ALLERGY STATUS TO OTHER ANTIBIOTIC AGENTS STATUS: ICD-10-CM

## 2021-02-03 DIAGNOSIS — R05 COUGH: ICD-10-CM

## 2021-02-03 LAB
ANION GAP SERPL CALC-SCNC: 8 MMOL/L — SIGNIFICANT CHANGE UP (ref 5–17)
ANISOCYTOSIS BLD QL: SLIGHT — SIGNIFICANT CHANGE UP
BASOPHILS # BLD AUTO: 0 K/UL — SIGNIFICANT CHANGE UP (ref 0–0.2)
BASOPHILS NFR BLD AUTO: 0 % — SIGNIFICANT CHANGE UP (ref 0–2)
BUN SERPL-MCNC: 14 MG/DL — SIGNIFICANT CHANGE UP (ref 7–23)
CALCIUM SERPL-MCNC: 9 MG/DL — SIGNIFICANT CHANGE UP (ref 8.4–10.5)
CHLORIDE SERPL-SCNC: 103 MMOL/L — SIGNIFICANT CHANGE UP (ref 96–108)
CO2 SERPL-SCNC: 27 MMOL/L — SIGNIFICANT CHANGE UP (ref 22–31)
CREAT SERPL-MCNC: 0.81 MG/DL — SIGNIFICANT CHANGE UP (ref 0.5–1.3)
ELLIPTOCYTES BLD QL SMEAR: SLIGHT — SIGNIFICANT CHANGE UP
EOSINOPHIL # BLD AUTO: 0.05 K/UL — SIGNIFICANT CHANGE UP (ref 0–0.5)
EOSINOPHIL NFR BLD AUTO: 0.9 % — SIGNIFICANT CHANGE UP (ref 0–6)
GLUCOSE SERPL-MCNC: 99 MG/DL — SIGNIFICANT CHANGE UP (ref 70–99)
HCT VFR BLD CALC: 39.2 % — SIGNIFICANT CHANGE UP (ref 39–50)
HGB BLD-MCNC: 12.1 G/DL — LOW (ref 13–17)
LYMPHOCYTES # BLD AUTO: 2.07 K/UL — SIGNIFICANT CHANGE UP (ref 1–3.3)
LYMPHOCYTES # BLD AUTO: 40.2 % — SIGNIFICANT CHANGE UP (ref 13–44)
MACROCYTES BLD QL: SLIGHT — SIGNIFICANT CHANGE UP
MANUAL SMEAR VERIFICATION: SIGNIFICANT CHANGE UP
MCHC RBC-ENTMCNC: 22.7 PG — LOW (ref 27–34)
MCHC RBC-ENTMCNC: 30.9 GM/DL — LOW (ref 32–36)
MCV RBC AUTO: 73.7 FL — LOW (ref 80–100)
MICROCYTES BLD QL: SLIGHT — SIGNIFICANT CHANGE UP
MONOCYTES # BLD AUTO: 0.28 K/UL — SIGNIFICANT CHANGE UP (ref 0–0.9)
MONOCYTES NFR BLD AUTO: 5.4 % — SIGNIFICANT CHANGE UP (ref 2–14)
NEUTROPHILS # BLD AUTO: 2.44 K/UL — SIGNIFICANT CHANGE UP (ref 1.8–7.4)
NEUTROPHILS NFR BLD AUTO: 47.3 % — SIGNIFICANT CHANGE UP (ref 43–77)
OVALOCYTES BLD QL SMEAR: SLIGHT — SIGNIFICANT CHANGE UP
PLAT MORPH BLD: ABNORMAL
PLATELET # BLD AUTO: 223 K/UL — SIGNIFICANT CHANGE UP (ref 150–400)
POIKILOCYTOSIS BLD QL AUTO: SLIGHT — SIGNIFICANT CHANGE UP
POTASSIUM SERPL-MCNC: 3.8 MMOL/L — SIGNIFICANT CHANGE UP (ref 3.5–5.3)
POTASSIUM SERPL-SCNC: 3.8 MMOL/L — SIGNIFICANT CHANGE UP (ref 3.5–5.3)
RBC # BLD: 5.32 M/UL — SIGNIFICANT CHANGE UP (ref 4.2–5.8)
RBC # FLD: 14.6 % — HIGH (ref 10.3–14.5)
RBC BLD AUTO: ABNORMAL
SARS-COV-2 RNA SPEC QL NAA+PROBE: SIGNIFICANT CHANGE UP
SCHISTOCYTES BLD QL AUTO: SLIGHT — SIGNIFICANT CHANGE UP
SMUDGE CELLS # BLD: PRESENT — SIGNIFICANT CHANGE UP
SODIUM SERPL-SCNC: 138 MMOL/L — SIGNIFICANT CHANGE UP (ref 135–145)
VARIANT LYMPHS # BLD: 6.2 % — HIGH (ref 0–6)
WBC # BLD: 5.16 K/UL — SIGNIFICANT CHANGE UP (ref 3.8–10.5)
WBC # FLD AUTO: 5.16 K/UL — SIGNIFICANT CHANGE UP (ref 3.8–10.5)

## 2021-02-03 PROCEDURE — 71046 X-RAY EXAM CHEST 2 VIEWS: CPT | Mod: 26

## 2021-02-03 PROCEDURE — 99283 EMERGENCY DEPT VISIT LOW MDM: CPT | Mod: 25

## 2021-02-03 PROCEDURE — 71046 X-RAY EXAM CHEST 2 VIEWS: CPT

## 2021-02-03 PROCEDURE — 93005 ELECTROCARDIOGRAM TRACING: CPT

## 2021-02-03 PROCEDURE — 80048 BASIC METABOLIC PNL TOTAL CA: CPT

## 2021-02-03 PROCEDURE — 85025 COMPLETE CBC W/AUTO DIFF WBC: CPT

## 2021-02-03 PROCEDURE — U0005: CPT

## 2021-02-03 PROCEDURE — 93010 ELECTROCARDIOGRAM REPORT: CPT

## 2021-02-03 PROCEDURE — U0003: CPT

## 2021-02-03 PROCEDURE — 99284 EMERGENCY DEPT VISIT MOD MDM: CPT | Mod: 25

## 2021-02-03 PROCEDURE — 36415 COLL VENOUS BLD VENIPUNCTURE: CPT

## 2021-02-03 RX ORDER — ACETAMINOPHEN 500 MG
650 TABLET ORAL ONCE
Refills: 0 | Status: COMPLETED | OUTPATIENT
Start: 2021-02-03 | End: 2021-02-03

## 2021-02-03 RX ORDER — SODIUM CHLORIDE 9 MG/ML
1000 INJECTION INTRAMUSCULAR; INTRAVENOUS; SUBCUTANEOUS ONCE
Refills: 0 | Status: COMPLETED | OUTPATIENT
Start: 2021-02-03 | End: 2021-02-03

## 2021-02-03 RX ADMIN — Medication 650 MILLIGRAM(S): at 04:36

## 2021-02-03 RX ADMIN — SODIUM CHLORIDE 1000 MILLILITER(S): 9 INJECTION INTRAMUSCULAR; INTRAVENOUS; SUBCUTANEOUS at 04:17

## 2021-02-03 NOTE — ED PROVIDER NOTE - ATTENDING CONTRIBUTION TO CARE
35 year old male presents to ED with concern for sob, low back pain, and dizziness x 2 weeks.  Seen several weeks ago for same with unremarkable work up and discharged from ED.  No CP, fever, chills.  + Nasal congestion.  On exam, patient is non toxic in appearance.  HRRR, lungs clear.  Abd soft/NT/ND.  No peripheral edema, no calf pain/tenderness.  Normal gait.  Will check labs, EKG, cxr and dispo accordingly.
None

## 2021-02-03 NOTE — ED ADULT TRIAGE NOTE - IDEAL BODY WEIGHT(KG)
Patient reviewed and signed discharge instructions, was discharged with instructions, prescriptions and personal belongings and escorted off unit where step father was waiting. 84

## 2021-02-03 NOTE — ED PROVIDER NOTE - OBJECTIVE STATEMENT
34 yo m with Hx asthma (1 intubation as baby) c/o feeling short of breath, low back pain, and dizziness.  Duration 2 weeks, and constant. 34 yo m with Hx asthma (1 intubation as baby) c/o feeling short of breath, low back pain, and dizziness.  Duration 2 weeks, and constant.  Has a mild nonproductive cough.  Describes the shortness of breath as "tightness" like asthma, but he does not feel like he's been wheezing. Compliant w Advair, and using albuterol PRN with little relief.  Does not have fever, chest pain, headache, abdominal pain.  No pattern to symptoms, and says nothing provokes the symptoms.  Says he was seen in ED for this once before but he doesn't know what was wrong, told that he may be prediabetic and iron deficient.  He has not tried treating his symptoms.  Does not have hx of heart disease, HTN, diabetes (prediabetic?), or HLD.  No hx of DVT/PE; no estrogen; no malignancy; no recent immobilization; no hemoptysis.  He denies psychiatric hx but EMR notes hx of depression; he denies SI/HI/hallucinations. 36 yo m with Hx asthma (1 intubation as baby) c/o feeling short of breath, low back pain, and dizziness.  Duration 2 weeks, and constant.  Has a mild nonproductive cough.  Describes the shortness of breath as "tightness" like asthma, but he does not feel like he's been wheezing. He is concerned about possible Covid-19.  Compliant w Advair, and using albuterol PRN with little relief.  Does not have fever, chest pain, headache, abdominal pain.  No pattern to symptoms, and says nothing provokes the symptoms.  Says he was seen in ED for this once before but he doesn't know what was wrong, told that he may be prediabetic and iron deficient.  He has not tried treating his symptoms.  Does not have hx of heart disease, HTN, diabetes (prediabetic?), or HLD.  No hx of DVT/PE; no estrogen; no malignancy; no recent immobilization; no hemoptysis.  He denies psychiatric hx but EMR notes hx of depression; he denies SI/HI/hallucinations. 34 yo m with Hx asthma (1 intubation as baby) c/o feeling short of breath, low back pain, and dizziness.  Duration 2 weeks, and constant.  Has a mild nonproductive cough sometimes when he wakes up.  Describes the shortness of breath as "tightness" like asthma, but he does not feel like he's been wheezing. He is concerned about possible Covid-19.  Compliant w Advair, and using albuterol PRN with little relief.  Does not have fever, chest pain, headache, abdominal pain.  No pattern to symptoms, and says nothing provokes the symptoms.  Says he was seen in ED for this once before but he doesn't know what was wrong, told that he may be prediabetic and iron deficient.  He has not tried treating his symptoms.  Does not have hx of heart disease, HTN, diabetes (prediabetic?), or HLD.  No hx of DVT/PE; no estrogen; no malignancy; no recent immobilization; no hemoptysis.  He denies psychiatric hx but EMR notes hx of depression; he denies SI/HI/hallucinations.  No lower extremity weakness/numbness, saddle anesthesia, bladder/bowel dysfunction.  No hx of IVDU. No steroids since childhood.

## 2021-02-03 NOTE — ED ADULT NURSE NOTE - SUICIDE SCREENING DEPRESSION
[[id number]] : 31 Negative

## 2021-02-03 NOTE — ED PROVIDER NOTE - NSFOLLOWUPINSTRUCTIONS_ED_ALL_ED_FT
Follow up with primary care provider - we will have our ED Referral Coordinator contact you regarding this.  Return to the Emergency Department if you have any new or worsening symptoms, or if you have any concerns.  =======================    Shortness of Breath    WHAT YOU NEED TO KNOW:    Shortness of breath is a feeling that you cannot get enough air when you breathe in. You may have this feeling only during activity, or all the time. Your symptoms can range from mild to severe. Shortness of breath may be a sign of a serious health condition that needs immediate care.    DISCHARGE INSTRUCTIONS:    Return to the emergency department if:   •Your signs and symptoms are the same or worse within 24 hours of treatment.       •The skin over your ribs or on your neck sinks in when you breathe.       •You feel confused or dizzy.      Contact your healthcare provider if:   •You have new or worsening symptoms.      •You have questions or concerns about your condition or care.      Medicines:   •Medicines may be used to treat the cause of your symptoms. You may need medicine to treat a bacterial infection or reduce anxiety. Other medicines may be used to open your airway, reduce swelling, or remove extra fluid. If you have a heart condition, you may need medicine to help your heart beat more strongly or regularly.      •Take your medicine as directed. Contact your healthcare provider if you think your medicine is not helping or if you have side effects. Tell him or her if you are allergic to any medicine. Keep a list of the medicines, vitamins, and herbs you take. Include the amounts, and when and why you take them. Bring the list or the pill bottles to follow-up visits. Carry your medicine list with you in case of an emergency.      Manage shortness of breath:   •Create an action plan. You and your healthcare provider can work together to create a plan for how to handle shortness of breath. The plan can include daily activities, treatment changes, and what to do if you have severe breathing problems.      •Lean forward on your elbows when you sit. This helps your lungs expand and may make it easier to breathe.      •Use pursed-lip breathing any time you feel short of breath. Breathe in through your nose and then slowly breathe out through your mouth with your lips slightly puckered. It should take you twice as long to breathe out as it did to breathe in.  Breathe in Breathe out           •Do not smoke. Nicotine and other chemicals in cigarettes and cigars can cause lung damage and make shortness of breath worse. Ask your healthcare provider for information if you currently smoke and need help to quit. E-cigarettes or smokeless tobacco still contain nicotine. Talk to your healthcare provider before you use these products.      •Reach or maintain a healthy weight. Your healthcare provider can help you create a safe weight loss plan if you are overweight.      •Exercise as directed. Exercise can help your lungs work more easily. Exercise can also help you lose weight if needed. Try to get at least 30 minutes of exercise most days of the week.      Follow up with your healthcare provider or specialist as directed: Write down your questions so you remember to ask them during your visits.  =============================    Back Pain    WHAT YOU NEED TO KNOW:    Back pain is common. You may have back pain and muscle spasms. You may feel sore or stiff on one or both sides of your back. The pain may spread to your lower body. Conditions that affect the spine, joints, or muscles can cause back pain. These may include arthritis, spinal stenosis (narrowing of the spinal column), muscle tension, or breakdown of the spinal discs.    DISCHARGE INSTRUCTIONS:    Call your local emergency number (911 in the [place]) if:   •You have severe back pain with chest pain.      •You cannot control your urine or bowel movements.      •Your pain becomes so severe that you cannot walk.      Return to the emergency department if:   •You have pain, numbness, or weakness in one or both legs.      •You have severe back pain, nausea, and vomiting.      •You have severe back pain that spreads to your side or genital area.      Call your doctor if:   •You have back pain that does not get better with rest and pain medicine.      •You have a fever.      •You have pain that worsens when you are on your back or when you rest.      •You have pain that worsens when you cough or sneeze.      •You lose weight without trying.      •You have questions or concerns about your condition or care.      Medicines: You may need any of the following:   •NSAIDs, such as ibuprofen, help decrease swelling, pain, and fever. This medicine is available with or without a doctor's order. NSAIDs can cause stomach bleeding or kidney problems in certain people. If you take blood thinner medicine, always ask your healthcare provider if NSAIDs are safe for you. Always read the medicine label and follow directions.      •Acetaminophen decreases pain and fever. It is available without a doctor's order. Ask how much to take and how often to take it. Follow directions. Read the labels of all other medicines you are using to see if they also contain acetaminophen, or ask your doctor or pharmacist. Acetaminophen can cause liver damage if not taken correctly. Do not use more than 4 grams (4,000 milligrams) total of acetaminophen in one day.       •Muscle relaxers help decrease muscle spasms and back pain.      •Prescription pain medicine may be given. Ask your healthcare provider how to take this medicine safely. Some prescription pain medicines contain acetaminophen. Do not take other medicines that contain acetaminophen without talking to your healthcare provider. Too much acetaminophen may cause liver damage. Prescription pain medicine may cause constipation. Ask your healthcare provider how to prevent or treat constipation.       •Take your medicine as directed. Contact your healthcare provider if you think your medicine is not helping or if you have side effects. Tell him or her if you are allergic to any medicine. Keep a list of the medicines, vitamins, and herbs you take. Include the amounts, and when and why you take them. Bring the list or the pill bottles to follow-up visits. Carry your medicine list with you in case of an emergency.      How to manage your back pain:   •Apply ice on your back for 15 to 20 minutes every hour or as directed. Use an ice pack, or put crushed ice in a plastic bag. Cover it with a towel before you apply it to your skin. Ice helps prevent tissue damage and decreases pain.      •Apply heat on your back for 20 to 30 minutes every 2 hours for as many days as directed. Heat helps decrease pain and muscle spasms.      •Stay active as much as you can without causing more pain. Bed rest could make your back pain worse. Avoid heavy lifting until your pain is gone.      •Go to physical therapy as directed. A physical therapist can teach you exercises to help improve movement and strength, and to decrease pain.      Follow up with your doctor in 2 weeks, or as directed: You might need to see a specialist. Write down your questions so you remember to ask them during your visits.

## 2021-02-03 NOTE — ED PROVIDER NOTE - CLINICAL SUMMARY MEDICAL DECISION MAKING FREE TEXT BOX
Symptoms could be related to viral syndrome; consider Covid-19.  Very well-appearing, euvolemic; no signs of distress.  VS WNL with O2 sat 100% room air.  Unremarkable physical exam.  Calm and cooperative.  Not forthcoming about his hx of depression, but no apparent risk to self/others currently. Will check labs, CXR, and EKG; hydration and acetaminophen for symptomatic relief.  Will reassess.

## 2021-02-03 NOTE — ED ADULT TRIAGE NOTE - CHIEF COMPLAINT QUOTE
Pt presents with multiple medical complaints to include "low back pain, SOB, dizziness" x 2 weeks, worsening x 3 days ago per pt. Denies any fevers. Respiratory effort WNL.

## 2021-02-03 NOTE — ED PROVIDER NOTE - NS ED ROS FT
CONSTITUTIONAL: No fever, chills, or weakness  NEURO: No headache, no syncope; No focal weakness/tingling/numbness  EYES: No visual changes  ENT: Nasal congestion.  No sore throat. No loss of taste.    PULM: HPI  CV: No chest pain or palpitations  GI: No abdominal pain, vomiting, or diarrhea  : No dysuria, hematuria, frequency; no urinary retention or incontinence  MSK: No neck pain, no joint pain  SKIN: no rash or unusual bruising

## 2021-02-03 NOTE — ED PROVIDER NOTE - NSFOLLOWUPCLINICS_GEN_ALL_ED_FT
[facility]  Family Medicine  [facility]  [place], [place] [facility]  Phone: [phone]  Fax:   Follow Up Time:

## 2021-02-03 NOTE — ED PROVIDER NOTE - PATIENT PORTAL LINK FT
You can access the FollowMyHealth Patient Portal offered by [facility] by registering at the following website: [email or web address]. By joining [employer]’s FollowMyHealth portal, you will also be able to view your health information using other applications (apps) compatible with our system.

## 2021-02-03 NOTE — ED PROVIDER NOTE - PHYSICAL EXAMINATION
CONSTITUTIONAL: very well-appearing man; NAD  SKIN: Normal color and turgor. No rash.    HEAD: NC/AT.  EYES: Conjunctiva clear. EOMI. PERRL.    ENT: Airway patent, OP without erythema, tonsillar swelling or exudate; uvula midline without swelling. Nasal mucosa clear, no rhinorrhea.   RESPIRATORY:  Breathing non-labored. No retractions or accessory muscle use.  Lungs CTA bilat without adventitious breath sounds.  CARDIOVASCULAR:  RRR, S1S2. No M/R/G.      GI:  Abdomen soft, nontender.    MSK: Neck supple.  No lower extremity edema or calf tenderness.  No joint swelling or ROM limitation.  NEURO: Alert and oriented; CN II-XII grossly intact. Speech clear. 5/5 strength in all extremities.  Good balance. Steady gait.

## 2021-03-01 PROCEDURE — G9005: CPT

## 2021-03-16 NOTE — ED BEHAVIORAL HEALTH ASSESSMENT NOTE - SUICIDE PROTECTIVE FACTORS
Last seen on 2/11/2021 for medical weight management with Dr Gray, Per PDMP review, last dispensed on 2/12/2021 for Phentermine 30 mg,take 1 capsule po every morning #30 capsules,0 refill, [facility] dispensing pharmacy. Med is prepped  For same below, please review and sign If agrees, she does have a 3/25/2021 follow up scheduled.   Other

## 2021-04-07 ENCOUNTER — EMERGENCY (EMERGENCY)
Facility: HOSPITAL | Age: 36
LOS: 1 days | Discharge: HOME | End: 2021-04-07
Attending: EMERGENCY MEDICINE | Admitting: EMERGENCY MEDICINE
Payer: MEDICAID

## 2021-04-07 VITALS
OXYGEN SATURATION: 98 % | RESPIRATION RATE: 17 BRPM | SYSTOLIC BLOOD PRESSURE: 132 MMHG | HEIGHT: 74 IN | HEART RATE: 86 BPM | DIASTOLIC BLOOD PRESSURE: 88 MMHG | TEMPERATURE: 98 F | WEIGHT: 179.9 LBS

## 2021-04-07 DIAGNOSIS — Z91.018 ALLERGY TO OTHER FOODS: ICD-10-CM

## 2021-04-07 DIAGNOSIS — R68.83 CHILLS (WITHOUT FEVER): ICD-10-CM

## 2021-04-07 DIAGNOSIS — Z79.899 OTHER LONG TERM (CURRENT) DRUG THERAPY: ICD-10-CM

## 2021-04-07 DIAGNOSIS — R42 DIZZINESS AND GIDDINESS: ICD-10-CM

## 2021-04-07 DIAGNOSIS — J06.9 ACUTE UPPER RESPIRATORY INFECTION, UNSPECIFIED: ICD-10-CM

## 2021-04-07 DIAGNOSIS — Z20.822 CONTACT WITH AND (SUSPECTED) EXPOSURE TO COVID-19: ICD-10-CM

## 2021-04-07 DIAGNOSIS — Z79.51 LONG TERM (CURRENT) USE OF INHALED STEROIDS: ICD-10-CM

## 2021-04-07 DIAGNOSIS — Z88.1 ALLERGY STATUS TO OTHER ANTIBIOTIC AGENTS STATUS: ICD-10-CM

## 2021-04-07 DIAGNOSIS — Z88.8 ALLERGY STATUS TO OTHER DRUGS, MEDICAMENTS AND BIOLOGICAL SUBSTANCES STATUS: ICD-10-CM

## 2021-04-07 LAB
ALBUMIN SERPL ELPH-MCNC: 4.5 G/DL — SIGNIFICANT CHANGE UP (ref 3.3–5)
ALP SERPL-CCNC: 72 U/L — SIGNIFICANT CHANGE UP (ref 40–120)
ALT FLD-CCNC: 21 U/L — SIGNIFICANT CHANGE UP (ref 10–45)
ANION GAP SERPL CALC-SCNC: 11 MMOL/L — SIGNIFICANT CHANGE UP (ref 5–17)
AST SERPL-CCNC: 29 U/L — SIGNIFICANT CHANGE UP (ref 10–40)
BILIRUB SERPL-MCNC: 0.6 MG/DL — SIGNIFICANT CHANGE UP (ref 0.2–1.2)
BUN SERPL-MCNC: 13 MG/DL — SIGNIFICANT CHANGE UP (ref 7–23)
CALCIUM SERPL-MCNC: 9.7 MG/DL — SIGNIFICANT CHANGE UP (ref 8.4–10.5)
CHLORIDE SERPL-SCNC: 99 MMOL/L — SIGNIFICANT CHANGE UP (ref 96–108)
CO2 SERPL-SCNC: 25 MMOL/L — SIGNIFICANT CHANGE UP (ref 22–31)
CREAT SERPL-MCNC: 0.95 MG/DL — SIGNIFICANT CHANGE UP (ref 0.5–1.3)
GLUCOSE SERPL-MCNC: 99 MG/DL — SIGNIFICANT CHANGE UP (ref 70–99)
HCT VFR BLD CALC: 43 % — SIGNIFICANT CHANGE UP (ref 39–50)
HGB BLD-MCNC: 13.8 G/DL — SIGNIFICANT CHANGE UP (ref 13–17)
MCHC RBC-ENTMCNC: 23.5 PG — LOW (ref 27–34)
MCHC RBC-ENTMCNC: 32.1 GM/DL — SIGNIFICANT CHANGE UP (ref 32–36)
MCV RBC AUTO: 73.1 FL — LOW (ref 80–100)
PLATELET # BLD AUTO: 180 K/UL — SIGNIFICANT CHANGE UP (ref 150–400)
POTASSIUM SERPL-MCNC: 4.2 MMOL/L — SIGNIFICANT CHANGE UP (ref 3.5–5.3)
POTASSIUM SERPL-SCNC: 4.2 MMOL/L — SIGNIFICANT CHANGE UP (ref 3.5–5.3)
PROT SERPL-MCNC: 8 G/DL — SIGNIFICANT CHANGE UP (ref 6–8.3)
RBC # BLD: 5.88 M/UL — HIGH (ref 4.2–5.8)
RBC # FLD: 15.6 % — HIGH (ref 10.3–14.5)
SODIUM SERPL-SCNC: 135 MMOL/L — SIGNIFICANT CHANGE UP (ref 135–145)
WBC # BLD: 6.5 K/UL — SIGNIFICANT CHANGE UP (ref 3.8–10.5)
WBC # FLD AUTO: 6.5 K/UL — SIGNIFICANT CHANGE UP (ref 3.8–10.5)

## 2021-04-07 PROCEDURE — 99285 EMERGENCY DEPT VISIT HI MDM: CPT | Mod: 25

## 2021-04-07 PROCEDURE — G1004: CPT

## 2021-04-07 PROCEDURE — 70450 CT HEAD/BRAIN W/O DYE: CPT | Mod: 26,MF

## 2021-04-07 PROCEDURE — 71046 X-RAY EXAM CHEST 2 VIEWS: CPT | Mod: 26

## 2021-04-07 RX ORDER — SODIUM CHLORIDE 9 MG/ML
1000 INJECTION INTRAMUSCULAR; INTRAVENOUS; SUBCUTANEOUS ONCE
Refills: 0 | Status: COMPLETED | OUTPATIENT
Start: 2021-04-07 | End: 2021-04-07

## 2021-04-07 RX ORDER — ACETAMINOPHEN 500 MG
1000 TABLET ORAL ONCE
Refills: 0 | Status: COMPLETED | OUTPATIENT
Start: 2021-04-07 | End: 2021-04-07

## 2021-04-07 RX ADMIN — Medication 1000 MILLIGRAM(S): at 23:50

## 2021-04-07 RX ADMIN — SODIUM CHLORIDE 1000 MILLILITER(S): 9 INJECTION INTRAMUSCULAR; INTRAVENOUS; SUBCUTANEOUS at 23:39

## 2021-04-07 RX ADMIN — Medication 400 MILLIGRAM(S): at 23:35

## 2021-04-07 NOTE — ED PROVIDER NOTE - CLINICAL SUMMARY MEDICAL DECISION MAKING FREE TEXT BOX
congestion, cough, chills, lightheaded, afebrile, nontoxic, no resp distress, no hypoxia, neck supple, no focal neuro deficits  -check labs  -ivf, tyelnol  -CT head

## 2021-04-07 NOTE — ED PROVIDER NOTE - PROGRESS NOTE DETAILS
no resp distress, nontoxic, possible URI, no need for ABX at this time. recommended f/u with primary care clinic  I have discussed the discharge plan with the patient. The patient agrees with the plan, as discussed.  The patient understands Emergency Department diagnosis is a preliminary diagnosis often based on limited information and that the patient must adhere to the follow-up plan as discussed.  The patient understands that if the symptoms worsen the patient may return to the Emergency Department at any time for further evaluation and treatment.

## 2021-04-07 NOTE — ED ADULT NURSE NOTE - OBJECTIVE STATEMENT
pt c/o chills, congestion, room spinning while lying flat x3 days. negative covid test last week.  reports increased anxiety.    nad at present

## 2021-04-07 NOTE — ED ADULT NURSE NOTE - NSIMPLEMENTINTERV_GEN_ALL_ED
Implemented All Universal Safety Interventions:  [place] to call system. Call bell, personal items and telephone within reach. Instruct patient to call for assistance. Room bathroom lighting operational. Non-slip footwear when patient is off stretcher. Physically safe environment: no spills, clutter or unnecessary equipment. Stretcher in lowest position, wheels locked, appropriate side rails in place.

## 2021-04-07 NOTE — ED ADULT TRIAGE NOTE - CHIEF COMPLAINT QUOTE
pt c/o chills, congestion, room spinning while lying flat x3 days. negative covid test last week.  reports increased anxiety.

## 2021-04-07 NOTE — ED PROVIDER NOTE - NSFOLLOWUPCLINICS_GEN_ALL_ED_FT
[facility]  Family Medicine  [address] [place] [address]  Phone: [phone]  Fax:     [address]  Family Medicine  [address] [place] [address]  Phone: [phone]  Fax: [phone]

## 2021-04-07 NOTE — ED PROVIDER NOTE - OBJECTIVE STATEMENT
35M hx asthma, c/o feeling unwell for past 3 days. pt states having chills, congestion, dizziness. states feels like he's in a fog and having tightness across forehead. no n/v/d. no abd pain. no SOB. no chest pain. no recent travel. no sick contacts. pt states has lost weight over past 3 months unintentionally.  also states he had unprotected sex 4 mo ago and would like STD testing.

## 2021-04-07 NOTE — ED PROVIDER NOTE - NSFOLLOWUPINSTRUCTIONS_ED_ALL_ED_FT
Follow-up in medicine clinic.      Upper Respiratory Infection, Adult    An upper respiratory infection (URI) affects the nose, throat, and upper air passages. URIs are caused by germs (viruses). The most common type of URI is often called "the common cold."    Medicines cannot cure URIs, but you can do things at home to relieve your symptoms. URIs usually get better within 7–10 days.    Follow these instructions at home:    Activity     •Rest as needed.    •If you have a fever, stay home from work or school until your fever is gone, or until your doctor says you may return to work or school.  •You should stay home until you cannot spread the infection anymore (you are not contagious).    •Your doctor may have you wear a face mask so you have less risk of spreading the infection.    Relieving symptoms     •Gargle with a salt-water mixture 3–4 times a day or as needed. To make a salt-water mixture, completely dissolve ½–1 tsp of salt in 1 cup of warm water.    •Use a cool-mist humidifier to add moisture to the air. This can help you breathe more easily.    Eating and drinking      •Drink enough fluid to keep your pee (urine) pale yellow.    •Eat soups and other clear broths.    General instructions      •Take over-the-counter and prescription medicines only as told by your doctor. These include cold medicines, fever reducers, and cough suppressants.    • Do not use any products that contain nicotine or tobacco. These include cigarettes and e-cigarettes. If you need help quitting, ask your doctor.    •Avoid being where people are smoking (avoid secondhand smoke).    •Make sure you get regular shots and get the flu shot every year.    •Keep all follow-up visits as told by your doctor. This is important.    How to avoid spreading infection to others      •Wash your hands often with soap and water. If you do not have soap and water, use hand .    •Avoid touching your mouth, face, eyes, or nose.    •Cough or sneeze into a tissue or your sleeve or elbow. Do not cough or sneeze into your hand or into the air.    Contact a doctor if:    •You are getting worse, not better.    •You have any of these:  •A fever.    •Chills.    •Brown or red mucus in your nose.    •Yellow or brown fluid (discharge)coming from your nose.    •Pain in your face, especially when you bend forward.    •Swollen neck glands.    •Pain with swallowing.    •White areas in the back of your throat.    Get help right away if:    •You have shortness of breath that gets worse.    •You have very bad or constant:  •Headache.    •Ear pain.    •Pain in your forehead, behind your eyes, and over your cheekbones (sinus pain).    •Chest pain.    •You have long-lasting (chronic) lung disease along with any of these:  •Wheezing.    •Long-lasting cough.    •Coughing up blood.    •A change in your usual mucus.    •You have a stiff neck.    •You have changes in your:  •Vision.    •Hearing.    •Thinking.    •Mood.      Summary    •An upper respiratory infection (URI) is caused by a germ called a virus. The most common type of URI is often called "the common cold."    •URIs usually get better within 7–10 days.    •Take over-the-counter and prescription medicines only as told by your doctor.    This information is not intended to replace advice given to you by your health care provider. Make sure you discuss any questions you have with your health care provider.

## 2021-04-07 NOTE — ED PROVIDER NOTE - PATIENT PORTAL LINK FT
You can access the FollowMyHealth Patient Portal offered by [facility] by registering at the following website: [email or web address]. By joining [employer]’s FollowMyHealth portal, you will also be able to view your health information using other applications (apps) compatible with our system.

## 2021-04-08 VITALS
DIASTOLIC BLOOD PRESSURE: 74 MMHG | RESPIRATION RATE: 19 BRPM | OXYGEN SATURATION: 99 % | SYSTOLIC BLOOD PRESSURE: 128 MMHG | HEART RATE: 81 BPM | TEMPERATURE: 98 F

## 2021-04-08 LAB
ANISOCYTOSIS BLD QL: SLIGHT — SIGNIFICANT CHANGE UP
BASOPHILS # BLD AUTO: 0.24 K/UL — HIGH (ref 0–0.2)
BASOPHILS NFR BLD AUTO: 3.7 % — HIGH (ref 0–2)
EOSINOPHIL # BLD AUTO: 0.18 K/UL — SIGNIFICANT CHANGE UP (ref 0–0.5)
EOSINOPHIL NFR BLD AUTO: 2.8 % — SIGNIFICANT CHANGE UP (ref 0–6)
FLU A RESULT: SIGNIFICANT CHANGE UP
FLU A RESULT: SIGNIFICANT CHANGE UP
FLUAV AG NPH QL: SIGNIFICANT CHANGE UP
FLUBV AG NPH QL: SIGNIFICANT CHANGE UP
HIV 1+2 AB+HIV1 P24 AG SERPL QL IA: SIGNIFICANT CHANGE UP
HYPOCHROMIA BLD QL: SIGNIFICANT CHANGE UP
LYMPHOCYTES # BLD AUTO: 2.39 K/UL — SIGNIFICANT CHANGE UP (ref 1–3.3)
LYMPHOCYTES # BLD AUTO: 36.7 % — SIGNIFICANT CHANGE UP (ref 13–44)
MANUAL SMEAR VERIFICATION: SIGNIFICANT CHANGE UP
MICROCYTES BLD QL: SLIGHT — SIGNIFICANT CHANGE UP
MONOCYTES # BLD AUTO: 0.47 K/UL — SIGNIFICANT CHANGE UP (ref 0–0.9)
MONOCYTES NFR BLD AUTO: 7.3 % — SIGNIFICANT CHANGE UP (ref 2–14)
NEUTROPHILS # BLD AUTO: 3.16 K/UL — SIGNIFICANT CHANGE UP (ref 1.8–7.4)
NEUTROPHILS NFR BLD AUTO: 48.6 % — SIGNIFICANT CHANGE UP (ref 43–77)
OVALOCYTES BLD QL SMEAR: SLIGHT — SIGNIFICANT CHANGE UP
PLAT MORPH BLD: ABNORMAL
POIKILOCYTOSIS BLD QL AUTO: SLIGHT — SIGNIFICANT CHANGE UP
PROMYELOCYTES # FLD: 0.9 % — HIGH (ref 0–0)
RBC BLD AUTO: SIGNIFICANT CHANGE UP
RSV RESULT: SIGNIFICANT CHANGE UP
RSV RNA RESP QL NAA+PROBE: SIGNIFICANT CHANGE UP
SARS-COV-2 RNA SPEC QL NAA+PROBE: SIGNIFICANT CHANGE UP
SMUDGE CELLS # BLD: PRESENT — SIGNIFICANT CHANGE UP
STOMATOCYTES BLD QL SMEAR: SLIGHT — SIGNIFICANT CHANGE UP

## 2021-04-08 PROCEDURE — 71046 X-RAY EXAM CHEST 2 VIEWS: CPT

## 2021-04-08 PROCEDURE — 87631 RESP VIRUS 3-5 TARGETS: CPT

## 2021-04-08 PROCEDURE — 86780 TREPONEMA PALLIDUM: CPT

## 2021-04-08 PROCEDURE — 96361 HYDRATE IV INFUSION ADD-ON: CPT

## 2021-04-08 PROCEDURE — 71046 X-RAY EXAM CHEST 2 VIEWS: CPT | Mod: 26

## 2021-04-08 PROCEDURE — 84443 ASSAY THYROID STIM HORMONE: CPT

## 2021-04-08 PROCEDURE — 70450 CT HEAD/BRAIN W/O DYE: CPT

## 2021-04-08 PROCEDURE — 87491 CHLMYD TRACH DNA AMP PROBE: CPT

## 2021-04-08 PROCEDURE — 85025 COMPLETE CBC W/AUTO DIFF WBC: CPT

## 2021-04-08 PROCEDURE — 87635 SARS-COV-2 COVID-19 AMP PRB: CPT

## 2021-04-08 PROCEDURE — 96374 THER/PROPH/DIAG INJ IV PUSH: CPT

## 2021-04-08 PROCEDURE — 87389 HIV-1 AG W/HIV-1&-2 AB AG IA: CPT

## 2021-04-08 PROCEDURE — 36415 COLL VENOUS BLD VENIPUNCTURE: CPT

## 2021-04-08 PROCEDURE — 86593 SYPHILIS TEST NON-TREP QUANT: CPT

## 2021-04-08 PROCEDURE — 80053 COMPREHEN METABOLIC PANEL: CPT

## 2021-04-08 PROCEDURE — 87591 N.GONORRHOEAE DNA AMP PROB: CPT

## 2021-04-08 PROCEDURE — 86592 SYPHILIS TEST NON-TREP QUAL: CPT

## 2021-04-08 PROCEDURE — 99284 EMERGENCY DEPT VISIT MOD MDM: CPT | Mod: 25

## 2021-04-08 PROCEDURE — 11402 EXC TR-EXT B9+MARG 1.1-2 CM: CPT | Mod: 25

## 2021-04-08 RX ADMIN — SODIUM CHLORIDE 1000 MILLILITER(S): 9 INJECTION INTRAMUSCULAR; INTRAVENOUS; SUBCUTANEOUS at 00:35

## 2021-04-09 ENCOUNTER — EMERGENCY (EMERGENCY)
Facility: HOSPITAL | Age: 36
LOS: 1 days | Discharge: HOME | End: 2021-04-09
Attending: EMERGENCY MEDICINE | Admitting: EMERGENCY MEDICINE
Payer: MEDICAID

## 2021-04-09 VITALS
WEIGHT: 179.9 LBS | TEMPERATURE: 98 F | SYSTOLIC BLOOD PRESSURE: 113 MMHG | HEART RATE: 90 BPM | RESPIRATION RATE: 18 BRPM | HEIGHT: 74 IN | DIASTOLIC BLOOD PRESSURE: 54 MMHG | OXYGEN SATURATION: 99 %

## 2021-04-09 DIAGNOSIS — Z88.8 ALLERGY STATUS TO OTHER DRUGS, MEDICAMENTS AND BIOLOGICAL SUBSTANCES: ICD-10-CM

## 2021-04-09 DIAGNOSIS — Z91.018 ALLERGY TO OTHER FOODS: ICD-10-CM

## 2021-04-09 DIAGNOSIS — F32.9 MAJOR DEPRESSIVE DISORDER, SINGLE EPISODE, UNSPECIFIED: ICD-10-CM

## 2021-04-09 DIAGNOSIS — J45.909 UNSPECIFIED ASTHMA, UNCOMPLICATED: ICD-10-CM

## 2021-04-09 DIAGNOSIS — A53.9 SYPHILIS, UNSPECIFIED: ICD-10-CM

## 2021-04-09 DIAGNOSIS — Z79.899 OTHER LONG TERM (CURRENT) DRUG THERAPY: ICD-10-CM

## 2021-04-09 DIAGNOSIS — Z20.822 CONTACT WITH AND (SUSPECTED) EXPOSURE TO COVID-19: ICD-10-CM

## 2021-04-09 DIAGNOSIS — Z91.041 RADIOGRAPHIC DYE ALLERGY STATUS: ICD-10-CM

## 2021-04-09 DIAGNOSIS — Z79.51 LONG TERM (CURRENT) USE OF INHALED STEROIDS: ICD-10-CM

## 2021-04-09 DIAGNOSIS — Z88.1 ALLERGY STATUS TO OTHER ANTIBIOTIC AGENTS STATUS: ICD-10-CM

## 2021-04-09 DIAGNOSIS — Z88.0 ALLERGY STATUS TO PENICILLIN: ICD-10-CM

## 2021-04-09 DIAGNOSIS — Z88.6 ALLERGY STATUS TO ANALGESIC AGENT: ICD-10-CM

## 2021-04-09 LAB
C TRACH RRNA SPEC QL NAA+PROBE: SIGNIFICANT CHANGE UP
N GONORRHOEA RRNA SPEC QL NAA+PROBE: SIGNIFICANT CHANGE UP
RPR SER-TITR: (no result)
RPR SERPL-ACNC: REACTIVE
SARS-COV-2 RNA SPEC QL NAA+PROBE: SIGNIFICANT CHANGE UP
SPECIMEN SOURCE: SIGNIFICANT CHANGE UP
T PALLIDUM AB TITR SER: POSITIVE

## 2021-04-09 PROCEDURE — 99283 EMERGENCY DEPT VISIT LOW MDM: CPT | Mod: 25

## 2021-04-09 PROCEDURE — U0005: CPT

## 2021-04-09 PROCEDURE — U0003: CPT

## 2021-04-09 PROCEDURE — 99283 EMERGENCY DEPT VISIT LOW MDM: CPT

## 2021-04-09 PROCEDURE — 96372 THER/PROPH/DIAG INJ SC/IM: CPT

## 2021-04-09 RX ORDER — PENICILLIN G BENZATHINE 1200000 [IU]/2ML
2.4 INJECTION, SUSPENSION INTRAMUSCULAR ONCE
Refills: 0 | Status: COMPLETED | OUTPATIENT
Start: 2021-04-09 | End: 2021-04-09

## 2021-04-09 RX ADMIN — PENICILLIN G BENZATHINE 2.4 MILLION UNIT(S): 1200000 INJECTION, SUSPENSION INTRAMUSCULAR at 19:53

## 2021-04-09 NOTE — ED PROVIDER NOTE - CLINICAL SUMMARY MEDICAL DECISION MAKING FREE TEXT BOX
36 yo M with + RPR  seen in ED 1 day ago - will rx with IM PCG G 2.4  IM advised pt on safe sex practices

## 2021-04-09 NOTE — ED ADULT TRIAGE NOTE - CHIEF COMPLAINT QUOTE
Patient states "I was called and told I have syphilis" Patient states "I have head and neck pressure and chest pain and shortness of breathe".

## 2021-04-09 NOTE — ED PROVIDER NOTE - NSFOLLOWUPCLINICS_GEN_ALL_ED_FT
[facility]  Family Medicine  [address]  [place], [place] [address]  Phone: [phone]  Fax:   Follow Up Time: 4-6 Days

## 2021-04-09 NOTE — ED PROVIDER NOTE - OBJECTIVE STATEMENT
34 yo male seen in ED 1 day ago with multiple med complaints including possible STD in differential of sx- pt called back for + RPR titer / T. pallidum for treatment-- no fevers or chills currently no sob or chest pain elina po well nl laithtie

## 2021-04-09 NOTE — ED PROVIDER NOTE - ENMT NEGATIVE STATEMENT, MLM
The patient is a 27y Male complaining of dizziness.
Ears: no ear pain and no hearing problems. Nose: no nasal congestion and no nasal drainage. Mouth/Throat: no dysphagia, no hoarseness and no throat pain. Neck: no lumps, no pain, no stiffness and no swollen glands.

## 2021-04-09 NOTE — ED PROVIDER NOTE - PATIENT PORTAL LINK FT
You can access the FollowMyHealth Patient Portal offered by [facility] by registering at the following website: [email or web address]. By joining [employer]’s FollowMyHealth portal, you will also be able to view your health information using other applications (apps) compatible with our system.

## 2021-04-12 ENCOUNTER — TRANSCRIPTION ENCOUNTER (OUTPATIENT)
Age: 36
End: 2021-04-12

## 2021-08-24 ENCOUNTER — EMERGENCY (EMERGENCY)
Facility: HOSPITAL | Age: 36
LOS: 1 days | Discharge: HOME | End: 2021-08-24
Attending: EMERGENCY MEDICINE | Admitting: EMERGENCY MEDICINE
Payer: MEDICAID

## 2021-08-24 VITALS
HEART RATE: 80 BPM | RESPIRATION RATE: 18 BRPM | OXYGEN SATURATION: 98 % | SYSTOLIC BLOOD PRESSURE: 109 MMHG | DIASTOLIC BLOOD PRESSURE: 68 MMHG | TEMPERATURE: 98 F

## 2021-08-24 VITALS
TEMPERATURE: 98 F | OXYGEN SATURATION: 97 % | HEIGHT: 74 IN | SYSTOLIC BLOOD PRESSURE: 142 MMHG | DIASTOLIC BLOOD PRESSURE: 60 MMHG | WEIGHT: 184.97 LBS | RESPIRATION RATE: 18 BRPM | HEART RATE: 76 BPM

## 2021-08-24 DIAGNOSIS — Z88.0 ALLERGY STATUS TO PENICILLIN: ICD-10-CM

## 2021-08-24 DIAGNOSIS — J45.909 UNSPECIFIED ASTHMA, UNCOMPLICATED: ICD-10-CM

## 2021-08-24 DIAGNOSIS — Z91.041 RADIOGRAPHIC DYE ALLERGY STATUS: ICD-10-CM

## 2021-08-24 DIAGNOSIS — Z88.8 ALLERGY STATUS TO OTHER DRUGS, MEDICAMENTS AND BIOLOGICAL SUBSTANCES STATUS: ICD-10-CM

## 2021-08-24 DIAGNOSIS — Z20.822 CONTACT WITH AND (SUSPECTED) EXPOSURE TO COVID-19: ICD-10-CM

## 2021-08-24 DIAGNOSIS — F32.9 MAJOR DEPRESSIVE DISORDER, SINGLE EPISODE, UNSPECIFIED: ICD-10-CM

## 2021-08-24 DIAGNOSIS — Z88.6 ALLERGY STATUS TO ANALGESIC AGENT: ICD-10-CM

## 2021-08-24 DIAGNOSIS — Z79.51 LONG TERM (CURRENT) USE OF INHALED STEROIDS: ICD-10-CM

## 2021-08-24 DIAGNOSIS — Z91.018 ALLERGY TO OTHER FOODS: ICD-10-CM

## 2021-08-24 DIAGNOSIS — R07.89 OTHER CHEST PAIN: ICD-10-CM

## 2021-08-24 LAB
ALBUMIN SERPL ELPH-MCNC: 3.5 G/DL — SIGNIFICANT CHANGE UP (ref 3.4–5)
ALP SERPL-CCNC: 129 U/L — HIGH (ref 40–120)
ALT FLD-CCNC: 37 U/L — SIGNIFICANT CHANGE UP (ref 12–42)
ANION GAP SERPL CALC-SCNC: 9 MMOL/L — SIGNIFICANT CHANGE UP (ref 9–16)
ANISOCYTOSIS BLD QL: SLIGHT — SIGNIFICANT CHANGE UP
APTT BLD: 29.5 SEC — SIGNIFICANT CHANGE UP (ref 27.5–35.5)
AST SERPL-CCNC: 22 U/L — SIGNIFICANT CHANGE UP (ref 15–37)
BASOPHILS # BLD AUTO: 0.02 K/UL — SIGNIFICANT CHANGE UP (ref 0–0.2)
BASOPHILS NFR BLD AUTO: 0.4 % — SIGNIFICANT CHANGE UP (ref 0–2)
BILIRUB SERPL-MCNC: 0.2 MG/DL — SIGNIFICANT CHANGE UP (ref 0.2–1.2)
BUN SERPL-MCNC: 17 MG/DL — SIGNIFICANT CHANGE UP (ref 7–23)
CALCIUM SERPL-MCNC: 8.8 MG/DL — SIGNIFICANT CHANGE UP (ref 8.5–10.5)
CHLORIDE SERPL-SCNC: 106 MMOL/L — SIGNIFICANT CHANGE UP (ref 96–108)
CO2 SERPL-SCNC: 27 MMOL/L — SIGNIFICANT CHANGE UP (ref 22–31)
CREAT SERPL-MCNC: 1.11 MG/DL — SIGNIFICANT CHANGE UP (ref 0.5–1.3)
D DIMER BLD IA.RAPID-MCNC: <187 NG/ML DDU — SIGNIFICANT CHANGE UP
EOSINOPHIL # BLD AUTO: 0.23 K/UL — SIGNIFICANT CHANGE UP (ref 0–0.5)
EOSINOPHIL NFR BLD AUTO: 4.4 % — SIGNIFICANT CHANGE UP (ref 0–6)
GIANT PLATELETS BLD QL SMEAR: PRESENT — SIGNIFICANT CHANGE UP
GLUCOSE SERPL-MCNC: 94 MG/DL — SIGNIFICANT CHANGE UP (ref 70–99)
HCT VFR BLD CALC: 38.8 % — LOW (ref 39–50)
HGB BLD-MCNC: 12.3 G/DL — LOW (ref 13–17)
HYPOCHROMIA BLD QL: SLIGHT — SIGNIFICANT CHANGE UP
IMM GRANULOCYTES NFR BLD AUTO: 0.2 % — SIGNIFICANT CHANGE UP (ref 0–1.5)
INR BLD: 0.97 — SIGNIFICANT CHANGE UP (ref 0.88–1.16)
LYMPHOCYTES # BLD AUTO: 2.16 K/UL — SIGNIFICANT CHANGE UP (ref 1–3.3)
LYMPHOCYTES # BLD AUTO: 41.2 % — SIGNIFICANT CHANGE UP (ref 13–44)
MACROCYTES BLD QL: SLIGHT — SIGNIFICANT CHANGE UP
MANUAL SMEAR VERIFICATION: SIGNIFICANT CHANGE UP
MCHC RBC-ENTMCNC: 23.8 PG — LOW (ref 27–34)
MCHC RBC-ENTMCNC: 31.7 GM/DL — LOW (ref 32–36)
MCV RBC AUTO: 75 FL — LOW (ref 80–100)
MICROCYTES BLD QL: SLIGHT — SIGNIFICANT CHANGE UP
MONOCYTES # BLD AUTO: 0.66 K/UL — SIGNIFICANT CHANGE UP (ref 0–0.9)
MONOCYTES NFR BLD AUTO: 12.6 % — SIGNIFICANT CHANGE UP (ref 2–14)
NEUTROPHILS # BLD AUTO: 2.16 K/UL — SIGNIFICANT CHANGE UP (ref 1.8–7.4)
NEUTROPHILS NFR BLD AUTO: 41.2 % — LOW (ref 43–77)
NRBC # BLD: 0 /100 WBCS — SIGNIFICANT CHANGE UP (ref 0–0)
OVALOCYTES BLD QL SMEAR: SLIGHT — SIGNIFICANT CHANGE UP
PLAT MORPH BLD: ABNORMAL
PLATELET # BLD AUTO: 221 K/UL — SIGNIFICANT CHANGE UP (ref 150–400)
POTASSIUM SERPL-MCNC: 4.2 MMOL/L — SIGNIFICANT CHANGE UP (ref 3.5–5.3)
POTASSIUM SERPL-SCNC: 4.2 MMOL/L — SIGNIFICANT CHANGE UP (ref 3.5–5.3)
PROT SERPL-MCNC: 7.2 G/DL — SIGNIFICANT CHANGE UP (ref 6.4–8.2)
PROTHROM AB SERPL-ACNC: 11.7 SEC — SIGNIFICANT CHANGE UP (ref 10.6–13.6)
RBC # BLD: 5.17 M/UL — SIGNIFICANT CHANGE UP (ref 4.2–5.8)
RBC # FLD: 14.5 % — SIGNIFICANT CHANGE UP (ref 10.3–14.5)
RBC BLD AUTO: ABNORMAL
SARS-COV-2 RNA SPEC QL NAA+PROBE: SIGNIFICANT CHANGE UP
SMUDGE CELLS # BLD: PRESENT — SIGNIFICANT CHANGE UP
SODIUM SERPL-SCNC: 142 MMOL/L — SIGNIFICANT CHANGE UP (ref 132–145)
TROPONIN I SERPL-MCNC: <0.017 NG/ML — LOW (ref 0.02–0.06)
WBC # BLD: 5.24 K/UL — SIGNIFICANT CHANGE UP (ref 3.8–10.5)
WBC # FLD AUTO: 5.24 K/UL — SIGNIFICANT CHANGE UP (ref 3.8–10.5)

## 2021-08-24 PROCEDURE — 99285 EMERGENCY DEPT VISIT HI MDM: CPT | Mod: 25

## 2021-08-24 PROCEDURE — 93010 ELECTROCARDIOGRAM REPORT: CPT

## 2021-08-24 PROCEDURE — 71046 X-RAY EXAM CHEST 2 VIEWS: CPT | Mod: 26

## 2021-08-24 RX ORDER — ALBUTEROL 90 UG/1
1 AEROSOL, METERED ORAL ONCE
Refills: 0 | Status: COMPLETED | OUTPATIENT
Start: 2021-08-24 | End: 2021-08-24

## 2021-08-24 RX ORDER — ACETAMINOPHEN 500 MG
650 TABLET ORAL ONCE
Refills: 0 | Status: COMPLETED | OUTPATIENT
Start: 2021-08-24 | End: 2021-08-24

## 2021-08-24 RX ADMIN — ALBUTEROL 1 PUFF(S): 90 AEROSOL, METERED ORAL at 00:50

## 2021-08-24 RX ADMIN — Medication 650 MILLIGRAM(S): at 00:50

## 2021-08-24 NOTE — ED ADULT TRIAGE NOTE - CHIEF COMPLAINT QUOTE
Pt c/o mid sternum, sharp, intermittent chest pain x2 weeks, w/ dizziness and SOB x1 day upon exertion. Pt endorses hx of asthma, states he is not vaccinated for covid-19. Denies fevers or cough.

## 2021-08-24 NOTE — ED PROVIDER NOTE - CLINICAL SUMMARY MEDICAL DECISION MAKING FREE TEXT BOX
Patient presenting with mild SOB, intermittent mild CP and dizziness/?vertigo. Will send COVID swabs and screening labs and check CXR. Grayson frey.

## 2021-08-24 NOTE — ED PROVIDER NOTE - PHYSICAL EXAMINATION
VITAL SIGNS: I have reviewed nursing notes and confirm.  CONSTITUTIONAL: Well-developed; well-nourished; in no acute distress.  SKIN: Skin is warm and dry, no acute rash.  HEAD: Normocephalic; atraumatic.  EYES: Pupils round bilaterally, 3mm; conjunctiva and sclera clear.  ENT: No nasal discharge; airway clear, MMM.  NECK: Supple; non tender.  CARD: S1, S2 normal; no murmurs, gallops, or rubs. Regular rate and rhythm.  RESP: Scattered faint exp wheezes, no rales or rhonchi.  : No CVAT tenderness bilaterally   ABD: Soft; non-distended; non-tender; no rebound or guarding.  EXT: Normal ROM. No clubbing, cyanosis or edema.  NEURO: Alert, oriented. Grossly unremarkable. OLMAS, normal tone, no gross motor or sensory changes. Fluent speech.   PSYCH: Cooperative, appropriate. Mood and affect wnl.

## 2021-08-24 NOTE — ED PROVIDER NOTE - TEMPLATE, MLM
EXAM DATE/TIME:  05/12/2018 23:38 

 

[facility] COMPARISON:     

No previous studies available for comparison.

 

 

INDICATIONS :     

Left flank pain., History of cirrhosis and hepatitis C.

                  

 

ORAL CONTRAST:      

No oral contrast ingested.

                  

 

RADIATION DOSE:     

9.23 CTDIvol (mGy) 

 

 

MEDICAL HISTORY :     

Hypertension. Chronic obstructive pulmonary disease. Hepatitis C.

 

SURGICAL HISTORY :      

None. 

 

ENCOUNTER:      

Initial

 

ACUITY:      

1 day

 

PAIN SCALE:      

7/10

 

LOCATION:       

Left flank 

 

TECHNIQUE:     

Volumetric scanning of the abdomen and pelvis was performed.  Using automated exposure control and ad
justment of the mA and/or kV according to patient size, radiation dose was kept as low as reasonably 
achievable to obtain optimal diagnostic quality images.  DICOM format image data is available electro
nically for review and comparison.  

 

FINDINGS:     

 

LOWER LUNGS:     

The visualized lower lungs are clear.

 

LIVER:     

The liver is small and cirrhotic in appearance with lobular contours. There is no focal mass identifi
ed on this noncontrast study. The gallbladder appears unremarkable. There are no calcified gallstones
. Multiple varices are present in right upper abdomen.

 

SPLEEN:     

Normal size without lesion.

 

PANCREAS:     

Within normal limits. 

 

KIDNEYS:     

Normal in size and shape.  There is no solid mass, stone, or hydronephrosis. There are small cystic s
tructures in the right kidney.

 

ADRENAL GLANDS:     

Within normal limits.

 

VASCULAR:     

There is no aortic aneurysm.

 

BOWEL/MESENTERY:     

No oral contrast was given limiting sensitivity. The stomach, small bowel, and colon demonstrate no a
cute abnormality.  There is no free intraperitoneal air or fluid. 

 

ABDOMINAL WALL:     

Within normal limits.

 

RETROPERITONEUM:     

There is no lymphadenopathy.

 

BLADDER:     

No wall thickening or mass.

 

REPRODUCTIVE:     

Within normal limits.

 

INGUINAL:     

There is no lymphadenopathy or hernia.

 

MUSCULOSKELETAL:     

Degenerative changes and scoliosis are present in the lumbar spine.

 

CONCLUSION:     

1. Small cirrhotic liver.

2. Numerous varices in the upper abdomen.

3. The left kidney is within normal limits with no calculi or obstruction.

 

 

 

 Jason Das MD on May 12, 2018 at 23:50           

Board Certified Radiologist.

 This report was verified electronically.
General

## 2021-08-24 NOTE — ED ADULT NURSE NOTE - OBJECTIVE STATEMENT
Pt came in c/o of intermittent cp and sob during deep inspiration x2 weeks. Pt reports pmh of asthma. Denies fever, chills, cough, n/v/d. A&Ox3 speaking in full sentences. No s/s of acute distress. Will continue to monitor

## 2021-08-24 NOTE — ED PROVIDER NOTE - PATIENT PORTAL LINK FT
You can access the FollowMyHealth Patient Portal offered by [facility] by registering at the following website: [email or web address]. By joining [employer]’s FollowMyHealth portal, you will also be able to view your health information using other applications (apps) compatible with our system.

## 2021-08-24 NOTE — ED PROVIDER NOTE - OBJECTIVE STATEMENT
35 M presenting with intermittent mild pleuritic CP and SOB x 2 weeks. The have been worse for the past week or so. He has also had dizziness like vertigo sometime in the past week. He is not sure if he has URI sx of not. He is not vaccinated for COVID. He has asthma - hasn't needed albuterol in a long time. He is not Vaccinated for COVID. No vertigo now.

## 2021-08-24 NOTE — ED ADULT NURSE NOTE - NSIMPLEMENTINTERV_GEN_ALL_ED
Implemented All Universal Safety Interventions:  [place] to call system. Call bell, personal items and telephone within reach. Instruct patient to call for assistance. Room bathroom lighting operational. Non-slip footwear when patient is off stretcher. Physically safe environment: no spills, clutter or unnecessary equipment. Stretcher in lowest position, wheels locked, appropriate side rails in place.

## 2021-09-26 NOTE — ED PROVIDER NOTE - MUSCULOSKELETAL NEGATIVE STATEMENT, MLM
DISPLAY PLAN FREE TEXT no back pain, no gout, no musculoskeletal pain, no neck pain, and no weakness.

## 2021-09-29 ENCOUNTER — EMERGENCY (EMERGENCY)
Facility: HOSPITAL | Age: 36
LOS: 1 days | Discharge: HOME | End: 2021-09-29
Admitting: EMERGENCY MEDICINE
Payer: MEDICAID

## 2021-09-29 VITALS
DIASTOLIC BLOOD PRESSURE: 69 MMHG | TEMPERATURE: 98 F | HEIGHT: 74 IN | RESPIRATION RATE: 18 BRPM | SYSTOLIC BLOOD PRESSURE: 111 MMHG | WEIGHT: 184.97 LBS | HEART RATE: 83 BPM | OXYGEN SATURATION: 97 %

## 2021-09-29 VITALS
TEMPERATURE: 98 F | RESPIRATION RATE: 18 BRPM | OXYGEN SATURATION: 98 % | SYSTOLIC BLOOD PRESSURE: 119 MMHG | HEART RATE: 78 BPM | DIASTOLIC BLOOD PRESSURE: 70 MMHG

## 2021-09-29 LAB
ALBUMIN SERPL ELPH-MCNC: 3.8 G/DL — SIGNIFICANT CHANGE UP (ref 3.4–5)
ALP SERPL-CCNC: 78 U/L — SIGNIFICANT CHANGE UP (ref 40–120)
ALT FLD-CCNC: 19 U/L — SIGNIFICANT CHANGE UP (ref 12–42)
ANION GAP SERPL CALC-SCNC: 9 MMOL/L — SIGNIFICANT CHANGE UP (ref 9–16)
ANISOCYTOSIS BLD QL: SLIGHT — SIGNIFICANT CHANGE UP
AST SERPL-CCNC: 25 U/L — SIGNIFICANT CHANGE UP (ref 15–37)
BASOPHILS # BLD AUTO: 0.02 K/UL — SIGNIFICANT CHANGE UP (ref 0–0.2)
BASOPHILS NFR BLD AUTO: 0.5 % — SIGNIFICANT CHANGE UP (ref 0–2)
BILIRUB SERPL-MCNC: 0.3 MG/DL — SIGNIFICANT CHANGE UP (ref 0.2–1.2)
BUN SERPL-MCNC: 18 MG/DL — SIGNIFICANT CHANGE UP (ref 7–23)
CALCIUM SERPL-MCNC: 9.4 MG/DL — SIGNIFICANT CHANGE UP (ref 8.5–10.5)
CHLORIDE SERPL-SCNC: 106 MMOL/L — SIGNIFICANT CHANGE UP (ref 96–108)
CO2 SERPL-SCNC: 26 MMOL/L — SIGNIFICANT CHANGE UP (ref 22–31)
CREAT SERPL-MCNC: 1.08 MG/DL — SIGNIFICANT CHANGE UP (ref 0.5–1.3)
D DIMER BLD IA.RAPID-MCNC: <187 NG/ML DDU — SIGNIFICANT CHANGE UP
EOSINOPHIL # BLD AUTO: 0.15 K/UL — SIGNIFICANT CHANGE UP (ref 0–0.5)
EOSINOPHIL NFR BLD AUTO: 3.7 % — SIGNIFICANT CHANGE UP (ref 0–6)
GLUCOSE SERPL-MCNC: 112 MG/DL — HIGH (ref 70–99)
HCT VFR BLD CALC: 39.4 % — SIGNIFICANT CHANGE UP (ref 39–50)
HGB BLD-MCNC: 12.6 G/DL — LOW (ref 13–17)
HYPOCHROMIA BLD QL: SLIGHT — SIGNIFICANT CHANGE UP
IMM GRANULOCYTES NFR BLD AUTO: 0 % — SIGNIFICANT CHANGE UP (ref 0–1.5)
LIDOCAIN IGE QN: 145 U/L — SIGNIFICANT CHANGE UP (ref 73–393)
LYMPHOCYTES # BLD AUTO: 1.98 K/UL — SIGNIFICANT CHANGE UP (ref 1–3.3)
LYMPHOCYTES # BLD AUTO: 49.4 % — HIGH (ref 13–44)
MACROCYTES BLD QL: SLIGHT — SIGNIFICANT CHANGE UP
MANUAL SMEAR VERIFICATION: SIGNIFICANT CHANGE UP
MCHC RBC-ENTMCNC: 23.2 PG — LOW (ref 27–34)
MCHC RBC-ENTMCNC: 32 GM/DL — SIGNIFICANT CHANGE UP (ref 32–36)
MCV RBC AUTO: 72.7 FL — LOW (ref 80–100)
MICROCYTES BLD QL: SLIGHT — SIGNIFICANT CHANGE UP
MONOCYTES # BLD AUTO: 0.47 K/UL — SIGNIFICANT CHANGE UP (ref 0–0.9)
MONOCYTES NFR BLD AUTO: 11.7 % — SIGNIFICANT CHANGE UP (ref 2–14)
NEUTROPHILS # BLD AUTO: 1.39 K/UL — LOW (ref 1.8–7.4)
NEUTROPHILS NFR BLD AUTO: 34.7 % — LOW (ref 43–77)
NRBC # BLD: 0 /100 WBCS — SIGNIFICANT CHANGE UP (ref 0–0)
PLAT MORPH BLD: NORMAL — SIGNIFICANT CHANGE UP
PLATELET # BLD AUTO: 208 K/UL — SIGNIFICANT CHANGE UP (ref 150–400)
POTASSIUM SERPL-MCNC: 3.5 MMOL/L — SIGNIFICANT CHANGE UP (ref 3.5–5.3)
POTASSIUM SERPL-SCNC: 3.5 MMOL/L — SIGNIFICANT CHANGE UP (ref 3.5–5.3)
PROT SERPL-MCNC: 7.6 G/DL — SIGNIFICANT CHANGE UP (ref 6.4–8.2)
RBC # BLD: 5.42 M/UL — SIGNIFICANT CHANGE UP (ref 4.2–5.8)
RBC # FLD: 14.6 % — HIGH (ref 10.3–14.5)
RBC BLD AUTO: ABNORMAL
SODIUM SERPL-SCNC: 141 MMOL/L — SIGNIFICANT CHANGE UP (ref 132–145)
TROPONIN I SERPL-MCNC: <0.017 NG/ML — LOW (ref 0.02–0.06)
WBC # BLD: 4.01 K/UL — SIGNIFICANT CHANGE UP (ref 3.8–10.5)
WBC # FLD AUTO: 4.01 K/UL — SIGNIFICANT CHANGE UP (ref 3.8–10.5)

## 2021-09-29 PROCEDURE — 71046 X-RAY EXAM CHEST 2 VIEWS: CPT | Mod: 26

## 2021-09-29 PROCEDURE — 93010 ELECTROCARDIOGRAM REPORT: CPT

## 2021-09-29 PROCEDURE — 99285 EMERGENCY DEPT VISIT HI MDM: CPT

## 2021-09-29 RX ORDER — FAMOTIDINE 10 MG/ML
20 INJECTION INTRAVENOUS ONCE
Refills: 0 | Status: COMPLETED | OUTPATIENT
Start: 2021-09-29 | End: 2021-09-29

## 2021-09-29 RX ORDER — POLYETHYLENE GLYCOL 3350 17 G/17G
17 POWDER, FOR SOLUTION ORAL
Qty: 119 | Refills: 0
Start: 2021-09-29 | End: 2021-10-05

## 2021-09-29 RX ORDER — ALBUTEROL 90 UG/1
2 AEROSOL, METERED ORAL
Qty: 1 | Refills: 0
Start: 2021-09-29 | End: 2021-10-28

## 2021-09-29 RX ADMIN — FAMOTIDINE 20 MILLIGRAM(S): 10 INJECTION INTRAVENOUS at 03:06

## 2021-09-29 NOTE — ED PROVIDER NOTE - CLINICAL SUMMARY MEDICAL DECISION MAKING FREE TEXT BOX
36 yo m well appearing pw epigastric abd pain and pleuritic chest pain sharp mod in severity non radiating with no provoking or modifying factors, worse with deep inspiration. Additionally reports sob on ambulation ongoing for 4 d in duration. No n/v or diaphoresis. No unilateral swelling, hemoptysis, estrogen supplementation, malignancy, recent immobilization or surgery, or prior DVT/PE. No h/o smoking or early cardiac disease in family. Nl exam, trop and ddimer negative.

## 2021-09-29 NOTE — ED PROVIDER NOTE - NS ED ROS FT
Review of Systems    Constitutional: (-) fever  Eyes/ENT: (-) change in vision, (-) sore throat, (-) ear pain  Cardiovascular: (-) palpitation   Respiratory: (-) cough  Gastrointestinal: (-) vomiting, (-) diarrhea  Musculoskeletal: (-) neck pain, (-) back pain, (-) joint pain  Integumentary: (-) rash, (-) edema  Neurological: (-) headache, (-) altered mental status  Heme/Lymph: (-) easy bruising (-) easy bleeding (-) lymphadenopathy  Allergic/Immunologic: (-) pruritus

## 2021-09-29 NOTE — ED PROVIDER NOTE - PATIENT PORTAL LINK FT
You can access the FollowMyHealth Patient Portal offered by [facility] by registering at the following website: [email or web address]. By joining [email or web address]’s FollowMyHealth portal, you will also be able to view your health information using other applications (apps) compatible with our system.

## 2021-09-29 NOTE — ED ADULT NURSE NOTE - NSIMPLEMENTINTERV_GEN_ALL_ED
Implemented All Universal Safety Interventions:  [place] to call system. Call bell, personal items and telephone within reach. Instruct patient to call for assistance. Room bathroom lighting operational. Non-slip footwear when patient is off stretcher. Physically safe environment: no spills, clutter or unnecessary equipment. Stretcher in lowest position, wheels locked, appropriate side rails in place.

## 2021-09-29 NOTE — ED PROVIDER NOTE - PHYSICAL EXAMINATION
Physical Exam    Vital Signs: I have reviewed the initial vital signs.  Constitutional: well-nourished, appears stated age, no acute distress  Cardiovascular: regular rate, regular rhythm, well-perfused extremities  Respiratory: unlabored respiratory effort, clear to auscultation bilaterally  Gastrointestinal: soft, non-tender abdomen, no pulsatile mass  Musculoskeletal: supple neck, no lower extremity edema  Integumentary: warm, dry, no rash

## 2021-09-29 NOTE — ED PROVIDER NOTE - OBJECTIVE STATEMENT
34 yo m well appearing pw epigastric abd pain and pleuritic chest pain sharp mod in severity non radiating with no provoking or modifying factors, worse with deep inspiration. Additionally reports sob on ambulation ongoing for 4 d in duration. No n/v or diaphoresis. No unilateral swelling, hemoptysis, estrogen supplementation, malignancy, recent immobilization or surgery, or prior DVT/PE. No h/o smoking or early cardiac disease in family.     I have reviewed available current nursing and previous documentation of past medical, surgical, family, and/or social history.

## 2021-09-29 NOTE — ED PROVIDER NOTE - CARE PROVIDERS DIRECT ADDRESSES
[email or web address]

## 2021-09-29 NOTE — ED ADULT TRIAGE NOTE - CHIEF COMPLAINT QUOTE
Pt walks in c/o chest pain for 6 days and shortness of breath for 4 days. Pt speaking in complete sentences without difficulty, SpO2 97% on RA. PMH of asthma, pt takes albuterol pump daily.

## 2021-09-29 NOTE — ED PROVIDER NOTE - CARE PROVIDER_API CALL
Ramón Herring; PhD)  Cardiology; Internal Medicine  [address] [place] [address]  Phone: [phone]  Fax: [phone]  Follow Up Time: 1-3 Days    Reji Olivares)  Gastroenterology; Internal Medicine  [address] [place] [address]  Phone: [phone]  Fax: [phone]  Follow Up Time:

## 2021-10-01 DIAGNOSIS — Z91.041 RADIOGRAPHIC DYE ALLERGY STATUS: ICD-10-CM

## 2021-10-01 DIAGNOSIS — Z91.018 ALLERGY TO OTHER FOODS: ICD-10-CM

## 2021-10-01 DIAGNOSIS — R10.13 EPIGASTRIC PAIN: ICD-10-CM

## 2021-10-01 DIAGNOSIS — Z88.6 ALLERGY STATUS TO ANALGESIC AGENT: ICD-10-CM

## 2021-10-01 DIAGNOSIS — Z88.0 ALLERGY STATUS TO PENICILLIN: ICD-10-CM

## 2021-10-01 DIAGNOSIS — Z88.8 ALLERGY STATUS TO OTHER DRUGS, MEDICAMENTS AND BIOLOGICAL SUBSTANCES: ICD-10-CM

## 2021-10-01 DIAGNOSIS — R07.81 PLEURODYNIA: ICD-10-CM

## 2021-10-05 ENCOUNTER — EMERGENCY (EMERGENCY)
Facility: HOSPITAL | Age: 36
LOS: 1 days | Discharge: HOME | End: 2021-10-05
Attending: EMERGENCY MEDICINE | Admitting: EMERGENCY MEDICINE
Payer: MEDICAID

## 2021-10-05 VITALS
SYSTOLIC BLOOD PRESSURE: 135 MMHG | DIASTOLIC BLOOD PRESSURE: 81 MMHG | TEMPERATURE: 98 F | OXYGEN SATURATION: 98 % | HEART RATE: 78 BPM | RESPIRATION RATE: 18 BRPM

## 2021-10-05 VITALS
DIASTOLIC BLOOD PRESSURE: 75 MMHG | OXYGEN SATURATION: 97 % | HEART RATE: 86 BPM | WEIGHT: 184.97 LBS | RESPIRATION RATE: 17 BRPM | TEMPERATURE: 98 F | HEIGHT: 74 IN | SYSTOLIC BLOOD PRESSURE: 137 MMHG

## 2021-10-05 DIAGNOSIS — F32.9 MAJOR DEPRESSIVE DISORDER, SINGLE EPISODE, UNSPECIFIED: ICD-10-CM

## 2021-10-05 DIAGNOSIS — Z91.041 RADIOGRAPHIC DYE ALLERGY STATUS: ICD-10-CM

## 2021-10-05 DIAGNOSIS — Z79.51 LONG TERM (CURRENT) USE OF INHALED STEROIDS: ICD-10-CM

## 2021-10-05 DIAGNOSIS — R10.84 GENERALIZED ABDOMINAL PAIN: ICD-10-CM

## 2021-10-05 DIAGNOSIS — J45.909 UNSPECIFIED ASTHMA, UNCOMPLICATED: ICD-10-CM

## 2021-10-05 DIAGNOSIS — Z91.018 ALLERGY TO OTHER FOODS: ICD-10-CM

## 2021-10-05 DIAGNOSIS — Z88.8 ALLERGY STATUS TO OTHER DRUGS, MEDICAMENTS AND BIOLOGICAL SUBSTANCES STATUS: ICD-10-CM

## 2021-10-05 DIAGNOSIS — R06.02 SHORTNESS OF BREATH: ICD-10-CM

## 2021-10-05 DIAGNOSIS — Z20.822 CONTACT WITH AND (SUSPECTED) EXPOSURE TO COVID-19: ICD-10-CM

## 2021-10-05 DIAGNOSIS — Z88.0 ALLERGY STATUS TO PENICILLIN: ICD-10-CM

## 2021-10-05 DIAGNOSIS — R42 DIZZINESS AND GIDDINESS: ICD-10-CM

## 2021-10-05 LAB
ALBUMIN SERPL ELPH-MCNC: 4.1 G/DL — SIGNIFICANT CHANGE UP (ref 3.3–5)
ALP SERPL-CCNC: 79 U/L — SIGNIFICANT CHANGE UP (ref 40–120)
ALT FLD-CCNC: 14 U/L — SIGNIFICANT CHANGE UP (ref 10–45)
ANION GAP SERPL CALC-SCNC: 9 MMOL/L — SIGNIFICANT CHANGE UP (ref 5–17)
ANISOCYTOSIS BLD QL: SLIGHT — SIGNIFICANT CHANGE UP
APPEARANCE UR: CLEAR — SIGNIFICANT CHANGE UP
AST SERPL-CCNC: 19 U/L — SIGNIFICANT CHANGE UP (ref 10–40)
BASOPHILS # BLD AUTO: 0.04 K/UL — SIGNIFICANT CHANGE UP (ref 0–0.2)
BASOPHILS NFR BLD AUTO: 0.9 % — SIGNIFICANT CHANGE UP (ref 0–2)
BILIRUB SERPL-MCNC: 0.2 MG/DL — SIGNIFICANT CHANGE UP (ref 0.2–1.2)
BILIRUB UR-MCNC: NEGATIVE — SIGNIFICANT CHANGE UP
BUN SERPL-MCNC: 22 MG/DL — SIGNIFICANT CHANGE UP (ref 7–23)
CALCIUM SERPL-MCNC: 9.5 MG/DL — SIGNIFICANT CHANGE UP (ref 8.4–10.5)
CHLORIDE SERPL-SCNC: 107 MMOL/L — SIGNIFICANT CHANGE UP (ref 96–108)
CO2 SERPL-SCNC: 24 MMOL/L — SIGNIFICANT CHANGE UP (ref 22–31)
COLOR SPEC: YELLOW — SIGNIFICANT CHANGE UP
CREAT SERPL-MCNC: 0.96 MG/DL — SIGNIFICANT CHANGE UP (ref 0.5–1.3)
DIFF PNL FLD: NEGATIVE — SIGNIFICANT CHANGE UP
EOSINOPHIL # BLD AUTO: 0 K/UL — SIGNIFICANT CHANGE UP (ref 0–0.5)
EOSINOPHIL NFR BLD AUTO: 0 % — SIGNIFICANT CHANGE UP (ref 0–6)
GIANT PLATELETS BLD QL SMEAR: PRESENT — SIGNIFICANT CHANGE UP
GLUCOSE SERPL-MCNC: 96 MG/DL — SIGNIFICANT CHANGE UP (ref 70–99)
GLUCOSE UR QL: NEGATIVE — SIGNIFICANT CHANGE UP
HCT VFR BLD CALC: 41.2 % — SIGNIFICANT CHANGE UP (ref 39–50)
HGB BLD-MCNC: 12.9 G/DL — LOW (ref 13–17)
KETONES UR-MCNC: NEGATIVE — SIGNIFICANT CHANGE UP
LEUKOCYTE ESTERASE UR-ACNC: NEGATIVE — SIGNIFICANT CHANGE UP
LIDOCAIN IGE QN: 38 U/L — SIGNIFICANT CHANGE UP (ref 7–60)
LYMPHOCYTES # BLD AUTO: 2.38 K/UL — SIGNIFICANT CHANGE UP (ref 1–3.3)
LYMPHOCYTES # BLD AUTO: 49.1 % — HIGH (ref 13–44)
MANUAL SMEAR VERIFICATION: SIGNIFICANT CHANGE UP
MCHC RBC-ENTMCNC: 23.3 PG — LOW (ref 27–34)
MCHC RBC-ENTMCNC: 31.3 GM/DL — LOW (ref 32–36)
MCV RBC AUTO: 74.5 FL — LOW (ref 80–100)
MICROCYTES BLD QL: SLIGHT — SIGNIFICANT CHANGE UP
MONOCYTES # BLD AUTO: 0.55 K/UL — SIGNIFICANT CHANGE UP (ref 0–0.9)
MONOCYTES NFR BLD AUTO: 11.4 % — SIGNIFICANT CHANGE UP (ref 2–14)
NEUTROPHILS # BLD AUTO: 1.82 K/UL — SIGNIFICANT CHANGE UP (ref 1.8–7.4)
NEUTROPHILS NFR BLD AUTO: 37.7 % — LOW (ref 43–77)
NITRITE UR-MCNC: NEGATIVE — SIGNIFICANT CHANGE UP
OVALOCYTES BLD QL SMEAR: SLIGHT — SIGNIFICANT CHANGE UP
PH UR: 6.5 — SIGNIFICANT CHANGE UP (ref 5–8)
PLAT MORPH BLD: ABNORMAL
PLATELET # BLD AUTO: 210 K/UL — SIGNIFICANT CHANGE UP (ref 150–400)
POIKILOCYTOSIS BLD QL AUTO: SLIGHT — SIGNIFICANT CHANGE UP
POTASSIUM SERPL-MCNC: 4 MMOL/L — SIGNIFICANT CHANGE UP (ref 3.5–5.3)
POTASSIUM SERPL-SCNC: 4 MMOL/L — SIGNIFICANT CHANGE UP (ref 3.5–5.3)
PROT SERPL-MCNC: 7.6 G/DL — SIGNIFICANT CHANGE UP (ref 6–8.3)
PROT UR-MCNC: NEGATIVE MG/DL — SIGNIFICANT CHANGE UP
RBC # BLD: 5.53 M/UL — SIGNIFICANT CHANGE UP (ref 4.2–5.8)
RBC # FLD: 14.7 % — HIGH (ref 10.3–14.5)
RBC BLD AUTO: ABNORMAL
SARS-COV-2 RNA SPEC QL NAA+PROBE: SIGNIFICANT CHANGE UP
SMUDGE CELLS # BLD: PRESENT — SIGNIFICANT CHANGE UP
SODIUM SERPL-SCNC: 140 MMOL/L — SIGNIFICANT CHANGE UP (ref 135–145)
SP GR SPEC: 1.02 — SIGNIFICANT CHANGE UP (ref 1–1.03)
UROBILINOGEN FLD QL: 0.2 E.U./DL — SIGNIFICANT CHANGE UP
VARIANT LYMPHS # BLD: 0.9 % — SIGNIFICANT CHANGE UP (ref 0–6)
WBC # BLD: 4.84 K/UL — SIGNIFICANT CHANGE UP (ref 3.8–10.5)
WBC # FLD AUTO: 4.84 K/UL — SIGNIFICANT CHANGE UP (ref 3.8–10.5)

## 2021-10-05 PROCEDURE — 80053 COMPREHEN METABOLIC PANEL: CPT

## 2021-10-05 PROCEDURE — 99284 EMERGENCY DEPT VISIT MOD MDM: CPT | Mod: 25

## 2021-10-05 PROCEDURE — 85025 COMPLETE CBC W/AUTO DIFF WBC: CPT

## 2021-10-05 PROCEDURE — 96374 THER/PROPH/DIAG INJ IV PUSH: CPT

## 2021-10-05 PROCEDURE — U0005: CPT

## 2021-10-05 PROCEDURE — 83690 ASSAY OF LIPASE: CPT

## 2021-10-05 PROCEDURE — U0003: CPT

## 2021-10-05 PROCEDURE — 36415 COLL VENOUS BLD VENIPUNCTURE: CPT

## 2021-10-05 PROCEDURE — 81003 URINALYSIS AUTO W/O SCOPE: CPT

## 2021-10-05 PROCEDURE — 71046 X-RAY EXAM CHEST 2 VIEWS: CPT | Mod: 26

## 2021-10-05 PROCEDURE — 71046 X-RAY EXAM CHEST 2 VIEWS: CPT

## 2021-10-05 RX ORDER — FAMOTIDINE 10 MG/ML
20 INJECTION INTRAVENOUS ONCE
Refills: 0 | Status: COMPLETED | OUTPATIENT
Start: 2021-10-05 | End: 2021-10-05

## 2021-10-05 RX ADMIN — FAMOTIDINE 20 MILLIGRAM(S): 10 INJECTION INTRAVENOUS at 03:52

## 2021-10-05 RX ADMIN — Medication 30 MILLILITER(S): at 03:52

## 2021-10-05 NOTE — ED ADULT NURSE REASSESSMENT NOTE - NS ED NURSE REASSESS COMMENT FT1
Pt, with hx of asthma and depression, presents to ER room 6 with multiple complaints including SOB, dizziness, diffuse abdominal "sharp 7/10" worsening with ambulation, pain and 1 episode of bowel incontinence. Assessment as noted, orders received, IV access established, labs drawn and sent, waiting results.

## 2021-10-05 NOTE — ED PROVIDER NOTE - CLINICAL SUMMARY MEDICAL DECISION MAKING FREE TEXT BOX
34 yo m with pmh of asthma c/o generalized abd pain, sob and lightheadedness x 1.5 weeks. reports worsening yesterday. States pain is sharp and intermittent, sometimes worse with eating. Denies fever, chills, n/v/d, cp, cough. Denies smoking. pt seen at [facility] on 9/29 for epigastric pain, labs including ddimer neg. VSS. Well appearing. Lungs cta b/l. Abd soft, nt, nd. No focal neuro deficits. Will check labs, cxr, GI cocktail. Advised f/u with PMD.

## 2021-10-05 NOTE — ED PROVIDER NOTE - ATTENDING CONTRIBUTION TO CARE
reports non specific abdominal pain and dizziness. unable to describe. present more than a week. had visit at [facility] during that time and didn't mention symptoms. labs done then wn and repeated today, also normal. given gi cocktail with improvement. to f/u outpatient. return precautions discussed

## 2021-10-05 NOTE — ED PROVIDER NOTE - PATIENT PORTAL LINK FT
You can access the FollowMyHealth Patient Portal offered by [facility] by registering at the following website: [email or web address]. By joining [employer]’s FollowMyHealth portal, you will also be able to view your health information using other applications (apps) compatible with our system.

## 2021-10-05 NOTE — ED ADULT NURSE NOTE - OBJECTIVE STATEMENT
Pt, with hx of asthma and depression, presents to ER with multiple complaints including SOB, dizziness, diffuse abdominal "sharp 7/10" worsening with ambulation, pain and 1 episode of bowel incontinence.

## 2021-10-05 NOTE — ED PROVIDER NOTE - NSFOLLOWUPINSTRUCTIONS_ED_ALL_ED_FT
Abdominal Pain    Many things can cause abdominal pain. Many times, abdominal pain is not caused by a disease and will improve without treatment. Your health care provider will do a physical exam to determine if there is a dangerous cause of your pain; blood tests and imaging may help determine the cause of your pain. However, in many cases, no cause may be found and you may need further testing as an outpatient. Monitor your abdominal pain for any changes.     SEEK IMMEDIATE MEDICAL CARE IF YOU HAVE ANY OF THE FOLLOWING SYMPTOMS: worsening abdominal pain, uncontrollable vomiting, profuse diarrhea, inability to have bowel movements or pass gas, black or bloody stools, fever accompanying chest pain or back pain, or fainting. These symptoms may represent a serious problem that is an emergency. Do not wait to see if the symptoms will go away. Get medical help right away. Call 911 and do not drive yourself to the hospital.    Shortness of breath    Shortness of breath (dyspnea) means you have trouble breathing and could indicate a medical problem. Causes include lung disease, heart disease, low amount of red blood cells (anemia), poor physical fitness, being overweight, smoking, etc. Your health care provider today may not be able to find a cause for your shortness of breath after your exam. In this case, it is important to have a follow-up exam with your primary care physician as instructed. If medicines were prescribed, take them as directed for the full length of time directed. Refrain from tobacco products.    SEEK IMMEDIATE MEDICAL CARE IF YOU HAVE ANY OF THE FOLLOWING SYMPTOMS: worsening shortness of breath, chest pain, back pain, abdominal pain, fever, coughing up blood, lightheadedness/dizziness.    Dizziness    Dizziness can manifest as a feeling of unsteadiness or light-headedness. You may feel like you are about to faint. This condition can be caused by a number of things, including medicines, dehydration, or illness. Drink enough fluid to keep your urine clear or pale yellow. Do not drink alcohol and limit your caffeine intake. Avoid quick or sudden movements.  Rise slowly from chairs and steady yourself until you feel okay. In the morning, first sit up on the side of the bed.    SEEK IMMEDIATE MEDICAL CARE IF YOU HAVE ANY OF THE FOLLOWING SYMPTOMS: vomiting, changes in your vision or speech, weakness in your arms or legs, trouble speaking or swallowing, chest pain, abdominal pain, shortness of breath, sweating, bleeding, headache, neck pain, or fever.

## 2021-10-05 NOTE — ED PROVIDER NOTE - OBJECTIVE STATEMENT
36 yo m with pmh of asthma c/o generalized abd pain, sob and lightheadedness x 1.5 weeks. reports worsening yesterday. States pain is sharp and intermittent, sometimes worse with eating. Denies fever, chills, n/v/d, cp, cough. Denies smoking. pt seen at [facility] on 9/29 for epigastric pain, labs including ddimer neg, pt given pepcid and discharged.

## 2021-12-18 NOTE — ED ADULT NURSE NOTE - CHIEF COMPLAINT
Emailed accepting REJI list to pt's wife Michael Fabian. She will look it over, check rating on the medicare website, and choose one for pt. SW to f/u for wife's REJI choice. The patient is a 35y Male complaining of multiple medical complaints.
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
## 2022-01-26 NOTE — ED PROVIDER NOTE - INTERNATIONAL TRAVEL
-- DO NOT REPLY / DO NOT REPLY ALL --  -- Message is from the [facility]--    Patient is requesting a medication refill - medication is on active medication list    Patient is currently OUT of the requested medication.    Was Medication Pended?  Yes.    Rx Name and Dose:  Lisinopril 40 mg Patient advised has been out for the last three days- advised this refill is delayed every time. Please approve request for refill ASAP     Duration: 90 days    Pharmacy  [id number] [address] [facility]    Patient confirmed the above pharmacy as correct?  Yes    Does this request need an existing or new prescription at a pharmacy to be sent to a new pharmacy location?   No    Caller Information       Type Contact Phone    01/26/2022 09:16 AM CST Phone (Incoming) Polo Mcintosh (Self) [phone] (M)          Alternative phone number: none     Turnaround time given to caller:   \"This message will be sent to [state Provider's name]. The clinical team will fulfill your request as soon as they review your message.\"  
No

## 2022-02-04 NOTE — BEHAVIORAL HEALTH ASSESSMENT NOTE - NS ED BHA REVIEW OF ED CHART AVAILABLE LABS REVIEWED
"----- Message from Jade Parrish sent at 2/4/2022  2:26 PM CST -----  Regarding: Consult/Advisory:  Name Of Caller: Natalie- wife    Contact Preference?: [phone]    What is the nature of the call?: films were not received by MD Waldrop and was hoping to get a tracking number           Additional Notes:  "Thank you for all that you do for our patients'"     "
Tracking number was sent to pt. See portal message  
Yes

## 2022-02-14 ENCOUNTER — EMERGENCY (EMERGENCY)
Facility: HOSPITAL | Age: 37
LOS: 1 days | Discharge: HOME | End: 2022-02-14
Admitting: EMERGENCY MEDICINE
Payer: MEDICAID

## 2022-02-14 VITALS
OXYGEN SATURATION: 96 % | SYSTOLIC BLOOD PRESSURE: 108 MMHG | RESPIRATION RATE: 18 BRPM | HEIGHT: 74 IN | WEIGHT: 190.04 LBS | DIASTOLIC BLOOD PRESSURE: 67 MMHG | HEART RATE: 68 BPM | TEMPERATURE: 98 F

## 2022-02-14 DIAGNOSIS — Z88.0 ALLERGY STATUS TO PENICILLIN: ICD-10-CM

## 2022-02-14 DIAGNOSIS — R05.9 COUGH, UNSPECIFIED: ICD-10-CM

## 2022-02-14 DIAGNOSIS — R06.02 SHORTNESS OF BREATH: ICD-10-CM

## 2022-02-14 DIAGNOSIS — Z91.041 RADIOGRAPHIC DYE ALLERGY STATUS: ICD-10-CM

## 2022-02-14 DIAGNOSIS — Z20.822 CONTACT WITH AND (SUSPECTED) EXPOSURE TO COVID-19: ICD-10-CM

## 2022-02-14 DIAGNOSIS — Z88.6 ALLERGY STATUS TO ANALGESIC AGENT: ICD-10-CM

## 2022-02-14 DIAGNOSIS — R09.81 NASAL CONGESTION: ICD-10-CM

## 2022-02-14 DIAGNOSIS — Z88.8 ALLERGY STATUS TO OTHER DRUGS, MEDICAMENTS AND BIOLOGICAL SUBSTANCES STATUS: ICD-10-CM

## 2022-02-14 DIAGNOSIS — J45.909 UNSPECIFIED ASTHMA, UNCOMPLICATED: ICD-10-CM

## 2022-02-14 DIAGNOSIS — R06.7 SNEEZING: ICD-10-CM

## 2022-02-14 DIAGNOSIS — Z91.018 ALLERGY TO OTHER FOODS: ICD-10-CM

## 2022-02-14 LAB
FLUAV AG NPH QL: SIGNIFICANT CHANGE UP
FLUBV AG NPH QL: SIGNIFICANT CHANGE UP
RSV RNA NPH QL NAA+NON-PROBE: SIGNIFICANT CHANGE UP
SARS-COV-2 RNA SPEC QL NAA+PROBE: SIGNIFICANT CHANGE UP

## 2022-02-14 PROCEDURE — 71046 X-RAY EXAM CHEST 2 VIEWS: CPT | Mod: 26

## 2022-02-14 PROCEDURE — 71046 X-RAY EXAM CHEST 2 VIEWS: CPT

## 2022-02-14 PROCEDURE — 99284 EMERGENCY DEPT VISIT MOD MDM: CPT | Mod: 25

## 2022-02-14 PROCEDURE — 99283 EMERGENCY DEPT VISIT LOW MDM: CPT | Mod: 25

## 2022-02-14 PROCEDURE — 94640 AIRWAY INHALATION TREATMENT: CPT

## 2022-02-14 PROCEDURE — 87637 SARSCOV2&INF A&B&RSV AMP PRB: CPT

## 2022-02-14 RX ORDER — IPRATROPIUM/ALBUTEROL SULFATE 18-103MCG
3 AEROSOL WITH ADAPTER (GRAM) INHALATION ONCE
Refills: 0 | Status: COMPLETED | OUTPATIENT
Start: 2022-02-14 | End: 2022-02-14

## 2022-02-14 RX ADMIN — Medication 3 MILLILITER(S): at 10:43

## 2022-02-14 RX ADMIN — Medication 40 MILLIGRAM(S): at 10:43

## 2022-02-14 NOTE — ED PROVIDER NOTE - PATIENT PORTAL LINK FT
You can access the FollowMyHealth Patient Portal offered by [facility] by registering at the following website: [email or web address]. By joining [employer]’s FollowMyHealth portal, you will also be able to view your health information using other applications (apps) compatible with our system.

## 2022-02-14 NOTE — ED PROVIDER NOTE - DISCHARGE DATE
The patient is a 46 year old male presenting for an annual wellness visit. Patient appears in good general health and in no acute distress. Physical exam was normal. Patient should obtain fasting wellness labs for general screening and chronic condition management, refer to orders.  Patient was highly recommended to begin OTC Multi-vitamins QD or increase intake of fruits and vegetables to at least 2 servings each a day.  Cancer Screening Recommendations Reviewed:   Preventative diet & exercise related life-style changes discussed & recommended; I recommended the patient begin a regular exercise regimen of at least 30 minutes to 1 hour of exercise 2-3x a week.  Discussed water intake, dental care, depression screen, screening labs, family history and exam findings.  Discussed and recommended vaccines that may be needed.  Weight goals reviewed & discussed.    Recommended lifestyle education and preventative guideline literature given at the end of the visit.   14-Feb-2022

## 2022-02-14 NOTE — ED PROVIDER NOTE - CLINICAL SUMMARY MEDICAL DECISION MAKING FREE TEXT BOX
Cough, nasal congestion - likely viral etiology. Hx asthma, hospitalization/intubation as toddler.  No signs of resp distress, no tachycardia or tachypnea, no hypoxia.  Lungs clear.  No sign of DVT on exam, and no risk factors for PE.  Plan: steroid, neb, covid test, CXR.

## 2022-02-14 NOTE — ED PROVIDER NOTE - NSFOLLOWUPINSTRUCTIONS_ED_ALL_ED_FT
Take prednisone for next 3 days.  Albuterol every 4 hours as needed.  Tessalon Perles as prescribed for cough.    Please get Covid-19 vaccine.    Follow up at primary care provider 1-2 days.  If you need help arranging primary care follow up please call ED Referral Coordinator at [phone]    Return to the Emergency Department if you have any new or worsening symptoms, or if you have any concerns.  =====================    Acute Cough    WHAT YOU NEED TO KNOW:    An acute cough can last up to 3 weeks. Common causes of an acute cough include a cold, allergies, or a lung infection.    DISCHARGE INSTRUCTIONS:    Return to the emergency department if:   •You have trouble breathing or feel short of breath.      •You cough up blood, or you see blood in your mucus.      •You faint or feel weak or dizzy.      •You have chest pain when you cough or take a deep breath.      •You have new wheezing.      Contact your healthcare provider if:   •You have a fever.      •Your cough lasts longer than 4 weeks.      •Your symptoms do not improve with treatment.      •You have questions or concerns about your condition or care.      Medicines:   •Medicines may be needed to stop the cough, decrease swelling in your airways, or help open your airways. Medicine may also be given to help you cough up mucus. Ask your healthcare provider what over-the-counter medicines you can take. If you have an infection caused by bacteria, you may need antibiotics.      •Take your medicine as directed. Contact your healthcare provider if you think your medicine is not helping or if you have side effects. Tell him or her if you are allergic to any medicine. Keep a list of the medicines, vitamins, and herbs you take. Include the amounts, and when and why you take them. Bring the list or the pill bottles to follow-up visits. Carry your medicine list with you in case of an emergency.      Manage your symptoms:   •Do not smoke and stay away from others who smoke. Nicotine and other chemicals in cigarettes and cigars can cause lung damage and make your cough worse. Ask your healthcare provider for information if you currently smoke and need help to quit. E-cigarettes or smokeless tobacco still contain nicotine. Talk to your healthcare provider before you use these products.      •Drink extra liquids as directed. Liquids will help thin and loosen mucus so you can cough it up. Liquids will also help prevent dehydration. Examples of good liquids to drink include water, fruit juice, and broth. Do not drink liquids that contain caffeine. Caffeine can increase your risk for dehydration. Ask your healthcare provider how much liquid to drink each day.      •Rest as directed. Do not do activities that make your cough worse, such as exercise.      •Use a humidifier or vaporizer. Use a cool mist humidifier or a vaporizer to increase air moisture in your home. This may make it easier for you to breathe and help decrease your cough.      •Eat 2 to 5 mL of honey 2 times each day. Honey can help thin mucus and decrease your cough.      •Use cough drops or lozenges. These can help decrease throat irritation and your cough.      Follow up with your healthcare provider as directed: Write down your questions so you remember to ask them during your visits.

## 2022-02-14 NOTE — ED PROVIDER NOTE - OBJECTIVE STATEMENT
37 y/o M with PMHx asthma (intubated once as a toddler) presents to ED with c/o cough, nasal congestion, and sneezing for over a week. Admits to some pain with breathing last week that has since resolved. Pt still with persistent cough and mild SOB. Denies any fever, chills, sore throat, or N/V/D. Pt has been using albuterol 2x daily with little relief and has not taken anything today. Pt is not vaccinated against COVID-19 or influenza. 37 y/o M with PMHx asthma (intubated once as a toddler) presents to ED with c/o cough, nasal congestion, and sneezing for over a week. Admits to some pain with breathing last week that has since resolved. Pt still with persistent cough and mild SOB. Denies any fever, chills, sore throat, or N/V/D.  No hx DVT or PE. Pt has been using albuterol 2x daily with little relief and has not taken anything today. Requesting Tessalon Perles as it helped in the past. Pt is not vaccinated against COVID-19 or influenza.

## 2022-02-23 NOTE — DISCHARGE NOTE BEHAVIORAL HEALTH - NSBHDCRESPONSEFT_PSY_A_CORE
[facility]  New Patient Visit      Encounter date:   February 23, 2022    Referring Provider:   Johann Serna DO  [id number] [place] [address]    Chief Complaint: Epistaxis     History of Present Illness:   Crispin Rojas is a 61 year old male with history of diabetes mellitus type 2 on Asprin 325 for CVA since 2010 who presents for consultation regarding left sided epistaxis that was examined by his primary on 1/3/22. Here he tells me that he has appreciated a small growth on the left septum. Each morning he will need to blow the nose and this often has dried blood interlaced with mucus. He has not had to present to an ER for bleeding or be placed on antibiotics.     Sino-Nasal Outcome Test (SNOT - 22)  DNT    [place] Operative History:  No sinonasal surgeries    Review of systems: A 14-point review of systems has been conducted and was negative for any notable symptoms, except as dictated in the history of present illness.     Medical History:  Past Medical History:   Diagnosis Date     Cerebral infarction (H)     2010     Chronic infection      Hyperlipidemia LDL goal <70      Hypertension      Numbness and tingling      Obesity      GILA (obstructive sleep apnea) 10/22/2018    CPAP intolerant     PVD (peripheral vascular disease) (H)     s/p left partial toe amputation     Renal stone      Tremor      Type 2 diabetes mellitus with diabetic polyneuropathy, with long-term current use of insulin (H)         Surgical History:   Past Surgical History:   Procedure Laterality Date     AMPUTATE FOOT Left 8/18/2016    Procedure: AMPUTATE FOOT;  Surgeon: Jack Younger DPM;  Location: [facility] OR     AMPUTATE TOE(S) Right 2/1/2016    Procedure: AMPUTATE TOE(S);  Surgeon: Rachelle Manriquez DPM, Pod;  Location: [facility] OR     AMPUTATE TOE(S) Right 8/2/2019    Procedure: Right fourth toe partial amputation for treatment of osteomyelitis.;  Surgeon: Jack Younger DPM;  Location: [facility] OR     AMPUTATE  TOE(S) Left 11/8/2019    Procedure: Left second toe amputation at the metatarsophalangeal joint.;  Surgeon: Rachelle Manriquez DPM, Podiatry/Foot and Ankle Surgery;  Location: [facility] OR     ANGIOGRAM       COLONOSCOPY       COMBINED CYSTOSCOPY, RETROGRADES, URETEROSCOPY, INSERT STENT Left 8/21/2016    Procedure: COMBINED CYSTOSCOPY, RETROGRADES, URETEROSCOPY, INSERT STENT;  Surgeon: Artemio Valenzuela MD;  Location: [facility] OR     COMBINED CYSTOSCOPY, RETROGRADES, URETEROSCOPY, LASER HOLMIUM LITHOTRIPSY URETER(S), INSERT STENT Left 10/3/2016    Procedure: COMBINED CYSTOSCOPY, RETROGRADES, URETEROSCOPY, LASER HOLMIUM LITHOTRIPSY URETER(S), INSERT STENT;  Surgeon: Artemio Valenzuela MD;  Location: [facility] OR     EXTRACORPOREAL SHOCK WAVE LITHOTRIPSY (ESWL) Left 9/8/2016    Procedure: EXTRACORPOREAL SHOCK WAVE LITHOTRIPSY (ESWL);  Surgeon: Artemio Valenzuela MD;  Location: [facility] OR     RECESSION GASTROCNEMIUS Right 2/1/2016    Procedure: RECESSION GASTROCNEMIUS;  Surgeon: Rachelle Manriquez DPM, Pod;  Location: [facility] OR        Family History:  Family History   Problem Relation Age of Onset     Arthritis Mother         SLE     Connective Tissue Disorder Mother         lupus     Diabetes Mother      Cerebrovascular Disease Mother      Cancer Mother      Diabetes Father      Diabetes Maternal Grandfather      Diabetes Sister         Social History:   Social History     Socioeconomic History     Marital status:      Spouse name: Sydney     Number of children: 2     Years of education: Not on file     Highest education level: Master's degree (e.g., MA, MS, Arleth, MEd, MSW, ELIANA)   Occupational History     Occupation: marketing     Employer: [employer]     Comment: [employer]   Tobacco Use     Smoking status: Never Smoker     Smokeless tobacco: Never Used   Vaping Use     Vaping Use: Never used   Substance and Sexual Activity     Alcohol use: Yes     Alcohol/week: 0.0 standard drinks     Comment: rare---red wine 3x per month      Drug use: No     Sexual activity: Not Currently     Partners: Female   Other Topics Concern     Parent/sibling w/ CABG, MI or angioplasty before 65F 55M? No   Social History Narrative     Not on file     Social Determinants of Health     Financial Resource Strain: Low Risk      Difficulty of Paying Living Expenses: Not very hard   Food Insecurity: No Food Insecurity     Worried About Running Out of Food in the Last Year: Never true     Ran Out of Food in the Last Year: Never true   Transportation Needs: No Transportation Needs     Lack of Transportation (Medical): No     Lack of Transportation (Non-Medical): No   Physical Activity: Sufficiently Active     Days of Exercise per Week: 4 days     Minutes of Exercise per Session: 40 min   Stress: No Stress Concern Present     Feeling of Stress : Not at all   Social Connections: Moderately Isolated     Frequency of Communication with Friends and Family: Once a week     Frequency of Social Gatherings with Friends and Family: Never     Attends Anglican Services: More than 4 times per year     Active Member of Clubs or Organizations: No     Attends Club or Organization Meetings: Not on file     Marital Status:    Intimate Partner Violence: Not on file   Housing Stability: Low Risk      Unable to Pay for Housing in the Last Year: No     Number of Places Lived in the Last Year: 2     Unstable Housing in the Last Year: No        Physical Exam:  Vital signs: There were no vitals taken for this visit.   General Appearance: No acute distress, appropriate demeanor, conversant  Eyes: moist conjunctivae; EOMI; pupils symmetric; visual acuity grossly intact; no proptosis  Head: normocephalic; overall symmetric appearance without deformity  Face: overall symmetric without deformity; HB I/VI  Nose: No external deformity; septum deviated to the right; see endoscopy  Lungs: symmetric chest rise; no wheezing  CV: Good distal perfusion; normal hear rate  Extremities: No  deformity  Neurologic Exam: Cranial nerves II-XII are grossly intact; no focal deficit      Procedure Note  Procedure performed: Rigid nasal endoscopy  Indication: To evaluate for sinonasal pathology not visualized on routine anterior rhinoscopy  Anesthesia: 4% topical lidocaine with 0.05% oxymetazoline  Description of procedure: A 30 degree, 3 mm rigid endoscope was inserted into bilateral nasal cavities and the nasal valves, nasal cavity, middle meatus, sphenoethmoid recess, and nasopharynx were thoroughly evaluated for evidence of obstruction, edema, purulence, polyps and/or mass/lesion.     [place] Endoscopic Scoring System  Endoscopic observation Right Left   Polyps in middle meatus (0 = absent, 1 = restricted to middle meatus, 2 = Beyond middle meatus) 0 0   Discharge (0 = absent, 1 = thin and clear, 2 = thick, purulent) 0 0   Edema (0 = absent, 1 = mild-moderate, 2 = moderate-severe) 0 1   Crusting (0 = absent, 1 = mild-moderate, 2 = moderate-severe) 0 1   Scarring (0= absent, 1 = mild-moderate, 2 = moderate-severe) 0 0   Total 0 2     Findings  RT: MM and SER clear  LT: Crusting along the caudal septum; squamous metaplasia; otherwise MM and SER clear.   Nasopharynx clear     The patient tolerated the procedure well without complication.     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 He should avoid blowing/picking the nose. If there are no improvements over a month of treatment, he can follow up in person. We discussed difficulty in reversing this issue as it seems this has been a chronic, ongoing issue for many years.     Jack Tucker MD    [facility]  Rhinology  Endoscopic Skull Base Surgery  [facility]  [place]  Department of Otolaryngology - Head & Neck Surgery    Scribe Disclosure:  I, Walt Haley, am serving as a scribe to document services personally performed by Jack Tucker MD at this visit, based upon the provider's statements to me. All documentation has been reviewed by the aforementioned provider prior to being entered into the official medical record.    good

## 2022-03-31 NOTE — ED BEHAVIORAL HEALTH ASSESSMENT NOTE - NS ED BHA BILLING ATTENDING WO NP TRAINEE
Patient requested assistance with additional gas cards.  Patient was provided a total of $350 in gas cards from the [employer] transportation salty; however, that salty has closed.  A Rohan application was completed with patient and emailed to that agency.     
Other

## 2022-04-29 NOTE — ED BEHAVIORAL HEALTH ASSESSMENT NOTE - CURRENT INTENT
Problem: Discharge Planning  Goal: Discharge to home or other facility with appropriate resources  4/28/2022 2248 by Rinku Solis RN  Outcome: Progressing  4/28/2022 1200 by Rogerio Kurtz RN  Outcome: Progressing     Problem: Safety - Adult  Goal: Free from fall injury  4/28/2022 2248 by Rinku Solis RN  Outcome: Progressing  4/28/2022 1200 by Rogerio Kurtz RN  Outcome: Progressing     Problem: ABCDS Injury Assessment  Goal: Absence of physical injury  4/28/2022 2248 by Rinku Solis RN  Outcome: Progressing  4/28/2022 1200 by Rogerio Kurtz RN  Outcome: Progressing     Problem: Skin/Tissue Integrity  Goal: Absence of new skin breakdown  Description: 1. Monitor for areas of redness and/or skin breakdown  2. Assess vascular access sites hourly  3. Every 4-6 hours minimum:  Change oxygen saturation probe site  4. Every 4-6 hours:  If on nasal continuous positive airway pressure, respiratory therapy assess nares and determine need for appliance change or resting period.   4/28/2022 2248 by Rinku Solis RN  Outcome: Progressing  4/28/2022 1200 by Rogerio Kurtz RN  Outcome: Progressing None known

## 2022-05-03 NOTE — ED ADULT TRIAGE NOTE - AS HEIGHT TYPE
I was advised that pt returned my call while I was in clinic.    Returned pt's call.  Advised pt of heterozygous VHL mutation and implications of such.  Pt states she (pt) has told her mother about her BRCA mutation; advised pt I would not advise mother as to her VHL mutation without first obtaining pt's permission.  Transferred call to DOMI Brown MA, for post-test genetics visit to be scheduled.  
stated

## 2022-05-05 NOTE — ED PROVIDER NOTE - WR INTERPRETED BY 1
Pt scheduled with [facility] on 5/9. Aware if pain gets worse to come to UC to be evaluated. Sascha Giordano

## 2022-05-16 NOTE — ED ADULT NURSE NOTE - PLAN OF CARE DISCUSSED WITH:
Returned the call to mother , whom pleasant during phone conversation.    Accepted video visit follow up for May 18th at 130PM with Dr Gonzalez    Mother states she was informed patient should continue to see an \"MD\" after Dr Gonzalez departure.    Mother aware of contact number for the [employer].  Declined the phone number until patient has her video follow up on Wednesday to go forward.     Patient

## 2022-06-15 ENCOUNTER — EMERGENCY (EMERGENCY)
Facility: HOSPITAL | Age: 37
LOS: 1 days | Discharge: HOME | End: 2022-06-15
Admitting: EMERGENCY MEDICINE
Payer: MEDICAID

## 2022-06-15 VITALS
SYSTOLIC BLOOD PRESSURE: 114 MMHG | HEART RATE: 115 BPM | OXYGEN SATURATION: 98 % | RESPIRATION RATE: 18 BRPM | DIASTOLIC BLOOD PRESSURE: 70 MMHG | WEIGHT: 186.95 LBS | TEMPERATURE: 99 F | HEIGHT: 74 IN

## 2022-06-15 VITALS
HEART RATE: 93 BPM | TEMPERATURE: 98 F | SYSTOLIC BLOOD PRESSURE: 103 MMHG | RESPIRATION RATE: 18 BRPM | DIASTOLIC BLOOD PRESSURE: 68 MMHG | OXYGEN SATURATION: 97 %

## 2022-06-15 DIAGNOSIS — F32.A DEPRESSION, UNSPECIFIED: ICD-10-CM

## 2022-06-15 DIAGNOSIS — Z88.6 ALLERGY STATUS TO ANALGESIC AGENT: ICD-10-CM

## 2022-06-15 DIAGNOSIS — B97.89 OTHER VIRAL AGENTS AS THE CAUSE OF DISEASES CLASSIFIED ELSEWHERE: ICD-10-CM

## 2022-06-15 DIAGNOSIS — Z91.010 ALLERGY TO PEANUTS: ICD-10-CM

## 2022-06-15 DIAGNOSIS — Z88.8 ALLERGY STATUS TO OTHER DRUGS, MEDICAMENTS AND BIOLOGICAL SUBSTANCES: ICD-10-CM

## 2022-06-15 DIAGNOSIS — R05.9 COUGH, UNSPECIFIED: ICD-10-CM

## 2022-06-15 DIAGNOSIS — Z91.041 RADIOGRAPHIC DYE ALLERGY STATUS: ICD-10-CM

## 2022-06-15 DIAGNOSIS — J45.909 UNSPECIFIED ASTHMA, UNCOMPLICATED: ICD-10-CM

## 2022-06-15 DIAGNOSIS — U07.1 COVID-19: ICD-10-CM

## 2022-06-15 LAB
ALBUMIN SERPL ELPH-MCNC: 4 G/DL — SIGNIFICANT CHANGE UP (ref 3.4–5)
ALP SERPL-CCNC: 114 U/L — SIGNIFICANT CHANGE UP (ref 40–120)
ALT FLD-CCNC: 30 U/L — SIGNIFICANT CHANGE UP (ref 12–42)
ANION GAP SERPL CALC-SCNC: 6 MMOL/L — LOW (ref 9–16)
AST SERPL-CCNC: 22 U/L — SIGNIFICANT CHANGE UP (ref 15–37)
BASOPHILS # BLD AUTO: 0.02 K/UL — SIGNIFICANT CHANGE UP (ref 0–0.2)
BASOPHILS NFR BLD AUTO: 0.3 % — SIGNIFICANT CHANGE UP (ref 0–2)
BILIRUB SERPL-MCNC: 0.3 MG/DL — SIGNIFICANT CHANGE UP (ref 0.2–1.2)
BUN SERPL-MCNC: 15 MG/DL — SIGNIFICANT CHANGE UP (ref 7–23)
CALCIUM SERPL-MCNC: 9 MG/DL — SIGNIFICANT CHANGE UP (ref 8.5–10.5)
CHLORIDE SERPL-SCNC: 101 MMOL/L — SIGNIFICANT CHANGE UP (ref 96–108)
CO2 SERPL-SCNC: 29 MMOL/L — SIGNIFICANT CHANGE UP (ref 22–31)
CREAT SERPL-MCNC: 0.97 MG/DL — SIGNIFICANT CHANGE UP (ref 0.5–1.3)
D DIMER BLD IA.RAPID-MCNC: <187 NG/ML DDU — SIGNIFICANT CHANGE UP
EGFR: 105 ML/MIN/1.73M2 — SIGNIFICANT CHANGE UP
ELLIPTOCYTES BLD QL SMEAR: SLIGHT — SIGNIFICANT CHANGE UP
EOSINOPHIL # BLD AUTO: 0.15 K/UL — SIGNIFICANT CHANGE UP (ref 0–0.5)
EOSINOPHIL NFR BLD AUTO: 2.4 % — SIGNIFICANT CHANGE UP (ref 0–6)
FLUAV H1 2009 PAND RNA SPEC QL NAA+PROBE: SIGNIFICANT CHANGE UP
FLUAV H1 RNA SPEC QL NAA+PROBE: SIGNIFICANT CHANGE UP
FLUAV H3 RNA SPEC QL NAA+PROBE: SIGNIFICANT CHANGE UP
FLUAV SUBTYP SPEC NAA+PROBE: SIGNIFICANT CHANGE UP
FLUBV RNA SPEC QL NAA+PROBE: SIGNIFICANT CHANGE UP
GLUCOSE SERPL-MCNC: 100 MG/DL — HIGH (ref 70–99)
HCT VFR BLD CALC: 40.2 % — SIGNIFICANT CHANGE UP (ref 39–50)
HGB BLD-MCNC: 12.8 G/DL — LOW (ref 13–17)
IMM GRANULOCYTES NFR BLD AUTO: 0.3 % — SIGNIFICANT CHANGE UP (ref 0–1.5)
LG PLATELETS BLD QL AUTO: SLIGHT — SIGNIFICANT CHANGE UP
LYMPHOCYTES # BLD AUTO: 0.59 K/UL — LOW (ref 1–3.3)
LYMPHOCYTES # BLD AUTO: 9.4 % — LOW (ref 13–44)
MANUAL SMEAR VERIFICATION: SIGNIFICANT CHANGE UP
MCHC RBC-ENTMCNC: 23.3 PG — LOW (ref 27–34)
MCHC RBC-ENTMCNC: 31.8 GM/DL — LOW (ref 32–36)
MCV RBC AUTO: 73.2 FL — LOW (ref 80–100)
MICROCYTES BLD QL: SLIGHT — SIGNIFICANT CHANGE UP
MONOCYTES # BLD AUTO: 0.64 K/UL — SIGNIFICANT CHANGE UP (ref 0–0.9)
MONOCYTES NFR BLD AUTO: 10.1 % — SIGNIFICANT CHANGE UP (ref 2–14)
NEUTROPHILS # BLD AUTO: 4.89 K/UL — SIGNIFICANT CHANGE UP (ref 1.8–7.4)
NEUTROPHILS NFR BLD AUTO: 77.5 % — HIGH (ref 43–77)
NRBC # BLD: 0 /100 WBCS — SIGNIFICANT CHANGE UP (ref 0–0)
PLAT MORPH BLD: ABNORMAL
PLATELET # BLD AUTO: 199 K/UL — SIGNIFICANT CHANGE UP (ref 150–400)
PLATELET COUNT - ESTIMATE: ABNORMAL
POTASSIUM SERPL-MCNC: 4.1 MMOL/L — SIGNIFICANT CHANGE UP (ref 3.5–5.3)
POTASSIUM SERPL-SCNC: 4.1 MMOL/L — SIGNIFICANT CHANGE UP (ref 3.5–5.3)
PROT SERPL-MCNC: 7.8 G/DL — SIGNIFICANT CHANGE UP (ref 6.4–8.2)
RAPID RVP RESULT: DETECTED
RBC # BLD: 5.49 M/UL — SIGNIFICANT CHANGE UP (ref 4.2–5.8)
RBC # FLD: 14.4 % — SIGNIFICANT CHANGE UP (ref 10.3–14.5)
RBC BLD AUTO: ABNORMAL
RV+EV RNA SPEC QL NAA+PROBE: DETECTED
SARS-COV-2 RNA SPEC QL NAA+PROBE: DETECTED
SODIUM SERPL-SCNC: 136 MMOL/L — SIGNIFICANT CHANGE UP (ref 132–145)
WBC # BLD: 6.31 K/UL — SIGNIFICANT CHANGE UP (ref 3.8–10.5)
WBC # FLD AUTO: 6.31 K/UL — SIGNIFICANT CHANGE UP (ref 3.8–10.5)

## 2022-06-15 PROCEDURE — 99285 EMERGENCY DEPT VISIT HI MDM: CPT

## 2022-06-15 PROCEDURE — 71046 X-RAY EXAM CHEST 2 VIEWS: CPT | Mod: 26

## 2022-06-15 RX ORDER — ALBUTEROL 90 UG/1
2 AEROSOL, METERED ORAL ONCE
Refills: 0 | Status: COMPLETED | OUTPATIENT
Start: 2022-06-15 | End: 2022-06-15

## 2022-06-15 RX ORDER — ACETAMINOPHEN 500 MG
975 TABLET ORAL ONCE
Refills: 0 | Status: COMPLETED | OUTPATIENT
Start: 2022-06-15 | End: 2022-06-15

## 2022-06-15 RX ADMIN — Medication 975 MILLIGRAM(S): at 06:22

## 2022-06-15 RX ADMIN — ALBUTEROL 2 PUFF(S): 90 AEROSOL, METERED ORAL at 04:13

## 2022-06-15 NOTE — ED PROVIDER NOTE - MUSCULOSKELETAL, MLM
Spine appears normal, range of motion is not limited, no muscle or joint tenderness. No LE edema or calf ttp

## 2022-06-15 NOTE — ED ADULT NURSE REASSESSMENT NOTE - NS ED NURSE REASSESS COMMENT FT1
Pt positive for covid19. Educated on quarantine, hydration, and at home symptomatic control. Pt states he lives w/ a couple who has 4 kids.  Pt awaiting  for placement in covid shelter/hotel. Pt given pulse oximeter.
care assumed, awaiting sw

## 2022-06-15 NOTE — ED PROVIDER NOTE - OBJECTIVE STATEMENT
35 yo M, pmhx asthma, presenting to the ED w/ cough, sore throat, and SOB for the past week and a half. He reports the SOB feels like his asthma but he has run out of his inhaler. The cough is occasionally productive w/ white sputum. Pt is not vaccinated against COVID. He reports occasional pain in the neck [where his lymph nodes are located. +intermittent dizziness - none currently. He denies fevers, chills, CP, abd pain, headaches, recent travel, or sick contacts.

## 2022-06-15 NOTE — ED PROVIDER NOTE - CLINICAL SUMMARY MEDICAL DECISION MAKING FREE TEXT BOX
35 yo M, pmhx asthma, presenting to the ED w/ cough, sore throat, and SOB for the past week and a half. Pt is well appearing, calmly lying down, and no accessory muscle use. Will check medical labs inl CBC, CMP d-dimer and CXR. Will also give albuterol pump for asthma symptoms. Reassess.

## 2022-06-15 NOTE — ED PROVIDER NOTE - CARE PLAN
1 Principal Discharge DX:	COVID  Secondary Diagnosis:	Rhinovirus  Secondary Diagnosis:	Enterovirus infection

## 2022-06-15 NOTE — ED PROVIDER NOTE - PROGRESS NOTE DETAILS
Pt COVID/ENTERO/rhino virus positive  Will have patient speak w/ SW about COVID hotel placement  Pt stable while waiting

## 2022-06-15 NOTE — ED ADULT TRIAGE NOTE - CHIEF COMPLAINT QUOTE
walk in pt with complaints of sore throat cough intermittent dizziness and chest pain when coughing x 1 week. Pt reports hx asthma and ran out of his inhaler.

## 2022-06-15 NOTE — ED PROVIDER NOTE - NSFOLLOWUPINSTRUCTIONS_ED_ALL_ED_FT
COVID 19     If you were tested and discharged home today,     - results may take up to 1-2 days to become available   - if your result is positive, you will receive a phone call from one of our coronavirus specialists, or via text message or email (if you consented during your ER visit)  - negative result may not be communicated on time due to the sheer volume of testing from the ER. If you haven't heard from us within 7 days, you can check [facility] or call [address] [place] [address] (please DO NOT call the ER for results)    Please continue to self quarantine (home isolation) until your result is back and following instructions accordingly  - positive: complete home isolation for a total of 14 days since day of testing and no more fever with feeling back to baseline x 3 days   - negative: you will be able to stop home isolation but still practice standard precautions, similar to how you would manage a regular flu/cold (wear a mask/face covering, and practice frequent hand washing)     Return to ER for any worsening symptoms, such as shortness of breath with minimal exertion or at rest (pulse oximeter <93% persistently for over a couple of mins), coughing up bloody sputum, worsening/persistent chest pain, lethargy, fainting, and persistent fever >100.4F for several days    Please remember to wash your hands frequently, avoid touching your face, cover your cough/sneeze and try to stay 6 feet apart from others       You may use Tylenol/Acetaminophen 1000mg every 8 hours as needed (maximum daily dose of 4000mg) and/or Motrin/Ibuprofen 600mg every 6 hours as needed (maximum daily dose of 3000mg) for fever or pain control.     Please use an inhaler (if prescribed) for any persistent cough, shortness of breath, and wheezing    Please drink 2-3 liters of fluids (pedialyte, smart water, coconut water, etc.) to stay hydrated    Please follow up with your primary care doctor for reassessment

## 2022-06-15 NOTE — ED PROVIDER NOTE - PATIENT PORTAL LINK FT
You can access the FollowMyHealth Patient Portal offered by [facility] by registering at the following website: [email or web address]. By joining [employer]’s FollowMyHealth portal, you will also be able to view your health information using other applications (apps) compatible with our system.

## 2022-06-29 ENCOUNTER — INPATIENT (INPATIENT)
Facility: HOSPITAL | Age: 37
LOS: 12 days | Discharge: HOME | DRG: 881 | End: 2022-07-12
Attending: PSYCHIATRY & NEUROLOGY | Admitting: PSYCHIATRY & NEUROLOGY
Payer: MEDICAID

## 2022-06-29 VITALS
TEMPERATURE: 98 F | WEIGHT: 184.97 LBS | OXYGEN SATURATION: 98 % | HEIGHT: 74 IN | SYSTOLIC BLOOD PRESSURE: 107 MMHG | DIASTOLIC BLOOD PRESSURE: 66 MMHG | HEART RATE: 81 BPM | RESPIRATION RATE: 18 BRPM

## 2022-06-29 DIAGNOSIS — F32.1 MAJOR DEPRESSIVE DISORDER, SINGLE EPISODE, MODERATE: ICD-10-CM

## 2022-06-29 LAB
ALBUMIN SERPL ELPH-MCNC: 3.9 G/DL — SIGNIFICANT CHANGE UP (ref 3.4–5)
ALP SERPL-CCNC: 143 U/L — HIGH (ref 40–120)
ALT FLD-CCNC: 43 U/L — HIGH (ref 12–42)
ANION GAP SERPL CALC-SCNC: 6 MMOL/L — LOW (ref 9–16)
APPEARANCE UR: CLEAR — SIGNIFICANT CHANGE UP
AST SERPL-CCNC: 17 U/L — SIGNIFICANT CHANGE UP (ref 15–37)
BASOPHILS # BLD AUTO: 0.02 K/UL — SIGNIFICANT CHANGE UP (ref 0–0.2)
BASOPHILS NFR BLD AUTO: 0.4 % — SIGNIFICANT CHANGE UP (ref 0–2)
BILIRUB SERPL-MCNC: 0.2 MG/DL — SIGNIFICANT CHANGE UP (ref 0.2–1.2)
BILIRUB UR-MCNC: NEGATIVE — SIGNIFICANT CHANGE UP
BUN SERPL-MCNC: 13 MG/DL — SIGNIFICANT CHANGE UP (ref 7–23)
CALCIUM SERPL-MCNC: 8.9 MG/DL — SIGNIFICANT CHANGE UP (ref 8.5–10.5)
CHLORIDE SERPL-SCNC: 108 MMOL/L — SIGNIFICANT CHANGE UP (ref 96–108)
CO2 SERPL-SCNC: 28 MMOL/L — SIGNIFICANT CHANGE UP (ref 22–31)
COLOR SPEC: YELLOW — SIGNIFICANT CHANGE UP
CREAT SERPL-MCNC: 0.98 MG/DL — SIGNIFICANT CHANGE UP (ref 0.5–1.3)
DIFF PNL FLD: NEGATIVE — SIGNIFICANT CHANGE UP
EGFR: 104 ML/MIN/1.73M2 — SIGNIFICANT CHANGE UP
EOSINOPHIL # BLD AUTO: 0.17 K/UL — SIGNIFICANT CHANGE UP (ref 0–0.5)
EOSINOPHIL NFR BLD AUTO: 3.4 % — SIGNIFICANT CHANGE UP (ref 0–6)
ETHANOL SERPL-MCNC: <3 MG/DL — SIGNIFICANT CHANGE UP
GLUCOSE SERPL-MCNC: 90 MG/DL — SIGNIFICANT CHANGE UP (ref 70–99)
GLUCOSE UR QL: NEGATIVE — SIGNIFICANT CHANGE UP
HCT VFR BLD CALC: 39.3 % — SIGNIFICANT CHANGE UP (ref 39–50)
HGB BLD-MCNC: 12.4 G/DL — LOW (ref 13–17)
IMM GRANULOCYTES NFR BLD AUTO: 0.2 % — SIGNIFICANT CHANGE UP (ref 0–1.5)
KETONES UR-MCNC: NEGATIVE — SIGNIFICANT CHANGE UP
LEUKOCYTE ESTERASE UR-ACNC: NEGATIVE — SIGNIFICANT CHANGE UP
LYMPHOCYTES # BLD AUTO: 2.29 K/UL — SIGNIFICANT CHANGE UP (ref 1–3.3)
LYMPHOCYTES # BLD AUTO: 45.7 % — HIGH (ref 13–44)
MAGNESIUM SERPL-MCNC: 2.2 MG/DL — SIGNIFICANT CHANGE UP (ref 1.6–2.6)
MCHC RBC-ENTMCNC: 23.1 PG — LOW (ref 27–34)
MCHC RBC-ENTMCNC: 31.6 GM/DL — LOW (ref 32–36)
MCV RBC AUTO: 73.3 FL — LOW (ref 80–100)
MONOCYTES # BLD AUTO: 0.5 K/UL — SIGNIFICANT CHANGE UP (ref 0–0.9)
MONOCYTES NFR BLD AUTO: 10 % — SIGNIFICANT CHANGE UP (ref 2–14)
NEUTROPHILS # BLD AUTO: 2.02 K/UL — SIGNIFICANT CHANGE UP (ref 1.8–7.4)
NEUTROPHILS NFR BLD AUTO: 40.3 % — LOW (ref 43–77)
NITRITE UR-MCNC: NEGATIVE — SIGNIFICANT CHANGE UP
NRBC # BLD: 0 /100 WBCS — SIGNIFICANT CHANGE UP (ref 0–0)
PCP SPEC-MCNC: SIGNIFICANT CHANGE UP
PH UR: 6 — SIGNIFICANT CHANGE UP (ref 5–8)
PLATELET # BLD AUTO: 217 K/UL — SIGNIFICANT CHANGE UP (ref 150–400)
POTASSIUM SERPL-MCNC: 4.2 MMOL/L — SIGNIFICANT CHANGE UP (ref 3.5–5.3)
POTASSIUM SERPL-SCNC: 4.2 MMOL/L — SIGNIFICANT CHANGE UP (ref 3.5–5.3)
PROT SERPL-MCNC: 7.7 G/DL — SIGNIFICANT CHANGE UP (ref 6.4–8.2)
PROT UR-MCNC: NEGATIVE MG/DL — SIGNIFICANT CHANGE UP
RBC # BLD: 5.36 M/UL — SIGNIFICANT CHANGE UP (ref 4.2–5.8)
RBC # FLD: 14.2 % — SIGNIFICANT CHANGE UP (ref 10.3–14.5)
SARS-COV-2 RNA SPEC QL NAA+PROBE: DETECTED
SODIUM SERPL-SCNC: 142 MMOL/L — SIGNIFICANT CHANGE UP (ref 132–145)
SP GR SPEC: 1.02 — SIGNIFICANT CHANGE UP (ref 1–1.03)
TSH SERPL-MCNC: 2.07 UIU/ML — SIGNIFICANT CHANGE UP (ref 0.36–3.74)
UROBILINOGEN FLD QL: 0.2 E.U./DL — SIGNIFICANT CHANGE UP
WBC # BLD: 5.01 K/UL — SIGNIFICANT CHANGE UP (ref 3.8–10.5)
WBC # FLD AUTO: 5.01 K/UL — SIGNIFICANT CHANGE UP (ref 3.8–10.5)

## 2022-06-29 PROCEDURE — 90792 PSYCH DIAG EVAL W/MED SRVCS: CPT | Mod: 95

## 2022-06-29 PROCEDURE — 99285 EMERGENCY DEPT VISIT HI MDM: CPT

## 2022-06-29 PROCEDURE — 93010 ELECTROCARDIOGRAM REPORT: CPT

## 2022-06-29 RX ORDER — TRAZODONE HCL 50 MG
50 TABLET ORAL ONCE
Refills: 0 | Status: COMPLETED | OUTPATIENT
Start: 2022-06-29 | End: 2022-07-02

## 2022-06-29 NOTE — ED PROVIDER NOTE - OBJECTIVE STATEMENT
37 y/o male with PMHx of asthma, presents to the ED with suicidal ideation. Patient reports he has had this ideation for the past 2 weeks and has recently been dealing with emotional stress after losing both his grandmothers. Patient has had thoughts of taking pills or "jumping in a river" as a way to harm himself. Patient denies HI. He also reports mild flu-like symptoms, most notably a brain fog which began 3 days ago. Denies SOB, cough, nausea, vomiting, fever, chills, or chest pain.

## 2022-06-29 NOTE — ED PROVIDER NOTE - CLINICAL SUMMARY MEDICAL DECISION MAKING FREE TEXT BOX
37 y/o male with PMHx of asthma presents to the ED with suicidal ideation for the past 2 weeks after losing both grandmothers. Patient also endorses intermittent brain fog, and is COVID+. Will place on isolation protocol, send medical labs and clear for psych evaluation. 35 y/o male with PMHx of asthma presents to the ED with suicidal ideation for the past 2 weeks after losing both grandmothers. Patient also endorses intermittent brain fog, and is COVID+. Will place on isolation protocol, send medical labs and clear for psych evaluation. Likely admit to inpatient psych facility.

## 2022-06-29 NOTE — ED BEHAVIORAL HEALTH ASSESSMENT NOTE - NS ED BHA REVIEW OF ED CHART AVAILABLE LABS REVIEWED
10 mo ex 31 week twin gestation (hx of CPAP in NICU) with h/o tracheobronchomalacia now s/p out of hospital cardiac arrest with hypoxemic respiratory failure and pulmonary hemorrhage. found to have foreign body in R bronchus intermedius, s/p removal on 12/18.    Extubated 12/18.     Plan:    Resp:  RA., continuous pulse ox  goal sao2>90    CV:  HDS, Continue to monitor    FEN/GI:  pulled out NGT overnight and is feeding well. - no coughing with feeds  Taking adequate volumes    Neuro:  s/p brain MRI w/o evidence of HIE 12/21  MAURO scores  Methadone and clonidine taper over 4-5 days    Social:  reach out to s/w re; parental needs     Yes

## 2022-06-29 NOTE — ED PROVIDER NOTE - ATTENDING APP SHARED VISIT CONTRIBUTION OF CARE
Pt presents for depression symptoms and SI w a plan. Medically cleared for Psych assessment. Pt presents for depression symptoms and SI w a plan. Medically cleared for Psych assessment. .

## 2022-06-29 NOTE — ED BEHAVIORAL HEALTH ASSESSMENT NOTE - RISK ASSESSMENT
High Acute Suicide Risk RF: current worsening depression and current SI with plan and intent, lack of social support, recent loss of grandmothers, loss of employment  PF: help seeking

## 2022-06-29 NOTE — ED BEHAVIORAL HEALTH ASSESSMENT NOTE - HPI (INCLUDE ILLNESS QUALITY, SEVERITY, DURATION, TIMING, CONTEXT, MODIFYING FACTORS, ASSOCIATED SIGNS AND SYMPTOMS)
37yo M, domiciled in apartment with  couple in [place], unemployed for the past 2 months (previously in [employer]), past medical history of asthma, no prior psych history (psych hospitalizations/SAs/self harm/HI), denies legal history/substances who presents BIBS for worsening depression and SI.   Currently is COVID+.     On interview patient reports that he found out through Facebook that both of his grandmothers  about 1 month ago. States that this was devastating for him since both raised him. Patient reports he doesn't like his family and has been estranged from them since , when he moved from [place] to [facility]. When asked why he does not like them does not elaborate. Patient denies having friends or much social support in the city. States that he was "fired" from his work about 2 months ago as well. On interview he endorses worsening depression for the past 1 month. States that this past week he had passive SI and today was beginning to have active SI w plan to jump in a river or take pills. Pt states that he does not have anyone to speak to and has not reached out to a therapist/psychiatrist. Patient reports anhedonia, weight loss, sleeping only 3-4 hours a night, a decrease in energy, self-isolation, and feeling more angry and emotional. States he doesn't "like talking to anyone about things" so he has been withdrawing and not going out as much to play sports. Patient denies any paranoia, hallucinations, 35yo M, domiciled in apartment with  couple in [place], unemployed for the past 2 months (previously in [employer]), past medical history of asthma, no prior psych history (psych hospitalizations/SAs/self harm/HI), denies legal history/substances who presents BIBS for worsening depression and SI.   Currently is COVID+.     On interview patient reports that he found out through Facebook that both of his grandmothers  about 1 month ago. States that this was devastating for him since both raised him. Patient reports he doesn't like his family and has been estranged from them since , when he moved from [place] to [facility]. When asked why he does not like them does not elaborate. Patient denies having friends or much social support in the city. States that he lost his work about 2 months ago as well. On interview he endorses worsening depression for the past 1 month. States that this past week he had passive SI and today was beginning to have active SI w plan to jump in a river or take pills. Pt states that he does not have anyone to speak to and has not reached out to a therapist/psychiatrist. Patient reports anhedonia, weight loss, sleeping only 3-4 hours a night, a decrease in energy, self-isolation, and feeling more angry and emotional. States he doesn't "like talking to anyone about things" so he has been withdrawing and not going out as much to play sports. Patient denies any paranoia, hallucinations, HI.

## 2022-06-29 NOTE — ED ADULT NURSE REASSESSMENT NOTE - NS ED NURSE REASSESS COMMENT FT1
Received pt. from Jayy OROSCO. Pt is calm and cooperative. Pt is alert, verbally responsive and following command. 1:1 constant observation maintained per protocol. Sitter at bedside for monitoring. No sob, no nausea, no vomiting. Comfort and safety precautions maintained.  Will continue to monitor

## 2022-06-29 NOTE — ED PROVIDER NOTE - PROGRESS NOTE DETAILS
evaluated and accepted by telepsych for inpt psych management. Pt is calm and cooperative in the ED without additional requirement of medication.  incidental findings of COVID+ but asymptomatic and no supplemental O2 requirement. Pt is medically cleared for inpatient psychiatric intervention

## 2022-06-29 NOTE — ED PROVIDER NOTE - NS ED ATTENDING STATEMENT MOD
This was a shared visit with the MAYDA. I reviewed and verified the documentation and independently performed the documented:

## 2022-06-29 NOTE — ED BEHAVIORAL HEALTH NOTE - BEHAVIORAL HEALTH NOTE
===================  PRE-HOSPITAL COURSE  ===================  SOURCE:  ED RN and secondhand documentation   DETAILS:  Patient self-presented to the ED c/o SI and depression for past 2 months; no reported psych history.      ============  ED COURSE   ============  SOURCE: ED RN and secondhand documentation  ARRIVAL:  Per chart and RN, patient walked into ED, was noted to be calm and cooperative with triage process upon arrival.  BELONGINGS:  All belongings were provided to hospital security, and patient currently in a gown with a 1:1 staff member.  BEHAVIOR: Patient is AAOx4, well groomed with ?flat affect. Patient found to be covid positive in ED; in private room currently. Patient has been resting comfortably in bed. As per RN, patient endorsing SI without specific plan but stating “I can think of a lot of ways to do it”. Patient able to answer and respond to questions appropriately. Denies HI/AH/VH.   TREATMENT:  Patient did not require any PRNs in the ED.   VISITORS:  None noted at beside, patient arrived alone.

## 2022-06-29 NOTE — ED ADULT TRIAGE NOTE - CHIEF COMPLAINT QUOTE
Pt presents to ed reporting suicidal ideations and depression, getting worse in past 2 months. denies psych history, reports he's been feeling really down and depressed. no HI. denies drug/etoh use. no current SI plan, just thoughts

## 2022-06-29 NOTE — ED ADULT NURSE NOTE - OBJECTIVE STATEMENT
male patient c/o have suicidal thoughts x 2 weeks. denies having a plan to hurt himself; denies HI. patient is alert verbal oriented x3; calm and collected. pending initial provider eval. labs drawn. patient denies psych history.

## 2022-06-29 NOTE — ED PROVIDER NOTE - CHPI ED SYMPTOMS NEG
no fever, no chills, no nausea, no vomiting, no chest pain, no shortness of breath, no cough/no homicidal

## 2022-06-29 NOTE — ED BEHAVIORAL HEALTH ASSESSMENT NOTE - NSPRESENTSXS_PSY_ALL_CORE
Depressed mood/Anhedonia/Hopelessness or despair/Global insomnia/Refusal or inability to complete safety plan

## 2022-06-29 NOTE — ED PROVIDER NOTE - PSYCHIATRIC, MLM
Alert and oriented to person, place, time/situation with capacity. +Suicidal ideation. No homicidal ideation.

## 2022-06-30 LAB
COVID-19 SPIKE DOMAIN AB INTERP: NEGATIVE — SIGNIFICANT CHANGE UP
COVID-19 SPIKE DOMAIN ANTIBODY RESULT: 0.62 U/ML — SIGNIFICANT CHANGE UP
CULTURE RESULTS: SIGNIFICANT CHANGE UP
SARS-COV-2 IGG+IGM SERPL QL IA: 0.62 U/ML — SIGNIFICANT CHANGE UP
SARS-COV-2 IGG+IGM SERPL QL IA: NEGATIVE — SIGNIFICANT CHANGE UP
SPECIMEN SOURCE: SIGNIFICANT CHANGE UP

## 2022-06-30 PROCEDURE — 99223 1ST HOSP IP/OBS HIGH 75: CPT

## 2022-06-30 NOTE — BH INPATIENT PSYCHIATRY ASSESSMENT NOTE - NSBHCHARTREVIEWVS_PSY_A_CORE FT
Vital Signs Last 24 Hrs  T(C): 36.7 (06-30-22 @ 08:05), Max: 36.9 (06-30-22 @ 01:42)  T(F): 98 (06-30-22 @ 08:05), Max: 98.5 (06-30-22 @ 01:42)  HR: 62 (06-30-22 @ 08:05) (62 - 81)  BP: 106/65 (06-30-22 @ 08:05) (103/66 - 121/78)  BP(mean): --  RR: 18 (06-30-22 @ 08:05) (16 - 18)  SpO2: 98% (06-30-22 @ 08:05) (96% - 99%)     Vital Signs Last 24 Hrs  T(C): 36.6 (07-01-22 @ 08:14), Max: 36.9 (06-30-22 @ 16:44)  T(F): 97.9 (07-01-22 @ 08:14), Max: 98.4 (06-30-22 @ 16:44)  HR: 91 (07-01-22 @ 08:14) (65 - 91)  BP: 105/70 (07-01-22 @ 08:14) (105/65 - 105/70)  BP(mean): --  RR: 20 (07-01-22 @ 08:14) (18 - 20)  SpO2: 99% (07-01-22 @ 08:14) (98% - 99%)

## 2022-06-30 NOTE — BH INPATIENT PSYCHIATRY ASSESSMENT NOTE - RISK ASSESSMENT
RF: current worsening depression and current SI with plan and intent, lack of social support, recent loss of grandmothers, loss of employment  PF: help seeking

## 2022-06-30 NOTE — ED ADULT NURSE REASSESSMENT NOTE - NS ED NURSE REASSESS COMMENT FT1
Pt is calm and quiet. Pt is awake, alert and verbally responsive. No acute pain or respiratory distress noted. Pt picked up by EMS for transfer in stable condition.

## 2022-06-30 NOTE — ED ADULT NURSE REASSESSMENT NOTE - NS ED NURSE REASSESS COMMENT FT1
Pt is calm and cooperative. Pt is alert, verbally responsive and following command. 1:1 constant observation per protocol. Sitter at bedside for monitoring. Vital signs are stable. No sob, no nausea, no vomiting. Pt is medically clear for psych admission. Report given to floor nurse. Pt is pending for transportation. Comfort and safety precautions provided. Will continue to monitor

## 2022-06-30 NOTE — ED BEHAVIORAL HEALTH NOTE - BEHAVIORAL HEALTH NOTE
Patient interviewed and part B completed at [facility]. Orders have been entered and handoff provided to Inpatient MD Dr. Quiroz. Full ED BH Assessment is located in MRN [id number] at [facility] ED.

## 2022-06-30 NOTE — BH SOCIAL WORK INITIAL PSYCHOSOCIAL EVALUATION - OTHER PAST PSYCHIATRIC HISTORY (INCLUDE DETAILS REGARDING ONSET, COURSE OF ILLNESS, INPATIENT/OUTPATIENT TREATMENT)
Pt denied psych hx during interview. Per Psyckes, multiple visits to ED for SI. Was prescribed Mirtazapine in the past, last  date 3/5/21.

## 2022-06-30 NOTE — BH INPATIENT PSYCHIATRY ASSESSMENT NOTE - NSBHASSESSSUMMFT_PSY_ALL_CORE
This is a 35y/o single, unemployed, domiciled  male with medical hx significant for asthma. Denies formal psychiatric history, no hospitalizations, prior medication trials, suicide attempts, substance abuse, legal/criminal hx, no trauma/abuse. Patient self-presents to [facility] ED with complaints of worsening depression and SI. Is transferred to [facility] and admitted voluntary status. Patient placed in isolation due to +Covid 19 result    Patient presents, odd and guarded. Requests remeron to be initiated.    Remeron 15mg qhs to be initiated

## 2022-06-30 NOTE — BH INPATIENT PSYCHIATRY ASSESSMENT NOTE - NSBHATTESTAPPBILLTIME_PSY_A_CORE
I attest my time as MAYDA is greater than 50% of the total combined time spent on qualifying patient care activities. I have reviewed and verified the documentation.

## 2022-06-30 NOTE — BH INPATIENT PSYCHIATRY ASSESSMENT NOTE - NSBHMETABOLIC_PSY_ALL_CORE_FT
BMI: BMI (kg/m2): 23.7 (06-29-22 @ 06:27)  HbA1c:   Glucose:   BP: 106/65 (06-30-22 @ 08:05) (103/66 - 124/81)  Lipid Panel:  BMI: BMI (kg/m2): 23.7 (06-29-22 @ 06:27)  HbA1c: A1C with Estimated Average Glucose Result: 5.8 % (07-01-22 @ 11:12)    Glucose:   BP: 105/70 (07-01-22 @ 08:14) (103/66 - 124/81)  Lipid Panel: Date/Time: 07-01-22 @ 11:12  Cholesterol, Serum: 115  Direct LDL: --  HDL Cholesterol, Serum: 28  Total Cholesterol/HDL Ration Measurement: --  Triglycerides, Serum: 270

## 2022-06-30 NOTE — BH SOCIAL WORK INITIAL PSYCHOSOCIAL EVALUATION - NSBHEDUCURSCHOOL_PSY_ALL_CORE
PRN Medication Documentation    Specific patient behavior that led to need for PRN medication: Pt becoming agitated, calling nurse into room and stating 'I want my tompkin! \" Pt stood in doorway of room and shoved her walker across the pierce, hitting the portable computer. Staff interventions attempted prior to PRN being given: redirection, reassurance, attempted to determine what a 'tompkin' is. PRN medication given: Zyprexa PO  Patient response/effectiveness of PRN medication: will monitor    1938- Pt is sitting quietly in her room. 2035- pt still with irritable edge. Declines Pm snacks. No

## 2022-06-30 NOTE — BH INPATIENT PSYCHIATRY ASSESSMENT NOTE - HPI (INCLUDE ILLNESS QUALITY, SEVERITY, DURATION, TIMING, CONTEXT, MODIFYING FACTORS, ASSOCIATED SIGNS AND SYMPTOMS)
This is a 35y/o single, unemployed, domiciled  male with medical hx significant for asthma. Denies formal psychiatric history, no hospitalizations, prior medication trials, suicide attempts, substance abuse, legal/criminal hx, no trauma/abuse. Patient self-presents to [facility] ED with complaints of worsening depression and SI. Is transferred to [facility] and admitted voluntary status. Patient placed in isolation due to +Covid 19 result    Per ED report:    On interview patient reports that he found out through Facebook that both of his grandmothers  about 1 month ago. States that this was devastating for him since both raised him. Patient reports he doesn't like his family and has been estranged from them since 2016, when he moved from [place] to [facility]. When asked why he does not like them does not elaborate. Patient denies having friends or much social support in the city. States that he lost his work about 2 months ago as well. On interview he endorses worsening depression for the past 1 month. States that this past week he had passive SI and today was beginning to have active SI w plan to jump in a river or take pills. Pt states that he does not have anyone to speak to and has not reached out to a therapist/psychiatrist. Patient reports anhedonia, weight loss, sleeping only 3-4 hours a night, a decrease in energy, self-isolation, and feeling more angry and emotional. States he doesn't "like talking to anyone about things" so he has been withdrawing and not going out as much to play sports. Patient denies any paranoia, hallucinations, HI.  On the unit:  Patient initially uncooperative during interview, guarded and evasive stating that he doesn't really want to talk about why he feels suicidal and doesn't like to talk about things. He eventually states that a family member  ~4 months ago and over the last 2 weeks he has been feeling suicidal as a result. He has had thought of jumping into a lake. Denies hi/avh or PI. He also states that he has lost ~50lbs during the last several months due to poor appetite. Sleep has been poor, he gets 3-4hrs each night. He states that he currently lives with a couple and their kids. He has his own room and no one bothers him 'I'm positive and they are negative'. He shares that he has been covid19+ for some time now. Physical sxs include dizziness and brain fog.  He was last working in [employer] 4 months ago but was let go. However, finances are not a concern as he has savings to use. He is interested in being started on remeron and being referred to a therapist/psychiatrist. He reports feeling depressed and anxious he reports not having any friends or close contacts as he feels the people talk too much and have 'too much energy' which he does not like.

## 2022-06-30 NOTE — BH INPATIENT PSYCHIATRY ASSESSMENT NOTE - CURRENT MEDICATION
MEDICATIONS  (STANDING):    MEDICATIONS  (PRN):  traZODone 50 milliGRAM(s) Oral Once PRN insomnia   MEDICATIONS  (STANDING):  haloperidol     Tablet 5 milliGRAM(s) Oral every 6 hours  mirtazapine 15 milliGRAM(s) Oral at bedtime  multivitamin 1 Tablet(s) Oral daily    MEDICATIONS  (PRN):  acetaminophen     Tablet .. 650 milliGRAM(s) Oral every 6 hours PRN Moderate Pain (4 - 6)  aluminum hydroxide/magnesium hydroxide/simethicone Suspension 30 milliLiter(s) Oral every 6 hours PRN Dyspepsia  traZODone 50 milliGRAM(s) Oral Once PRN insomnia

## 2022-07-01 DIAGNOSIS — F60.9 PERSONALITY DISORDER, UNSPECIFIED: ICD-10-CM

## 2022-07-01 LAB
A1C WITH ESTIMATED AVERAGE GLUCOSE RESULT: 5.8 % — HIGH (ref 4–5.6)
CHOLEST SERPL-MCNC: 115 MG/DL — SIGNIFICANT CHANGE UP
ESTIMATED AVERAGE GLUCOSE: 120 MG/DL — HIGH (ref 68–114)
HDLC SERPL-MCNC: 28 MG/DL — LOW
LIPID PNL WITH DIRECT LDL SERPL: 33 MG/DL — SIGNIFICANT CHANGE UP
NON HDL CHOLESTEROL: 87 MG/DL — SIGNIFICANT CHANGE UP
TRIGL SERPL-MCNC: 270 MG/DL — HIGH

## 2022-07-01 PROCEDURE — 99233 SBSQ HOSP IP/OBS HIGH 50: CPT

## 2022-07-01 RX ORDER — ACETAMINOPHEN 500 MG
650 TABLET ORAL EVERY 6 HOURS
Refills: 0 | Status: DISCONTINUED | OUTPATIENT
Start: 2022-07-01 | End: 2022-07-12

## 2022-07-01 RX ORDER — HALOPERIDOL DECANOATE 100 MG/ML
5 INJECTION INTRAMUSCULAR EVERY 6 HOURS
Refills: 0 | Status: DISCONTINUED | OUTPATIENT
Start: 2022-07-01 | End: 2022-07-12

## 2022-07-01 RX ORDER — HALOPERIDOL DECANOATE 100 MG/ML
5 INJECTION INTRAMUSCULAR EVERY 6 HOURS
Refills: 0 | Status: DISCONTINUED | OUTPATIENT
Start: 2022-07-01 | End: 2022-07-01

## 2022-07-01 RX ORDER — MIRTAZAPINE 45 MG/1
15 TABLET, ORALLY DISINTEGRATING ORAL AT BEDTIME
Refills: 0 | Status: DISCONTINUED | OUTPATIENT
Start: 2022-07-01 | End: 2022-07-12

## 2022-07-01 RX ORDER — ALBUTEROL 90 UG/1
2 AEROSOL, METERED ORAL EVERY 6 HOURS
Refills: 0 | Status: DISCONTINUED | OUTPATIENT
Start: 2022-07-01 | End: 2022-07-12

## 2022-07-01 RX ADMIN — MIRTAZAPINE 15 MILLIGRAM(S): 45 TABLET, ORALLY DISINTEGRATING ORAL at 22:04

## 2022-07-01 NOTE — BH INPATIENT PSYCHIATRY PROGRESS NOTE - NSICDXBHSECONDARYDX_PSY_ALL_CORE
Current moderate episode of major depressive disorder without prior episode   F32.1  Cluster A personality disorder in adult   F60.9

## 2022-07-01 NOTE — BH INPATIENT PSYCHIATRY PROGRESS NOTE - NSBHASSESSSUMMFT_PSY_ALL_CORE
This is a 37y/o single, unemployed, domiciled  male with medical hx significant for asthma. Denies formal psychiatric history, no hospitalizations, prior medication trials, suicide attempts, substance abuse, legal/criminal hx, no trauma/abuse. Patient self-presents to [facility] ED with complaints of worsening depression and SI. Is transferred to [facility] and admitted voluntary status. Patient placed in isolation due to +Covid 19 result    Patient presents, odd and guarded. Requests remeron to be initiated.    Remeron 15mg qhs to be initiated

## 2022-07-01 NOTE — BH INPATIENT PSYCHIATRY PROGRESS NOTE - NSBHFUPINTERVALHXFT_PSY_A_CORE
Patient remains in isolation due to Covid 19+ status. Is adjusting fairly well to being admitted to unit. Optimistic about medication treatment and looking forward to starting remeron. Denies si/hi/avh or PI. Appetite has remained poor, but he states that he has been eating at each meal, he requests ensure to be added to diet. Linear, constricted affect.

## 2022-07-01 NOTE — BH INPATIENT PSYCHIATRY PROGRESS NOTE - NSBHMETABOLIC_PSY_ALL_CORE_FT
BMI: BMI (kg/m2): 23.7 (06-29-22 @ 06:27)  HbA1c: A1C with Estimated Average Glucose Result: 5.8 % (07-01-22 @ 11:12)    Glucose:   BP: 105/70 (07-01-22 @ 08:14) (103/66 - 124/81)  Lipid Panel: Date/Time: 07-01-22 @ 11:12  Cholesterol, Serum: 115  Direct LDL: --  HDL Cholesterol, Serum: 28  Total Cholesterol/HDL Ration Measurement: --  Triglycerides, Serum: 270

## 2022-07-01 NOTE — BH INPATIENT PSYCHIATRY PROGRESS NOTE - NSBHCHARTREVIEWVS_PSY_A_CORE FT
Vital Signs Last 24 Hrs  T(C): 36.6 (07-01-22 @ 08:14), Max: 36.9 (06-30-22 @ 16:44)  T(F): 97.9 (07-01-22 @ 08:14), Max: 98.4 (06-30-22 @ 16:44)  HR: 91 (07-01-22 @ 08:14) (65 - 91)  BP: 105/70 (07-01-22 @ 08:14) (105/65 - 105/70)  BP(mean): --  RR: 20 (07-01-22 @ 08:14) (18 - 20)  SpO2: 99% (07-01-22 @ 08:14) (98% - 99%)

## 2022-07-02 RX ADMIN — Medication 1 TABLET(S): at 09:43

## 2022-07-02 RX ADMIN — MIRTAZAPINE 15 MILLIGRAM(S): 45 TABLET, ORALLY DISINTEGRATING ORAL at 21:46

## 2022-07-02 RX ADMIN — Medication 50 MILLIGRAM(S): at 21:45

## 2022-07-02 NOTE — BH INPATIENT PSYCHIATRY PROGRESS NOTE - NSBHASSESSSUMMFT_PSY_ALL_CORE
This is a 37y/o single, unemployed, domiciled  male with medical hx significant for asthma. Denies formal psychiatric history, no hospitalizations, prior medication trials, suicide attempts, substance abuse, legal/criminal hx, no trauma/abuse. Patient self-presents to [facility] ED with complaints of worsening depression and SI. Is transferred to [facility] and admitted voluntary status. Patient placed in isolation due to +Covid 19 result    Patient presents, odd and guarded. Requests remeron to be initiated.    Remeron 15mg qhs to be initiated    7/2/22 - patient received first dose of mirtazapine last night; tolerating will with no major side effects; continuing to report SI but no longer has a plan

## 2022-07-02 NOTE — BH INPATIENT PSYCHIATRY PROGRESS NOTE - NSBHMETABOLIC_PSY_ALL_CORE_FT
BMI: BMI (kg/m2): 23.7 (06-29-22 @ 06:27)  HbA1c: A1C with Estimated Average Glucose Result: 5.8 % (07-01-22 @ 11:12)    Glucose:   BP: 111/68 (07-02-22 @ 08:13) (103/66 - 124/81)  Lipid Panel: Date/Time: 07-01-22 @ 11:12  Cholesterol, Serum: 115  Direct LDL: --  HDL Cholesterol, Serum: 28  Total Cholesterol/HDL Ration Measurement: --  Triglycerides, Serum: 270

## 2022-07-02 NOTE — BH INPATIENT PSYCHIATRY PROGRESS NOTE - NSBHCHARTREVIEWVS_PSY_A_CORE FT
Vital Signs Last 24 Hrs  T(C): 36.4 (07-02-22 @ 08:13), Max: 36.6 (07-01-22 @ 17:30)  T(F): 97.5 (07-02-22 @ 08:13), Max: 97.8 (07-01-22 @ 17:30)  HR: 60 (07-02-22 @ 08:13) (60 - 77)  BP: 111/68 (07-02-22 @ 08:13) (111/68 - 119/75)  BP(mean): --  RR: 18 (07-02-22 @ 08:13) (18 - 18)  SpO2: 98% (07-02-22 @ 08:13) (98% - 100%)

## 2022-07-02 NOTE — BH INPATIENT PSYCHIATRY PROGRESS NOTE - NSBHFUPINTERVALHXFT_PSY_A_CORE
Patient remains in isolation due to Covid 19+ status. Reports that his first dose of mirtazapine was last night; continuing to endorse suicidal ideation but reports that he no longer has a plan (which he reports he had prior to hospitalization). Denies HI/AVH. Reports no issues with appetite, concentration, energy. Has no questions or concerns. Denies any somatic complaints or concern for side effects with mirtazapine.    Per chart review, vitals within normal limits and no PRNs given.

## 2022-07-03 DIAGNOSIS — U07.1 COVID-19: ICD-10-CM

## 2022-07-03 PROCEDURE — 99233 SBSQ HOSP IP/OBS HIGH 50: CPT

## 2022-07-03 RX ADMIN — Medication 1 TABLET(S): at 10:29

## 2022-07-03 RX ADMIN — MIRTAZAPINE 15 MILLIGRAM(S): 45 TABLET, ORALLY DISINTEGRATING ORAL at 21:05

## 2022-07-03 NOTE — BH INPATIENT PSYCHIATRY PROGRESS NOTE - NSBHFUPINTERVALHXFT_PSY_A_CORE
Sleep  appetite ok; no pain issues.  Not endorsing  suicidal or homicidal ideation intent or plans; no mention of auditory or visual hallucinations Isolative; stays in bed; makes minimal eye contact; not conversational. Alert; oriented; cognition intact; speech clear; no tremor or evidence of movement impairment.  Desk in room is very neat; a little guarded and sad . constricted.  i&J: poor: limited if any awareness of medications; guarded or minimally acknowledging sxs; not reflective on issues that impact on symptoms

## 2022-07-03 NOTE — BH INPATIENT PSYCHIATRY PROGRESS NOTE - NSBHCHARTREVIEWVS_PSY_A_CORE FT
Vital Signs Last 24 Hrs  T(C): 36.1 (07-02-22 @ 17:03), Max: 36.1 (07-02-22 @ 17:03)  T(F): 97 (07-02-22 @ 17:03), Max: 97 (07-02-22 @ 17:03)  HR: 65 (07-02-22 @ 17:03) (65 - 65)  BP: 110/70 (07-02-22 @ 17:03) (110/70 - 110/70)  BP(mean): --  RR: 18 (07-02-22 @ 17:03) (18 - 18)  SpO2: 95% (07-02-22 @ 17:03) (95% - 95%)     Vital Signs Last 24 Hrs  T(C): 36.5 (07-03-22 @ 08:15), Max: 36.5 (07-03-22 @ 08:15)  T(F): 97.7 (07-03-22 @ 08:15), Max: 97.7 (07-03-22 @ 08:15)  HR: 79 (07-03-22 @ 08:15) (65 - 79)  BP: 94/61 (07-03-22 @ 08:15) (94/61 - 110/70)  BP(mean): --  RR: 18 (07-03-22 @ 08:15) (18 - 18)  SpO2: 98% (07-03-22 @ 08:15) (95% - 98%)

## 2022-07-03 NOTE — BH INPATIENT PSYCHIATRY PROGRESS NOTE - NSBHMETABOLIC_PSY_ALL_CORE_FT
BMI: BMI (kg/m2): 23.7 (06-29-22 @ 06:27)  HbA1c: A1C with Estimated Average Glucose Result: 5.8 % (07-01-22 @ 11:12)    Glucose:   BP: 110/70 (07-02-22 @ 17:03) (105/65 - 119/75)  Lipid Panel: Date/Time: 07-01-22 @ 11:12  Cholesterol, Serum: 115  Direct LDL: --  HDL Cholesterol, Serum: 28  Total Cholesterol/HDL Ration Measurement: --  Triglycerides, Serum: 270   BMI: BMI (kg/m2): 23.7 (06-29-22 @ 06:27)  HbA1c: A1C with Estimated Average Glucose Result: 5.8 % (07-01-22 @ 11:12)    Glucose:   BP: 94/61 (07-03-22 @ 08:15) (94/61 - 119/75)  Lipid Panel: Date/Time: 07-01-22 @ 11:12  Cholesterol, Serum: 115  Direct LDL: --  HDL Cholesterol, Serum: 28  Total Cholesterol/HDL Ration Measurement: --  Triglycerides, Serum: 270

## 2022-07-03 NOTE — BH INPATIENT PSYCHIATRY PROGRESS NOTE - NSBHASSESSSUMMFT_PSY_ALL_CORE
This is a 35y/o single, unemployed, domiciled  male with medical hx significant for asthma. Denies formal psychiatric history, no hospitalizations, prior medication trials, suicide attempts, substance abuse, legal/criminal hx, no trauma/abuse. Patient self-presents to [facility] ED with complaints of worsening depression and SI. Is transferred to [facility] and admitted voluntary status. Patient placed in isolation due to +Covid 19 result    Patient presents, odd and guarded. Requests remeron to be initiated.    Remeron 15mg qhs to be initiated    7/2/22 - patient received first dose of mirtazapine last night; tolerating will with no major side effects; continuing to report SI but no longer has a plan.  In isolation because of COVID positivity: COVID-19 PCR: Detected (29 Jun 2022 06:37)      ;;07/03: Isolative; stays in bed; makes minimal eye contact; not conversational. sad; guarded.  denies s/h I/i/p or avh.  continue current regime:  Current medications acetaminophen     Tablet .. 650 milliGRAM(s) Oral every 6 hours PRN  ALBUTerol    90 MICROgram(s) HFA Inhaler 2 Puff(s) Inhalation every 6 hours PRN  aluminum hydroxide/magnesium hydroxide/simethicone Suspension 30 milliLiter(s) Oral every 6 hours PRN  haloperidol     Tablet 5 milliGRAM(s) Oral every 6 hours PRN  mirtazapine 15 milliGRAM(s) Oral at bedtime  multivitamin 1 Tablet(s) Oral daily

## 2022-07-03 NOTE — BH INPATIENT PSYCHIATRY PROGRESS NOTE - NSICDXBHSECONDARYDX_PSY_ALL_CORE
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

## 2022-07-04 RX ADMIN — Medication 1 TABLET(S): at 15:11

## 2022-07-04 RX ADMIN — MIRTAZAPINE 15 MILLIGRAM(S): 45 TABLET, ORALLY DISINTEGRATING ORAL at 21:46

## 2022-07-05 PROCEDURE — 99233 SBSQ HOSP IP/OBS HIGH 50: CPT

## 2022-07-05 RX ORDER — QUETIAPINE FUMARATE 200 MG/1
50 TABLET, FILM COATED ORAL DAILY
Refills: 0 | Status: DISCONTINUED | OUTPATIENT
Start: 2022-07-05 | End: 2022-07-12

## 2022-07-05 RX ADMIN — Medication 1 TABLET(S): at 10:10

## 2022-07-05 RX ADMIN — MIRTAZAPINE 15 MILLIGRAM(S): 45 TABLET, ORALLY DISINTEGRATING ORAL at 21:29

## 2022-07-05 NOTE — BH INPATIENT PSYCHIATRY PROGRESS NOTE - NSBHFUPINTERVALHXFT_PSY_A_CORE
Patient remains in [facility] due to Covid19+ status. Appears edgy and irritable on approach, seems to be very upset by another pt also in the area yelling at staff. He expressed not wanting to be involved in the 'drama.' Reports an increase level of stress since his admission. Pt very odd, guarded but reactive. States that his si has improved, no longer having a plan or intent. Mood has minimally improved. Sleep and appetite have improved. Reports daytime sedation as a result of the mirtazipine. States that he has not contacted any family or friends, essentially wanting to be left alone and not involve anyone. No acute medical concerns. No hi/avh or PI.

## 2022-07-05 NOTE — BH INPATIENT PSYCHIATRY PROGRESS NOTE - NSICDXBHSECONDARYDX_PSY_ALL_CORE
Current moderate episode of major depressive disorder without prior episode   F32.1  Cluster A personality disorder in adult   F60.9  2019 novel coronavirus disease (COVID-19)   U07.1

## 2022-07-05 NOTE — BH INPATIENT PSYCHIATRY PROGRESS NOTE - NSBHASSESSSUMMFT_PSY_ALL_CORE
This is a 37y/o single, unemployed, domiciled  male with medical hx significant for asthma. Denies formal psychiatric history, no hospitalizations, prior medication trials, suicide attempts, substance abuse, legal/criminal hx, no trauma/abuse. Patient self-presents to [facility] ED with complaints of worsening depression and SI. Is transferred to [facility] and admitted voluntary status. Patient placed in isolation due to +Covid 19 result    Patient presents, odd and guarded. Requests remeron to be initiated.    Remeron 15mg qhs to be initiated    7/2/22 - patient received first dose of mirtazapine last night; tolerating will with no major side effects; continuing to report SI but no longer has a plan.  In isolation because of COVID positivity: COVID-19 PCR: Detected (29 Jun 2022 06:37)  ;;07/03: Isolative; stays in bed; makes minimal eye contact; not conversational. sad; guarded.  denies s/h I/i/p or avh.  continue current regime:  Current medications acetaminophen     Tablet .. 650 milliGRAM(s) Oral every 6 hours PRN  ALBUTerol    90 MICROgram(s) HFA Inhaler 2 Puff(s) Inhalation every 6 hours PRN  aluminum hydroxide/magnesium hydroxide/simethicone Suspension 30 milliLiter(s) Oral every 6 hours PRN  haloperidol     Tablet 5 milliGRAM(s) Oral every 6 hours PRN  mirtazapine 15 milliGRAM(s) Oral at bedtime  multivitamin 1 Tablet(s) Oral daily  7/5 remains in isolation, tolerating remeron well, remains odd/eccentric, guarded

## 2022-07-05 NOTE — BH INPATIENT PSYCHIATRY PROGRESS NOTE - NSBHMETABOLIC_PSY_ALL_CORE_FT
BMI: BMI (kg/m2): 23.7 (06-29-22 @ 06:27)  HbA1c: A1C with Estimated Average Glucose Result: 5.8 % (07-01-22 @ 11:12)    Glucose:   BP: 109/69 (07-05-22 @ 09:14) (94/61 - 117/64)  Lipid Panel: Date/Time: 07-01-22 @ 11:12  Cholesterol, Serum: 115  Direct LDL: --  HDL Cholesterol, Serum: 28  Total Cholesterol/HDL Ration Measurement: --  Triglycerides, Serum: 270

## 2022-07-05 NOTE — BH INPATIENT PSYCHIATRY PROGRESS NOTE - NSBHCHARTREVIEWVS_PSY_A_CORE FT
Vital Signs Last 24 Hrs  T(C): 36.5 (07-05-22 @ 09:14), Max: 36.7 (07-04-22 @ 16:48)  T(F): 97.7 (07-05-22 @ 09:14), Max: 98 (07-04-22 @ 16:48)  HR: 69 (07-05-22 @ 09:14) (69 - 82)  BP: 109/69 (07-05-22 @ 09:14) (109/69 - 117/64)  BP(mean): --  RR: 20 (07-05-22 @ 09:14) (18 - 20)  SpO2: 100% (07-05-22 @ 09:14) (100% - 100%)

## 2022-07-06 PROCEDURE — 99232 SBSQ HOSP IP/OBS MODERATE 35: CPT

## 2022-07-06 RX ADMIN — MIRTAZAPINE 15 MILLIGRAM(S): 45 TABLET, ORALLY DISINTEGRATING ORAL at 22:18

## 2022-07-06 RX ADMIN — Medication 1 TABLET(S): at 22:18

## 2022-07-06 NOTE — BH INPATIENT PSYCHIATRY PROGRESS NOTE - NSBHMETABOLIC_PSY_ALL_CORE_FT
BMI: BMI (kg/m2): 23.7 (06-29-22 @ 06:27)  HbA1c: A1C with Estimated Average Glucose Result: 5.8 % (07-01-22 @ 11:12)    Glucose:   BP: 106/63 (07-06-22 @ 09:00) (99/65 - 128/81)  Lipid Panel: Date/Time: 07-01-22 @ 11:12  Cholesterol, Serum: 115  Direct LDL: --  HDL Cholesterol, Serum: 28  Total Cholesterol/HDL Ration Measurement: --  Triglycerides, Serum: 270

## 2022-07-06 NOTE — BH INPATIENT PSYCHIATRY PROGRESS NOTE - NSBHCHARTREVIEWVS_PSY_A_CORE FT
Vital Signs Last 24 Hrs  T(C): 36.9 (07-06-22 @ 09:00), Max: 36.9 (07-06-22 @ 09:00)  T(F): 98.5 (07-06-22 @ 09:00), Max: 98.5 (07-06-22 @ 09:00)  HR: 92 (07-06-22 @ 09:00) (77 - 92)  BP: 106/63 (07-06-22 @ 09:00) (106/63 - 128/81)  BP(mean): --  RR: 18 (07-06-22 @ 09:00) (18 - 18)  SpO2: 96% (07-06-22 @ 09:00) (96% - 99%)

## 2022-07-06 NOTE — BH INPATIENT PSYCHIATRY PROGRESS NOTE - NSBHFUPINTERVALHXFT_PSY_A_CORE
Patient remains in [facility] due to Covid19+ status.  Seen in his room, initially is seated at desk reading, cooperative on approach. He states that he has been sleeping a lot during the day, attributed to side effect of remeron and boredom with minimal distractions in his room. Otherwise tolerating med without issue. Denies sxs of depressive, +passive si, no hi/avh or PI. Continues to endorse 'brain fog' and dizziness x2 months, he states that this is due to either vertigo or covid. He also reports sometimes having difficulty with organizing his thoughts.   Per staff report pt endorsing passive SI, compliant with med regimen

## 2022-07-06 NOTE — BH INPATIENT PSYCHIATRY PROGRESS NOTE - NSBHASSESSSUMMFT_PSY_ALL_CORE
This is a 37y/o single, unemployed, domiciled  male with medical hx significant for asthma. Denies formal psychiatric history, no hospitalizations, prior medication trials, suicide attempts, substance abuse, legal/criminal hx, no trauma/abuse. Patient self-presents to [facility] ED with complaints of worsening depression and SI. Is transferred to [facility] and admitted voluntary status. Patient placed in isolation due to +Covid 19 result    Patient presents, odd and guarded. Requests remeron to be initiated.    Remeron 15mg qhs to be initiated    7/2/22 - patient received first dose of mirtazapine last night; tolerating will with no major side effects; continuing to report SI but no longer has a plan.  In isolation because of COVID positivity: COVID-19 PCR: Detected (29 Jun 2022 06:37)  ;;07/03: Isolative; stays in bed; makes minimal eye contact; not conversational. sad; guarded.  denies s/h I/i/p or avh.  continue current regime:  Current medications acetaminophen     Tablet .. 650 milliGRAM(s) Oral every 6 hours PRN  ALBUTerol    90 MICROgram(s) HFA Inhaler 2 Puff(s) Inhalation every 6 hours PRN  aluminum hydroxide/magnesium hydroxide/simethicone Suspension 30 milliLiter(s) Oral every 6 hours PRN  haloperidol     Tablet 5 milliGRAM(s) Oral every 6 hours PRN  mirtazapine 15 milliGRAM(s) Oral at bedtime  multivitamin 1 Tablet(s) Oral daily  7/5 remains in isolation, tolerating remeron well, remains odd/eccentric, guarded   7/6 pt c/o brain fog, difficulty with organization of thoughts at times attributes this to vertigo, remains focused on only taking remeron at this time. Will Remain on Covid 19 precautions/isolation x10 days total

## 2022-07-07 PROCEDURE — 99232 SBSQ HOSP IP/OBS MODERATE 35: CPT

## 2022-07-07 RX ADMIN — Medication 1 TABLET(S): at 12:25

## 2022-07-07 RX ADMIN — MIRTAZAPINE 15 MILLIGRAM(S): 45 TABLET, ORALLY DISINTEGRATING ORAL at 22:08

## 2022-07-07 NOTE — BH TREATMENT PLAN - NSTXSUICIDGOAL_PSY_ALL_CORE
Will identify thoughts associated with suicidal ideation
Will identify thoughts associated with suicidal ideation

## 2022-07-07 NOTE — BH TREATMENT PLAN - NSTXDEPRESINTERMD_PSY_ALL_CORE
psychopharmacology >15 minutes, will initiate remeron per pt request
psychopharmacology >15 minutes, will initiate remeron per pt request

## 2022-07-07 NOTE — BH TREATMENT PLAN - NSTXPLANTHERAPYSESSIONSFT_PSY_ALL_CORE
07-01-22  Type of therapy: Music therapy  Type of session: Individual  Level of patient participation: Participates  Duration of participation: 60 minutes  Therapy conducted by: Psych rehab  Therapy Summary: The music therapist met with Pt for an individual therapy session. Pt engaged in a relaxing sound bath music therapy experience. Pt calmly listening and afterwards shared that the music meditation "did what it was supposed to do," but would not speak more personally or elaborate. Pt spoke about feeling depressed and endorses some passive SI. Pt appeared guarded in response to some questions, e.g. what triggers SI, stating he "does not like to explain things." Pt spoke about having few relationships, in part due to his own tendency to keep his personal life private. Another individual session will be offered to Pt as available and Pt has been provided with individual therapeutic activity materials while on contact precuations due to COVID+ status.

## 2022-07-07 NOTE — BH TREATMENT PLAN - NSTXPATIENTPARTICIPATE_PSY_ALL_CORE
Patient participated in identification of needs/problems/goals for treatment
Patient participated in development of after care plan

## 2022-07-07 NOTE — BH TREATMENT PLAN - NSTXSUICIDINTERMD_PSY_ALL_CORE
psychopharmacology >15 minutes, will initiate remeron per pt request
psychopharmacology >15 minutes, will initiate remeron per pt request and uptitrate

## 2022-07-07 NOTE — BH INPATIENT PSYCHIATRY PROGRESS NOTE - NSBHMETABOLIC_PSY_ALL_CORE_FT
BMI: BMI (kg/m2): 23.7 (06-29-22 @ 06:27)  HbA1c: A1C with Estimated Average Glucose Result: 5.8 % (07-01-22 @ 11:12)    Glucose:   BP: 101/59 (07-07-22 @ 08:30) (101/59 - 128/81)  Lipid Panel: Date/Time: 07-01-22 @ 11:12  Cholesterol, Serum: 115  Direct LDL: --  HDL Cholesterol, Serum: 28  Total Cholesterol/HDL Ration Measurement: --  Triglycerides, Serum: 270

## 2022-07-07 NOTE — BH INPATIENT PSYCHIATRY PROGRESS NOTE - NSBHASSESSSUMMFT_PSY_ALL_CORE
This is a 35y/o single, unemployed, domiciled  male with medical hx significant for asthma. Denies formal psychiatric history, no hospitalizations, prior medication trials, suicide attempts, substance abuse, legal/criminal hx, no trauma/abuse. Patient self-presents to [facility] ED with complaints of worsening depression and SI. Is transferred to [facility] and admitted voluntary status. Patient placed in isolation due to +Covid 19 result    Patient presents, odd and guarded. Requests remeron to be initiated.    Remeron 15mg qhs to be initiated    7/2/22 - patient received first dose of mirtazapine last night; tolerating will with no major side effects; continuing to report SI but no longer has a plan.  In isolation because of COVID positivity: COVID-19 PCR: Detected (29 Jun 2022 06:37)  ;;07/03: Isolative; stays in bed; makes minimal eye contact; not conversational. sad; guarded.  denies s/h I/i/p or avh.  continue current regime:  Current medications acetaminophen     Tablet .. 650 milliGRAM(s) Oral every 6 hours PRN  ALBUTerol    90 MICROgram(s) HFA Inhaler 2 Puff(s) Inhalation every 6 hours PRN  aluminum hydroxide/magnesium hydroxide/simethicone Suspension 30 milliLiter(s) Oral every 6 hours PRN  haloperidol     Tablet 5 milliGRAM(s) Oral every 6 hours PRN  mirtazapine 15 milliGRAM(s) Oral at bedtime  multivitamin 1 Tablet(s) Oral daily  7/5 remains in isolation, tolerating remeron well, remains odd/eccentric, guarded   7/6 pt c/o brain fog, difficulty with organization of thoughts at times attributes this to vertigo, remains focused on only taking remeron at this time. Will Remain on Covid 19 precautions/isolation x10 days total  7/7 continues to report improvement in mood sxs, tolerating med well without issue, denies si, aftercare planning in effect

## 2022-07-07 NOTE — BH TREATMENT PLAN - NSTXDEPRESINTERSW_PSY_ALL_CORE
1. SW will liaison with family/collateral providers as need.  2. Discharge planning.
1. SW will liaison with family/collateral providers as need.  2. Discharge planning.

## 2022-07-07 NOTE — BH INPATIENT PSYCHIATRY PROGRESS NOTE - NSBHFUPINTERVALHXFT_PSY_A_CORE
None Patient remains in [facility] due to Covid19+ status.  Seen in his room, initially is resting in bed but is cooperative on approach. He reports improvement in appetite and has been eating more at each meal, he doesn't feel that he needs ensure anymore and is concerned about the amount of ensure max that he has been getting with each meal. Medically stable, no acute concerns, denies any sxs of Covid at this time. Denies hi/avh or PI. Endorses passive si. Feels that medication is helping. Future oriented, continues to state that he enjoys his solitude and having limited interactions with people due to his isolation status. Will plan for discharge next week to appropriate aftercare.   Per staff report pt has been guarded during interactions, but has been taking his medications without issue.

## 2022-07-07 NOTE — BH TREATMENT PLAN - NSTXDEPRESINTERPR_PSY_ALL_CORE
Pt. will be invited and encouraged to participate in all offered groups
Pt. will be invited and encouraged to participate in all offered groups

## 2022-07-07 NOTE — BH TREATMENT PLAN - NSTXDEPRESGOALOTHER_PSY_ALL_CORE
Pt will stabilize mood and alleviate symptoms of depression to engage in discharge planning.
Pt will stabilize mood and alleviate symptoms of depression to engage in discharge planning.

## 2022-07-07 NOTE — BH TREATMENT PLAN - NSTXOTHERINTERMD_PSY_ALL_CORE
Provide safe, isolated environment, continue all medication
Provide safe, isolated environment, continue all medication

## 2022-07-08 RX ADMIN — MIRTAZAPINE 15 MILLIGRAM(S): 45 TABLET, ORALLY DISINTEGRATING ORAL at 22:28

## 2022-07-08 RX ADMIN — Medication 1 TABLET(S): at 08:31

## 2022-07-08 RX ADMIN — Medication 650 MILLIGRAM(S): at 18:15

## 2022-07-08 NOTE — BH INPATIENT PSYCHIATRY PROGRESS NOTE - NSBHASSESSSUMMFT_PSY_ALL_CORE
This is a 37y/o single, unemployed, domiciled  male with medical hx significant for asthma. Denies formal psychiatric history, no hospitalizations, prior medication trials, suicide attempts, substance abuse, legal/criminal hx, no trauma/abuse. Patient self-presents to [facility] ED with complaints of worsening depression and SI. Is transferred to [facility] and admitted voluntary status. Patient placed in isolation due to +Covid 19 result    Patient presents, odd and guarded. Requests remeron to be initiated.    Remeron 15mg qhs to be initiated    7/2/22 - patient received first dose of mirtazapine last night; tolerating will with no major side effects; continuing to report SI but no longer has a plan.  In isolation because of COVID positivity: COVID-19 PCR: Detected (29 Jun 2022 06:37)  ;;07/03: Isolative; stays in bed; makes minimal eye contact; not conversational. sad; guarded.  denies s/h I/i/p or avh.  continue current regime:  Current medications acetaminophen     Tablet .. 650 milliGRAM(s) Oral every 6 hours PRN  ALBUTerol    90 MICROgram(s) HFA Inhaler 2 Puff(s) Inhalation every 6 hours PRN  aluminum hydroxide/magnesium hydroxide/simethicone Suspension 30 milliLiter(s) Oral every 6 hours PRN  haloperidol     Tablet 5 milliGRAM(s) Oral every 6 hours PRN  mirtazapine 15 milliGRAM(s) Oral at bedtime  multivitamin 1 Tablet(s) Oral daily  7/5 remains in isolation, tolerating remeron well, remains odd/eccentric, guarded   7/6 pt c/o brain fog, difficulty with organization of thoughts at times attributes this to vertigo, remains focused on only taking remeron at this time. Will Remain on Covid 19 precautions/isolation x10 days total  7/7 continues to report improvement in mood sxs, tolerating med well without issue, denies si, aftercare planning in effect  7/7 remains unchanged

## 2022-07-08 NOTE — BH INPATIENT PSYCHIATRY PROGRESS NOTE - NSBHFUPINTERVALHXFT_PSY_A_CORE
Patient remains in [facility] due to Covid19+ status.  Seen in his room, initially is resting in bed but is cooperative on approach. He reports improvement in appetite and has been eating more at each meal. He states 'i'm just trying to get better.' Reports having a sore on his tongue, but nothing was visualized at this time. No other complaints. Guarded and superficial overall. Aware of plan to discharge next with appropriate aftercare, pt in agreement. Passive si endorsed, no hi/vh/pi

## 2022-07-08 NOTE — BH INPATIENT PSYCHIATRY PROGRESS NOTE - NSBHCHARTREVIEWVS_PSY_A_CORE FT
Vital Signs Last 24 Hrs  T(C): 36.5 (07-08-22 @ 08:15), Max: 36.5 (07-07-22 @ 17:00)  T(F): 97.7 (07-08-22 @ 08:15), Max: 97.7 (07-07-22 @ 17:00)  HR: 75 (07-08-22 @ 08:15) (65 - 75)  BP: 100/59 (07-08-22 @ 08:15) (100/59 - 113/76)  BP(mean): --  RR: 18 (07-08-22 @ 08:15) (16 - 18)  SpO2: 97% (07-08-22 @ 08:15) (97% - 100%)

## 2022-07-08 NOTE — BH INPATIENT PSYCHIATRY PROGRESS NOTE - NSBHMETABOLIC_PSY_ALL_CORE_FT
BMI: BMI (kg/m2): 23.7 (06-29-22 @ 06:27)  HbA1c: A1C with Estimated Average Glucose Result: 5.8 % (07-01-22 @ 11:12)    Glucose:   BP: 100/59 (07-08-22 @ 08:15) (100/59 - 128/81)  Lipid Panel: Date/Time: 07-01-22 @ 11:12  Cholesterol, Serum: 115  Direct LDL: --  HDL Cholesterol, Serum: 28  Total Cholesterol/HDL Ration Measurement: --  Triglycerides, Serum: 270

## 2022-07-09 RX ADMIN — Medication 1 TABLET(S): at 11:17

## 2022-07-09 RX ADMIN — MIRTAZAPINE 15 MILLIGRAM(S): 45 TABLET, ORALLY DISINTEGRATING ORAL at 21:54

## 2022-07-10 PROCEDURE — 99232 SBSQ HOSP IP/OBS MODERATE 35: CPT

## 2022-07-10 RX ADMIN — MIRTAZAPINE 15 MILLIGRAM(S): 45 TABLET, ORALLY DISINTEGRATING ORAL at 22:16

## 2022-07-10 RX ADMIN — Medication 1 TABLET(S): at 10:21

## 2022-07-10 NOTE — BH INPATIENT PSYCHIATRY PROGRESS NOTE - NSBHASSESSSUMMFT_PSY_ALL_CORE
This is a 35y/o single, unemployed, domiciled  male with medical hx significant for asthma. Denies formal psychiatric history, no hospitalizations, prior medication trials, suicide attempts, substance abuse, legal/criminal hx, no trauma/abuse. Patient self-presents to [facility] ED with complaints of worsening depression and SI. Is transferred to [facility] and admitted voluntary status. Patient placed in isolation due to +Covid 19 result    Patient presents, odd and guarded. Requests remeron to be initiated.    Remeron 15mg qhs to be initiated    7/2/22 - patient received first dose of mirtazapine last night; tolerating will with no major side effects; continuing to report SI but no longer has a plan.  In isolation because of COVID positivity: COVID-19 PCR: Detected (29 Jun 2022 06:37)  ;;07/03: Isolative; stays in bed; makes minimal eye contact; not conversational. sad; guarded.  denies s/h I/i/p or avh.  continue current regime:  Current medications acetaminophen     Tablet .. 650 milliGRAM(s) Oral every 6 hours PRN  ALBUTerol    90 MICROgram(s) HFA Inhaler 2 Puff(s) Inhalation every 6 hours PRN  aluminum hydroxide/magnesium hydroxide/simethicone Suspension 30 milliLiter(s) Oral every 6 hours PRN  haloperidol     Tablet 5 milliGRAM(s) Oral every 6 hours PRN  mirtazapine 15 milliGRAM(s) Oral at bedtime  multivitamin 1 Tablet(s) Oral daily  7/5 remains in isolation, tolerating remeron well, remains odd/eccentric, guarded   7/6 pt c/o brain fog, difficulty with organization of thoughts at times attributes this to vertigo, remains focused on only taking remeron at this time. Will Remain on Covid 19 precautions/isolation x10 days total  7/7 continues to report improvement in mood sxs, tolerating med well without issue, denies si, aftercare planning in effect  7/7 remains unchanged  ;;0710: strange behaviors and tends to be isolative (but in isolation); psychotic features suggested; may need antipsychotic medication; review  tongue status prior to initiation.

## 2022-07-10 NOTE — BH INPATIENT PSYCHIATRY PROGRESS NOTE - NSBHFUPINTERVALHXFT_PSY_A_CORE
Patient remains in [facility] due to Covid19+ status.  sitting strangely propted up on his chair by the dest sitting above the seat in a strange manner staring a bit; reading a book but seems to be looking at the book rather than reading it; anxious; brisk speech; a little irritable; Not endorsing  suicidal or homicidal ideation intent or plans; no mention of auditory or visual hallucinations but appears internally preoccupied; talks about wanting a rinse because he is experiencing tongue discomfort.  No evidence of lip smaking or tongue movements however.  Alert; oriented; cognition intact; speech clear; no tremor or evidence of movement impairment.

## 2022-07-10 NOTE — BH INPATIENT PSYCHIATRY PROGRESS NOTE - NSBHMETABOLIC_PSY_ALL_CORE_FT
BMI: BMI (kg/m2): 23.7 (06-29-22 @ 06:27)  HbA1c: A1C with Estimated Average Glucose Result: 5.8 % (07-01-22 @ 11:12)    Glucose:   BP: 114/72 (07-09-22 @ 17:00) (100/59 - 114/72)  Lipid Panel: Date/Time: 07-01-22 @ 11:12  Cholesterol, Serum: 115  Direct LDL: --  HDL Cholesterol, Serum: 28  Total Cholesterol/HDL Ration Measurement: --  Triglycerides, Serum: 270   BMI: BMI (kg/m2): 23.7 (06-29-22 @ 06:27)  HbA1c: A1C with Estimated Average Glucose Result: 5.8 % (07-01-22 @ 11:12)    Glucose:   BP: 110/71 (07-10-22 @ 09:00) (100/59 - 114/72)  Lipid Panel: Date/Time: 07-01-22 @ 11:12  Cholesterol, Serum: 115  Direct LDL: --  HDL Cholesterol, Serum: 28  Total Cholesterol/HDL Ration Measurement: --  Triglycerides, Serum: 270

## 2022-07-10 NOTE — BH INPATIENT PSYCHIATRY PROGRESS NOTE - NSBHCHARTREVIEWVS_PSY_A_CORE FT
Vital Signs Last 24 Hrs  T(C): 37.1 (07-09-22 @ 17:00), Max: 37.1 (07-09-22 @ 17:00)  T(F): 98.7 (07-09-22 @ 17:00), Max: 98.7 (07-09-22 @ 17:00)  HR: 76 (07-09-22 @ 17:00) (76 - 78)  BP: 114/72 (07-09-22 @ 17:00) (109/64 - 114/72)  BP(mean): --  RR: 18 (07-09-22 @ 17:00) (18 - 18)  SpO2: 99% (07-09-22 @ 17:00) (99% - 99%)     Vital Signs Last 24 Hrs  T(C): 37.2 (07-10-22 @ 09:00), Max: 37.2 (07-10-22 @ 09:00)  T(F): 98.9 (07-10-22 @ 09:00), Max: 98.9 (07-10-22 @ 09:00)  HR: 93 (07-10-22 @ 09:00) (76 - 93)  BP: 110/71 (07-10-22 @ 09:00) (110/71 - 114/72)  BP(mean): --  RR: 18 (07-10-22 @ 09:00) (18 - 18)  SpO2: 98% (07-10-22 @ 09:00) (98% - 99%)

## 2022-07-11 PROCEDURE — 99233 SBSQ HOSP IP/OBS HIGH 50: CPT

## 2022-07-11 RX ADMIN — MIRTAZAPINE 15 MILLIGRAM(S): 45 TABLET, ORALLY DISINTEGRATING ORAL at 21:53

## 2022-07-11 RX ADMIN — Medication 1 TABLET(S): at 11:01

## 2022-07-11 NOTE — BH INPATIENT PSYCHIATRY PROGRESS NOTE - NSBHMETABOLIC_PSY_ALL_CORE_FT
BMI: BMI (kg/m2): 23.7 (06-29-22 @ 06:27)  HbA1c: A1C with Estimated Average Glucose Result: 5.8 % (07-01-22 @ 11:12)    Glucose:   BP: 94/61 (07-11-22 @ 08:00) (94/61 - 114/72)  Lipid Panel: Date/Time: 07-01-22 @ 11:12  Cholesterol, Serum: 115  Direct LDL: --  HDL Cholesterol, Serum: 28  Total Cholesterol/HDL Ration Measurement: --  Triglycerides, Serum: 270   BMI: BMI (kg/m2): 23.7 (06-29-22 @ 06:27)  HbA1c: A1C with Estimated Average Glucose Result: 5.8 % (07-01-22 @ 11:12)    Glucose:   BP: 105/70 (07-11-22 @ 17:06) (94/61 - 114/72)  Lipid Panel: Date/Time: 07-01-22 @ 11:12  Cholesterol, Serum: 115  Direct LDL: --  HDL Cholesterol, Serum: 28  Total Cholesterol/HDL Ration Measurement: --  Triglycerides, Serum: 270

## 2022-07-11 NOTE — BH INPATIENT PSYCHIATRY PROGRESS NOTE - NSICDXBHPRIMARYDX_PSY_ALL_CORE
Major depressive disorder with current active episode   F32.9  

## 2022-07-11 NOTE — PSYCHIATRIC REHAB INITIAL EVALUATION - NSBHPRTOOLBOX_PSY_ALL_CORE
Pt. reports enjoying singing, dancing, Sabianist, basketball, camping, and walking [place] the [place]/Art/Exercise/Music/Outdoor activity/Reading/Spirituality/Jainism

## 2022-07-11 NOTE — BH INPATIENT PSYCHIATRY PROGRESS NOTE - NSBHMSEAFFQUAL_PSY_A_CORE
Depressed/Anxious
Anxious
Euthymic
Depressed/Anxious
Anxious
Depressed/Anxious
Anxious

## 2022-07-11 NOTE — BH INPATIENT PSYCHIATRY PROGRESS NOTE - NSBHASSESSSUMMFT_PSY_ALL_CORE
This is a 35y/o single, unemployed, domiciled  male with medical hx significant for asthma. Denies formal psychiatric history, no hospitalizations, prior medication trials, suicide attempts, substance abuse, legal/criminal hx, no trauma/abuse. Patient self-presents to [facility] ED with complaints of worsening depression and SI. Is transferred to [facility] and admitted voluntary status. Patient placed in isolation due to +Covid 19 result    Patient presents, odd and guarded. Requests remeron to be initiated.    Remeron 15mg qhs to be initiated    7/2/22 - patient received first dose of mirtazapine last night; tolerating will with no major side effects; continuing to report SI but no longer has a plan.  In isolation because of COVID positivity: COVID-19 PCR: Detected (29 Jun 2022 06:37)  ;;07/03: Isolative; stays in bed; makes minimal eye contact; not conversational. sad; guarded.  denies s/h I/i/p or avh.  continue current regime:  Current medications acetaminophen     Tablet .. 650 milliGRAM(s) Oral every 6 hours PRN  ALBUTerol    90 MICROgram(s) HFA Inhaler 2 Puff(s) Inhalation every 6 hours PRN  aluminum hydroxide/magnesium hydroxide/simethicone Suspension 30 milliLiter(s) Oral every 6 hours PRN  haloperidol     Tablet 5 milliGRAM(s) Oral every 6 hours PRN  mirtazapine 15 milliGRAM(s) Oral at bedtime  multivitamin 1 Tablet(s) Oral daily  7/5 remains in isolation, tolerating remeron well, remains odd/eccentric, guarded   7/6 pt c/o brain fog, difficulty with organization of thoughts at times attributes this to vertigo, remains focused on only taking remeron at this time. Will Remain on Covid 19 precautions/isolation x10 days total  7/7 continues to report improvement in mood sxs, tolerating med well without issue, denies si, aftercare planning in effect  7/8 remains unchanged  7/10 strange behaviors and tends to be isolative (but in isolation); psychotic features suggested; may need antipsychotic medication; review tongue status prior to initiation.   7/11 reports improved mood and taking medications, open to conversation but still isolative, no psychotic features noted today, denies concerns about tongue, denies any si/hi/ah/vh  This is a 35y/o single, unemployed, domiciled  male with medical hx significant for asthma. Denies formal psychiatric history, no hospitalizations, prior medication trials, suicide attempts, substance abuse, legal/criminal hx, no trauma/abuse. Patient self-presents to [facility] ED with complaints of worsening depression and SI. Is transferred to [facility] and admitted voluntary status. Patient placed in isolation due to +Covid 19 result    Patient presents, odd and guarded. Requests remeron to be initiated.    Remeron 15mg qhs to be initiated    7/2/22 - patient received first dose of mirtazapine last night; tolerating will with no major side effects; continuing to report SI but no longer has a plan.  In isolation because of COVID positivity: COVID-19 PCR: Detected (29 Jun 2022 06:37)  ;;07/03: Isolative; stays in bed; makes minimal eye contact; not conversational. sad; guarded.  denies s/h I/i/p or avh.  continue current regime:  Current medications acetaminophen     Tablet .. 650 milliGRAM(s) Oral every 6 hours PRN  ALBUTerol    90 MICROgram(s) HFA Inhaler 2 Puff(s) Inhalation every 6 hours PRN  aluminum hydroxide/magnesium hydroxide/simethicone Suspension 30 milliLiter(s) Oral every 6 hours PRN  haloperidol     Tablet 5 milliGRAM(s) Oral every 6 hours PRN  mirtazapine 15 milliGRAM(s) Oral at bedtime  multivitamin 1 Tablet(s) Oral daily  7/5 remains in isolation, tolerating remeron well, remains odd/eccentric, guarded   7/6 pt c/o brain fog, difficulty with organization of thoughts at times attributes this to vertigo, remains focused on only taking remeron at this time. Will Remain on Covid 19 precautions/isolation x10 days total  7/7 continues to report improvement in mood sxs, tolerating med well without issue, denies si, aftercare planning in effect  7/8 remains unchanged  7/10 strange behaviors and tends to be isolative (but in isolation); psychotic features suggested; may need antipsychotic medication; review tongue status prior to initiation.   7/11 reports improved mood and taking medications, open to conversation but still isolative, no psychotic features noted today, denies concerns about tongue, denies any si/hi/ah/vh. Isolation discontinued. Discharge tomorrow

## 2022-07-11 NOTE — BH INPATIENT PSYCHIATRY PROGRESS NOTE - NSTXOTHERDATETRGT_PSY_ALL_CORE
07-Jul-2022

## 2022-07-11 NOTE — BH INPATIENT PSYCHIATRY PROGRESS NOTE - CURRENT MEDICATION
MEDICATIONS  (STANDING):  mirtazapine 15 milliGRAM(s) Oral at bedtime  multivitamin 1 Tablet(s) Oral daily    MEDICATIONS  (PRN):  acetaminophen     Tablet .. 650 milliGRAM(s) Oral every 6 hours PRN Moderate Pain (4 - 6)  ALBUTerol    90 MICROgram(s) HFA Inhaler 2 Puff(s) Inhalation every 6 hours PRN sob/wheezing  aluminum hydroxide/magnesium hydroxide/simethicone Suspension 30 milliLiter(s) Oral every 6 hours PRN Dyspepsia  haloperidol     Tablet 5 milliGRAM(s) Oral every 6 hours PRN agitation  
MEDICATIONS  (STANDING):  mirtazapine 15 milliGRAM(s) Oral at bedtime  multivitamin 1 Tablet(s) Oral daily    MEDICATIONS  (PRN):  acetaminophen     Tablet .. 650 milliGRAM(s) Oral every 6 hours PRN Moderate Pain (4 - 6)  ALBUTerol    90 MICROgram(s) HFA Inhaler 2 Puff(s) Inhalation every 6 hours PRN sob/wheezing  aluminum hydroxide/magnesium hydroxide/simethicone Suspension 30 milliLiter(s) Oral every 6 hours PRN Dyspepsia  haloperidol     Tablet 5 milliGRAM(s) Oral every 6 hours PRN agitation  QUEtiapine 50 milliGRAM(s) Oral daily PRN anxiety  
MEDICATIONS  (STANDING):  mirtazapine 15 milliGRAM(s) Oral at bedtime  multivitamin 1 Tablet(s) Oral daily    MEDICATIONS  (PRN):  acetaminophen     Tablet .. 650 milliGRAM(s) Oral every 6 hours PRN Moderate Pain (4 - 6)  ALBUTerol    90 MICROgram(s) HFA Inhaler 2 Puff(s) Inhalation every 6 hours PRN sob/wheezing  aluminum hydroxide/magnesium hydroxide/simethicone Suspension 30 milliLiter(s) Oral every 6 hours PRN Dyspepsia  haloperidol     Tablet 5 milliGRAM(s) Oral every 6 hours PRN agitation  traZODone 50 milliGRAM(s) Oral Once PRN insomnia  
MEDICATIONS  (STANDING):  mirtazapine 15 milliGRAM(s) Oral at bedtime  multivitamin 1 Tablet(s) Oral daily    MEDICATIONS  (PRN):  acetaminophen     Tablet .. 650 milliGRAM(s) Oral every 6 hours PRN Moderate Pain (4 - 6)  ALBUTerol    90 MICROgram(s) HFA Inhaler 2 Puff(s) Inhalation every 6 hours PRN sob/wheezing  aluminum hydroxide/magnesium hydroxide/simethicone Suspension 30 milliLiter(s) Oral every 6 hours PRN Dyspepsia  haloperidol     Tablet 5 milliGRAM(s) Oral every 6 hours PRN agitation  QUEtiapine 50 milliGRAM(s) Oral daily PRN anxiety  
MEDICATIONS  (STANDING):  mirtazapine 15 milliGRAM(s) Oral at bedtime  multivitamin 1 Tablet(s) Oral daily    MEDICATIONS  (PRN):  acetaminophen     Tablet .. 650 milliGRAM(s) Oral every 6 hours PRN Moderate Pain (4 - 6)  ALBUTerol    90 MICROgram(s) HFA Inhaler 2 Puff(s) Inhalation every 6 hours PRN sob/wheezing  aluminum hydroxide/magnesium hydroxide/simethicone Suspension 30 milliLiter(s) Oral every 6 hours PRN Dyspepsia  haloperidol     Tablet 5 milliGRAM(s) Oral every 6 hours PRN agitation  QUEtiapine 50 milliGRAM(s) Oral daily PRN anxiety  
MEDICATIONS  (STANDING):  haloperidol     Tablet 5 milliGRAM(s) Oral every 6 hours  mirtazapine 15 milliGRAM(s) Oral at bedtime  multivitamin 1 Tablet(s) Oral daily    MEDICATIONS  (PRN):  acetaminophen     Tablet .. 650 milliGRAM(s) Oral every 6 hours PRN Moderate Pain (4 - 6)  ALBUTerol    90 MICROgram(s) HFA Inhaler 2 Puff(s) Inhalation every 6 hours PRN sob/wheezing  aluminum hydroxide/magnesium hydroxide/simethicone Suspension 30 milliLiter(s) Oral every 6 hours PRN Dyspepsia  traZODone 50 milliGRAM(s) Oral Once PRN insomnia  
MEDICATIONS  (STANDING):  mirtazapine 15 milliGRAM(s) Oral at bedtime  multivitamin 1 Tablet(s) Oral daily    MEDICATIONS  (PRN):  acetaminophen     Tablet .. 650 milliGRAM(s) Oral every 6 hours PRN Moderate Pain (4 - 6)  ALBUTerol    90 MICROgram(s) HFA Inhaler 2 Puff(s) Inhalation every 6 hours PRN sob/wheezing  aluminum hydroxide/magnesium hydroxide/simethicone Suspension 30 milliLiter(s) Oral every 6 hours PRN Dyspepsia  haloperidol     Tablet 5 milliGRAM(s) Oral every 6 hours PRN agitation  QUEtiapine 50 milliGRAM(s) Oral daily PRN anxiety
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
## 2022-07-11 NOTE — BH INPATIENT PSYCHIATRY PROGRESS NOTE - NSDCCRITERIA_PSY_ALL_CORE
Decrease in depressive sxs

## 2022-07-11 NOTE — BH INPATIENT PSYCHIATRY PROGRESS NOTE - NSTXDEPRESGOALOTHER_PSY_ALL_CORE
While on COVID+ precautions, Pt will engage with individual therapeutic activities
Pt will stabilize mood and alleviate symptoms of depression to engage in discharge planning.
While on COVID+ precautions, Pt will engage with individual therapeutic activities
While on COVID+ precautions, Pt will engage with individual therapeutic activities
Pt will stabilize mood and alleviate symptoms of depression to engage in discharge planning.

## 2022-07-11 NOTE — BH INPATIENT PSYCHIATRY PROGRESS NOTE - NSBHCONSULTIPREASON_PSY_A_CORE
other reason
other reason
danger to self; mental illness expected to respond to inpatient care
other reason
danger to self; mental illness expected to respond to inpatient care
danger to self; mental illness expected to respond to inpatient care
other reason
danger to self; mental illness expected to respond to inpatient care
danger to self; mental illness expected to respond to inpatient care

## 2022-07-11 NOTE — BH INPATIENT PSYCHIATRY PROGRESS NOTE - NSBHATTESTAPPBILLTIME_PSY_A_CORE
I attest my time as MAYDA is greater than 50% of the total combined time spent on qualifying patient care activities. I have reviewed and verified the documentation.
I attest my time as MAYAD is greater than 50% of the total combined time spent on qualifying patient care activities. I have reviewed and verified the documentation.
I attest my time as MAYDA is greater than 50% of the total combined time spent on qualifying patient care activities. I have reviewed and verified the documentation.

## 2022-07-11 NOTE — BH INPATIENT PSYCHIATRY PROGRESS NOTE - NSTXSUICIDGOAL_PSY_ALL_CORE
Will identify thoughts associated with suicidal ideation

## 2022-07-11 NOTE — BH INPATIENT PSYCHIATRY PROGRESS NOTE - NSTXDEPRESDATETRGT_PSY_ALL_CORE
07-Jul-2022
14-Jul-2022
14-Jul-2022
07-Jul-2022
07-Jul-2022
14-Jul-2022
07-Jul-2022

## 2022-07-11 NOTE — BH INPATIENT PSYCHIATRY PROGRESS NOTE - PRN MEDS
MEDICATIONS  (PRN):  acetaminophen     Tablet .. 650 milliGRAM(s) Oral every 6 hours PRN Moderate Pain (4 - 6)  ALBUTerol    90 MICROgram(s) HFA Inhaler 2 Puff(s) Inhalation every 6 hours PRN sob/wheezing  aluminum hydroxide/magnesium hydroxide/simethicone Suspension 30 milliLiter(s) Oral every 6 hours PRN Dyspepsia  haloperidol     Tablet 5 milliGRAM(s) Oral every 6 hours PRN agitation  QUEtiapine 50 milliGRAM(s) Oral daily PRN anxiety  
MEDICATIONS  (PRN):  acetaminophen     Tablet .. 650 milliGRAM(s) Oral every 6 hours PRN Moderate Pain (4 - 6)  ALBUTerol    90 MICROgram(s) HFA Inhaler 2 Puff(s) Inhalation every 6 hours PRN sob/wheezing  aluminum hydroxide/magnesium hydroxide/simethicone Suspension 30 milliLiter(s) Oral every 6 hours PRN Dyspepsia  traZODone 50 milliGRAM(s) Oral Once PRN insomnia  
MEDICATIONS  (PRN):  acetaminophen     Tablet .. 650 milliGRAM(s) Oral every 6 hours PRN Moderate Pain (4 - 6)  ALBUTerol    90 MICROgram(s) HFA Inhaler 2 Puff(s) Inhalation every 6 hours PRN sob/wheezing  aluminum hydroxide/magnesium hydroxide/simethicone Suspension 30 milliLiter(s) Oral every 6 hours PRN Dyspepsia  haloperidol     Tablet 5 milliGRAM(s) Oral every 6 hours PRN agitation  QUEtiapine 50 milliGRAM(s) Oral daily PRN anxiety  
MEDICATIONS  (PRN):  acetaminophen     Tablet .. 650 milliGRAM(s) Oral every 6 hours PRN Moderate Pain (4 - 6)  ALBUTerol    90 MICROgram(s) HFA Inhaler 2 Puff(s) Inhalation every 6 hours PRN sob/wheezing  aluminum hydroxide/magnesium hydroxide/simethicone Suspension 30 milliLiter(s) Oral every 6 hours PRN Dyspepsia  haloperidol     Tablet 5 milliGRAM(s) Oral every 6 hours PRN agitation  
MEDICATIONS  (PRN):  acetaminophen     Tablet .. 650 milliGRAM(s) Oral every 6 hours PRN Moderate Pain (4 - 6)  ALBUTerol    90 MICROgram(s) HFA Inhaler 2 Puff(s) Inhalation every 6 hours PRN sob/wheezing  aluminum hydroxide/magnesium hydroxide/simethicone Suspension 30 milliLiter(s) Oral every 6 hours PRN Dyspepsia  haloperidol     Tablet 5 milliGRAM(s) Oral every 6 hours PRN agitation  QUEtiapine 50 milliGRAM(s) Oral daily PRN anxiety  
MEDICATIONS  (PRN):  acetaminophen     Tablet .. 650 milliGRAM(s) Oral every 6 hours PRN Moderate Pain (4 - 6)  ALBUTerol    90 MICROgram(s) HFA Inhaler 2 Puff(s) Inhalation every 6 hours PRN sob/wheezing  aluminum hydroxide/magnesium hydroxide/simethicone Suspension 30 milliLiter(s) Oral every 6 hours PRN Dyspepsia  haloperidol     Tablet 5 milliGRAM(s) Oral every 6 hours PRN agitation  traZODone 50 milliGRAM(s) Oral Once PRN insomnia

## 2022-07-11 NOTE — BH INPATIENT PSYCHIATRY PROGRESS NOTE - NSTXSUICIDDATEEST_PSY_ALL_CORE
30-Jun-2022

## 2022-07-11 NOTE — BH INPATIENT PSYCHIATRY PROGRESS NOTE - ADDITIONAL DETAILS / COMMENTS
appears to be help seeking

## 2022-07-11 NOTE — BH INPATIENT PSYCHIATRY PROGRESS NOTE - NSTXDEPRESINTERMD_PSY_ALL_CORE
psychopharmacology >15 minutes, will initiate remeron per pt request

## 2022-07-11 NOTE — BH INPATIENT PSYCHIATRY PROGRESS NOTE - NSTXPROBOTHER_PSY_ALL_CORE
OTHER PROBLEM

## 2022-07-11 NOTE — BH INPATIENT PSYCHIATRY PROGRESS NOTE - NSCGISEVERILLNESS_PSY_ALL_CORE
4 = Moderately ill – overt symptoms causing noticeable, but modest, functional impairment or distress; symptom level may warrant medication
5 = Markedly ill - intrusive symptoms that distinctly impair social/occupational function or cause intrusive levels of distress

## 2022-07-11 NOTE — BH INPATIENT PSYCHIATRY PROGRESS NOTE - NSBHATTESTBILLINGAW_PSY_A_CORE
[id number] Inpatient care - moderate complexity - 25 minutes
[id number]-Xlpgmdxnfc Inpatient care - high complexity - 35 minutes
[id number]-Buscgyutiz Inpatient care - moderate complexity - 25 minutes
[id number]-Eeykhhlqjd Inpatient care - low complexity - 15 minutes
[id number]-Uwtrvxmeuq Inpatient care - low complexity - 15 minutes
[id number]-Kdyvoohzhv Inpatient care - moderate complexity - 25 minutes
[id number]-Eovtzrqlrb Inpatient care - moderate complexity - 25 minutes
[id number]-Hvqgsxxwjv Inpatient care - moderate complexity - 25 minutes
[id number]-Huodrryuyh Inpatient care - high complexity - 35 minutes

## 2022-07-11 NOTE — BH INPATIENT PSYCHIATRY PROGRESS NOTE - NSTXOTHERINTERMD_PSY_ALL_CORE
Provide safe, isolated environment, continue all medication

## 2022-07-11 NOTE — BH INPATIENT PSYCHIATRY PROGRESS NOTE - NSBHMSETHTCONTENT_PSY_A_CORE
Suicidality
Unremarkable
Unremarkable
Suicidality
Suicidality
Unremarkable
Unremarkable
Suicidality
Suicidality

## 2022-07-11 NOTE — BH INPATIENT PSYCHIATRY PROGRESS NOTE - NSBHFUPINTERVALHXFT_PSY_A_CORE
Patient remains isolated due to COVID status. He is laying in bed upon approach but open to discussion. He states he read and slept a lot this weekend and denies any physical complaints or COVID symptoms. States he feels ready to go considering he is longer having suicidal thoughts but "will do whatever has to be done to get better". 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 Still retaining that he prefers to be alone and is adverse to the idea of going to groups after he is out of isolation. Taking Remeron but asserts that is the only medication he will take. Denies any suicidal thoughts, homicidal thoughts or auditory or visual hallucinations. Patient remains isolated in his room, is no longer on isolation status.  He is laying in bed upon approach but open to discussion. He states he read and slept a lot this weekend and denies any physical complaints or COVID symptoms. States he feels ready to go considering he is longer having suicidal thoughts but "will do whatever has to be done to get better". 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 Still retaining that he prefers to be alone and is adverse to the idea of going to groups after he is out of isolation. Taking Remeron but asserts that is the only medication he will take. Denies any suicidal thoughts, homicidal thoughts or auditory or visual hallucinations.

## 2022-07-11 NOTE — BH INPATIENT PSYCHIATRY PROGRESS NOTE - NSBHMSETHTPROC_PSY_A_CORE
Perseverative
Linear
Linear/Perseverative
Perseverative
Linear
Perseverative
Linear/Normal reasoning

## 2022-07-11 NOTE — BH INPATIENT PSYCHIATRY PROGRESS NOTE - NSTXDEPRESDATEEST_PSY_ALL_CORE
07-Jul-2022
30-Jun-2022
07-Jul-2022
30-Jun-2022
07-Jul-2022
30-Jun-2022

## 2022-07-11 NOTE — BH INPATIENT PSYCHIATRY PROGRESS NOTE - NSTXSUICIDINTERMD_PSY_ALL_CORE
psychopharmacology >15 minutes, will initiate remeron per pt request and uptitrate
psychopharmacology >15 minutes, will initiate remeron per pt request
psychopharmacology >15 minutes, will initiate remeron per pt request and uptitrate
psychopharmacology >15 minutes, will initiate remeron per pt request and uptitrate
psychopharmacology >15 minutes, will initiate remeron per pt request
psychopharmacology >15 minutes, will initiate remeron per pt request and uptitrate

## 2022-07-11 NOTE — BH INPATIENT PSYCHIATRY PROGRESS NOTE - NSBHFUPINTERVALCCFT_PSY_A_CORE
treated for mood and suicidal ideation.   'im just trying to get better'
treated for mood and suicidal ideation. 
treatment of depressive sxs
"Not totally better but a lot better" 
treated for mood and suicidal ideation. 
treated for mood and suicidal ideation. 
"I'm feeling better"
treated for mood and suicidal ideation.   'im just trying to get better'
treated for mood and suicidal ideation.

## 2022-07-11 NOTE — BH INPATIENT PSYCHIATRY PROGRESS NOTE - NSBHCHARTREVIEWVS_PSY_A_CORE FT
Vital Signs Last 24 Hrs  T(C): 36.6 (07-11-22 @ 08:00), Max: 37.2 (07-10-22 @ 21:49)  T(F): 97.9 (07-11-22 @ 08:00), Max: 98.9 (07-10-22 @ 21:49)  HR: 96 (07-11-22 @ 08:00) (85 - 96)  BP: 94/61 (07-11-22 @ 08:00) (94/61 - 112/67)  BP(mean): --  RR: 18 (07-11-22 @ 08:00) (18 - 18)  SpO2: 98% (07-11-22 @ 08:00) (98% - 99%)     Vital Signs Last 24 Hrs  T(C): 36.7 (07-11-22 @ 17:06), Max: 37.2 (07-10-22 @ 21:49)  T(F): 98 (07-11-22 @ 17:06), Max: 98.9 (07-10-22 @ 21:49)  HR: 88 (07-11-22 @ 17:06) (85 - 96)  BP: 105/70 (07-11-22 @ 17:06) (94/61 - 112/67)  BP(mean): --  RR: 18 (07-11-22 @ 17:06) (18 - 18)  SpO2: 98% (07-11-22 @ 17:06) (98% - 99%)

## 2022-07-12 VITALS
HEART RATE: 86 BPM | SYSTOLIC BLOOD PRESSURE: 113 MMHG | DIASTOLIC BLOOD PRESSURE: 76 MMHG | RESPIRATION RATE: 20 BRPM | OXYGEN SATURATION: 100 % | WEIGHT: 205.03 LBS | TEMPERATURE: 98 F

## 2022-07-12 PROCEDURE — 99238 HOSP IP/OBS DSCHRG MGMT 30/<: CPT

## 2022-07-12 RX ORDER — ALBUTEROL 90 UG/1
2 AEROSOL, METERED ORAL
Qty: 0 | Refills: 0 | DISCHARGE
Start: 2022-07-12

## 2022-07-12 RX ORDER — MIRTAZAPINE 45 MG/1
1 TABLET, ORALLY DISINTEGRATING ORAL
Qty: 0 | Refills: 0 | DISCHARGE
Start: 2022-07-12

## 2022-07-12 RX ORDER — ALBUTEROL 90 UG/1
2 AEROSOL, METERED ORAL
Qty: 1 | Refills: 0
Start: 2022-07-12 | End: 2022-08-10

## 2022-07-12 RX ORDER — MIRTAZAPINE 45 MG/1
1 TABLET, ORALLY DISINTEGRATING ORAL
Qty: 14 | Refills: 0
Start: 2022-07-12 | End: 2022-07-25

## 2022-07-12 NOTE — BH INPATIENT PSYCHIATRY DISCHARGE NOTE - NSBHDCRISKMITIGATE_PSY_ALL_CORE
Safety planning Safety planning/Medications targeting suicidality/non-suicidal self injurious behavior

## 2022-07-12 NOTE — BH INPATIENT PSYCHIATRY DISCHARGE NOTE - HOSPITAL COURSE
7/2/22 - patient received first dose of mirtazapine last night; tolerating will with no major side effects; continuing to report SI but no longer has a plan.  In isolation because of COVID positivity: COVID-19 PCR: Detected (29 Jun 2022 06:37)  07/03: Isolative; stays in bed; makes minimal eye contact; not conversational. sad; guarded.  denies s/h I/i/p or avh.  continuing current medications   7/5 remains in isolation, tolerating remeron well, remains odd/eccentric, guarded   7/6 pt c/o brain fog, difficulty with organization of thoughts at times attributes this to vertigo, remains focused on only taking remeron at this time. Will Remain on Covid 19 precautions/isolation x10 days total  7/7 continues to report improvement in mood sxs, tolerating med well without issue, denies si, aftercare planning in effect  7/8 remains unchanged  7/10 strange behaviors and tends to be isolative (but in isolation); psychotic features suggested; may need antipsychotic medication; review tongue status prior to initiation.   7/11 reports improved mood and taking medications, open to conversation but still isolative, no psychotic features noted today, denies concerns about tongue, denies any si/hi/ah/vh. Isolation discontinued. Discharge tomorrow   7/2/22 - patient received first dose of mirtazapine last night; tolerating will with no major side effects; continuing to report SI but no longer has a plan.  In isolation because of COVID positivity: COVID-19 PCR: Detected (29 Jun 2022 06:37)  07/03: Isolative; stays in bed; makes minimal eye contact; not conversational. sad; guarded.  denies s/h I/i/p or avh.  continuing current medications   7/5 remains in isolation, tolerating remeron well, remains odd/eccentric, guarded   7/6 pt c/o brain fog, difficulty with organization of thoughts at times attributes this to vertigo, remains focused on only taking remeron at this time. Will Remain on Covid 19 precautions/isolation x10 days total  7/7 continues to report improvement in mood sxs, tolerating med well without issue, denies si, aftercare planning in effect  7/8 remains unchanged  7/10 strange behaviors and tends to be isolative (but in isolation); psychotic features suggested; may need antipsychotic medication; review tongue status prior to initiation.   7/11 reports improved mood and taking medications, open to conversation but still isolative, no psychotic features noted today, denies concerns about tongue, denies any si/hi/ah/vh. Isolation discontinued. Discharge tomorrow  7/12 patient pleasant and states he is ready for discharge. Denies any si/hi/ah/vh. Off isolation today and requesting COVID test before discharge 7/2/22 - patient received first dose of mirtazapine last night; tolerating will with no major side effects; continuing to report SI but no longer has a plan.  In isolation because of COVID positivity: COVID-19 PCR: Detected (29 Jun 2022 06:37)  07/03: Isolative; stays in bed; makes minimal eye contact; not conversational. sad; guarded.  denies s/h I/i/p or avh.  continuing current medications   7/5 remains in isolation, tolerating remeron well, remains odd/eccentric, guarded   7/6 pt c/o brain fog, difficulty with organization of thoughts at times attributes this to vertigo, remains focused on only taking remeron at this time. Will Remain on Covid 19 precautions/isolation x10 days total  7/7 continues to report improvement in mood sxs, tolerating med well without issue, denies si, aftercare planning in effect  7/8 remains unchanged  7/10 strange behaviors and tends to be isolative (but in isolation); psychotic features suggested; may need antipsychotic medication; review tongue status prior to initiation.   7/11 reports improved mood and taking medications, open to conversation but still isolative, no psychotic features noted today, denies concerns about tongue, denies any si/hi/ah/vh. Isolation discontinued. Discharge tomorrow  7/12 patient pleasant and states he is ready for discharge. Denies any si/hi/ah/vh.      Upon admission to unit, patient was noted to be Covid19+ and was maintained on isolation precautions throughout. He consistently verbalized enjoying being by himself and not wanting to interact with other patients. He was compliant his requested remeron which was started at 15mg qhs and maintained at this dose for depressive, poor sleep and poor appetite. Additional medications (antipsychotics) were discussed with patient to treat symptoms including odd presentation at times, intense/inappropriate affect 7/2/22 - patient received first dose of mirtazapine last night; tolerating will with no major side effects; continuing to report SI but no longer has a plan.  In isolation because of COVID positivity: COVID-19 PCR: Detected (29 Jun 2022 06:37)  07/03: Isolative; stays in bed; makes minimal eye contact; not conversational. sad; guarded.  denies s/h I/i/p or avh.  continuing current medications   7/5 remains in isolation, tolerating remeron well, remains odd/eccentric, guarded   7/6 pt c/o brain fog, difficulty with organization of thoughts at times attributes this to vertigo, remains focused on only taking remeron at this time. Will Remain on Covid 19 precautions/isolation x10 days total  7/7 continues to report improvement in mood sxs, tolerating med well without issue, denies si, aftercare planning in effect  7/8 remains unchanged  7/10 strange behaviors and tends to be isolative (but in isolation); psychotic features suggested; may need antipsychotic medication; review tongue status prior to initiation.   7/11 reports improved mood and taking medications, open to conversation but still isolative, no psychotic features noted today, denies concerns about tongue, denies any si/hi/ah/vh. Isolation discontinued. Discharge tomorrow  7/12 patient pleasant and states he is ready for discharge. Denies any si/hi/ah/vh.      Upon admission to unit, patient was noted to be Covid19+ and was maintained on isolation precautions throughout his stay. He consistently verbalized enjoying being by himself and not wanting to interact with other patients, overall appeared somewhat odd, guarded and superficial most times. He was compliant with his requested remeron which was started at 15mg qhs and maintained at this dose for depressive, poor sleep and poor appetite. Depression, sleep and appetite were reported to have improved. Additionally he began to deny suicidal ideations. Additional medications (antipsychotics) were discussed with patient to treat symptoms including odd presentation at times, intense/inappropriate affect. He declined, did not meet criteria for treatment over objection. Safety plan completed prior to discharge. [facility].

## 2022-07-12 NOTE — BH INPATIENT PSYCHIATRY DISCHARGE NOTE - NSDCMRMEDTOKEN_GEN_ALL_CORE_FT
albuterol 90 mcg/inh inhalation aerosol: 2 puff(s) inhaled every 6 hours, As needed, sob/wheezing  mirtazapine 15 mg oral tablet: 1 tab(s) orally once a day (at bedtime)  Multiple Vitamins oral tablet: 1 tab(s) orally once a day

## 2022-07-12 NOTE — BH INPATIENT PSYCHIATRY DISCHARGE NOTE - REASON FOR ADMISSION
Patient self-presents to [facility] ED with complaints of worsening depression and SI. Is transferred to [facility] and admitted voluntary status. Patient placed in isolation due to +Covid 19 result    Patient self-presents to [facility] ED with complaints of worsening depression and SI. Is transferred to [facility] and admitted voluntary status. Patient placed in isolation due to +Covid 19 result.

## 2022-07-12 NOTE — BH INPATIENT PSYCHIATRY DISCHARGE NOTE - NSDCCPCAREPLAN_GEN_ALL_CORE_FT
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  Assessment and Plan of Treatment:     Diagnosis: COVID  Assessment and Plan of Treatment:      PRINCIPAL DISCHARGE DIAGNOSIS  Diagnosis: Major depressive disorder with current active episode  Assessment and Plan of Treatment: Continue current medication regimen and follow up with aftercare appointments      SECONDARY DISCHARGE DIAGNOSES  Diagnosis: COVID  Assessment and Plan of Treatment: Follow up with routine testing as needed if symptommatic, consider available vaccine options    Diagnosis: 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

## 2022-07-12 NOTE — BH DISCHARGE NOTE NURSING/SOCIAL WORK/PSYCH REHAB - NSCDUDCCRISIS_PSY_A_CORE
[facility]  1 [phone]) [facility]-WELL ([phone])  Text "WELL" to 89720  Website: www.[employer]/.[employer] 1 (867) 391-DVQP (1044) Website: www.safehorizon.org/.National Suicide Prevention Lifeline 4 (500) 192-2323/.  [employer]  1 (055) LIFENET (854-7192)/.  [facility]  [address] [place] [id number] [phone]   Hours:  Monday through Friday from 9 AM to 3 PM/.  [place] [employer] - [facility] Crisis Line  6 [phone], Option 1

## 2022-07-12 NOTE — BH DISCHARGE NOTE NURSING/SOCIAL WORK/PSYCH REHAB - PATIENT PORTAL LINK FT
You can access the FollowMyHealth Patient Portal offered by [facility] by registering at the following website: [email or web address]. By joining [employer]’s FollowMyHealth portal, you will also be able to view your health information using other applications (apps) compatible with our system.

## 2022-07-12 NOTE — BH INPATIENT PSYCHIATRY DISCHARGE NOTE - NSBHMETABOLIC_PSY_ALL_CORE_FT
BMI: BMI (kg/m2): 23.7 (06-29-22 @ 06:27)  HbA1c: A1C with Estimated Average Glucose Result: 5.8 % (07-01-22 @ 11:12)    Glucose:   BP: 105/70 (07-11-22 @ 17:06) (94/61 - 114/72)  Lipid Panel: Date/Time: 07-01-22 @ 11:12  Cholesterol, Serum: 115  Direct LDL: --  HDL Cholesterol, Serum: 28  Total Cholesterol/HDL Ration Measurement: --  Triglycerides, Serum: 270

## 2022-07-12 NOTE — BH DISCHARGE NOTE NURSING/SOCIAL WORK/PSYCH REHAB - NSDCPRGOAL_PSY_ALL_CORE
Over the course of tx., pt. was primarily isolated in the COVID wing of the unit; in that interim, pt. was provided with individualized interaction with clinical and creative arts therapy staff; at the time of discharge, pt. was able to identify holistic coping skills such as waling in the park, music, Yazidi attendance, books, among others. Pt. plans to continue to remain in contact with his family in [place].

## 2022-07-12 NOTE — BH INPATIENT PSYCHIATRY DISCHARGE NOTE - NSBHSUICIDESTATUS_PSY_ALL_CORE
Chronic suicidal risk without acute risk present Chronic suicidal risk without acute presenting factors

## 2022-07-12 NOTE — BH INPATIENT PSYCHIATRY DISCHARGE NOTE - NSCURPASTPSYDX_PSY_ALL_CORE
Mood disorder/Psychotic disorder/Cluster B Personality disorder/traits Mood disorder/Psychotic disorder

## 2022-07-12 NOTE — BH INPATIENT PSYCHIATRY DISCHARGE NOTE - HPI (INCLUDE ILLNESS QUALITY, SEVERITY, DURATION, TIMING, CONTEXT, MODIFYING FACTORS, ASSOCIATED SIGNS AND SYMPTOMS)
This is a 35y/o single, unemployed, domiciled  male with medical hx significant for asthma. Denies formal psychiatric history, no hospitalizations, prior medication trials, suicide attempts, substance abuse, legal/criminal hx, no trauma/abuse. Patient self-presents to [facility] ED with complaints of worsening depression and SI. Is transferred to [facility] and admitted voluntary status. Patient placed in isolation due to +Covid 19 result    Per ED report:    On interview patient reports that he found out through Facebook that both of his grandmothers  about 1 month ago. States that this was devastating for him since both raised him. Patient reports he doesn't like his family and has been estranged from them since 2016, when he moved from [place] to [facility]. When asked why he does not like them does not elaborate. Patient denies having friends or much social support in the city. States that he lost his work about 2 months ago as well. On interview he endorses worsening depression for the past 1 month. States that this past week he had passive SI and today was beginning to have active SI w plan to jump in a river or take pills. Pt states that he does not have anyone to speak to and has not reached out to a therapist/psychiatrist. Patient reports anhedonia, weight loss, sleeping only 3-4 hours a night, a decrease in energy, self-isolation, and feeling more angry and emotional. States he doesn't "like talking to anyone about things" so he has been withdrawing and not going out as much to play sports. Patient denies any paranoia, hallucinations, HI.  On the unit:  Patient initially uncooperative during interview, guarded and evasive stating that he doesn't really want to talk about why he feels suicidal and doesn't like to talk about things. He eventually states that a family member  ~4 months ago and over the last 2 weeks he has been feeling suicidal as a result. He has had thought of jumping into a lake. Denies hi/avh or PI. He also states that he has lost ~50lbs during the last several months due to poor appetite. Sleep has been poor, he gets 3-4hrs each night. He states that he currently lives with a couple and their kids. He has his own room and no one bothers him 'I'm positive and they are negative'. He shares that he has been covid19+ for some time now. Physical sxs include dizziness and brain fog.  He was last working in [employer] 4 months ago but was let go. However, finances are not a concern as he has savings to use. He is interested in being started on remeron and being referred to a therapist/psychiatrist. He reports feeling depressed and anxious he reports not having any friends or close contacts as he feels the people talk too much and have 'too much energy' which he does not like.      This is a 35y/o single, unemployed, domiciled  male with medical hx significant for asthma. Denies formal psychiatric history, no hospitalizations, prior medication trials, suicide attempts, substance abuse, legal/criminal hx, no trauma/abuse. Patient self-presents to [facility] ED with complaints of worsening depression and SI. Is transferred to [facility] and admitted voluntary status. Patient placed in isolation due to +Covid 19 result    Patient presents, odd and guarded. Requests remeron to be initiated.    Remeron 15mg qhs to be initiated This is a 37y/o single, unemployed, domiciled  male with medical hx significant for asthma. Denies formal psychiatric history, no hospitalizations, prior medication trials, suicide attempts, substance abuse, legal/criminal hx, no trauma/abuse. Patient self-presents to [facility] ED with complaints of worsening depression and SI. Is transferred to [facility] and admitted voluntary status. Patient placed in isolation due to +Covid 19 result    Per ED report:    On interview patient reports that he found out through Facebook that both of his grandmothers  about 1 month ago. States that this was devastating for him since both raised him. Patient reports he doesn't like his family and has been estranged from them since 2016, when he moved from [place] to [facility]. When asked why he does not like them does not elaborate. Patient denies having friends or much social support in the city. States that he lost his work about 2 months ago as well. On interview he endorses worsening depression for the past 1 month. States that this past week he had passive SI and today was beginning to have active SI w plan to jump in a river or take pills. Pt states that he does not have anyone to speak to and has not reached out to a therapist/psychiatrist. Patient reports anhedonia, weight loss, sleeping only 3-4 hours a night, a decrease in energy, self-isolation, and feeling more angry and emotional. States he doesn't "like talking to anyone about things" so he has been withdrawing and not going out as much to play sports. Patient denies any paranoia, hallucinations, HI.  On the unit:  Patient initially uncooperative during interview, guarded and evasive stating that he doesn't really want to talk about why he feels suicidal and doesn't like to talk about things. He eventually states that a family member  ~4 months ago and over the last 2 weeks he has been feeling suicidal as a result. He has had thought of jumping into a lake. Denies hi/avh or PI. He also states that he has lost ~50lbs during the last several months due to poor appetite. Sleep has been poor, he gets 3-4hrs each night. He states that he currently lives with a couple and their kids. He has his own room and no one bothers him 'I'm positive and they are negative'. He shares that he has been covid19+ for some time now. Physical sxs include dizziness and brain fog.  He was last working in [employer] 4 months ago but was let go. However, finances are not a concern as he has savings to use. He is interested in being started on remeron and being referred to a therapist/psychiatrist. He reports feeling depressed and anxious he reports not having any friends or close contacts as he feels the people talk too much and have 'too much energy' which he does not like.     Patient presents, odd and guarded. Requests remeron to be initiated.    Remeron 15mg qhs to be initiated

## 2022-07-14 ENCOUNTER — EMERGENCY (EMERGENCY)
Facility: HOSPITAL | Age: 37
LOS: 1 days | Discharge: HOME | End: 2022-07-14
Attending: STUDENT IN AN ORGANIZED HEALTH CARE EDUCATION/TRAINING PROGRAM | Admitting: STUDENT IN AN ORGANIZED HEALTH CARE EDUCATION/TRAINING PROGRAM
Payer: MEDICAID

## 2022-07-14 VITALS
OXYGEN SATURATION: 97 % | HEIGHT: 74 IN | TEMPERATURE: 99 F | HEART RATE: 108 BPM | RESPIRATION RATE: 24 BRPM | DIASTOLIC BLOOD PRESSURE: 75 MMHG | SYSTOLIC BLOOD PRESSURE: 123 MMHG | WEIGHT: 199.96 LBS

## 2022-07-14 VITALS
TEMPERATURE: 98 F | OXYGEN SATURATION: 98 % | DIASTOLIC BLOOD PRESSURE: 82 MMHG | HEART RATE: 97 BPM | RESPIRATION RATE: 22 BRPM | SYSTOLIC BLOOD PRESSURE: 137 MMHG

## 2022-07-14 LAB
APPEARANCE UR: CLEAR — SIGNIFICANT CHANGE UP
BILIRUB UR-MCNC: NEGATIVE — SIGNIFICANT CHANGE UP
COLOR SPEC: YELLOW — SIGNIFICANT CHANGE UP
DIFF PNL FLD: NEGATIVE — SIGNIFICANT CHANGE UP
GLUCOSE UR QL: NEGATIVE — SIGNIFICANT CHANGE UP
KETONES UR-MCNC: NEGATIVE — SIGNIFICANT CHANGE UP
LEUKOCYTE ESTERASE UR-ACNC: NEGATIVE — SIGNIFICANT CHANGE UP
NITRITE UR-MCNC: NEGATIVE — SIGNIFICANT CHANGE UP
PH UR: 7.5 — SIGNIFICANT CHANGE UP (ref 5–8)
PROT UR-MCNC: NEGATIVE MG/DL — SIGNIFICANT CHANGE UP
SP GR SPEC: 1.01 — SIGNIFICANT CHANGE UP (ref 1–1.03)
UROBILINOGEN FLD QL: 2 E.U./DL

## 2022-07-14 PROCEDURE — 99284 EMERGENCY DEPT VISIT MOD MDM: CPT

## 2022-07-14 RX ORDER — DIPHENHYDRAMINE HCL 50 MG
50 CAPSULE ORAL ONCE
Refills: 0 | Status: COMPLETED | OUTPATIENT
Start: 2022-07-14 | End: 2022-07-14

## 2022-07-14 RX ORDER — ALPRAZOLAM 0.25 MG
0.25 TABLET ORAL ONCE
Refills: 0 | Status: DISCONTINUED | OUTPATIENT
Start: 2022-07-14 | End: 2022-07-14

## 2022-07-14 RX ADMIN — Medication 50 MILLIGRAM(S): at 19:35

## 2022-07-14 RX ADMIN — Medication 0.25 MILLIGRAM(S): at 21:59

## 2022-07-14 NOTE — ED ADULT TRIAGE NOTE - CHIEF COMPLAINT QUOTE
Pt states he took Compazine for headache for the first time today and now has abnormal tongue movements and feels jittery. Pt diaphoretic in triage, states he is having trouble remaining still.

## 2022-07-14 NOTE — ED ADULT NURSE REASSESSMENT NOTE - NS ED NURSE REASSESS COMMENT FT1
Pt appears more calm, less jerking movements. Pt reporting "the compazine and benadryl are fighting against each other. It is starting in my legs and it needs to work its way up to my arms and face.

## 2022-07-14 NOTE — ED PROVIDER NOTE - NSFOLLOWUPINSTRUCTIONS_ED_ALL_ED_FT
Log Out.      [employer] CareNotes®     :  [facility]  	                     EXTRAPYRAMIDAL SYMPTOMS - General Information           Extrapyramidal Symptoms    WHAT YOU NEED TO KNOW:    What are extrapyramidal symptoms? Extrapyramidal symptoms (EPS) are side effects of antipsychotic medicines. EPS can cause movement and muscle control problems throughout your body.    What symptoms may I have? Symptoms may be noticed after you take one dose of medicine or after long-term use. You may not be aware of these symptoms, but others close to you may notice any of the following:  •Restlessness and nervous energy shown by pacing, marching, shuffling, or foot-tapping      •Severe, uncontrolled muscle contractions of your head, neck, trunk, and limbs that cause stiff tongue, twisted neck, or back arching      •Tremors, stiff posture, or no arm movement when you walk      •Uncontrolled movements of your tongue, jaw, lips, or face, such as pursing, chewing, or frequent eye blinking      •Uncontrolled movements of your fingers or toes, head nodding, or pelvic thrusting      •Fast, irregular breathing with grunts, gasping, or sighing      •Weak voice, drooling, or little or no facial expression      What should I do if I or someone close to me notices that I have symptoms?   •Do not stop taking your medicines unless your healthcare provider says it is okay.      •Contact your healthcare provider.      •Take a list of your medicines with you to your appointment.      CARE AGREEMENT:    You have the right to help plan your care. Learn about your health condition and how it may be treated. Discuss treatment options with your healthcare providers to decide what care you want to receive. You always have the right to refuse treatment.        © Copyright [employer] 2022           back to top                          © Copyright [employer] 2022

## 2022-07-14 NOTE — ED PROVIDER NOTE - CLINICAL SUMMARY MEDICAL DECISION MAKING FREE TEXT BOX
35 yo male with a hx of asthma and depression p/w feeling jittery and restless after taking one dose of compazine earlier today for a headache. Restless on exam. No dystonia. Clinical picture consistent with EPS 2/2 compazine.

## 2022-07-14 NOTE — ED PROVIDER NOTE - PROGRESS NOTE DETAILS
Pt admits to possible cocaine use. Still feels jittery. Will give small dose xanax and reassess. Pt requests urine tox screen.

## 2022-07-14 NOTE — ED PROVIDER NOTE - PATIENT PORTAL LINK FT
You can access the FollowMyHealth Patient Portal offered by [facility] by registering at the following website: [email or web address]. By joining [facility]’s FollowMyHealth portal, you will also be able to view your health information using other applications (apps) compatible with our system.

## 2022-07-14 NOTE — ED ADULT NURSE NOTE - OBJECTIVE STATEMENT
AAOx4, respirations even and unlabored, pt diaphoretic, ambulatory with steady gait. Pt reports receiving compazine earlier this morning at a hospital in the [place] for a headache. Pt reports jittery feeling, anxiety, and tongue movement began 1 hr after. Pt reports symptoms increased throughout the day. Pt displaying jerking movements and mal tongue movements. Pt denies substance abuse, or psyc hx. AAOx4, respirations even and unlabored, pt diaphoretic, ambulatory with steady gait. Pt reports receiving compazine earlier this morning at a hospital in the [place] for a headache. Pt reports jittery feeling, anxiety, and tongue movement began 1 hr after. Pt reports symptoms increased throughout the day. Pt displaying jerking movements and tongue movements. Pt denies substance abuse, or psyc hx. Dr Moon at bedside.

## 2022-07-14 NOTE — ED PROVIDER NOTE - OBJECTIVE STATEMENT
35 yo male with a hx of asthma and depression p/w 37 yo male with a hx of asthma and depression p/w feeling jittery and restless after taking one dose of compazine earlier today for a headache. First time he takes it. He denies drug use. Denies a similar reaction in the past to any other medications. Denies being on any psych meds. Denies tino stiffness. Only reports feeling very jittery and cannot sit still. No sob, cp, abdominal pain, n/v/d/c.

## 2022-07-14 NOTE — ED PROVIDER NOTE - PHYSICAL EXAMINATION
VITAL SIGNS: I have reviewed nursing notes and confirm.  CONSTITUTIONAL: diaphoretic, restless   SKIN:  warm and dry, no acute rash.   HEAD:  normocephalic, atraumatic.  EYES: EOM intact; conjunctiva and sclera clear.  ENT: No nasal discharge; airway clear.   NECK: Supple; non tender. no dystonia   CARD: S1, S2 normal; no murmurs, gallops, or rubs. Regular rate and rhythm.   RESP:  Clear to auscultation b/l, no wheezes, rales or rhonchi.  ABD: Normal bowel sounds; soft; non-distended; non-tender; no guarding/ rebound.  EXT: Normal ROM. No clubbing, cyanosis or edema. 2+ pulses to b/l ue/le.  NEURO: Alert, oriented, grossly unremarkable  PSYCH: Cooperative, mood and affect appropriate.

## 2022-07-15 LAB
AMPHET UR-MCNC: NEGATIVE — SIGNIFICANT CHANGE UP
BARBITURATES UR SCN-MCNC: NEGATIVE — SIGNIFICANT CHANGE UP
BENZODIAZ UR-MCNC: NEGATIVE — SIGNIFICANT CHANGE UP
COCAINE METAB.OTHER UR-MCNC: NEGATIVE — SIGNIFICANT CHANGE UP
METHADONE UR-MCNC: NEGATIVE — SIGNIFICANT CHANGE UP
OPIATES UR-MCNC: POSITIVE
PCP SPEC-MCNC: SIGNIFICANT CHANGE UP
PCP UR-MCNC: NEGATIVE — SIGNIFICANT CHANGE UP
THC UR QL: NEGATIVE — SIGNIFICANT CHANGE UP

## 2022-07-15 PROCEDURE — 99283 EMERGENCY DEPT VISIT LOW MDM: CPT

## 2022-07-15 PROCEDURE — 96372 THER/PROPH/DIAG INJ SC/IM: CPT

## 2022-07-15 PROCEDURE — 81003 URINALYSIS AUTO W/O SCOPE: CPT

## 2022-07-15 PROCEDURE — 80307 DRUG TEST PRSMV CHEM ANLYZR: CPT

## 2022-07-18 DIAGNOSIS — Z88.0 ALLERGY STATUS TO PENICILLIN: ICD-10-CM

## 2022-07-18 DIAGNOSIS — F32.9 MAJOR DEPRESSIVE DISORDER, SINGLE EPISODE, UNSPECIFIED: ICD-10-CM

## 2022-07-18 DIAGNOSIS — Z91.041 RADIOGRAPHIC DYE ALLERGY STATUS: ICD-10-CM

## 2022-07-18 DIAGNOSIS — Z88.8 ALLERGY STATUS TO OTHER DRUGS, MEDICAMENTS AND BIOLOGICAL SUBSTANCES STATUS: ICD-10-CM

## 2022-07-18 DIAGNOSIS — Z91.018 ALLERGY TO OTHER FOODS: ICD-10-CM

## 2022-07-18 DIAGNOSIS — R51.9 HEADACHE, UNSPECIFIED: ICD-10-CM

## 2022-07-18 DIAGNOSIS — T43.3X5A ADVERSE EFFECT OF PHENOTHIAZINE ANTIPSYCHOTICS AND NEUROLEPTICS, INITIAL ENCOUNTER: ICD-10-CM

## 2022-07-18 DIAGNOSIS — Z91.010 ALLERGY TO PEANUTS: ICD-10-CM

## 2022-07-18 DIAGNOSIS — R45.851 SUICIDAL IDEATIONS: ICD-10-CM

## 2022-07-18 DIAGNOSIS — F32.A DEPRESSION, UNSPECIFIED: ICD-10-CM

## 2022-07-18 DIAGNOSIS — G25.71 DRUG INDUCED AKATHISIA: ICD-10-CM
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
## 2022-07-18 DIAGNOSIS — J45.909 UNSPECIFIED ASTHMA, UNCOMPLICATED: ICD-10-CM

## 2022-07-18 DIAGNOSIS — Z56.0 UNEMPLOYMENT, UNSPECIFIED: ICD-10-CM

## 2022-07-18 DIAGNOSIS — U07.1 COVID-19: ICD-10-CM

## 2022-07-18 DIAGNOSIS — F60.9 PERSONALITY DISORDER, UNSPECIFIED: ICD-10-CM

## 2022-07-18 DIAGNOSIS — Z88.1 ALLERGY STATUS TO OTHER ANTIBIOTIC AGENTS STATUS: ICD-10-CM
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
## 2022-07-18 SDOH — ECONOMIC STABILITY - INCOME SECURITY: UNEMPLOYMENT, UNSPECIFIED: Z56.0

## 2022-09-14 NOTE — ED PROVIDER NOTE - ATTESTATION, MLM
[facility]  (A Division of [address] [place] [address])    Irma Hernandez MD  Office #: - Option # [phone]  Fax #: [phone]     Date of Admission: 9/11/2022      Date of Note: 9/14/2022   Reason for Referral: Evaluation and antibiotic management of foot infection/ OM     Current Antimicrobials:    Prior Antimicrobials:  Cefepime -> ceftriaxone and vancomycin   Oral ABX PTA   Antibiotic allergy: Bactrim ? Assessment:     R hallux OM with abscess - proximal and distal phalanx on MRI 9/8/22  Blood culture 9/11: NGSF  Wound culture 9/12- GS/culture- pending  S/P hallux ampuation-- OR cultures: NGSF 9/12  DM reasonable control 0 A1C 7.1%  A.fib on AC-Coumadin  Morbidly obese BMI 42    Recommendation -- ID related:     FU cutlures : NGSF  Can DC IV abx  - surgically cured it seems. Will confirm with Arlyn Murphy/YANDEL    Will sign off. Please re-consult as needed. Subjective:   Feels fine, no complaints  No leg pain    Notes/Labs reviewed    HPI:     Carlos Alberto Almanzar is a 69 y.o. WHITE/NON- with PMH of DM, A.fib on AC- Coumadin,R great toe OM-- he had onset of R hallux infection from 2 weeks ago. Onset of pain and swelling- without trauma 2 weeks ago-- seen in [facility]- 8/24--I and D done and given Augmentin and Bactrim. Sent to wound clinic per Care Everywhere review-- on 48 hour FU. Seen by PCP, who referred him to Podiatry. MRI done on 9/8 showing OM and abscess, sent in to ED for admission. Per patient, he doesn't recall any allergic reaction to Bactrim, however, he has it listed as allergy. Currently, tolerating Vanco and cefepime so far. Tells me that OR for toe resection in  plans by Dr Anamaria Holguin. No F/C/R/SOB/ chest pain. No urinary issue. Chart reviewed on prior notes. Care Everywhere reviewed- in S system. Imaging repots reviewed- in Sent ` system    Osteomyelitis of great toe of right foot ([address] [M86.9]  History reviewed.  No pertinent surgical history. No family history on file.   Medications reviewed as below:   Current Facility-Administered Medications   Medication Dose Route Frequency Provider Last Rate Last Admin    vancomycin (VANCOCIN) 1750 mg in  ml infusion  1,750 mg IntraVENous Q12H LEVI Tiwari 250 mL/hr at 09/14/22 0525 1,750 mg at 09/14/22 0525    insulin glargine (LANTUS) injection 10 Units  10 Units SubCUTAneous QHS Renea FROST MD   10 Units at 09/13/22 2200    cefTRIAXone (ROCEPHIN) 1 g in 0.9% sodium chloride (MBP/ADV) 50 mL MBP  1 g IntraVENous Q24H Jayda Caicedo  mL/hr at 09/13/22 1153 1 g at 09/13/22 1153    Vancomycin Pharmacy Dosing   1 Each Other Rx Dosing/Monitoring LEVI Singh        sodium chloride (NS) flush 5-40 mL  5-40 mL IntraVENous Q8H Jaqueline Markham MD   10 mL at 09/14/22 0533    sodium chloride (NS) flush 5-40 mL  5-40 mL IntraVENous PRN Jaqueline Markham MD        acetaminophen (TYLENOL) tablet 650 mg  650 mg Oral Q6H PRN Jaqueline Markham MD   650 mg at 09/12/22 6973    Or    acetaminophen (TYLENOL) suppository 650 mg  650 mg Rectal Q6H PRN Jaqueline Markham MD        bisacodyL (DULCOLAX) suppository 10 mg  10 mg Rectal DAILY PRN Jaqueline Markham MD        promethazine (PHENERGAN) tablet 12.5 mg  12.5 mg Oral Q6H PRN Jaqueline Markham MD        Or    ondansetron [facility]) injection 4 mg  4 mg IntraVENous Q6H PRN Jaqueline Markham MD        insulin lispro (HUMALOG) injection   SubCUTAneous AC&HS Nafisa Castaneda MD   4 Units at 09/14/22 0730    glucose chewable tablet 16 g  4 Tablet Oral PRN Jaqueline Markham MD        glucagon (GLUCAGEN) injection 1 mg  1 mg IntraMUSCular PRN Jaqueline Markham MD        dextrose 10% infusion 0-250 mL  0-250 mL IntraVENous PRN Jaqueline Markham MD        atorvastatin (LIPITOR) tablet 40 mg  40 mg Oral PCD Jaqueline Markham MD   40 mg at 09/13/22 1715    losartan (COZAAR) tablet 50 mg  50 mg Oral DAILY Jaqueline Markham MD   50 mg at 09/14/22 0838    montelukast (SINGULAIR) tablet 10 mg  10 mg Oral PCD Jaqueline Markham MD   10 mg at 09/13/22 1715    spironolactone (ALDACTONE) tablet 12.5 mg  12.5 mg Oral DAILY Ernestine Berumen MD   12.5 mg at 22 8386    oxyCODONE-acetaminophen (PERCOCET) 5-325 mg per tablet 1 Tablet  1 Tablet Oral Q6H PRN Ernestine Berumen MD   1 Tablet at 22 1308     Allergies   Allergen Reactions    Sulfa (Sulfonamide Antibiotics) Unknown (comments)     Patient doesn't recall     Social History     Socioeconomic History    Marital status:      Spouse name: Not on file    Number of children: Not on file    Years of education: Not on file    Highest education level: Not on file   Occupational History    Not on file   Tobacco Use    Smoking status: Not on file    Smokeless tobacco: Not on file   Substance and Sexual Activity    Alcohol use: Not on file    Drug use: Not on file    Sexual activity: Not on file   Other Topics Concern    Not on file   Social History Narrative    Not on file     Social Determinants of Health     Financial Resource Strain: Not on file   Food Insecurity: Not on file   Transportation Needs: Not on file   Physical Activity: Not on file   Stress: Not on file   Social Connections: Not on file   Intimate Partner Violence: Not on file   Housing Stability: Not on file        Review of Systems    Negative Unless BOLDED    General: fevers, chills, myalgias, arthralgias, unexplained weight loss, malaise, fatigue.     Objective:      Visit Vitals  BP (!) 145/94 (BP 1 Location: Left upper arm, BP Patient Position: Sitting)   Pulse 97   Temp 98.7 °F (37.1 °C)   Resp 17   Ht 5' 8\" (1.727 m)   Wt 115.7 kg (255 lb)   SpO2 98%   BMI 38.77 kg/m²     Temp (24hrs), Av.1 °F (36.7 °C), Min:97.6 °F (36.4 °C), Max:98.7 °F (37.1 °C)      Lines / Catheters:   PIV    General:   Comfortable/no distress--on RA    Skin:   no rashes or skin lesions noted on limited exam  R hallux-- swollen, red and open drainage from 1st PIP joint site-- dressed, not seen   HEENT:  Normocephalic, atraumatic,       Lungs:   non-labored       Abdomen: soft, non-distended, active bowel sounds. Appropriate surgical scars for stated surgeries. Non-tender   Genitourinary:  deferred     Results       Procedure Component Value Units Date/Time    CULTURE, ANAEROBIC [[id number]] Collected: 09/12/22 1745    Order Status: Sent Specimen: Toe Updated: 09/13/22 1640    CULTURE, Dub Kashif STAIN [[id number]] Collected: 09/12/22 1745    Order Status: Completed Specimen: Wound from Toe Updated: 09/14/22 0648     Special Requests: NO SPECIAL REQUESTS        GRAM STAIN NO WBC'S SEEN         NO ORGANISMS SEEN        Culture result: PENDING    CULTURE, Dub Kashif STAIN [[id number]] Collected: 09/12/22 0049    Order Status: Completed Specimen: Wound from Drainage Updated: 09/13/22 0711     Special Requests: NO SPECIAL REQUESTS        GRAM STAIN OCCASIONAL WBCS SEEN         NO DEFINITE ORGANISM SEEN        Culture result: NO GROWTH THUS FAR       CULTURE, BLOOD [[id number]] Collected: 09/11/22 1400    Order Status: Completed Specimen: Blood Updated: 09/14/22 0724     Special Requests: NO SPECIAL REQUESTS        Culture result: NO GROWTH 3 DAYS       CULTURE, BLOOD [[id number]] Collected: 09/11/22 1345    Order Status: Completed Specimen: Blood Updated: 09/14/22 0724     Special Requests: NO SPECIAL REQUESTS        Culture result: NO GROWTH 3 DAYS               Labs: Results:   Chemistry Recent Labs     09/14/22  0155 09/13/22  1430 09/13/22  0140 09/12/22  0429 09/11/22  1345   * 233* 122*   < > 79   * 135* 140   < > 140   K 4.2 4.0 4.4   < > 4.5    102 106   < > 108   CO2 26 26 28   < > 28   BUN 13 10 10   < > 15   CREA 0.82 0.80 0.57*   < > 0.65   CA 9.1 9.0 8.7   < > 9.0   AGAP 6 7 6   < > 4   BUCR 16 13 18   < > 23*   AP  --   --   --   --  71   TP  --   --   --   --  7.0   ALB  --   --   --   --  3.3*   GLOB  --   --   --   --  3.7   AGRAT  --   --   --   --  0.9    < > = values in this interval not displayed.       CBC w/Diff Recent Labs     09/14/22  0155 09/13/22  0140 09/12/22  0429   WBC 8.3 6.4 6.0   RBC 4.14* 3.70* 3.82*   HGB 13.2 11.8* 12.1*   HCT 39.9 35.8* 37.5    189 199   GRANS 74* 63 64   LYMPH 10* 17* 18*   EOS 2 4 4                Imaging: All imaging reviewed from Admission to present as per radiology interpretation in [facility]   MRI of foot- 9/8/22 Care Everywhere  1. Loss of cortical definition of the proximal and distal phalanx of the first digit along with some bone marrow edema and heterogeneous enhancement suggestive of osteomyelitis. There is also a fluid collection at the medial aspect of the first interphalangeal joint worrisome for abscess. 2. Soft tissue edema. Consider cellulitis.    3. The findings and impression of this report were called to the treating physician, Charo with read back at 9:15 PM. I have reviewed and confirmed nurses' notes for patient's medications, allergies, medical history, and surgical history.

## 2022-10-10 NOTE — ED ADULT NURSE NOTE - NSSISCREENINGQ3_ED_A_ED
EPWORTH SLEEPINESS SCALE - PATIENT RESPONSES    Situation             Response  Sitting and reading 0   Watching TV 0   Sitting inactive in a public place (e.g. A theatre or a meeting) 0   As a passenger in a car for an hour without a break 1   Lying down to rest in the afternoon when circumstances permit 0   Sitting and talking to someone 0   Sitting quietly after lunch without alcohol 0   In a car while stopped for a few minutes in traffic 0   Total score 1        No

## 2022-10-11 NOTE — ED PROVIDER NOTE - ATTESTATION, MLM
I have reviewed and confirmed nurses' notes for patient's medications, allergies, medical history, and surgical history. Rhomboid Transposition Flap Text: The defect edges were debeveled with a #15 scalpel blade.  Given the location of the defect and the proximity to free margins a rhomboid transposition flap was deemed most appropriate.  Using a sterile surgical marker, an appropriate rhomboid flap was drawn incorporating the defect.    The area thus outlined was incised deep to adipose tissue with a #15 scalpel blade.  The skin margins were undermined to an appropriate distance in all directions utilizing iris scissors.

## 2022-10-31 NOTE — ED ADULT NURSE NOTE - NSFALLRSKPASTHIST_ED_ALL_ED
The patient is Stable - Low risk of patient condition declining or worsening    Shift Goals  Clinical Goals: Establish breast feeding  Patient Goals: bonding with infant  Family Goals: support    Progress made toward(s) clinical / shift goals:  Pain well controlled    Patient is not progressing towards the following goals:       no

## 2022-12-08 NOTE — ED ADULT NURSE REASSESSMENT NOTE - NS ED NURSE REASSESS COMMENT FT1
Patient provided for rounding. Patient in no apparent distress. Resting comfortably. Patient provided for emotional support, comfort, safety, and review of plan of care. Will continue to monitor. Remains on constant observation. 79

## 2023-01-01 NOTE — ED PROVIDER NOTE - INTERPRETATION
IMP: Viral Syndrome improving.    PLAN: Continue cool mist humidifier, nasal saline and suction as needed.  May give Tylenol or Ibuprofen if needed.  F/U if her condition worsens.  
normal sinus rhythm, Normal axis, Normal LA interval and QRS complex. There are no acute ischemic ST or T-wave changes.

## 2023-02-27 NOTE — ED PROVIDER NOTE - NSCAREINITIATED _GEN_ER
Jackelin Alarcon)
50 y/o female with PMHx of mitral valve prolapse and protein C defiency presents to the ED c/o chest discomfort since yesterday. Pt localizes pain to the sternal area, "sharp and tight" in nature. Pain had resolved but suddenly worsened last night. Last episode of pain was about 30 minutes ago PTA, lasted about 10 minutes. + SOB and back pain.. Reports son at home is sick with strep throat and believes she has it too. Does not use OCP. Denies drug/alcohol use. Also notes Hx of pelvis congestion with metal clamps and surgeries for LE "blood pooling."

## 2023-04-03 NOTE — ED ADULT TRIAGE NOTE - PATIENT ON (OXYGEN DELIVERY METHOD)
Detail Level: Zone Detail Level: Detailed Sunscreen Recommendations: SPF 30+, broad spectrum Sunscreen Recommendations: SPF 30+ daily, broad spectrum room air

## 2023-05-18 ENCOUNTER — EMERGENCY (EMERGENCY)
Facility: HOSPITAL | Age: 38
LOS: 1 days | Discharge: HOME | End: 2023-05-18
Attending: EMERGENCY MEDICINE | Admitting: EMERGENCY MEDICINE
Payer: MEDICAID

## 2023-05-18 VITALS
SYSTOLIC BLOOD PRESSURE: 121 MMHG | HEART RATE: 95 BPM | OXYGEN SATURATION: 98 % | HEIGHT: 74 IN | TEMPERATURE: 98 F | WEIGHT: 207.68 LBS | DIASTOLIC BLOOD PRESSURE: 77 MMHG | RESPIRATION RATE: 16 BRPM

## 2023-05-18 DIAGNOSIS — Z91.018 ALLERGY TO OTHER FOODS: ICD-10-CM

## 2023-05-18 DIAGNOSIS — Z20.2 CONTACT WITH AND (SUSPECTED) EXPOSURE TO INFECTIONS WITH A PREDOMINANTLY SEXUAL MODE OF TRANSMISSION: ICD-10-CM

## 2023-05-18 DIAGNOSIS — Z88.8 ALLERGY STATUS TO OTHER DRUGS, MEDICAMENTS AND BIOLOGICAL SUBSTANCES: ICD-10-CM

## 2023-05-18 DIAGNOSIS — F32.A DEPRESSION, UNSPECIFIED: ICD-10-CM

## 2023-05-18 DIAGNOSIS — Z88.0 ALLERGY STATUS TO PENICILLIN: ICD-10-CM

## 2023-05-18 DIAGNOSIS — Z87.09 PERSONAL HISTORY OF OTHER DISEASES OF THE RESPIRATORY SYSTEM: ICD-10-CM

## 2023-05-18 DIAGNOSIS — Z91.010 ALLERGY TO PEANUTS: ICD-10-CM

## 2023-05-18 DIAGNOSIS — Z91.041 RADIOGRAPHIC DYE ALLERGY STATUS: ICD-10-CM

## 2023-05-18 DIAGNOSIS — Z88.1 ALLERGY STATUS TO OTHER ANTIBIOTIC AGENTS: ICD-10-CM

## 2023-05-18 PROCEDURE — 99284 EMERGENCY DEPT VISIT MOD MDM: CPT

## 2023-05-18 RX ORDER — CEFTRIAXONE 500 MG/1
500 INJECTION, POWDER, FOR SOLUTION INTRAMUSCULAR; INTRAVENOUS ONCE
Refills: 0 | Status: COMPLETED | OUTPATIENT
Start: 2023-05-18 | End: 2023-05-18

## 2023-05-18 RX ADMIN — Medication 100 MILLIGRAM(S): at 21:15

## 2023-05-18 RX ADMIN — CEFTRIAXONE 500 MILLIGRAM(S): 500 INJECTION, POWDER, FOR SOLUTION INTRAMUSCULAR; INTRAVENOUS at 21:14

## 2023-05-18 NOTE — ED PROVIDER NOTE - OBJECTIVE STATEMENT
37-year-old male, denies cold symptoms as stated in triage.  Rather states that he is here for treatment of possible STD exposure, HIV and syphilis test.  About 2 to 3 weeks ago he had a party like encounter with multiple females and sexual activity.  He states he may have been exposed to HIV or STD at that time.  He is here requesting treatment and testing for both.  No symptoms, no penile discharge, no testicular or scrotal pain, no abdominal pain, no nausea/vomiting, no other symptoms.  No urinary symptoms.

## 2023-05-18 NOTE — ED PROVIDER NOTE - NSFOLLOWUPINSTRUCTIONS_ED_ALL_ED_FT
Sexually Transmitted Diseases    WHAT YOU NEED TO KNOW:    A sexually transmitted disease (STD), is also called a sexually transmitted infection (STI). An STD is an infection caused by bacteria or a virus. STDs are spread by oral, genital, or anal sex. Some examples of STDs are HIV, chlamydia, syphilis, and gonorrhea.    DISCHARGE INSTRUCTIONS:    Return to the emergency department if:     You have genital swelling or pain, or unusual bleeding.      You have joint pain, rash, swollen lymph nodes, or night sweats.      You have severe abdominal pain.    Contact your healthcare provider if:     You have a fever.       Your symptoms do not go away or they get worse, even after treatment.      You have bleeding or pain during sex.      You have questions or concerns about your condition or care.    Medicines:     Medicines help treat the infection.      Take your medicine as directed. Contact your healthcare provider if you think your medicine is not helping or if you have side effects. Tell him of her if you are allergic to any medicine. Keep a list of the medicines, vitamins, and herbs you take. Include the amounts, and when and why you take them. Bring the list or the pill bottles to follow-up visits. Carry your medicine list with you in case of an emergency.    Prevent the spread of an STD: Ask your healthcare provider for more information about the following safe sex practices:    Use condoms. Use a latex condom if you have oral, genital, or anal sex. Use a new condom each time. Use a polyurethane condom if you are allergic to latex.      Do not douche. Douching upsets the normal balance of bacteria are found in your vagina. It does not prevent or clear up vaginal infections.      Do not have sex with someone who has an STD. This includes oral and anal sex.      Limit sexual partners. Have sex with one person who is not having sex with anyone else.       Do not have sex during treatment. Do not have sex while you or your partners are being treated for an STD.      Get screening tests regularly if you are sexually active.      Get vaccinated. Vaccines may help to prevent your risk of some STDs. Ask your healthcare provider for more information about vaccines for STDs.    Follow up with your healthcare provider as directed: Write down your questions so you remember to ask them during your visits.

## 2023-05-18 NOTE — ED PROVIDER NOTE - NSFOLLOWUPCLINICS_GEN_ALL_ED_FT
[facility]  HIV/AIDS Research & Treatment  [address] [place] [address]  Phone: [phone]  Fax:

## 2023-05-18 NOTE — ED PROVIDER NOTE - CLINICAL SUMMARY MEDICAL DECISION MAKING FREE TEXT BOX
Treated for STD exposure, patient declined to wait for results of HIV test.  Referred for outpatient follow-up.

## 2023-05-18 NOTE — ED PROVIDER NOTE - PATIENT PORTAL LINK FT
You can access the FollowMyHealth Patient Portal offered by [facility] by registering at the following website: [email or web address]. By joining [facility]’s FollowMyHealth portal, you will also be able to view your health information using other applications (apps) compatible with our system.

## 2023-05-18 NOTE — ED ADULT TRIAGE NOTE - CHIEF COMPLAINT QUOTE
patient walk in c/o cold s/s for over one week but wont elaborate on any more information "he has more to tell in a private space"

## 2023-05-18 NOTE — ED ADULT NURSE NOTE - NSFALLUNIVINTERV_ED_ALL_ED
Bed/Stretcher in lowest position, wheels locked, appropriate side rails in place/Call bell, personal items and telephone in reach/Instruct patient to call for assistance before getting out of bed/chair/stretcher/Non-slip footwear applied when patient is off stretcher/[place] to call system/Physically safe environment - no spills, clutter or unnecessary equipment/Purposeful proactive rounding/Room/bathroom lighting operational, light cord in reach

## 2023-05-19 LAB — HIV 1 & 2 AB SERPL IA.RAPID: SIGNIFICANT CHANGE UP

## 2023-06-21 NOTE — ED ADULT NURSE NOTE - NS ED NOTE ABUSE RESPONSE YN
NEW PATIENT VISIT     Medication verified, no changes  Denies known Latex allergy or symptoms of Latex sensitivity   Tobacco history verified.    Verbal permission granted by patient to leave a detailed message with medical information at phone number listed in Epic. yes    If female, are you pregnant, trying to become pregnant, or breastfeeding? N/A    Pt is here today with Self    CHIEF COMPLAINT:   Chief Complaint   Patient presents with   • Office Visit   • Skin Assessment   • Rosacea        HISTORY OF PRESENT ILLNESS: The patient is a 72 year old  male seen today as a self referral for DOMINIQUE. LV 09/20/11, Dr Avalos.       Concerning areas: soc- raised lesion  Location:  Left cheek(s)  Duration:  1 year  Symptoms:  no associated symptoms  Changes over time:  unchanged  Treatments:  hydrocortisone       DERM-RELEVANT HISTORY:  -Hx rosacea  PP Dr Avalos:  History of skin cancer--Negative   09/27/11 Shave bx, R shoulder: Angioma (Dr Avalos)  Family history of skin cancer--No family history of skin cancer  History of tanning bed use- yes   Outdoor hobbies-gardening; sunscreens sometimes  Occupation-sales   Lives with--Wife      PAST MEDICAL HISTORY:  [x]  None  []  Melanoma    []  Asthma []  Skin Cancer   []  Eczema []  Keloids  []  Allergies []  Psoriasis  []  Vitiligo  []  Alopecia areata      REVIEW OF SYSTEMS:  Yes No  []  [x]  Other skin concerns, rash, or other changing lesions  []  [x]  Fevers, current, or recent illness  []  [x]  Easy bruising or bleeding    There are no problems to display for this patient.      PAST MEDICAL HISTORY:   No past medical history on file.      FAMILY HISTORY:   Family History   Problem Relation Age of Onset   • Heart Mother    • Heart Father    • Stroke Mother    • Stroke Father         SOCIAL HISTORY:   Social History     Tobacco Use   • Smoking status: Former     Current packs/day: 0.50     Average packs/day: 0.5 packs/day for 2.0 years (1.0 pk-yrs)     Types:  Cigarettes        MEDICATIONS: reviewed in EPIC    ALLERGIES: reviewed in EPIC    PHYSICAL EXAM:   There were no vitals filed for this visit.  GENERAL:  Pleasant, alert, no acute distress    A full body skin exam of the scalp, face, neck, back, chest, right and left arm and leg, abdomen, groin, buttocks, hands and feet was performed today.  Findings include:   Nevi, lentigines, ephelides, cherry angiomas on trunk and extremities   Waxy, stuck-on tan and brown papules (Seborrheic Keratosis) on the body    Gritty pink papule(s) (actinic keratoses) on the vertex scalp x 5 with background diffuse actinic damage  Central forehead and glabella with pink patch  Left medial cheek with waxy thin plaque x 1 (SK)  Right dorsal 3rd finger with Verrucous papule x 1  Yellow thickened toe nails on left great toe and left 3rd toe    ASSESSMENT AND PLAN:     # Rosacea: erythematous/telangiectatic  -Discussed diagnosis and chronicity of condition  -Discussed option for PDL laser if desired, pt prefers no Tx    #Central forehead and glabella with pink patch: Dermatitis  -Apply hydrocortisone twice a day x 1- 2 weeks (Rx from PCP)    # Verruca vulgaris   -Discussed Dx. Treatment options reviewed. Pt prefers No Tx at this time, he reports lesion has been stable for years and doesn't bother him.   -Treatment today with: none   -Home treatment with none    # Tinea pedis  - Treat with OTC clotrimazole or terbinafine cream once daily for maintenance, twice daily with flares  -Dry feet well after bathing  -Appropriate use of medication, risks, benefits, and side effects reviewed.     # Xerosis  - Moisturizer use 1-2 times daily.  - Gentle skincare reviewed    # Nevi, lentigines, ephelides, cherry angiomas  -No concerning features, continue to monitor. Reviewed ABCDEs of moles, self-skin exam, and importance of sun protection.     # Seborrheic keratoses   -No concerning features, reviewed diagnosis, continue to monitor.     #  Onychomycosis  -Nail clipping obtained for PAS stain today: no  -Fungal culture obtained today: no  -Discussed Tx options for onychomycosis: oral antifungals, Jublia (topical efinaconazole), Penlac (topical ciclopirox) or Vicks VapoRub.  -Explained that improvement will take time due to slow growth of nail. (Approx 6 mos fingernails, 1-1.5 yrs for toenails)   -Start Rx topical Jublia to affected toe nails at bedtime: yes  -Start Gordon's VapoRub: no  -Appropriate use of medication, risks, benefits, and side effects reviewed.    # Efudex treatment: discussed option for cryo vs efudex to vertex scalp  -Rx treatment of actinic keratoses/chronic actinic damage with 5-fluorouracil  -Reviewed risks, benefits, side effects and that the skin will become raw and irritated with treatment. Call if concerns or severe irritation.   Areas to be treated: vertex scalp  Apply:  2 times a day  Apply for:  14 days  Wait to treat until fall        Discussed skin self-examination, ABCDE's of pigmented lesions, or other lesions of concern such as persistent crusts, erosions, irritated areas, and new, rapidly growing, or changing lesions. Reviewed importance of sun protection (broad spectrum sunblock with spf 30 or greater (zinc oxide, titanium dioxide), sun-protective clothing).      Return to clinic: Return in about 1 year (around 6/21/2024) for F/U DOMINIQUE.   Contact office sooner if condition is worsening or with new concerns.      On 6/21/2023, Cherrie PARSONS scribed the services personally performed by Arin Renteria MD, Michelle L Bayer, MD, attest that the documentation recorded by the scribe accurately and completely reflects the service(s) I personally performed and the decisions made by me. I also reviewed and verified the scribe's note, which I edited as appropriate.                        Yes

## 2023-06-28 NOTE — BH INPATIENT PSYCHIATRY PROGRESS NOTE - NSBHMSEAFFRANGE_PSY_A_CORE
Constricted
Constricted
Blunted
Constricted
Constricted
Unique Flap 1 Name: Nasal Advancement Flap
Constricted

## 2023-08-02 NOTE — ED ADULT NURSE NOTE - NSICDXNOPASTSURGICALHX_GEN_ALL_CORE
<-- Click to add NO significant Past Surgical History Cimetidine Counseling:  I discussed with the patient the risks of Cimetidine including but not limited to gynecomastia, headache, diarrhea, nausea, drowsiness, arrhythmias, pancreatitis, skin rashes, psychosis, bone marrow suppression and kidney toxicity.

## 2023-09-03 ENCOUNTER — INPATIENT (INPATIENT)
Facility: HOSPITAL | Age: 38
LOS: 10 days | Discharge: HOME | DRG: 754 | End: 2023-09-14
Attending: PSYCHIATRY & NEUROLOGY | Admitting: PSYCHIATRY & NEUROLOGY
Payer: MEDICAID

## 2023-09-03 VITALS
HEART RATE: 71 BPM | TEMPERATURE: 99 F | WEIGHT: 169.98 LBS | OXYGEN SATURATION: 99 % | DIASTOLIC BLOOD PRESSURE: 67 MMHG | RESPIRATION RATE: 18 BRPM | HEIGHT: 74 IN | SYSTOLIC BLOOD PRESSURE: 119 MMHG

## 2023-09-03 DIAGNOSIS — R45.851 SUICIDAL IDEATIONS: ICD-10-CM

## 2023-09-03 DIAGNOSIS — F33.1 MAJOR DEPRESSIVE DISORDER, RECURRENT, MODERATE: ICD-10-CM

## 2023-09-03 LAB
ANION GAP SERPL CALC-SCNC: 10 MMOL/L — SIGNIFICANT CHANGE UP (ref 7–14)
APAP SERPL-MCNC: <5 UG/ML — LOW (ref 10–30)
APPEARANCE UR: CLEAR — SIGNIFICANT CHANGE UP
BASOPHILS # BLD AUTO: 0.03 K/UL — SIGNIFICANT CHANGE UP (ref 0–0.2)
BASOPHILS NFR BLD AUTO: 0.6 % — SIGNIFICANT CHANGE UP (ref 0–1)
BILIRUB UR-MCNC: NEGATIVE — SIGNIFICANT CHANGE UP
BUN SERPL-MCNC: 16 MG/DL — SIGNIFICANT CHANGE UP (ref 10–20)
CALCIUM SERPL-MCNC: 9.1 MG/DL — SIGNIFICANT CHANGE UP (ref 8.4–10.5)
CHLORIDE SERPL-SCNC: 106 MMOL/L — SIGNIFICANT CHANGE UP (ref 98–110)
CO2 SERPL-SCNC: 24 MMOL/L — SIGNIFICANT CHANGE UP (ref 17–32)
COLOR SPEC: YELLOW — SIGNIFICANT CHANGE UP
CREAT SERPL-MCNC: 0.9 MG/DL — SIGNIFICANT CHANGE UP (ref 0.7–1.5)
DIFF PNL FLD: NEGATIVE — SIGNIFICANT CHANGE UP
EGFR: 114 ML/MIN/1.73M2 — SIGNIFICANT CHANGE UP
EOSINOPHIL # BLD AUTO: 0.21 K/UL — SIGNIFICANT CHANGE UP (ref 0–0.7)
EOSINOPHIL NFR BLD AUTO: 4.2 % — SIGNIFICANT CHANGE UP (ref 0–8)
ETHANOL SERPL-MCNC: <10 MG/DL — SIGNIFICANT CHANGE UP
GLUCOSE SERPL-MCNC: 92 MG/DL — SIGNIFICANT CHANGE UP (ref 70–99)
GLUCOSE UR QL: NEGATIVE MG/DL — SIGNIFICANT CHANGE UP
HCT VFR BLD CALC: 39.7 % — LOW (ref 42–52)
HGB BLD-MCNC: 12.5 G/DL — LOW (ref 14–18)
IMM GRANULOCYTES NFR BLD AUTO: 0.2 % — SIGNIFICANT CHANGE UP (ref 0.1–0.3)
KETONES UR-MCNC: ABNORMAL MG/DL
LEUKOCYTE ESTERASE UR-ACNC: NEGATIVE — SIGNIFICANT CHANGE UP
LYMPHOCYTES # BLD AUTO: 2.14 K/UL — SIGNIFICANT CHANGE UP (ref 1.2–3.4)
LYMPHOCYTES # BLD AUTO: 42.3 % — SIGNIFICANT CHANGE UP (ref 20.5–51.1)
MCHC RBC-ENTMCNC: 23.9 PG — LOW (ref 27–31)
MCHC RBC-ENTMCNC: 31.5 G/DL — LOW (ref 32–37)
MCV RBC AUTO: 75.8 FL — LOW (ref 80–94)
MONOCYTES # BLD AUTO: 0.45 K/UL — SIGNIFICANT CHANGE UP (ref 0.1–0.6)
MONOCYTES NFR BLD AUTO: 8.9 % — SIGNIFICANT CHANGE UP (ref 1.7–9.3)
NEUTROPHILS # BLD AUTO: 2.22 K/UL — SIGNIFICANT CHANGE UP (ref 1.4–6.5)
NEUTROPHILS NFR BLD AUTO: 43.8 % — SIGNIFICANT CHANGE UP (ref 42.2–75.2)
NITRITE UR-MCNC: NEGATIVE — SIGNIFICANT CHANGE UP
NRBC # BLD: 0 /100 WBCS — SIGNIFICANT CHANGE UP (ref 0–0)
PH UR: 6.5 — SIGNIFICANT CHANGE UP (ref 5–8)
PLATELET # BLD AUTO: 183 K/UL — SIGNIFICANT CHANGE UP (ref 130–400)
PMV BLD: 11.8 FL — HIGH (ref 7.4–10.4)
POTASSIUM SERPL-MCNC: 4.1 MMOL/L — SIGNIFICANT CHANGE UP (ref 3.5–5)
POTASSIUM SERPL-SCNC: 4.1 MMOL/L — SIGNIFICANT CHANGE UP (ref 3.5–5)
PROT UR-MCNC: SIGNIFICANT CHANGE UP MG/DL
RBC # BLD: 5.24 M/UL — SIGNIFICANT CHANGE UP (ref 4.7–6.1)
RBC # FLD: 14.6 % — HIGH (ref 11.5–14.5)
SALICYLATES SERPL-MCNC: <0.3 MG/DL — LOW (ref 4–30)
SARS-COV-2 RNA SPEC QL NAA+PROBE: SIGNIFICANT CHANGE UP
SODIUM SERPL-SCNC: 140 MMOL/L — SIGNIFICANT CHANGE UP (ref 135–146)
SP GR SPEC: >1.03 — HIGH (ref 1–1.03)
UROBILINOGEN FLD QL: 1 MG/DL — SIGNIFICANT CHANGE UP (ref 0.2–1)
WBC # BLD: 5.06 K/UL — SIGNIFICANT CHANGE UP (ref 4.8–10.8)
WBC # FLD AUTO: 5.06 K/UL — SIGNIFICANT CHANGE UP (ref 4.8–10.8)

## 2023-09-03 PROCEDURE — 99285 EMERGENCY DEPT VISIT HI MDM: CPT | Mod: 25

## 2023-09-03 PROCEDURE — 93010 ELECTROCARDIOGRAM REPORT: CPT

## 2023-09-03 PROCEDURE — 93005 ELECTROCARDIOGRAM TRACING: CPT

## 2023-09-03 PROCEDURE — 80061 LIPID PANEL: CPT

## 2023-09-03 PROCEDURE — 85025 COMPLETE CBC W/AUTO DIFF WBC: CPT

## 2023-09-03 PROCEDURE — 84443 ASSAY THYROID STIM HORMONE: CPT

## 2023-09-03 PROCEDURE — 80053 COMPREHEN METABOLIC PANEL: CPT

## 2023-09-03 PROCEDURE — 83036 HEMOGLOBIN GLYCOSYLATED A1C: CPT

## 2023-09-03 RX ORDER — HYDROXYZINE HCL 10 MG
50 TABLET ORAL EVERY 6 HOURS
Refills: 0 | Status: DISCONTINUED | OUTPATIENT
Start: 2023-09-03 | End: 2023-09-14

## 2023-09-03 RX ORDER — HALOPERIDOL DECANOATE 100 MG/ML
5 INJECTION INTRAMUSCULAR EVERY 6 HOURS
Refills: 0 | Status: DISCONTINUED | OUTPATIENT
Start: 2023-09-03 | End: 2023-09-14

## 2023-09-03 NOTE — ED PROVIDER NOTE - DIFFERENTIAL DIAGNOSIS
Suicidal ideation, will obtain labs to r/o organic pathology for medical clearance Differential Diagnosis

## 2023-09-03 NOTE — ED BEHAVIORAL HEALTH ASSESSMENT NOTE - DESCRIPTION
Calm, cooperative, on 1:1. asthma single, does odd jobs for a living, domiciled with an acquaintance named Nhan, educated up to 12th grade in [place], no developmental delay/past IEP/OT/special education reported

## 2023-09-03 NOTE — ED BEHAVIORAL HEALTH ASSESSMENT NOTE - HPI (INCLUDE ILLNESS QUALITY, SEVERITY, DURATION, TIMING, CONTEXT, MODIFYING FACTORS, ASSOCIATED SIGNS AND SYMPTOMS)
note in prog 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, does odd jobs for a living, domiciled with an acquaintance named Nhan, with past psychiatric history depression and multiple (3-4) inpatient hospitalizations, last time being to [facility] in July 2022 for depression and SI, though the pt denies hospitalizations initially, no suicide attempts, denies substance abuse, no legal/criminal hx, no trauma/abuse, with PMH significant for Asthma (uses albuterol inhaler prn), who self-presents to the ED with suicidal ideation. Psychiatry consulted for a mental health evaluation.     On approach, pt appears well groomed 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, does odd jobs for a living, domiciled with an acquaintance named Nhan, educated up to 12th grade in [place], no developmental delay/past IEP/OT/special education reported, with past psychiatric history depression and multiple (3-4) inpatient hospitalizations, last time being to [facility] in July 2022 for depression and SI, though the pt denies hospitalizations initially, no suicide attempts, denies substance abuse, no legal/criminal hx, no trauma/abuse, with PMH significant for Asthma (uses albuterol inhaler prn), who self-presents to the ED with suicidal ideation. Psychiatry consulted for a mental health evaluation.     On approach, pt appears appropriately dressed in hospital gown, adequately groomed, and pleasant but with odd affect. Pt reports that he came to the ED because he has been feeling suicidal after the loss of his two grandmothers- mere weeks apart- 2 months ago. Reports that grandmothers raised him, as his parents passed away at a young age. Reports a vague plan that came to him in the form of a dream wherein pt [place] himself [place] a lake. Pt reports that he has never attempted to hurt himself or engaged in suicidal behavior before, but is afraid to be alone at this time. Reports poor social supports, as he lives with acquaintances from [place] and his family "has splintered apart" after the death of his family members over time; notes that his little brother drowned in a pool when he was a child- notes extensive trauma from these losses but denies any nightmares or flashbacks. Does report hypervigilance. Also reports severely low mood, weight loss of 37 lbs from 207 to 170 lbs 2/2 poor appetite, only 3-4 hours of sleep per night, and low energy. Denies Sx of namrata, psychosis or severe anxiety. At first pt denies that he was ever treated for these Sx by a psychiatrist before, but reports with some prompting that pt was at [facility] ED for a psychiatric eval; also admits that the pt was started on remeron at the time for "sleep and appetite" but endorses that he did not continue the medication due to wariness about side effects. Curiously, pt adamantly denies all other psychiatric admissions. Pt reports he saw counselors at school for minor arguments with peers, but denies any form of outpatient psychiatric Tx. When outpatient Tx is proposed, pt endorses skepticism that he would be able to keep himself safe for the time leading up to an appointment, and ultimately cannot safety plan. Denies any substance use. Did not provide any contacts for collateral, endorsing that he does not have social supports and the acquaintance he lives with does not know about his psychiatric problems. 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, does odd jobs for a living, domiciled with an acquaintance named Nhan, educated up to 12th grade in [place], no developmental delay/past IEP/OT/special education reported, with past psychiatric history depression and multiple (3-4) inpatient hospitalizations, last time being to [facility] in July 2022 for depression and SI, though the pt denies hospitalizations initially, no suicide attempts, denies substance abuse, no legal/criminal hx, no hx of physical or sexual abuse, +trauma from multiple deaths in family from a young age, with PMH significant for Asthma (uses albuterol inhaler prn), who self-presents to the ED with suicidal ideation. Psychiatry consulted for a mental health evaluation.     On approach, pt appears appropriately dressed in hospital gown, adequately groomed, and pleasant but with odd affect. Pt reports that he came to the ED because he has been feeling suicidal after the loss of his two grandmothers- mere weeks apart- 2 months ago. Reports that grandmothers raised him, as his parents passed away at a young age. Reports a vague plan that came to him in the form of a dream wherein pt drowned himself in a lake. Pt reports that he has never attempted to hurt himself or engaged in suicidal behavior before, but is afraid to be alone at this time. Reports poor social supports, as he lives with acquaintances from [place] and his family "has splintered apart" after the death of his family members over time; notes that his little brother drowned in a pool when he was a child- notes extensive trauma from these losses but denies any nightmares or flashbacks. Does report hypervigilance. Also reports severely low mood, weight loss of 37 lbs from 207 to 170 lbs 2/2 poor appetite, only 3-4 hours of sleep per night, and low energy. Denies Sx of namrata, psychosis or severe anxiety. At first pt denies that he was ever treated for these Sx by a psychiatrist before, but reports with some prompting that pt was at [facility] ED for a psychiatric eval; also admits that the pt was started on remeron at the time for "sleep and appetite" but endorses that he did not continue the medication due to wariness about side effects. Curiously, pt adamantly denies all other psychiatric admissions. Pt reports he saw counselors at school for minor arguments with peers, but denies any form of outpatient psychiatric Tx. When outpatient Tx is proposed, pt endorses skepticism that he would be able to keep himself safe for the time leading up to an appointment, and ultimately cannot safety plan. Denies any substance use. Did not provide any contacts for collateral, endorsing that he does not have social supports and the acquaintance he lives with does not know about his psychiatric problems.

## 2023-09-03 NOTE — BH PATIENT PROFILE - ALLERGIES
Allergies:-  iodine  Toradol  Reglan  Ativan  Zofran  amoxicillin  peanuts  Tree Nuts  iodine  Ativan  Toradol  Reglan  Nuts  Zofran  amoxicillin

## 2023-09-03 NOTE — BH PATIENT PROFILE - NSBHSNSOFSAFETY_PSY_A_CORE
talking to someone being left alone/calling significant other/family member/drinking tea or coffee/fresh air breaks/listening to music/showering/talking to someone

## 2023-09-03 NOTE — BH PATIENT PROFILE - NSDYSPHAGSECTTWO_PSY_ALL_CORE
Result sent through my chart. Adam Ms. Edenilson Sinclair you are doing well. We have received your lab results and below are the finding. 1. Over all cholesterol level is in good range. Triglyceride level is slightly above normal range but improved from last time. Continue work on maintaining healthy lifestyle and keep up the good work! 2.  CBC, kidney, liver function is within normal range. 3. Prediabetes: Average blood sugar( Hg A1c ) 6.1, went slightly higher than last year,  is in the range of prediabetic level. Prediabetes is a warning sign that you are at risk for getting type 2 diabetes. It means that your blood sugar is higher than it should be. Most people who get type 2 diabetes have prediabetes first. The good news is that lifestyle changes may help you get your blood sugar back to normal and avoid or delay diabetes. Will recheck this level in 6 months. Please let me know if you have any questions. Thanks.     Dr. Tila Alonso N/A

## 2023-09-03 NOTE — ED PROVIDER NOTE - CLINICAL SUMMARY MEDICAL DECISION MAKING FREE TEXT BOX
Patient presented with suicidal ideation without plan as documented. Otherwise afebrile, HD stable, neurovascularly intact, no HI/hallucinations or drug use. Obtained labs which were grossly unremarkable including no significant leukocytosis, anemia, signs of dehydration/NORM, transaminitis or significant electrolyte abnormalities. Patient medically cleared in ED. Consulted psych who evaluated patient in the ED - recommending psychiatric admission for further management.

## 2023-09-03 NOTE — ED BEHAVIORAL HEALTH ASSESSMENT NOTE - DETAILS
Pt informed and agreeable. verbal h/o completed SI with vague plan to drown himself, no intent at this time unspecified allergies reported to amoxicillin, ativan, reglan, toradol several deaths in the family from a young age

## 2023-09-03 NOTE — ED BEHAVIORAL HEALTH ASSESSMENT NOTE - NSACTIVEVENT_PSY_ALL_CORE
Triggering events leading to humiliation, shame, and/or despair (e.g., Loss of relationship, financial or health status) (real or anticipated)/Current sexual/physical abuse or other trauma/Current or pending social isolation/Inadequate social supports

## 2023-09-03 NOTE — ED ADULT TRIAGE NOTE - CHIEF COMPLAINT QUOTE
Pt presents to the ED complaining of "anxiety." Pt denies drug/alcohol use. Pt refusing to give any additional information in triage and "doesn't want to talk about it now" Unknown SI/HI. 1:1 initiated in triage

## 2023-09-03 NOTE — ED ADULT NURSE NOTE - NS ED NOTE  TALK SOMEONE YN
-- DO NOT REPLY / DO NOT REPLY ALL --  -- Message is from the [facility]--    Provider paged via [employer] Documentation - The below message was copied and pasted from a [employer] page:    [phone] ACC URGENT PATIENT CALLER NAME: SHAGGY RE: TERESA SUBRAMANIAN PATIENT 1994 PATIENT OB PROVIDER: JEREMIAS WEEKS PREGNANT: 42  IS REQUESTING A CALLBACK BECAUSE HE BELIEVES SPOUSE IS HAVING CONTRACTIONS. PATIENT ALSO HAS MUCUS MIXED WITH BLOOD COMING FROM VAGINAL REGION. CALLBACK ASAP TO ADVISE. NPRICE       
Dr. Hiren FLEMING...  
Reviewed pt EMR and pt is a [facility] pt. Called pt to advised for her to call [facility] or go to hospital for evaluation. Pt not understanding this and states that she is a pt of [facility] and does not need to call [facility]. Informed pt that we do not have any of her records related to the pregnancy and needs to call [facility] or go to [facility] for evaluation. Pt states that she does not need to call anyone and if needed she will go to the hospital. Pt proceeded to hang up.    Called [facility] to inform them of situation since we do not have any records related to this pt. Spoke to Nuha informed her of situation states that she will call pt now and find out what is going on.  
No

## 2023-09-03 NOTE — ED PROVIDER NOTE - PROGRESS NOTE DETAILS
Consulted psych JOSÉ MIGUEL: Case endorsed to Dr. Le pending transport to [facility] s/p admission.

## 2023-09-03 NOTE — ED PROVIDER NOTE - PHYSICAL EXAMINATION
GENERAL: Well-developed; well-nourished; in no acute distress.   SKIN: warm, dry  HEAD: Normocephalic; atraumatic.  EYES: PERRLA, EOMI, no conjunctival erythema  ENT: No nasal discharge; airway clear.  NECK: Supple; non tender.  CARD: S1, S2 normal; no murmurs, gallops, or rubs. Regular rate and rhythm.   RESP: LCTAB; No wheezes, rales, rhonchi, or stridor.  ABD: soft, nontender, and nondistended  EXT: Normal ROM.  No LE TTP or edema bilaterally.  NEURO: Alert, oriented, grossly unremarkable  PSYCH: Cooperative, appropriate, normal affect

## 2023-09-03 NOTE — BH PATIENT PROFILE - HOME MEDICATIONS
doxycycline monohydrate 100 mg oral tablet , 1 tab(s) orally 2 times a day MDD: 2  Multiple Vitamins oral tablet , 1 tab(s) orally once a day  albuterol 90 mcg/inh inhalation aerosol , 2 puff(s) inhaled every 6 hours, As needed, sob/wheezing  mirtazapine 15 mg oral tablet , 1 tab(s) orally once a day (at bedtime)  Tessalon Perles 100 mg oral capsule , 1 cap(s) orally 3 times a day, As Needed -for cough   predniSONE 20 mg oral tablet , 2 tab(s) orally once a day   albuterol 90 mcg/inh inhalation aerosol , 2 puff(s) inhaled every 6 hours   Fleet Enema 19 g-7 g rectal enema , 133 milliliter(s) rectally once   MiraLax oral powder for reconstitution , 17 gram(s) orally once a day   Multiple Vitamins with Minerals oral tablet , 1 tab(s) orally once a day  melatonin 3 mg oral tablet , 1 tab(s) orally once a day (at bedtime)  mirtazapine 45 mg oral tablet , 1 tab(s) orally once a day (at bedtime)  meclizine 25 mg oral tablet , 1 tab(s) orally 3 times a day PRN vertigo  budesonide-formoterol 80 mcg-4.5 mcg/inh inhalation aerosol , 2 puff(s) inhaled 2 times a day   albuterol 90 mcg/inh inhalation aerosol , 2 puff(s) inhaled every 6 hours, As needed, Bronchospasm

## 2023-09-03 NOTE — ED BEHAVIORAL HEALTH ASSESSMENT NOTE - RISK ASSESSMENT
Risk factors of poor social supports, neurovegetative Sx of depression, suicidal ideation, no engagement in Tx, past admissions, are mitigated by protective factors of good physical health, goal-oriented behavior and help-seeking behavior. Pt's acute for suicide and chronic risk for suicide are high. Pt's acute and chronic risk for violent behavior towards others are low based upon lack of Hx or current ideation.

## 2023-09-03 NOTE — ED ADULT NURSE NOTE - NS_NURSE_RECEIVING_PHYS_ED_ALL_ED_FT
Wartpeel Counseling:  I discussed with the patient the risks of Wartpeel including but not limited to erythema, scaling, itching, weeping, crusting, and pain. Dr Jb Rodriguez

## 2023-09-03 NOTE — ED ADULT NURSE NOTE - HOW OFTEN DO YOU HAVE A DRINK CONTAINING ALCOHOL?
[place]  OPERATIVE REPORT    Name:  Amari Houser  MR#:  [id number]  :  1977  ACCOUNT #:  [de-identified]  DATE OF SERVICE:  2021    PREOPERATIVE DIAGNOSIS:  Right breast intraductal papilloma. POSTOPERATIVE DIAGNOSIS:  Right breast intraductal papilloma. PROCEDURE PERFORMED:  Right breast needle localization partial mastectomy, CPT code 53844. SURGEON:  Sruthi Lemus DO    ASSISTANT:  None. ANESTHESIA:  General endotracheal.    COMPLICATIONS:  None. SPECIMENS REMOVED:  Right breast partial mastectomy marked with a long lateral, short superior, double deep suture, three clips lateral, two superior, one for the deep margin. IMPLANTS:  None. ESTIMATED BLOOD LOSS:  Minimal.    DISPOSITION:  Stable. COUNTS:  Sponge count, needle count, instrument count all correct x3. DESCRIPTION OF THE PROCEDURE:  This is a 29-year-old female with right breast intraductal papilloma prepared for needle localization partial mastectomy. Consent was obtained by describing the procedure to the patient including potential complications to include infection, bleeding. Consent was obtained and placed in the final chart. She was administered clindamycin 900 mg IV preoperatively, taken to the operative suite, and placed in the supine position. General anesthesia was initiated without complications. She was then prepped and draped in the usual sterile fashion. Time-out was taken to confirm the patient name and proper procedure. Following this, an incision was planned. The needle guidewire had been placed and radiographs were reviewed prior to surgery to determine best approach. An incision was planned. A 0.25% Marcaine with epinephrine was used to anesthetize the skin and subcutaneous tissue. A #15 scalpel blade was used to make a skin incision and Bovie cauterization was used to dissect down the subcutaneous tissue stabilizing the needle guidewire with Allis clamps.   We dissected down to the pectoralis muscle and then circumferentially excised the lesion marking with a long lateral, short superior, double deep suture, three clips lateral, two superior, one for the deep margin. Post-excision radiographs revealed the biopsy clip within the central portion of the specimen for satisfactory and successful excision and partial mastectomy. This was later sent to Pathology for further analysis. At this point, we copiously irrigated with saline until clear. We ensured hemostasis using spot cauterization. Once hemostasis was confirmed, we reapproximated the subcutaneous tissue with 2-0 Vicryl in an interrupted fashion and 3-0 Vicryl in a simple running fashion. Mastisol and Steri-Strips were placed on top of the incision. A sterile dressing was applied. The patient will be extubated and transferred to Recovery stable. FINDINGS:  A 19-year-old female with right breast intraductal papilloma. Successful needle localization partial mastectomy was performed with the biopsy clip within the central portion of the specimen. She tolerated the procedure well.       [address],       [id number]  D:  01/25/2021 13:58  T:  01/25/2021 15:39  JOB #:  [id number] Never

## 2023-09-03 NOTE — ED BEHAVIORAL HEALTH ASSESSMENT NOTE - OTHER PAST PSYCHIATRIC HISTORY (INCLUDE DETAILS REGARDING ONSET, COURSE OF ILLNESS, INPATIENT/OUTPATIENT TREATMENT)
past psychiatric history depression and multiple (3-4) inpatient hospitalizations, last time being to [facility] in July 2022 for depression and SI, though the pt denies hospitalizations initially, no suicide attempts, denies substance abuse, no legal/criminal hx, no hx of physical or sexual abuse, +trauma from multiple deaths in family from a young age

## 2023-09-03 NOTE — ED BEHAVIORAL HEALTH ASSESSMENT NOTE - SUMMARY
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 in July 2022 for depression and SI, though the pt denies hospitalizations initially, no suicide attempts, denies substance abuse, no legal/criminal hx, no trauma/abuse, with PMH significant for Asthma (uses albuterol inhaler prn), who self-presents to the ED with suicidal ideation. Psychiatry consulted for a mental health evaluation.     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 Psychiatry consulted for a mental health evaluation.     Upon evaluation, pt's presentation is most consistent with MDD, evidenced by neurovegetative Sx including severely poor appetite, insomnia, low mood, low energy, fatigue, psychomotor retardation and suicidal ideation with vague plans to jump into a lake to kill himself. Pt could not contract for safety and reports poor social supports and lack of coping skills or support network that could distract the pt from his emotional suffering and suicidal thoughts in the context of recent bereavement from the two people who raised him. Pt would benefit from medication management and supportive therapy on IPP until safety and stabilization are established.     Plan:   - Admit to IPP at [facility] under 9.13 legals.   - Defer to primary team on IPP for medication trial, though pt is open to discussing antidepressants, including but not limited to Remeron.   - COVID test  - Continue 1:1 until pt admitted to IPP  - For severe agitation not responding to behavioral intervention, may give haldol 5 mg po q6h prn, hydroxyzine 50 mg po q6h prn, with escalation to IM if patient refusing PO and remains an imminent danger to self or others. If IM antipsychotic is administered, please perform follow-up ECG for QTc monitoring. ***Pt reports allergies to Ativan. 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 in July 2022 for depression and SI, though the pt denies hospitalizations initially, no suicide attempts, denies substance abuse, no legal/criminal hx, no hx of physical or sexual abuse, +trauma from multiple deaths in family from a young age, with PMH significant for Asthma (uses albuterol inhaler prn), who self-presents to the ED with suicidal ideation. Psychiatry consulted for a mental health evaluation.     Upon evaluation, pt's presentation is most consistent with MDD, evidenced by neurovegetative Sx including severely poor appetite, insomnia, low mood, low energy, fatigue, psychomotor retardation and suicidal ideation with vague plans to jump into a lake to kill himself. Pt could not contract for safety and reports poor social supports and lack of coping skills or support network that could distract the pt from his emotional suffering and suicidal thoughts in the context of recent bereavement from the two people who raised him. Pt would benefit from medication management and supportive therapy on IPP until safety and stabilization are established.     Plan:   - Admit to IPP at [facility] under 9.13 legals.   - Defer to primary team on IPP for medication trial, though pt is open to discussing antidepressants, including but not limited to Remeron.   - COVID test  - Continue 1:1 until pt admitted to IPP  - For severe agitation not responding to behavioral intervention, may give haldol 5 mg po q6h prn, hydroxyzine 50 mg po q6h prn, with escalation to IM if patient refusing PO and remains an imminent danger to self or others. If IM antipsychotic is administered, please perform follow-up ECG for QTc monitoring. ***Pt reports allergies to Ativan. 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 in July 2022 for depression and SI, though the pt denies hospitalizations initially, no suicide attempts, denies substance abuse, no legal/criminal hx, no hx of physical or sexual abuse, +trauma from multiple deaths in family from a young age, with PMH significant for Asthma (uses albuterol inhaler prn), who self-presents to the ED with suicidal ideation. Psychiatry consulted for a mental health evaluation.     Upon evaluation, pt's presentation is most consistent with MDD, evidenced by neurovegetative Sx including severely poor appetite, insomnia, low mood, low energy, fatigue, psychomotor retardation and suicidal ideation with vague plans to jump into a lake to kill himself. Pt could not contract for safety and reports poor social supports and lack of coping skills or support network that could distract the pt from his emotional suffering and suicidal thoughts in the context of recent bereavement from the two people who raised him. Pt would benefit from medication management and supportive therapy on IPP until safety and stabilization are established.     Plan:   - Admit to IPP at [facility] under 9.13 legals.   - Defer to primary team on IPP for medication trial, though pt is open to discussing antidepressants, including but not limited to Remeron.   - For now, can offer remeron 15 mg PO QHs for sleep and depression.   - COVID test  - Continue 1:1 until pt admitted to IPP  - For severe agitation not responding to behavioral intervention, may give haldol 5 mg po q6h prn, hydroxyzine 50 mg po q6h prn, with escalation to IM if patient refusing PO and remains an imminent danger to self or others. If IM antipsychotic is administered, please perform follow-up ECG for QTc monitoring. ***Pt reports allergies to Ativan.

## 2023-09-03 NOTE — ED PROVIDER NOTE - OBJECTIVE STATEMENT
37-year-old male, with past medical history of asthma and depression, presents to the emergency department with progressively worsening passive suicidal ideation for 1 month.  Denies specific thoughts or plan of harming self.  Patient reports both of his grandmothers passed away 2 months ago and he has been depressed about that.  He states he has had decreased sleep, decreased appetite, 30 pounds of weight loss over the past month.  Patient reports his mother and brother passed away when he was a child and that he does not have a relationship with his father.  He currently lives with friends.  Upon chart review patient was admitted inpatient psych in June 2022 for SI, however patient denies previous psych admissions.  Denies HI, alcohol, tobacco, other illicit drug use, fever, chills, chest pain, shortness of breath, abdominal pain, nausea, vomiting, diarrhea.

## 2023-09-03 NOTE — ED PROVIDER NOTE - CONSIDERATION OF ADMISSION OBSERVATION
Patient will require admission to psych for suicidal ideation Consideration of Admission/Observation

## 2023-09-03 NOTE — ED BEHAVIORAL HEALTH ASSESSMENT NOTE - PSYCHIATRIC ISSUES AND PLAN (INCLUDE STANDING AND PRN MEDICATION)
For severe agitation not responding to behavioral intervention, may give haldol 5 mg po q6h prn, hydroxyzine 50 mg po q6h prn, with escalation to IM if patient refusing PO and remains an imminent danger to self or others. If IM antipsychotic is administered, please perform follow-up ECG for QTc monitoring. ***Pt reports allergies to Ativan.

## 2023-09-04 LAB
A1C WITH ESTIMATED AVERAGE GLUCOSE RESULT: 5.7 % — HIGH (ref 4–5.6)
ALBUMIN SERPL ELPH-MCNC: 4.3 G/DL — SIGNIFICANT CHANGE UP (ref 3.5–5.2)
ALP SERPL-CCNC: 91 U/L — SIGNIFICANT CHANGE UP (ref 30–115)
ALT FLD-CCNC: 13 U/L — SIGNIFICANT CHANGE UP (ref 0–41)
ANION GAP SERPL CALC-SCNC: 10 MMOL/L — SIGNIFICANT CHANGE UP (ref 7–14)
AST SERPL-CCNC: 18 U/L — SIGNIFICANT CHANGE UP (ref 0–41)
BASOPHILS # BLD AUTO: 0.02 K/UL — SIGNIFICANT CHANGE UP (ref 0–0.2)
BASOPHILS NFR BLD AUTO: 0.4 % — SIGNIFICANT CHANGE UP (ref 0–1)
BILIRUB SERPL-MCNC: 0.3 MG/DL — SIGNIFICANT CHANGE UP (ref 0.2–1.2)
BUN SERPL-MCNC: 17 MG/DL — SIGNIFICANT CHANGE UP (ref 10–20)
CALCIUM SERPL-MCNC: 8.9 MG/DL — SIGNIFICANT CHANGE UP (ref 8.4–10.5)
CHLORIDE SERPL-SCNC: 105 MMOL/L — SIGNIFICANT CHANGE UP (ref 98–110)
CHOLEST SERPL-MCNC: 115 MG/DL — SIGNIFICANT CHANGE UP
CO2 SERPL-SCNC: 25 MMOL/L — SIGNIFICANT CHANGE UP (ref 17–32)
CREAT SERPL-MCNC: 0.8 MG/DL — SIGNIFICANT CHANGE UP (ref 0.7–1.5)
EGFR: 118 ML/MIN/1.73M2 — SIGNIFICANT CHANGE UP
EOSINOPHIL # BLD AUTO: 0.19 K/UL — SIGNIFICANT CHANGE UP (ref 0–0.7)
EOSINOPHIL NFR BLD AUTO: 4.2 % — SIGNIFICANT CHANGE UP (ref 0–8)
ESTIMATED AVERAGE GLUCOSE: 117 MG/DL — HIGH (ref 68–114)
GLUCOSE SERPL-MCNC: 114 MG/DL — HIGH (ref 70–99)
HCT VFR BLD CALC: 41.6 % — LOW (ref 42–52)
HDLC SERPL-MCNC: 28 MG/DL — LOW
HGB BLD-MCNC: 13.1 G/DL — LOW (ref 14–18)
IMM GRANULOCYTES NFR BLD AUTO: 0 % — LOW (ref 0.1–0.3)
LIPID PNL WITH DIRECT LDL SERPL: 58 MG/DL — SIGNIFICANT CHANGE UP
LYMPHOCYTES # BLD AUTO: 2.29 K/UL — SIGNIFICANT CHANGE UP (ref 1.2–3.4)
LYMPHOCYTES # BLD AUTO: 50.1 % — SIGNIFICANT CHANGE UP (ref 20.5–51.1)
MCHC RBC-ENTMCNC: 23.4 PG — LOW (ref 27–31)
MCHC RBC-ENTMCNC: 31.5 G/DL — LOW (ref 32–37)
MCV RBC AUTO: 74.3 FL — LOW (ref 80–94)
MONOCYTES # BLD AUTO: 0.41 K/UL — SIGNIFICANT CHANGE UP (ref 0.1–0.6)
MONOCYTES NFR BLD AUTO: 9 % — SIGNIFICANT CHANGE UP (ref 1.7–9.3)
NEUTROPHILS # BLD AUTO: 1.66 K/UL — SIGNIFICANT CHANGE UP (ref 1.4–6.5)
NEUTROPHILS NFR BLD AUTO: 36.3 % — LOW (ref 42.2–75.2)
NON HDL CHOLESTEROL: 87 MG/DL — SIGNIFICANT CHANGE UP
NRBC # BLD: 0 /100 WBCS — SIGNIFICANT CHANGE UP (ref 0–0)
PLATELET # BLD AUTO: 205 K/UL — SIGNIFICANT CHANGE UP (ref 130–400)
PMV BLD: SIGNIFICANT CHANGE UP (ref 7.4–10.4)
POTASSIUM SERPL-MCNC: 4.4 MMOL/L — SIGNIFICANT CHANGE UP (ref 3.5–5)
POTASSIUM SERPL-SCNC: 4.4 MMOL/L — SIGNIFICANT CHANGE UP (ref 3.5–5)
PROT SERPL-MCNC: 6.9 G/DL — SIGNIFICANT CHANGE UP (ref 6–8)
RBC # BLD: 5.6 M/UL — SIGNIFICANT CHANGE UP (ref 4.7–6.1)
RBC # FLD: 14.2 % — SIGNIFICANT CHANGE UP (ref 11.5–14.5)
SODIUM SERPL-SCNC: 140 MMOL/L — SIGNIFICANT CHANGE UP (ref 135–146)
TRIGL SERPL-MCNC: 146 MG/DL — SIGNIFICANT CHANGE UP
TSH SERPL-MCNC: 1.18 UIU/ML — SIGNIFICANT CHANGE UP (ref 0.27–4.2)
WBC # BLD: 4.57 K/UL — LOW (ref 4.8–10.8)
WBC # FLD AUTO: 4.57 K/UL — LOW (ref 4.8–10.8)

## 2023-09-04 PROCEDURE — 99233 SBSQ HOSP IP/OBS HIGH 50: CPT

## 2023-09-04 PROCEDURE — 93010 ELECTROCARDIOGRAM REPORT: CPT

## 2023-09-04 RX ORDER — LANOLIN ALCOHOL/MO/W.PET/CERES
3 CREAM (GRAM) TOPICAL AT BEDTIME
Refills: 0 | Status: DISCONTINUED | OUTPATIENT
Start: 2023-09-04 | End: 2023-09-14

## 2023-09-04 RX ORDER — LANOLIN ALCOHOL/MO/W.PET/CERES
3 CREAM (GRAM) TOPICAL AT BEDTIME
Refills: 0 | Status: DISCONTINUED | OUTPATIENT
Start: 2023-09-04 | End: 2023-09-04

## 2023-09-04 RX ORDER — MIRTAZAPINE 45 MG/1
15 TABLET, ORALLY DISINTEGRATING ORAL AT BEDTIME
Refills: 0 | Status: DISCONTINUED | OUTPATIENT
Start: 2023-09-04 | End: 2023-09-05

## 2023-09-04 RX ORDER — ACETAMINOPHEN 500 MG
650 TABLET ORAL EVERY 6 HOURS
Refills: 0 | Status: DISCONTINUED | OUTPATIENT
Start: 2023-09-04 | End: 2023-09-14

## 2023-09-04 RX ADMIN — Medication 3 MILLIGRAM(S): at 22:44

## 2023-09-04 RX ADMIN — MIRTAZAPINE 15 MILLIGRAM(S): 45 TABLET, ORALLY DISINTEGRATING ORAL at 21:40

## 2023-09-04 NOTE — BH INPATIENT PSYCHIATRY ASSESSMENT NOTE - NSBHATTESTCOMMENTATTENDFT_PSY_A_CORE
Patient seen, evaluated and discussed with the resident, Dr Nicolas. Chart reviewed. Agree with above assessment and plan. Continue medications as above.

## 2023-09-04 NOTE — BH INPATIENT PSYCHIATRY ASSESSMENT NOTE - VIOLENCE PROTECTIVE FACTORS:
Subjective:      Caleb Ibarra is a 4 y.o. male here with mother. Patient brought in for Fever and Cough      History of Present Illness:  HPI   Woke up overnight with fever to 103.  Not much other symptoms. Drinking ok, normal urination  Now in clinic complaining of ear pain.    Had +covid test in January--mild GI sx at that time.     Review of Systems   Constitutional: Positive for fever. Negative for activity change, appetite change and crying.   HENT: Negative for rhinorrhea, sneezing and sore throat.    Eyes: Negative for discharge and itching.   Respiratory: Negative for cough, wheezing and stridor.    Gastrointestinal: Negative for abdominal pain, diarrhea and vomiting.   Genitourinary: Negative for decreased urine volume and difficulty urinating.   Skin: Negative for rash.   Psychiatric/Behavioral: Negative for sleep disturbance.       Objective:     Physical Exam  Vitals reviewed.   HENT:      Right Ear: Tympanic membrane normal.      Left Ear: Tympanic membrane normal.      Mouth/Throat:      Mouth: Mucous membranes are moist.      Pharynx: Oropharynx is clear.   Eyes:      General:         Right eye: No discharge.         Left eye: No discharge.      Conjunctiva/sclera: Conjunctivae normal.      Pupils: Pupils are equal, round, and reactive to light.   Cardiovascular:      Rate and Rhythm: Normal rate and regular rhythm.      Pulses: Normal pulses.      Heart sounds: S1 normal and S2 normal. No murmur heard.  Pulmonary:      Effort: Pulmonary effort is normal. No respiratory distress.      Breath sounds: Normal breath sounds.   Abdominal:      General: Bowel sounds are normal. There is no distension.      Palpations: Abdomen is soft.      Tenderness: There is no abdominal tenderness.   Musculoskeletal:         General: Normal range of motion.      Cervical back: Neck supple.   Skin:     General: Skin is warm.      Findings: No rash.   Neurological:      Mental Status: He is alert.          Assessment:        1. Viral illness         Plan:      Caleb was seen today for fever and cough.    Diagnoses and all orders for this visit:    Viral illness  Normal exam  Well-appearing  Symptomatic care  Call or return if symptoms persist or worsen.  [facility] On Call number is [phone]       Sobriety/Insight into violence risk and need for management/treatment

## 2023-09-04 NOTE — BH INPATIENT PSYCHIATRY ASSESSMENT NOTE - RISK ASSESSMENT
Risk factors of poor social supports, neurovegetative Sx of depression, suicidal ideation, no engagement in Tx, past admissions, are mitigated by protective factors of good physical health, goal-oriented behavior and help-seeking behavior. Pt's acute for suicide and chronic risk for suicide are high. Pt's acute and chronic risk for violent behavior towards others are low based upon lack of Hx or current ideation. [As Noted in HPI] : as noted in HPI

## 2023-09-04 NOTE — BH INPATIENT PSYCHIATRY ASSESSMENT NOTE - NSBHCHARTREVIEWINVESTIGATE_PSY_A_CORE FT
< from: 12 Lead ECG (09.04.23 @ 09:33) >    Ventricular Rate 74 BPM    Atrial Rate 74 BPM    P-R Interval 312 ms    QRS Duration 84 ms    Q-T Interval 344 ms    QTC Calculation(Bazett) 381 ms    P Axis 75 degrees    R Axis 75 degrees    T Axis 27 degrees    Diagnosis Line Sinus rhythm with 1st degree A-V block  Early repolarization    Confirmed by AMAURY SUTHERLAND, LAMIN ([id number]) on 9/4/2023 10:25:42 AM    < end of copied text >

## 2023-09-04 NOTE — CONSULT NOTE ADULT - ASSESSMENT
Patient is a 37y old  Male who presents with a chief complaint of Suicidal Ideation      Medicine consulted for Assessment of Medical Conditions     Patient denies any medical problems or Use of Medication   Vitals and Labs are grossly normal     Medicine will sign out   Recall pRN

## 2023-09-04 NOTE — CONSULT NOTE ADULT - SUBJECTIVE AND OBJECTIVE BOX
Patient is a 37y old  Male who presents with a chief complaint of Suicidal Ideation          MEDICATIONS  (PRN):  acetaminophen   Oral Liquid .. 650 milliGRAM(s) Oral every 6 hours PRN Temp greater or equal to 38C (100.4F), Mild Pain (1 - 3)  haloperidol     Tablet 5 milliGRAM(s) Oral every 6 hours PRN agitation  hydrOXYzine hydrochloride 50 milliGRAM(s) Oral every 6 hours PRN anxiety, agitation      CAPILLARY BLOOD GLUCOSE  I&O's Summary      PHYSICAL EXAM:  Vital Signs Last 24 Hrs  T(C): 35.8 (04 Sep 2023 08:00), Max: 36.6 (03 Sep 2023 16:33)  T(F): 96.5 (04 Sep 2023 08:00), Max: 97.9 (03 Sep 2023 16:33)  HR: 92 (04 Sep 2023 08:00) (70 - 92)  BP: 106/68 (04 Sep 2023 08:00) (106/68 - 153/73)  BP(mean): --  RR: 18 (04 Sep 2023 08:00) (18 - 18)  SpO2: 100% (03 Sep 2023 16:33) (100% - 100%)    Parameters below as of 03 Sep 2023 16:33  Patient On (Oxygen Delivery Method): room air      GENERAL: No acute distress, well-developed  HEAD:  Atraumatic, Normocephalic  EYES: EOMI, PERRLA, conjunctiva and sclera clear  NECK: Supple, no lymphadenopathy, no JVD  CHEST/LUNG: CTAB; No wheezes, rales, or rhonchi  HEART: Regular rate and rhythm; No murmurs, rubs, or gallops  ABDOMEN: Soft, non-tender, non-distended; normal bowel sounds, no organomegaly  EXTREMITIES:  2+ peripheral pulses b/l, No clubbing, cyanosis, or edema  NEUROLOGY: A&O x 3, no focal deficits  SKIN: No rashes or lesions    LABS:                        13.1   4.57  )-----------( 205      ( 04 Sep 2023 08:54 )             41.6     09-04    140  |  105  |  17  ----------------------------<  114<H>  4.4   |  25  |  0.8    Ca    8.9      04 Sep 2023 08:54    TPro  6.9  /  Alb  4.3  /  TBili  0.3  /  DBili  x   /  AST  18  /  ALT  13  /  AlkPhos  91  09-04      Urinalysis Basic - ( 04 Sep 2023 08:54 )    Color: x / Appearance: x / SG: x / pH: x  Gluc: 114 mg/dL / Ketone: x  / Bili: x / Urobili: x   Blood: x / Protein: x / Nitrite: x   Leuk Esterase: x / RBC: x / WBC x   Sq Epi: x / Non Sq Epi: x / Bacteria:          Patient is a 37y old  Male who presents with a chief complaint of Suicidal Ideation          MEDICATIONS  (PRN):  acetaminophen   Oral Liquid .. 650 milliGRAM(s) Oral every 6 hours PRN Temp greater or equal to 38C (100.4F), Mild Pain (1 - 3)  haloperidol     Tablet 5 milliGRAM(s) Oral every 6 hours PRN agitation  hydrOXYzine hydrochloride 50 milliGRAM(s) Oral every 6 hours PRN anxiety, agitation      CAPILLARY BLOOD GLUCOSE  I&O's Summary      PHYSICAL EXAM:  Vital Signs Last 24 Hrs  T(C): 35.8 (04 Sep 2023 08:00), Max: 36.6 (03 Sep 2023 16:33)  T(F): 96.5 (04 Sep 2023 08:00), Max: 97.9 (03 Sep 2023 16:33)  HR: 92 (04 Sep 2023 08:00) (70 - 92)  BP: 106/68 (04 Sep 2023 08:00) (106/68 - 153/73)  BP(mean): --  RR: 18 (04 Sep 2023 08:00) (18 - 18)  SpO2: 100% (03 Sep 2023 16:33) (100% - 100%)    Parameters below as of 03 Sep 2023 16:33  Patient On (Oxygen Delivery Method): room air      GENERAL: No acute distress, well-developed  HEAD:  Atraumatic, Normocephalic  EYES:  conjunctiva and sclera clear  NECK: Supple, no lymphadenopathy, no JVD  CHEST/LUNG: CTAB; No wheezes, rales, or rhonchi  HEART: Regular rate and rhythm; No murmurs, rubs, or gallops  ABDOMEN: Soft, non-tender, non-distended; normal bowel sounds,   EXTREMITIES:  2+ peripheral pulses b/l, No clubbing, cyanosis, or edema  NEUROLOGY: A&O x 3, no focal deficits  SKIN: No rashes or lesions    LABS:                        13.1   4.57  )-----------( 205      ( 04 Sep 2023 08:54 )             41.6     09-04    140  |  105  |  17  ----------------------------<  114<H>  4.4   |  25  |  0.8    Ca    8.9      04 Sep 2023 08:54    TPro  6.9  /  Alb  4.3  /  TBili  0.3  /  DBili  x   /  AST  18  /  ALT  13  /  AlkPhos  91  09-04      Urinalysis Basic - ( 04 Sep 2023 08:54 )    Color: x / Appearance: x / SG: x / pH: x  Gluc: 114 mg/dL / Ketone: x  / Bili: x / Urobili: x   Blood: x / Protein: x / Nitrite: x   Leuk Esterase: x / RBC: x / WBC x   Sq Epi: x / Non Sq Epi: x / Bacteria:

## 2023-09-04 NOTE — BH INPATIENT PSYCHIATRY ASSESSMENT NOTE - NSBHCHARTREVIEWVS_PSY_A_CORE FT
Vital Signs Last 24 Hrs  T(C): 35.8 (09-04-23 @ 08:00), Max: 36.8 (09-03-23 @ 12:38)  T(F): 96.5 (09-04-23 @ 08:00), Max: 98.2 (09-03-23 @ 12:38)  HR: 92 (09-04-23 @ 08:00) (70 - 92)  BP: 106/68 (09-04-23 @ 08:00) (106/68 - 153/73)  BP(mean): --  RR: 18 (09-04-23 @ 08:00) (18 - 18)  SpO2: 100% (09-03-23 @ 16:33) (99% - 100%)     Vital Signs Last 24 Hrs  T(C): 35.8 (09-04-23 @ 08:00), Max: 36.6 (09-03-23 @ 16:33)  T(F): 96.5 (09-04-23 @ 08:00), Max: 97.9 (09-03-23 @ 16:33)  HR: 92 (09-04-23 @ 08:00) (70 - 92)  BP: 106/68 (09-04-23 @ 08:00) (106/68 - 153/73)  BP(mean): --  RR: 18 (09-04-23 @ 08:00) (18 - 18)  SpO2: 100% (09-03-23 @ 16:33) (100% - 100%)     Vital Signs Last 24 Hrs  T(C): 35.9 (09-04-23 @ 15:52), Max: 36.1 (09-03-23 @ 20:30)  T(F): 96.6 (09-04-23 @ 15:52), Max: 97 (09-03-23 @ 20:30)  HR: 91 (09-04-23 @ 15:52) (70 - 92)  BP: 124/65 (09-04-23 @ 15:52) (106/68 - 124/65)  BP(mean): --  RR: 17 (09-04-23 @ 15:52) (17 - 18)  SpO2: --

## 2023-09-04 NOTE — BH INPATIENT PSYCHIATRY ASSESSMENT NOTE - NSBHMETABOLIC_PSY_ALL_CORE_FT
BMI: BMI (kg/m2): 24.8 (09-03-23 @ 20:30)  HbA1c:   Glucose:   BP: 106/68 (09-04-23 @ 08:00) (106/68 - 153/73)  Lipid Panel: Date/Time: 09-04-23 @ 08:54  Cholesterol, Serum: 115  Direct LDL: --  HDL Cholesterol, Serum: 28  Total Cholesterol/HDL Ration Measurement: --  Triglycerides, Serum: 146   BMI: BMI (kg/m2): 24.8 (09-03-23 @ 20:30)  HbA1c: A1C with Estimated Average Glucose Result: 5.7 % (09-04-23 @ 08:54)    Glucose:   BP: 124/65 (09-04-23 @ 15:52) (106/68 - 153/73)  Lipid Panel: Date/Time: 09-04-23 @ 08:54  Cholesterol, Serum: 115  Direct LDL: --  HDL Cholesterol, Serum: 28  Total Cholesterol/HDL Ration Measurement: --  Triglycerides, Serum: 146

## 2023-09-04 NOTE — BH INPATIENT PSYCHIATRY ASSESSMENT NOTE - DESCRIPTION
single, does odd jobs for a living, domiciled with an acquaintance named Nhan, educated up to 12th grade in [place], no developmental delay/past IEP/OT/special education reported

## 2023-09-04 NOTE — BH INPATIENT PSYCHIATRY ASSESSMENT NOTE - OTHER PAST PSYCHIATRIC HISTORY (INCLUDE DETAILS REGARDING ONSET, COURSE OF ILLNESS, INPATIENT/OUTPATIENT TREATMENT)
past psychiatric history depression and multiple (3-4) inpatient hospitalizations, last time being to [facility] in July 2022 for depression and SI, though the pt denies hospitalizations initially, no suicide attempts, denies substance abuse, no legal/criminal hx, no hx of physical or sexual abuse, +trauma from multiple deaths in family from a young age

## 2023-09-04 NOTE — BH INPATIENT PSYCHIATRY ASSESSMENT NOTE - DETAILS
unspecified allergies reported to amoxicillin, ativan, reglan, toradol several deaths in the family from a young age In ED, patient reported SI with vague plan to drown himself, no intent. Patient denied on exam on admission.

## 2023-09-04 NOTE — BH INPATIENT PSYCHIATRY ASSESSMENT NOTE - CURRENT MEDICATION
MEDICATIONS  (STANDING):    MEDICATIONS  (PRN):  acetaminophen   Oral Liquid .. 650 milliGRAM(s) Oral every 6 hours PRN Temp greater or equal to 38C (100.4F), Mild Pain (1 - 3)  haloperidol     Tablet 5 milliGRAM(s) Oral every 6 hours PRN agitation  hydrOXYzine hydrochloride 50 milliGRAM(s) Oral every 6 hours PRN anxiety, agitation   MEDICATIONS  (STANDING):  mirtazapine 15 milliGRAM(s) Oral at bedtime    MEDICATIONS  (PRN):  acetaminophen   Oral Liquid .. 650 milliGRAM(s) Oral every 6 hours PRN Temp greater or equal to 38C (100.4F), Mild Pain (1 - 3)  haloperidol     Tablet 5 milliGRAM(s) Oral every 6 hours PRN agitation  hydrOXYzine hydrochloride 50 milliGRAM(s) Oral every 6 hours PRN anxiety, agitation  melatonin. 3 milliGRAM(s) Oral at bedtime PRN Insomnia

## 2023-09-04 NOTE — BH INPATIENT PSYCHIATRY ASSESSMENT NOTE - HPI (INCLUDE ILLNESS QUALITY, SEVERITY, DURATION, TIMING, CONTEXT, MODIFYING FACTORS, ASSOCIATED SIGNS AND SYMPTOMS)
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, does odd jobs for a living, domiciled with an acquaintance named Nhan, educated up to 12th grade in [place], no developmental delay/past IEP/OT/special education reported, with past psychiatric history depression and multiple (3-4) inpatient hospitalizations, last time being to [facility] in July 2022 for depression and SI, though the pt denies hospitalizations initially, no suicide attempts, denies substance abuse, no legal/criminal hx, no hx of physical or sexual abuse, +trauma from multiple deaths in family from a young age, with PMH significant for Asthma (uses albuterol inhaler prn), who self-presents to the ED with suicidal ideation. Patient was admitted for depression with suicidal ideation.    Patient was reported to present to the ED with odd affect. Pt reports that he came to the ED because he has been feeling suicidal after the loss of his two grandmothers- weeks apart- 2 months ago. Reports that grandmothers raised him, as his parents passed away at a young age. Reports a vague plan that came to him in the form of a dream wherein pt drowned himself in a lake. Pt reports that he has never attempted to hurt himself or engaged in suicidal behavior before, but is afraid to be alone at this time. Reports poor social supports, as he lives with acquaintances from [place] and his family "has splintered apart" after the death of his family members over time; notes that his little brother drowned in a pool when he was a child- notes extensive trauma from these losses but denies any nightmares or flashbacks. Does report hypervigilance. Also reports severely low mood, weight loss of 37 lbs from 207 to 170 lbs 2/2 poor appetite, only 3-4 hours of sleep per night, and low energy. Denies Sx of namrata, psychosis or severe anxiety. At first pt denies that he was ever treated for these Sx by a psychiatrist before, but reports with some prompting that pt was at [facility] ED for a psychiatric eval; also admits that the pt was started on remeron at the time for "sleep and appetite" but endorses that he did not continue the medication due to wariness about side effects. Curiously, pt adamantly denies all other psychiatric admissions. Pt reports he saw counselors at school for minor arguments with peers, but denies any form of outpatient psychiatric Tx. When outpatient Tx is proposed, pt endorses skepticism that he would be able to keep himself safe for the time leading up to an appointment, and ultimately could not safety plan.     On presentation in [facility], patient was found sleeping in bed on unit. He was woken up and reported that he wanted to sleep more but was willing to answer questions. Patient presented with fair energy, overinclusive in speech, with fast but not pressured speech. Patient endorsed feeling better this morning with no current passive or active suicidal ideation. He reported feeling OK this morning and had received breakfast. He reported that he had no sleep overnight and was interested in receiving sleep aid. He endorsed that he had been upset over the last two months since the death of his grandmothers with him endorsing low mood, poor sleep, 50 pounds of weight loss due to decreased appetite, poor energy, poor concentration, lack of interest in his hobbies of making music or playing sports over the last 3 weeks. He also endorsed multiple episodes of passive suicidal thoughts over this time but denied any active suicidal ideation. When asked about the report of wanting to jump into a lake, he reported that it was only a dream and that he had no intention to act on the event he saw in this dream. Patient denied any history of periods of elevated mood, any auditory or visual hallucinations, any history of substance use other than alcohol, last used one year ago, or any past suicide attempts.

## 2023-09-04 NOTE — BH INPATIENT PSYCHIATRY ASSESSMENT NOTE - NSBHCHARTREVIEWLAB_PSY_A_CORE FT
Complete Blood Count + Automated Diff (09.04.23 @ 08:54)   WBC Count: 4.57 K/uL  RBC Count: 5.60 M/uL  Hemoglobin: 13.1 g/dL  Hematocrit: 41.6 %  Mean Cell Volume: 74.3 fL  Mean Cell Hemoglobin: 23.4 pg  Mean Cell Hemoglobin Conc: 31.5 g/dL  Red Cell Distrib Width: 14.2 %  Platelet Count - Automated: 205 K/uL  MPV: Not measured  Auto Neutrophil #: 1.66 K/uL  Auto Lymphocyte #: 2.29 K/uL  Auto Monocyte #: 0.41 K/uL  Auto Eosinophil #: 0.19 K/uL  Auto Basophil #: 0.02 K/uL  Auto Neutrophil %: 36.3: Differential percentages must be correlated with absolute numbers for   clinical significance. %  Auto Lymphocyte %: 50.1 %  Auto Monocyte %: 9.0 %  Auto Eosinophil %: 4.2 %  Auto Basophil %: 0.4 %  Auto Immature Granulocyte %: 0.0: Comprehensive Metabolic Panel (09.04.23 @ 08:54)   Sodium: 140 mmol/L  Potassium: 4.4 mmol/L  Chloride: 105 mmol/L  Carbon Dioxide: 25 mmol/L  Anion Gap: 10 mmol/L  Blood Urea Nitrogen: 17 mg/dL  Creatinine: 0.8 mg/dL  Glucose: 114 mg/dL  Calcium: 8.9 mg/dL  Protein Total: 6.9 g/dL  Albumin: 4.3 g/dL  Bilirubin Total: 0.3 mg/dL  Alkaline Phosphatase: 91 U/L  Aspartate Aminotransferase (AST/SGOT): 18 U/L  Alanine Aminotransferase (ALT/SGPT): 13 U/L  eGFR: 118:

## 2023-09-04 NOTE — BH INPATIENT PSYCHIATRY ASSESSMENT NOTE - NSBHASSESSSUMMFT_PSY_ALL_CORE
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, does odd jobs for a living, domiciled with an acquaintance named Nhan, educated up to 12th grade in [place], no developmental delay/past IEP/OT/special education reported, with past psychiatric history depression and multiple (3-4) inpatient hospitalizations, last time being to [facility] in July 2022 for depression and SI, though the pt denies hospitalizations initially, no suicide attempts, denies substance abuse, no legal/criminal hx, no hx of physical or sexual abuse, +trauma from multiple deaths in family from a young age, with PMH significant for Asthma (uses albuterol inhaler prn), who self-presents to the ED with suicidal ideation. Patient was admitted for depression with suicidal ideation.      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, does odd jobs for a living, domiciled with an acquaintance named Nhan, educated up to 12th grade in [place], no developmental delay/past IEP/OT/special education reported, with past psychiatric history depression and multiple (3-4) inpatient hospitalizations, last time being to [facility] in July 2022 for depression and SI, though the pt denies hospitalizations initially, no suicide attempts, denies substance abuse, no legal/criminal hx, no hx of physical or sexual abuse, +trauma from multiple deaths in family from a young age, with PMH significant for Asthma (uses albuterol inhaler prn), who self-presents to the ED with suicidal ideation. Patient was admitted for depression with suicidal ideation.     Patient presented to ED with report of a three week episode of an episode most consistent with MDD, evidenced by neurovegetative Sx including severely poor appetite, insomnia, low mood, low energy, fatigue, anhedonia, with episodes of passive suicidal ideation during this time. Of note, patient appears to present information contraindicated in the chart as in interview this morning, patient reported no past IPP admission though he was discharged from [facility] on July 12th. Patient also endorsed suicidal ideation with intent to jump into a lake which patient walked back today and denied any history of active suicidal ideation. Also of note, patient lives in [place] but came to [place] to come to the ED; reported that he was visiting a friend. Patient's differential includes MDD, substance induced mood disorder, adjustment disorder, and unspecified personality traits. Pt could not contract for safety and reports poor social supports and lack of coping skills or support network that could distract the pt from his emotional suffering and suicidal thoughts in the context of recent bereavement from the two people who raised him. Patient can continue previously prescribed medication of Remeron as further evaluation is continued. Pt would benefit from medication management and supportive therapy on IPP until safety and stabilization are established.     #MDD  #R/o traits secondary to personality disorder  - Start Remeron 15 mg PO QHs for sleep, weight loss, and depression  - Start melatonin 3 mg PRN for sleep aid  - Start Atarax 25 mg q6h for anxiety/insomnia  - For severe agitation not responding to behavioral intervention, may give haldol 5 mg po q6h prn, hydroxyzine 50 mg po q6h prn, with escalation to IM if patient refusing PO and remains an imminent danger to self or others. If IM antipsychotic is administered, please perform follow-up ECG for QTc monitoring.  ***Pt reports allergies to Ativan.     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 in July 2022 for depression and SI, though the pt denies hospitalizations initially, no suicide attempts, denies substance abuse, no legal/criminal hx, no hx of physical or sexual abuse, +trauma from multiple deaths in family from a young age, with PMH significant for Asthma (uses albuterol inhaler prn), who self-presents to the ED with suicidal ideation. Patient was admitted for depression with suicidal ideation.     Patient presented to ED with report of a three week episode of an episode most consistent with MDD, evidenced by neurovegetative Sx including severely poor appetite, insomnia, low mood, low energy, fatigue, anhedonia, with episodes of passive suicidal ideation during this time. Of note, patient appears to present information contraindicated in the chart as in interview this morning, patient reported no past IPP admission though he was discharged from [facility] on July 12th. Patient also endorsed suicidal ideation with intent to jump into a lake which patient walked back today and denied any history of active suicidal ideation. Also of note, patient lives in [place] but came to [place] to come to the ED; reported that he was visiting a friend. Patient's differential includes MDD, substance induced mood disorder, adjustment disorder, and unspecified personality traits. Pt could not contract for safety and reports poor social supports and lack of coping skills or support network that could distract the pt from his emotional suffering and suicidal thoughts in the context of recent bereavement from the two people who raised him. Patient can continue previously prescribed medication of Remeron as further evaluation is continued. Pt would benefit from medication management and supportive therapy on IPP until safety and stabilization are established.     #MDD  #R/o traits secondary to personality disorder  - Start Remeron 15 mg PO QHs for sleep, weight loss, and depression  - Start melatonin 3 mg PRN for sleep aid  - Start Atarax 50 mg q6h for anxiety/insomnia  - For severe agitation not responding to behavioral intervention, may give haldol 5 mg po q6h prn, hydroxyzine 50 mg po q6h prn, with escalation to IM if patient refusing PO and remains an imminent danger to self or others. If IM antipsychotic is administered, please perform follow-up ECG for QTc monitoring.  ***Pt reports allergies to Ativan.     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, does odd jobs for a living, domiciled with an acquaintance named Nhan, educated up to 12th grade in [place], no developmental delay/past IEP/OT/special education reported, with past psychiatric history depression and multiple (3-4) inpatient hospitalizations, last time being to [facility] in July 2022 for depression and SI, though the pt denies hospitalizations initially, no suicide attempts, denies substance abuse, no legal/criminal hx, no hx of physical or sexual abuse, +trauma from multiple deaths in family from a young age, with PMH significant for Asthma (uses albuterol inhaler prn), who self-presents to the ED with suicidal ideation. Patient was admitted for depression with suicidal ideation.     Patient presented to ED with report of a three week episode of an episode most consistent with MDD, evidenced by neurovegetative Sx including severely poor appetite, insomnia, low mood, low energy, fatigue, anhedonia, with episodes of passive suicidal ideation during this time. Of note, patient appears to present information contraindicated in the chart as in interview this morning, patient reported no past IPP admission though he was discharged from [facility] on July 12th. Patient also endorsed suicidal ideation with intent to jump into a lake which patient walked back today and denied any history of active suicidal ideation. Also of note, patient lives in [place] but came to [place] to come to the ED; reported that he was visiting a friend. Patient's differential includes MDD, substance induced mood disorder, adjustment disorder, and unspecified personality traits. Pt could not contract for safety and reports poor social supports and lack of coping skills or support network that could distract the pt from his emotional suffering and suicidal thoughts in the context of recent bereavement from the two people who raised him. Patient can continue previously prescribed medication of Remeron as further evaluation is continued. Pt would benefit from medication management and supportive therapy on IPP until safety and stabilization are established.     #MDD  #R/o traits secondary to personality disorder  - Start Remeron 15 mg PO QHs for sleep, weight loss, and depression  - Start melatonin 3 mg PRN for sleep aid  - Start Atarax 50 mg q6h for anxiety/insomnia  - For severe agitation not responding to behavioral intervention, may give haldol 5 mg po q6h prn with escalation to IM if patient refusing PO and remains an imminent danger to self or others. If IM Haldol is administered, please perform follow-up ECG for QTc monitoring.  ***Pt reports allergies to Ativan.

## 2023-09-05 PROCEDURE — 99231 SBSQ HOSP IP/OBS SF/LOW 25: CPT

## 2023-09-05 RX ORDER — MIRTAZAPINE 45 MG/1
30 TABLET, ORALLY DISINTEGRATING ORAL AT BEDTIME
Refills: 0 | Status: DISCONTINUED | OUTPATIENT
Start: 2023-09-05 | End: 2023-09-14

## 2023-09-05 RX ADMIN — MIRTAZAPINE 30 MILLIGRAM(S): 45 TABLET, ORALLY DISINTEGRATING ORAL at 20:09

## 2023-09-05 NOTE — BH DISCHARGE NOTE NURSING/SOCIAL WORK/PSYCH REHAB - PATIENT PORTAL LINK FT
You can access the FollowMyHealth Patient Portal offered by [facility] by registering at the following website: [email or web address]. By joining [employer]’s FollowMyHealth portal, you will also be able to view your health information using other applications (apps) compatible with our system.

## 2023-09-05 NOTE — BH DISCHARGE NOTE NURSING/SOCIAL WORK/PSYCH REHAB - NSBHDCAGENCY1FT_PSY_A_CORE
[facility]   Recovery and Wellness Clinical Services  [facility]   Recovery and Wellness Clinical Services   with ced

## 2023-09-05 NOTE — BH INPATIENT PSYCHIATRY PROGRESS NOTE - NSBHATTESTCOMMENTATTENDFT_PSY_A_CORE
Patient seen, evaluated and discussed with the resident, Dr Nicolas. Chart reviewed. Agree with above assessment and plan. Continue medications as above.  Patient seen, evaluated and discussed with the resident. Chart reviewed. Agree with above assessment and plan.    increase Remeron.

## 2023-09-05 NOTE — BH INPATIENT PSYCHIATRY PROGRESS NOTE - NSBHFUPINTERVALHXFT_PSY_A_CORE
Chart reviewed, staff interviewed met with patient.  Patient gives2-month history Of poor sleep and appetite and 2-week history Vague unformed thoughts of suicide.  He recalls no particular triggering incidentsAnd at firstDenies having ever been treated before.When pressedHe does admit Chart reviewed, staff interviewed met with patient.  Patient gives a 2-month history of depressive symptoms including poor sleep and appetite and 2-week history of vague unformed suicidal thoughts.  "I thought of it but I never thought of how to do it".  I first denies ever having been in treatment before but when pressed says "oh yeah, and reports having been hospitalized at [facility].  Notably this pattern of forgetting or denying prior hospitalization occurred in both emergency room and initial intake on the treatment unit.   He reports that he has been depressed before, but does not think he has ever been treated with antidepressants, but then notes "they gave me Remeron".  We discussed antidepressant uses of Remeron and he reports sleep is notably improved but still not sleeping "normally".    He reports improved but not quite irritable appetite. He currently has no thoughts of self-harm or harm to others.  He demonstrates no delusional ideas, denies and does not evidence abnormal perceptions and seems to be somewhat tangential and unusual in his communication but there were no clear loose associations or flight of ideas.   He tends to talk around the subject but does not refused to answer questions.  His manner is cheerful and he is well engaged .  We agreed to increase his Remeron and as had been suggested start him on Ensure.  We agreed to continue discussing the possible use of antidepressants.   Patient reports fleeting thoughts of suicide but reports no possibility of harming himself in any way while in the hospital..

## 2023-09-05 NOTE — BH INPATIENT PSYCHIATRY PROGRESS NOTE - CURRENT MEDICATION
MEDICATIONS  (STANDING):  mirtazapine 30 milliGRAM(s) Oral at bedtime  multivitamin  Chewable 1 Tablet(s) Oral daily    MEDICATIONS  (PRN):  acetaminophen   Oral Liquid .. 650 milliGRAM(s) Oral every 6 hours PRN Temp greater or equal to 38C (100.4F), Mild Pain (1 - 3)  haloperidol     Tablet 5 milliGRAM(s) Oral every 6 hours PRN agitation  hydrOXYzine hydrochloride 50 milliGRAM(s) Oral every 6 hours PRN anxiety, agitation  melatonin. 3 milliGRAM(s) Oral at bedtime PRN Insomnia

## 2023-09-05 NOTE — UM REPORT PROGRESS NOTE - NSUMRMPROVIDER_GEN_A_CORE FT
Patient: Berkley Jacobo   : 1985  INSURANCE: [employer]   ID# : [id number]  [phone]        Spoke to Faheem Aguirre # [id number]. Clinicals have been faxed to [phone]. Awaiting call back from assigned CM on determination.  Patient: Berkley Jacobo   : 1985  INSURANCE: [employer]   ID# : [id number]  [phone]        Spoke to Carla Auth # [id number]. Clinicals have been faxed to [phone]. Awaiting call back from assigned CM on determination.    - Received a vm from Janis ([phone]). Approved for 30 days. LCD 10/2 but weekly verbal check-in needed with Janis.   Patient: Berkley Jacobo   : 1985  INSURANCE: [employer]   ID# : [id number]  [phone]        Spoke to Faheem Aguirre # [id number]. Clinicals have been faxed to [phone]. Awaiting call back from assigned CM on determination.    - Received a vm from Janis ([phone]). Approved for 30 days. LCD 10/2 but weekly verbal check-in needed with Janis.        - Received a vm from Janis ([phone]) called to and left voicemail for weekly check in.   Patient: Berkley Jacobo   : 1985  INSURANCE: [employer]   ID# : [id number]  [phone]        Spoke to Faheem Aguirre # [id number]. Clinicals have been faxed to [phone]. Awaiting call back from assigned CM on determination.    - Received a vm from Janis ([phone]). Approved for 30 days. LCD 10/2 but weekly verbal check-in needed with Janis.        - Received a vm from Janis ([phone]) called to and left voicemail for weekly check in.     -- discharge clinicals have been faxed  to [phone]

## 2023-09-05 NOTE — BH INPATIENT PSYCHIATRY PROGRESS NOTE - NSBHASSESSSUMMFT_PSY_ALL_CORE
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 in July 2022 for depression and SI, though the pt denies hospitalizations initially, no suicide attempts, denies substance abuse, no legal/criminal hx, no hx of physical or sexual abuse, +trauma from multiple deaths in family from a young age, with PMH significant for Asthma (uses albuterol inhaler prn), who self-presents to the ED with suicidal ideation. Patient was admitted for depression with suicidal ideation.     Patient presented to ED with report of a three week episode of an episode most consistent with MDD, evidenced by neurovegetative Sx including severely poor appetite, insomnia, low mood, low energy, fatigue, anhedonia, with episodes of passive suicidal ideation during this time. Of note, patient appears to present information contraindicated in the chart as in interview this morning, patient reported no past IPP admission though he was discharged from [facility] on July 12th. Patient also endorsed suicidal ideation with intent to jump into a lake which patient walked back today and denied any history of active suicidal ideation. Also of note, patient lives in [place] but came to [place] to come to the ED; reported that he was visiting a friend. Patient's differential includes MDD, substance induced mood disorder, adjustment disorder, and unspecified personality traits. Pt could not contract for safety and reports poor social supports and lack of coping skills or support network that could distract the pt from his emotional suffering and suicidal thoughts in the context of recent bereavement from the two people who raised him. Patient can continue previously prescribed medication of Remeron as further evaluation is continued. Pt would benefit from medication management and supportive therapy on IPP until safety and stabilization are established.     #MDD  #R/o traits secondary to personality disorder  - Start Remeron 15 mg PO QHs for sleep, weight loss, and depression  - Start melatonin 3 mg PRN for sleep aid  - Start Atarax 50 mg q6h for anxiety/insomnia  - For severe agitation not responding to behavioral intervention, may give haldol 5 mg po q6h prn with escalation to IM if patient refusing PO and remains an imminent danger to self or others. If IM Haldol is administered, please perform follow-up ECG for QTc monitoring.  ***Pt reports allergies to Ativan.     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, does odd jobs for a living, domiciled with an acquaintance named Nhan, educated up to 12th grade in [place], no developmental delay/past IEP/OT/special education reported, with past psychiatric history depression and multiple (3-4) inpatient hospitalizations, last time being to [facility] in July 2022 for depression and SI, though the pt denies hospitalizations initially, no suicide attempts, denies substance abuse, no legal/criminal hx, no hx of physical or sexual abuse, +trauma from multiple deaths in family from a young age, with PMH significant for Asthma (uses albuterol inhaler prn), who self-presents to the ED with suicidal ideation. Patient was admitted for depression with suicidal ideation.     Patient presented to ED with report of a three week episode of an episode most consistent with MDD, evidenced by neurovegetative Sx including severely poor appetite, insomnia, low mood, low energy, fatigue, anhedonia, with episodes of passive suicidal ideation during this time. Of note, patient appears to present information contraindicated in the chart as in interview this morning, patient reported no past IPP admission though he was discharged from [facility] on July 12th. Patient also endorsed suicidal ideation with intent to jump into a lake which patient walked back today and denied any history of active suicidal ideation. Also of note, patient lives in [place] but came to [place] to come to the ED; reported that he was visiting a friend. Patient's differential includes MDD, substance induced mood disorder, adjustment disorder, and unspecified personality traits. Pt could not contract for safety and reports poor social supports and lack of coping skills or support network that could distract the pt from his emotional suffering and suicidal thoughts in the context of recent bereavement from the two people who raised him. Patient can continue previously prescribed medication of Remeron as further evaluation is continued. Pt would benefit from medication management and supportive therapy on IPP until safety and stabilization are established.     9–5–23   Patient continues to present as slightly oddly related,  evasive but friendly.      #MDD  #R/o traits secondary to personality disorder  - Start Remeron 15 mg PO QHs for sleep, weight loss, and depression  - Start melatonin 3 mg PRN for sleep aid  - Start Atarax 50 mg q6h for anxiety/insomnia  - For severe agitation not responding to behavioral intervention, may give haldol 5 mg po q6h prn with escalation to IM if patient refusing PO and remains an imminent danger to self or others. If IM Haldol is administered, please perform follow-up ECG for QTc monitoring.  ***Pt reports allergies to Ativan.

## 2023-09-05 NOTE — BH SOCIAL WORK INITIAL PSYCHOSOCIAL EVALUATION - NSPROGENSOURCEINFO_PSY_ALL_CORE
Patient adoptive mother called and stated that she feels that Zoloft is making her daughter more agitated. The patient adpotive mother stated that her daughter can't take the medication Prozac because it agitates her. The patient mother stated that the patient was taken off of Seroquel in the past due to not sleeping. Patient adoptive mother stated that the patient has always been aggressive regardless of the medication she takes. The patient adopted mother stated that her biological parents have a history of bipolar, schizophrenia, depression, and anxiety. The patient adpotive mother stated that the patient has homicidal thoughts, and has ghastly thoughts, and that the patient feels she is a serial killer in another life. The patient adoptive mother fears for her own safety,and for her daughter's safety. The patient mother would like to be called and informed if any new medications are given and ordered. The patient mother was informed that the patient medication Zoloft will be held tomorrow per physician order. The patient mother stated she is available anytime and would like the physician to call her. I explained PRN medications available to the patient with the patient's adoptive mother and use of medications. I also informed the patient mother that the patient did receive PRN medications IM earlier due to patient status will continue to monitor. Patient

## 2023-09-05 NOTE — BH DISCHARGE NOTE NURSING/SOCIAL WORK/PSYCH REHAB - NSDCVIVACCINE_GEN_ALL_CORE_FT
influenza, injectable, quadrivalent, preservative free; 29-Nov-2019 18:45; Debra Butterfield (RN); [employer]; [id number] (Exp. Date: 06-Mar-2020); IntraMuscular; Deltoid Right.; 0.5 milliLiter(s); VIS (VIS Published: 15-Aug-2019, VIS Presented: 29-Nov-2019);   Tdap; 28-Jun-2018 06:12; Madeline Samuel (RN); [employer]; [id number]; IntraMuscular; Dorsogluteal Right.; 0.5 milliLiter(s); VIS (VIS Published: 09-May-2013, VIS Presented: 28-Jun-2018);

## 2023-09-05 NOTE — BH INPATIENT PSYCHIATRY PROGRESS NOTE - NSBHATTESTBILLING_PSY_A_CORE
99222-Initial OBS or IP - moderate complexity OR 55-74 mins 25826-[id number] OBS or IP - low complexity OR 25-34 mins

## 2023-09-05 NOTE — BH SOCIAL WORK INITIAL PSYCHOSOCIAL EVALUATION - OTHER PAST PSYCHIATRIC HISTORY (INCLUDE DETAILS REGARDING ONSET, COURSE OF ILLNESS, INPATIENT/OUTPATIENT TREATMENT)
History of prior inpatient psychiatric admissions.  Last admission was at [facility] for treatment of depression and suicidal ideation in July of 2022.

## 2023-09-05 NOTE — BH INPATIENT PSYCHIATRY PROGRESS NOTE - NSBHMETABOLIC_PSY_ALL_CORE_FT
BMI: BMI (kg/m2): 24.8 (09-03-23 @ 20:30)  HbA1c: A1C with Estimated Average Glucose Result: 5.7 % (09-04-23 @ 08:54)    Glucose:   BP: 111/59 (09-05-23 @ 15:51) (98/61 - 153/73)  Lipid Panel: Date/Time: 09-04-23 @ 08:54  Cholesterol, Serum: 115  Direct LDL: --  HDL Cholesterol, Serum: 28  Total Cholesterol/HDL Ration Measurement: --  Triglycerides, Serum: 146

## 2023-09-05 NOTE — BH INPATIENT PSYCHIATRY PROGRESS NOTE - NSBHCHARTREVIEWVS_PSY_A_CORE FT
Vital Signs Last 24 Hrs  T(C): 35.7 (09-05-23 @ 15:51), Max: 35.7 (09-05-23 @ 15:51)  T(F): 96.3 (09-05-23 @ 15:51), Max: 96.3 (09-05-23 @ 15:51)  HR: 70 (09-05-23 @ 15:51) (70 - 83)  BP: 111/59 (09-05-23 @ 15:51) (98/61 - 111/59)  BP(mean): --  RR: 16 (09-05-23 @ 15:51) (16 - 18)  SpO2: --

## 2023-09-05 NOTE — BH INPATIENT PSYCHIATRY PROGRESS NOTE - OTHER
odd Deferred pleasant but somewhat oddly related      Slightly odd   He identifies himself as having depression which may or may not require medications which he has  concerns may cause side effects although he notes he does not have any at this time.

## 2023-09-05 NOTE — BH DISCHARGE NOTE NURSING/SOCIAL WORK/PSYCH REHAB - NSCDUDCCRISIS_PSY_A_CORE
[facility] Well  1 [phone]) [facility] ([phone][facility]  Text "WELL" to 24058  Website: www.[employer]/.National Suicide Prevention Lifeline 0 (595) 445-3645/.  [employer]  1 (407) LIF[employer] (904-8187)/988 Suicide and Crisis Lifeline

## 2023-09-06 RX ADMIN — MIRTAZAPINE 30 MILLIGRAM(S): 45 TABLET, ORALLY DISINTEGRATING ORAL at 23:02

## 2023-09-06 RX ADMIN — Medication 1 TABLET(S): at 08:12

## 2023-09-06 NOTE — BH SAFETY PLAN - WARNING SIGN 1
The warning sign is that I was experiencing so much grief from multiple deaths that occurred. All of these were people of significance to me.

## 2023-09-06 NOTE — BH INPATIENT PSYCHIATRY PROGRESS NOTE - OTHER
Slightly odd He identifies himself as having depression which may or may not require medications which he has concerns may cause side effects although he notes he does not have any at this time. "almost normal but not quite" Borderline circumstantial at times

## 2023-09-06 NOTE — BH SAFETY PLAN - THE ONE THING THAT IS MOST IMPORTANT TO ME AND WORTH LIVING FOR IS:
The one thing that is most important to me and worth living for is to live a good life and fulfill my [place].

## 2023-09-06 NOTE — BH INPATIENT PSYCHIATRY PROGRESS NOTE - NSBHFUPINTERVALHXFT_PSY_A_CORE
Chart was reviewed and no acute events were noted per nursing staff. Patient did not require any PRN medications for agitation or anxiety. He remains compliant with all standing medications.    Upon approach by treatment team, patient is sleeping, lying in bed comfortably. Upon awakening, patient remains supine but is alert and oriented, engaging with the team. He appears sleepy but in good spirits. When asked about his mood he states, "almost normal but not quite." He endorses 5 hours of restless sleep overnight. Patient reports that he has been eating well, but has low appetite and is "forcing" himself to do so. Patient demonstrates linear and borderline circumstantial thought process, full-ranged affect, and he is oddly related. Patient endorses suicidal thoughts without any current intent or plan and denies any HI or AVH. He denies any side effects from medications or other medical concerns at this time.

## 2023-09-06 NOTE — BH INPATIENT PSYCHIATRY PROGRESS NOTE - NSBHMETABOLIC_PSY_ALL_CORE_FT
BMI: BMI (kg/m2): 24.8 (09-03-23 @ 20:30)  HbA1c: A1C with Estimated Average Glucose Result: 5.7 % (09-04-23 @ 08:54)    Glucose:   BP: 100/47 (09-06-23 @ 07:45) (98/61 - 153/73)  Lipid Panel: Date/Time: 09-04-23 @ 08:54  Cholesterol, Serum: 115  Direct LDL: --  HDL Cholesterol, Serum: 28  Total Cholesterol/HDL Ration Measurement: --  Triglycerides, Serum: 146

## 2023-09-06 NOTE — BH INPATIENT PSYCHIATRY PROGRESS NOTE - NSBHCHARTREVIEWVS_PSY_A_CORE FT
Vital Signs Last 24 Hrs  T(C): 36.7 (09-06-23 @ 07:45), Max: 36.7 (09-06-23 @ 07:45)  T(F): 98.1 (09-06-23 @ 07:45), Max: 98.1 (09-06-23 @ 07:45)  HR: 72 (09-06-23 @ 07:45) (70 - 72)  BP: 100/47 (09-06-23 @ 07:45) (100/47 - 111/59)  BP(mean): --  RR: 18 (09-06-23 @ 07:45) (16 - 18)  SpO2: --

## 2023-09-06 NOTE — BH SAFETY PLAN - ENVIRONMENT SAFETY 1:
I can make my environment safer by getting a more supportive environment. I am going back to the [facility] so that I have a positive community to form new relationships in.

## 2023-09-06 NOTE — BH INPATIENT PSYCHIATRY PROGRESS NOTE - NSBHASSESSSUMMFT_PSY_ALL_CORE
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 in July 2022 for depression and SI, though the pt denies hospitalizations initially, no suicide attempts, denies substance abuse, no legal/criminal hx, no hx of physical or sexual abuse, +trauma from multiple deaths in family from a young age, with PMH significant for Asthma (uses albuterol inhaler prn), who self-presents to the ED with suicidal ideation. Patient was admitted for depression with suicidal ideation.     Patient presented to ED with report of a three week episode of an episode most consistent with MDD, evidenced by neurovegetative Sx including severely poor appetite, insomnia, low mood, low energy, fatigue, anhedonia, with episodes of passive suicidal ideation during this time. Of note, patient appears to present information contraindicated in the chart as in interview this morning, patient reported no past IPP admission though he was discharged from [facility] on July 12th. Patient also endorsed suicidal ideation with intent to jump into a lake which patient walked back today and denied any history of active suicidal ideation. Also of note, patient lives in [place] but came to [place] to come to the ED; reported that he was visiting a friend. Patient's differential includes MDD, substance induced mood disorder, adjustment disorder, and unspecified personality traits. Pt could not contract for safety and reports poor social supports and lack of coping skills or support network that could distract the pt from his emotional suffering and suicidal thoughts in the context of recent bereavement from the two people who raised him. Patient can continue previously prescribed medication of Remeron as further evaluation is continued. Pt would benefit from medication management and supportive therapy on IPP until safety and stabilization are established.     9–5–23   Patient continues to present as slightly oddly related,  evasive but friendly.    9/6/2023: Patient is pleasant, displays full-ranged affect and is oddly related. His sleep is improving and appetite remains a work in progress at this time. He still reports lingering suicidal thoughts but denies any intent or plan to act upon them, denies any homicidal ideations or auditory/visual hallucinations.       #MDD  #R/o traits secondary to personality disorder  - Start Remeron 15 mg PO QHs for sleep, weight loss, and depression  - Start melatonin 3 mg PRN for sleep aid  - Start Atarax 50 mg q6h for anxiety/insomnia  - For severe agitation not responding to behavioral intervention, may give haldol 5 mg po q6h prn with escalation to IM if patient refusing PO and remains an imminent danger to self or others. If IM Haldol is administered, please perform follow-up ECG for QTc monitoring.  ***Pt reports allergies to Ativan. 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, does odd jobs for a living, domiciled with an acquaintance named Nhan, educated up to 12th grade in [place], no developmental delay/past IEP/OT/special education reported, with past psychiatric history depression and multiple (3-4) inpatient hospitalizations, last time being to [facility] in July 2022 for depression and SI, though the pt denies hospitalizations initially, no suicide attempts, denies substance abuse, no legal/criminal hx, no hx of physical or sexual abuse, +trauma from multiple deaths in family from a young age, with PMH significant for Asthma (uses albuterol inhaler prn), who self-presents to the ED with suicidal ideation. Patient was admitted for depression with suicidal ideation.     Patient presented to ED with report of a three week episode of an episode most consistent with MDD, evidenced by neurovegetative Sx including severely poor appetite, insomnia, low mood, low energy, fatigue, anhedonia, with episodes of passive suicidal ideation during this time. Of note, patient appears to present information contraindicated in the chart as in interview this morning, patient reported no past IPP admission though he was discharged from [facility] on July 12th. Patient also endorsed suicidal ideation with intent to jump into a lake which patient walked back today and denied any history of active suicidal ideation. Also of note, patient lives in [place] but came to [place] to come to the ED; reported that he was visiting a friend. Patient's differential includes MDD, substance induced mood disorder, adjustment disorder, and unspecified personality traits. Pt could not contract for safety and reports poor social supports and lack of coping skills or support network that could distract the pt from his emotional suffering and suicidal thoughts in the context of recent bereavement from the two people who raised him. Patient can continue previously prescribed medication of Remeron as further evaluation is continued. Pt would benefit from medication management and supportive therapy on IPP until safety and stabilization are established.     9–5–23   Patient continues to present as slightly oddly related,  evasive but friendly.    9/6/2023: Patient is pleasant, displays full-ranged affect and is oddly related. His sleep is improving and appetite remains a work in progress at this time. Remeron dose increased to 30mg last night, with no reported adverse effects. He still reports lingering suicidal thoughts but denies any intent or plan to act upon them, denies any homicidal ideations or auditory/visual hallucinations.       #MDD  #R/o traits secondary to personality disorder  - Start Remeron 15 mg PO QHs for sleep, weight loss, and depression, increased to 30mg on 9/5  - Start melatonin 3 mg PRN for sleep aid  - Start Atarax 50 mg q6h for anxiety/insomnia  - For severe agitation not responding to behavioral intervention, may give haldol 5 mg po q6h prn with escalation to IM if patient refusing PO and remains an imminent danger to self or others. If IM Haldol is administered, please perform follow-up ECG for QTc monitoring.  ***Pt reports allergies to Ativan.

## 2023-09-07 RX ORDER — BUPROPION HYDROCHLORIDE 150 MG/1
150 TABLET, EXTENDED RELEASE ORAL DAILY
Refills: 0 | Status: DISCONTINUED | OUTPATIENT
Start: 2023-09-07 | End: 2023-09-13

## 2023-09-07 RX ADMIN — Medication 1 TABLET(S): at 08:50

## 2023-09-07 RX ADMIN — MIRTAZAPINE 30 MILLIGRAM(S): 45 TABLET, ORALLY DISINTEGRATING ORAL at 22:51

## 2023-09-07 NOTE — BH INPATIENT PSYCHIATRY PROGRESS NOTE - NSBHFUPINTERVALHXFT_PSY_A_CORE
Chart was reviewed and no acute events were noted per nursing staff. Patient did not require any PRN medications for agitation or anxiety. He remains compliant with all standing medications.    Upon approach by treatment team, patient is awake, lying in bed comfortably. He remains supine during the interview but is alert, oriented, pleasant while engaging with the team. He describes his mood this morning as "fair." He endorses 4 hours of sleep overnight. Patient feels his appetite has improved slightly, but reports that it has not returned to baseline and he is still "forcing" himself to eat meals. Patient demonstrates linear thought process, full-ranged affect, and remains oddly related. Patient reports improved suicidality with fewer suicidal thoughts. He denies any current intent or plan to kill himself and denies any HI or AVH. He denies any adverse effects from medications or other medical concerns at this time.

## 2023-09-07 NOTE — BH INPATIENT PSYCHIATRY PROGRESS NOTE - NSBHATTESTCOMMENTATTENDFT_PSY_A_CORE
Addended by: Ivett Naidu on: 3/17/2023 08:26 AM     Modules accepted: Orders Case discussed with student and resident.  I concur with findings and plan.

## 2023-09-07 NOTE — BH INPATIENT PSYCHIATRY PROGRESS NOTE - NSBHASSESSSUMMFT_PSY_ALL_CORE
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 in July 2022 for depression and SI, though the pt denies hospitalizations initially, no suicide attempts, denies substance abuse, no legal/criminal hx, no hx of physical or sexual abuse, +trauma from multiple deaths in family from a young age, with PMH significant for Asthma (uses albuterol inhaler prn), who self-presents to the ED with suicidal ideation. Patient was admitted for depression with suicidal ideation.     Patient presented to ED with report of a three week episode of an episode most consistent with MDD, evidenced by neurovegetative Sx including severely poor appetite, insomnia, low mood, low energy, fatigue, anhedonia, with episodes of passive suicidal ideation during this time. Of note, patient appears to present information contraindicated in the chart as in interview this morning, patient reported no past IPP admission though he was discharged from [facility] on July 12th. Patient also endorsed suicidal ideation with intent to jump into a lake which patient walked back today and denied any history of active suicidal ideation. Also of note, patient lives in [place] but came to [place] to come to the ED; reported that he was visiting a friend. Patient's differential includes MDD, substance induced mood disorder, adjustment disorder, and unspecified personality traits. Pt could not contract for safety and reports poor social supports and lack of coping skills or support network that could distract the pt from his emotional suffering and suicidal thoughts in the context of recent bereavement from the two people who raised him. Patient can continue previously prescribed medication of Remeron as further evaluation is continued. Pt would benefit from medication management and supportive therapy on IPP until safety and stabilization are established.     9–5–23   Patient continues to present as slightly oddly related,  evasive but friendly.    9/6/2023: Patient is pleasant, displays full-ranged affect and is oddly related. His sleep is improving and appetite remains a work in progress at this time. Remeron dose increased to 30mg last night, with no reported adverse effects. He still reports lingering suicidal thoughts but denies any intent or plan to act upon them, denies any homicidal ideations or auditory/visual hallucinations.     9/7/2023: Patient's remains pleasant, oddly related, with full-ranged affect and linear thought process. He reports slight improvements with regards to appetite and suicidality this morning as compared to yesterday. He reports fewer suicidal thoughts and continues to deny any intent or plan to act upon them. He continues to tolerate Remeron well. We will consider and discuss with patient the possibility of adding another antidepressant (Wellbutrin) to his current medication regimen.        #MDD  #R/o traits secondary to personality disorder  - Start Remeron 15 mg PO QHs for sleep, weight loss, and depression, increased to 30mg on 9/5  - Start melatonin 3 mg PRN for sleep aid  - Start Atarax 50 mg q6h for anxiety/insomnia  - For severe agitation not responding to behavioral intervention, may give haldol 5 mg po q6h prn with escalation to IM if patient refusing PO and remains an imminent danger to self or others. If IM Haldol is administered, please perform follow-up ECG for QTc monitoring.  ***Pt reports allergies to Ativan. 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, does odd jobs for a living, domiciled with an acquaintance named Nhan, educated up to 12th grade in [place], no developmental delay/past IEP/OT/special education reported, with past psychiatric history depression and multiple (3-4) inpatient hospitalizations, last time being to [facility] in July 2022 for depression and SI, though the pt denies hospitalizations initially, no suicide attempts, denies substance abuse, no legal/criminal hx, no hx of physical or sexual abuse, +trauma from multiple deaths in family from a young age, with PMH significant for Asthma (uses albuterol inhaler prn), who self-presents to the ED with suicidal ideation. Patient was admitted for depression with suicidal ideation.     Patient presented to ED with report of a three week episode of an episode most consistent with MDD, evidenced by neurovegetative Sx including severely poor appetite, insomnia, low mood, low energy, fatigue, anhedonia, with episodes of passive suicidal ideation during this time. Of note, patient appears to present information contraindicated in the chart as in interview this morning, patient reported no past IPP admission though he was discharged from [facility] on July 12th. Patient also endorsed suicidal ideation with intent to jump into a lake which patient walked back today and denied any history of active suicidal ideation. Also of note, patient lives in [place] but came to [place] to come to the ED; reported that he was visiting a friend. Patient's differential includes MDD, substance induced mood disorder, adjustment disorder, and unspecified personality traits. Pt could not contract for safety and reports poor social supports and lack of coping skills or support network that could distract the pt from his emotional suffering and suicidal thoughts in the context of recent bereavement from the two people who raised him. Patient can continue previously prescribed medication of Remeron as further evaluation is continued. Pt would benefit from medication management and supportive therapy on IPP until safety and stabilization are established.     9–5–23   Patient continues to present as slightly oddly related,  evasive but friendly.    9/6/2023: Patient is pleasant, displays full-ranged affect and is oddly related. His sleep is improving and appetite remains a work in progress at this time. Remeron dose increased to 30mg last night, with no reported adverse effects. He still reports lingering suicidal thoughts but denies any intent or plan to act upon them, denies any homicidal ideations or auditory/visual hallucinations.     9/7/2023: Patient's remains pleasant, oddly related, with full-ranged affect and linear thought process. He reports slight improvements with regards to appetite and suicidality this morning as compared to yesterday. He reports fewer suicidal thoughts and continues to deny any intent or plan to act upon them. He continues to tolerate Remeron well. Patient agreeable to trying Wellbutrin to further improve mood symptoms.        #MDD  #R/o traits secondary to personality disorder  - Start Wellbutrin 150mg XR PO QD for depressive symptoms on 9/8  - C/w Remeron 30 mg PO QHs for sleep, weight loss, and depression, increase to 45mg on 9/7  - Start melatonin 3 mg PRN for sleep aid  - Start Atarax 50 mg q6h for anxiety/insomnia  - For severe agitation not responding to behavioral intervention, may give haldol 5 mg po q6h prn with escalation to IM if patient refusing PO and remains an imminent danger to self or others. If IM Haldol is administered, please perform follow-up ECG for QTc monitoring.  ***Pt reports allergies to Ativan.

## 2023-09-07 NOTE — BH INPATIENT PSYCHIATRY PROGRESS NOTE - OTHER
"fair" Slightly odd Borderline circumstantial at times He identifies himself as having depression which may or may not require medications which he has concerns may cause side effects although he notes he does not have any at this time.

## 2023-09-07 NOTE — BH INPATIENT PSYCHIATRY PROGRESS NOTE - NSBHMETABOLIC_PSY_ALL_CORE_FT
BMI: BMI (kg/m2): 24.8 (09-03-23 @ 20:30)  HbA1c: A1C with Estimated Average Glucose Result: 5.7 % (09-04-23 @ 08:54)    Glucose:   BP: 118/65 (09-07-23 @ 08:26) (98/61 - 124/65)  Lipid Panel: Date/Time: 09-04-23 @ 08:54  Cholesterol, Serum: 115  Direct LDL: --  HDL Cholesterol, Serum: 28  Total Cholesterol/HDL Ration Measurement: --  Triglycerides, Serum: 146   BMI: BMI (kg/m2): 24.8 (09-03-23 @ 20:30)  HbA1c: A1C with Estimated Average Glucose Result: 5.7 % (09-04-23 @ 08:54)    Glucose:   BP: 131/62 (09-07-23 @ 16:04) (98/61 - 131/62)  Lipid Panel: Date/Time: 09-04-23 @ 08:54  Cholesterol, Serum: 115  Direct LDL: --  HDL Cholesterol, Serum: 28  Total Cholesterol/HDL Ration Measurement: --  Triglycerides, Serum: 146

## 2023-09-07 NOTE — BH INPATIENT PSYCHIATRY PROGRESS NOTE - NSBHCHARTREVIEWVS_PSY_A_CORE FT
Vital Signs Last 24 Hrs  T(C): 35.5 (09-07-23 @ 08:26), Max: 35.8 (09-06-23 @ 15:50)  T(F): 95.9 (09-07-23 @ 08:26), Max: 96.5 (09-06-23 @ 15:50)  HR: 80 (09-07-23 @ 08:26) (80 - 85)  BP: 118/65 (09-07-23 @ 08:26) (118/65 - 122/85)  BP(mean): --  RR: 18 (09-07-23 @ 08:26) (18 - 18)  SpO2: --     Vital Signs Last 24 Hrs  T(C): 35.7 (09-07-23 @ 16:04), Max: 35.7 (09-07-23 @ 16:04)  T(F): 96.3 (09-07-23 @ 16:04), Max: 96.3 (09-07-23 @ 16:04)  HR: 85 (09-07-23 @ 16:04) (80 - 85)  BP: 131/62 (09-07-23 @ 16:04) (118/65 - 131/62)  BP(mean): --  RR: 18 (09-07-23 @ 16:04) (18 - 18)  SpO2: --

## 2023-09-08 PROCEDURE — 99231 SBSQ HOSP IP/OBS SF/LOW 25: CPT

## 2023-09-08 RX ADMIN — Medication 1 TABLET(S): at 08:35

## 2023-09-08 RX ADMIN — MIRTAZAPINE 30 MILLIGRAM(S): 45 TABLET, ORALLY DISINTEGRATING ORAL at 22:58

## 2023-09-08 NOTE — BH INPATIENT PSYCHIATRY PROGRESS NOTE - NSBHFUPINTERVALHXFT_PSY_A_CORE
Chart was reviewed and no acute events were noted per nursing staff. Patient did not require any PRN medications for agitation or anxiety. He refused  compliant with all standing medications.    Upon approach by treatment team, patient is awake, lying in bed comfortably. He remains supine during the interview but is alert, oriented, pleasant while engaging with the team. He describes his mood this morning as "fair." He endorses 4 hours of sleep overnight. Patient feels his appetite has improved slightly, but reports that it has not returned to baseline and he is still "forcing" himself to eat meals. Patient demonstrates linear thought process, full-ranged affect, and remains oddly related. Patient reports improved suicidality with fewer suicidal thoughts. He denies any current intent or plan to kill himself and denies any HI or AVH. He denies any adverse effects from medications or other medical concerns at this time.   Chart was reviewed and no acute events were noted per nursing staff. Patient did not require any PRN medications for agitation or anxiety. He refused his Wellbutrin this morning, but is compliant with his other medications.    Upon approach by treatment team, patient is awake, lying in bed comfortably. He remains supine during the interview but is alert, oriented, and pleasant while engaging with the team. He describes his mood this morning as "somewhere in the middle." He endorses 9 hours of sleep overnight. Patient feels his appetite is beginning to return, and he is eating 3 times a day ("the Ensure helps"). Patient demonstrates linear thought process, full-ranged affect, and remains oddly related. Patient reports improved suicidality, with fewer thoughts of killing himself, today stating, "I try to do things to distract myself." He denies any current intent or plan to kill himself and denies any HI or AVH. He denies any adverse effects from medications or other medical concerns at this time.

## 2023-09-08 NOTE — BH INPATIENT PSYCHIATRY PROGRESS NOTE - CURRENT MEDICATION
MEDICATIONS  (STANDING):  buPROPion XL (24-Hour) 150 milliGRAM(s) Oral daily  mirtazapine 30 milliGRAM(s) Oral at bedtime  multivitamin  Chewable 1 Tablet(s) Oral daily    MEDICATIONS  (PRN):  acetaminophen   Oral Liquid .. 650 milliGRAM(s) Oral every 6 hours PRN Temp greater or equal to 38C (100.4F), Mild Pain (1 - 3)  haloperidol     Tablet 5 milliGRAM(s) Oral every 6 hours PRN agitation  hydrOXYzine hydrochloride 50 milliGRAM(s) Oral every 6 hours PRN anxiety, agitation  melatonin. 3 milliGRAM(s) Oral at bedtime PRN Insomnia

## 2023-09-08 NOTE — BH INPATIENT PSYCHIATRY PROGRESS NOTE - OTHER
Slightly odd He identifies himself as having depression which may or may not require medications which he has concerns may cause side effects although he notes he does not have any at this time. Borderline circumstantial at times "fair" Borderline circumstantial at times; sometimes overinclusive "Somewhere in the middle."

## 2023-09-08 NOTE — BH INPATIENT PSYCHIATRY PROGRESS NOTE - NSBHCHARTREVIEWVS_PSY_A_CORE FT
Vital Signs Last 24 Hrs  T(C): 35.6 (09-08-23 @ 08:07), Max: 35.7 (09-07-23 @ 16:04)  T(F): 96.1 (09-08-23 @ 08:07), Max: 96.3 (09-07-23 @ 16:04)  HR: 86 (09-08-23 @ 08:07) (85 - 86)  BP: 105/64 (09-08-23 @ 08:07) (105/64 - 131/62)  BP(mean): --  RR: 18 (09-08-23 @ 08:07) (18 - 18)  SpO2: --

## 2023-09-08 NOTE — BH INPATIENT PSYCHIATRY PROGRESS NOTE - NSBHASSESSSUMMFT_PSY_ALL_CORE
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, does odd jobs for a living, domiciled with an acquaintance named Nhan, educated up to 12th grade in [place], no developmental delay/past IEP/OT/special education reported, with past psychiatric history depression and multiple (3-4) inpatient hospitalizations, last time being to [facility] in July 2022 for depression and SI, though the pt denies hospitalizations initially, no suicide attempts, denies substance abuse, no legal/criminal hx, no hx of physical or sexual abuse, +trauma from multiple deaths in family from a young age, with PMH significant for Asthma (uses albuterol inhaler prn), who self-presents to the ED with suicidal ideation. Patient was admitted for depression with suicidal ideation.     Patient presented to ED with report of a three week episode of an episode most consistent with MDD, evidenced by neurovegetative Sx including severely poor appetite, insomnia, low mood, low energy, fatigue, anhedonia, with episodes of passive suicidal ideation during this time. Of note, patient appears to present information contraindicated in the chart as in interview this morning, patient reported no past IPP admission though he was discharged from [facility] on July 12th. Patient also endorsed suicidal ideation with intent to jump into a lake which patient walked back today and denied any history of active suicidal ideation. Also of note, patient lives in [place] but came to [place] to come to the ED; reported that he was visiting a friend. Patient's differential includes MDD, substance induced mood disorder, adjustment disorder, and unspecified personality traits. Pt could not contract for safety and reports poor social supports and lack of coping skills or support network that could distract the pt from his emotional suffering and suicidal thoughts in the context of recent bereavement from the two people who raised him. Patient can continue previously prescribed medication of Remeron as further evaluation is continued. Pt would benefit from medication management and supportive therapy on IPP until safety and stabilization are established.     9–5–23   Patient continues to present as slightly oddly related,  evasive but friendly.    9/6/2023: Patient is pleasant, displays full-ranged affect and is oddly related. His sleep is improving and appetite remains a work in progress at this time. Remeron dose increased to 30mg last night, with no reported adverse effects. He still reports lingering suicidal thoughts but denies any intent or plan to act upon them, denies any homicidal ideations or auditory/visual hallucinations.     9/7/2023: Patient's remains pleasant, oddly related, with full-ranged affect and linear thought process. He reports slight improvements with regards to appetite and suicidality this morning as compared to yesterday. He reports fewer suicidal thoughts and continues to deny any intent or plan to act upon them. He continues to tolerate Remeron well. Patient agreeable to trying Wellbutrin to further improve mood symptoms.        #MDD  #R/o traits secondary to personality disorder  - Start Wellbutrin 150mg XR PO QD for depressive symptoms on 9/8  - C/w Remeron 30 mg PO QHs for sleep, weight loss, and depression, increase to 45mg on 9/7  - Start melatonin 3 mg PRN for sleep aid  - Start Atarax 50 mg q6h for anxiety/insomnia  - For severe agitation not responding to behavioral intervention, may give haldol 5 mg po q6h prn with escalation to IM if patient refusing PO and remains an imminent danger to self or others. If IM Haldol is administered, please perform follow-up ECG for QTc monitoring.  ***Pt reports allergies to Ativan. 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, does odd jobs for a living, domiciled with an acquaintance named Nhan, educated up to 12th grade in [place], no developmental delay/past IEP/OT/special education reported, with past psychiatric history depression and multiple (3-4) inpatient hospitalizations, last time being to [facility] in July 2022 for depression and SI, though the pt denies hospitalizations initially, no suicide attempts, denies substance abuse, no legal/criminal hx, no hx of physical or sexual abuse, +trauma from multiple deaths in family from a young age, with PMH significant for Asthma (uses albuterol inhaler prn), who self-presents to the ED with suicidal ideation. Patient was admitted for depression with suicidal ideation.     Patient presented to ED with report of a three week episode of an episode most consistent with MDD, evidenced by neurovegetative Sx including severely poor appetite, insomnia, low mood, low energy, fatigue, anhedonia, with episodes of passive suicidal ideation during this time. Of note, patient appears to present information contraindicated in the chart as in interview this morning, patient reported no past IPP admission though he was discharged from [facility] on July 12th. Patient also endorsed suicidal ideation with intent to jump into a lake which patient walked back today and denied any history of active suicidal ideation. Also of note, patient lives in [place] but came to [place] to come to the ED; reported that he was visiting a friend. Patient's differential includes MDD, substance induced mood disorder, adjustment disorder, and unspecified personality traits. Pt could not contract for safety and reports poor social supports and lack of coping skills or support network that could distract the pt from his emotional suffering and suicidal thoughts in the context of recent bereavement from the two people who raised him. Patient can continue previously prescribed medication of Remeron as further evaluation is continued. Pt would benefit from medication management and supportive therapy on IPP until safety and stabilization are established.     9–5–23   Patient continues to present as slightly oddly related,  evasive but friendly.    9/6/2023: Patient is pleasant, displays full-ranged affect and is oddly related. His sleep is improving and appetite remains a work in progress at this time. Remeron dose increased to 30mg last night, with no reported adverse effects. He still reports lingering suicidal thoughts but denies any intent or plan to act upon them, denies any homicidal ideations or auditory/visual hallucinations.     9/7/2023: Patient's remains pleasant, oddly related, with full-ranged affect and linear thought process. He reports slight improvements with regards to appetite and suicidality this morning as compared to yesterday. He reports fewer suicidal thoughts and continues to deny any intent or plan to act upon them. He continues to tolerate Remeron well. Patient agreeable to trying Wellbutrin to further improve mood symptoms.      9/8/2023: Patient continues to improve clinically with increased appetite, improved sleep quality and quantity and fewer suicidal thoughts. Patient has been seen socializing with other patients during the day, and spontaneously makes conversation with staff as well. Treatment team will discuss antidepressant regimen with patient; otherwise he tolerated the increased Remeron dose from yesterday, with no adverse effects noted.    #MDD  #R/o traits secondary to personality disorder  - Start Wellbutrin 150mg XR PO QD for depressive symptoms on 9/8  - C/w Remeron 45 mg PO QHs for sleep, weight loss, and depression, increased to 45mg on 9/7  - Start melatonin 3 mg PRN for sleep aid  - Start Atarax 50 mg q6h for anxiety/insomnia  - For severe agitation not responding to behavioral intervention, may give haldol 5 mg po q6h prn with escalation to IM if patient refusing PO and remains an imminent danger to self or others. If IM Haldol is administered, please perform follow-up ECG for QTc monitoring.  ***Pt reports allergies to Ativan.

## 2023-09-08 NOTE — BH INPATIENT PSYCHIATRY PROGRESS NOTE - NSBHMETABOLIC_PSY_ALL_CORE_FT
BMI: BMI (kg/m2): 24.8 (09-03-23 @ 20:30)  HbA1c: A1C with Estimated Average Glucose Result: 5.7 % (09-04-23 @ 08:54)    Glucose:   BP: 105/64 (09-08-23 @ 08:07) (100/47 - 131/62)  Lipid Panel: Date/Time: 09-04-23 @ 08:54  Cholesterol, Serum: 115  Direct LDL: --  HDL Cholesterol, Serum: 28  Total Cholesterol/HDL Ration Measurement: --  Triglycerides, Serum: 146

## 2023-09-08 NOTE — BH INPATIENT PSYCHIATRY PROGRESS NOTE - ADDITIONAL DETAILS / COMMENTS
Patient's communication is slightly unusual. 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
 patient's communication is slightly unusual.   When asked about previous "odd jobs" (his words during interview), he asked for an explanation and then said "oh I thought you meant are like all and even"  and then answered the question with a vague description of jobs helping people.

## 2023-09-09 RX ADMIN — Medication 1 TABLET(S): at 08:16

## 2023-09-09 RX ADMIN — MIRTAZAPINE 30 MILLIGRAM(S): 45 TABLET, ORALLY DISINTEGRATING ORAL at 23:35

## 2023-09-10 RX ADMIN — Medication 1 TABLET(S): at 08:12

## 2023-09-10 RX ADMIN — MIRTAZAPINE 30 MILLIGRAM(S): 45 TABLET, ORALLY DISINTEGRATING ORAL at 23:22

## 2023-09-11 RX ORDER — LIDOCAINE 4 G/100G
15 CREAM TOPICAL
Refills: 0 | Status: COMPLETED | OUTPATIENT
Start: 2023-09-11 | End: 2023-09-13

## 2023-09-11 RX ADMIN — Medication 1 TABLET(S): at 08:28

## 2023-09-11 RX ADMIN — MIRTAZAPINE 30 MILLIGRAM(S): 45 TABLET, ORALLY DISINTEGRATING ORAL at 23:37

## 2023-09-11 RX ADMIN — LIDOCAINE 15 MILLILITER(S): 4 CREAM TOPICAL at 13:30

## 2023-09-11 RX ADMIN — LIDOCAINE 15 MILLILITER(S): 4 CREAM TOPICAL at 15:25

## 2023-09-11 RX ADMIN — LIDOCAINE 15 MILLILITER(S): 4 CREAM TOPICAL at 23:38

## 2023-09-11 NOTE — BH INPATIENT PSYCHIATRY PROGRESS NOTE - NSBHFUPINTERVALHXFT_PSY_A_CORE
Chart was reviewed and no acute events were noted per nursing staff. He was noted to be restless over the weekend. Patient did not require any PRN medications for agitation or anxiety. He continues to refuse his Wellbutrin every morning, but is compliant with his other medications.    Upon approach by treatment team, patient is awake and alert. He is seated, pleasant, and actively engaging in discussion with the team. He describes his mood this morning as "ok." He endorses improving sleep and appetite, but states "I still have to force myself to eat but I shouldn't." However, nursing staff report that the patient asked for double portions of breakfast this morning. When asked about his weekend, patient says "It was a weekend, let's put it like that," and expresses wishing to be discharged before the next weekend. He still demonstrates linear thought process, full-ranged affect, and remains oddly related. Patient denies any current SI/HI or AVH.  He denies any adverse effects from medications or other medical concerns at this time.

## 2023-09-11 NOTE — BH INPATIENT PSYCHIATRY PROGRESS NOTE - NSBHASSESSSUMMFT_PSY_ALL_CORE
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 in July 2022 for depression and SI, though the pt denies hospitalizations initially, no suicide attempts, denies substance abuse, no legal/criminal hx, no hx of physical or sexual abuse, +trauma from multiple deaths in family from a young age, with PMH significant for Asthma (uses albuterol inhaler prn), who self-presents to the ED with suicidal ideation. Patient was admitted for depression with suicidal ideation.     Patient presented to ED with report of a three week episode of an episode most consistent with MDD, evidenced by neurovegetative Sx including severely poor appetite, insomnia, low mood, low energy, fatigue, anhedonia, with episodes of passive suicidal ideation during this time. Of note, patient appears to present information contraindicated in the chart as in interview this morning, patient reported no past IPP admission though he was discharged from [facility] on July 12th. Patient also endorsed suicidal ideation with intent to jump into a lake which patient walked back today and denied any history of active suicidal ideation. Also of note, patient lives in [place] but came to [place] to come to the ED; reported that he was visiting a friend. Patient's differential includes MDD, substance induced mood disorder, adjustment disorder, and unspecified personality traits. Pt could not contract for safety and reports poor social supports and lack of coping skills or support network that could distract the pt from his emotional suffering and suicidal thoughts in the context of recent bereavement from the two people who raised him. Patient can continue previously prescribed medication of Remeron as further evaluation is continued. Pt would benefit from medication management and supportive therapy on IPP until safety and stabilization are established.     9–5–23   Patient continues to present as slightly oddly related,  evasive but friendly.    9/6/2023: Patient is pleasant, displays full-ranged affect and is oddly related. His sleep is improving and appetite remains a work in progress at this time. Remeron dose increased to 30mg last night, with no reported adverse effects. He still reports lingering suicidal thoughts but denies any intent or plan to act upon them, denies any homicidal ideations or auditory/visual hallucinations.     9/7/2023: Patient's remains pleasant, oddly related, with full-ranged affect and linear thought process. He reports slight improvements with regards to appetite and suicidality this morning as compared to yesterday. He reports fewer suicidal thoughts and continues to deny any intent or plan to act upon them. He continues to tolerate Remeron well. Patient agreeable to trying Wellbutrin to further improve mood symptoms.      9/8/2023: Patient continues to improve clinically with increased appetite, improved sleep quality and quantity and fewer suicidal thoughts. Patient has been seen socializing with other patients during the day, and spontaneously makes conversation with staff as well. Treatment team will discuss antidepressant regimen with patient; otherwise he tolerated the increased Remeron dose from yesterday, with no adverse effects noted.    9/11/2023: Patient continues to demonstrate improved mood, sleep and appetite. He was seen socializing with other patient's on the unit and is talkative and friendly with others. He reports that the Remeron has been helping him and he has not noted any adverse effects. Patient is enthusiastic about discharge this morning.    #MDD  #R/o traits secondary to personality disorder  - Start Wellbutrin 150mg XR PO QD for depressive symptoms on 9/8  - C/w Remeron 30 mg PO QHs for sleep, weight loss, and depression  - Start melatonin 3 mg PRN for sleep aid  - Start Atarax 50 mg q6h for anxiety/insomnia  - For severe agitation not responding to behavioral intervention, may give haldol 5 mg po q6h prn with escalation to IM if patient refusing PO and remains an imminent danger to self or others. If IM Haldol is administered, please perform follow-up ECG for QTc monitoring.  ***Pt reports allergies to Ativan.

## 2023-09-11 NOTE — BH TREATMENT PLAN - NSTXSUICIDINTERSW_PSY_ALL_CORE
Goal is for patient to participate in developing a safety plan and to be free of suicidal thoughts.
SW met with patient during Team. Patient stated that he was doing well.  Patient wants to be connected to a provider in the communy for his mental health services. Patient denies sucidal or homicdial ideations as well as an active auditoary of visual hallucinations.

## 2023-09-11 NOTE — BH TREATMENT PLAN - NSTXPLANTHERAPYSESSIONSFT_PSY_ALL_CORE
09-06-23  Type of therapy: Dialectical behavior therapy, Coping skills  Type of session: Group  --  --  Therapy conducted by: Social work  Therapy Summary:  encouraged Brendon to attend the DBT Crisis Skills group today with peers (the DBT Pros and Cons skill was reviewed). The benefits of attending groups were discussed. Patient declined group attendance. He is encouraged to attend future sessions.    09-06-23  Type of therapy: Transition planning  Type of session: Group  Level of patient participation: Attentive, Engaged, Participates  Duration of participation: 60 minutes  Therapy conducted by: Social work  Therapy Summary: Brendon was an active participant and remained engaged in the dialogue. He remained in good behavioral control for the duration of the group. He interacted well with peers.  
  09-06-23  Type of therapy: Dialectical behavior therapy, Coping skills  Type of session: Group  --  --  Therapy conducted by: Social work  Therapy Summary:  encouraged Brendon to attend the DBT Crisis Skills group today with peers (the DBT Pros and Cons skill was reviewed). The benefits of attending groups were discussed. Patient declined group attendance. He is encouraged to attend future sessions.    09-06-23  Type of therapy: Transition planning  Type of session: Group  Level of patient participation: Attentive, Engaged, Participates  Duration of participation: 60 minutes  Therapy conducted by: Social work  Therapy Summary: Brendon was an active participant and remained engaged in the dialogue. He remained in good behavioral control for the duration of the group. He interacted well with peers.    09-07-23  Type of therapy: Coping skills, Creative arts therapy, Inspiration and motiviation, Leisure development, Self esteem, Stress management  Type of session: Individual  Level of patient participation: Resistance to participation  Duration of participation: Less than 15 minutes  Therapy conducted by: Psych rehab  Therapy Summary: Introduced pt to RT groups , encouraged participation , pts mood is low and he is spending time in his room .    09-08-23  Type of therapy: Dialectical behavior therapy, Coping skills  Type of session: Group  --  --  Therapy conducted by: Social work  Therapy Summary:  encouraged Brendon to attend the DBT Crisis Skills group today with peers (the TIPP skill was reviewed). The benefits of attending groups were discussed. Patient declined group attendance. He is encouraged to attend future sessions.    09-08-23  Type of therapy: Cognitive behavior therapy, Psychoeducation  Type of session: Group  Level of patient participation: Attentive, Engaged, Participates  Duration of participation: 45 minutes  Therapy conducted by: Social work  Therapy Summary: Patient attended the Patient Education Group with  and peers.  provided an introduction to “The Cognitive Model” (CBT), encouraging patients to identify and challenge their own irrational thoughts. Patients offered support and feedback while  facilitated the discussion.      Brendon was an active participant. He appeared open to reviewing the materials presented. He was able to identify benefits of challenging ones irrational thoughts and learning to change them. He discussed the importance of having control over ones own thoughts, feelings and behaviors. He remained focused, alert and in good behavioral control for the duration of the group.

## 2023-09-11 NOTE — BH INPATIENT PSYCHIATRY PROGRESS NOTE - NSBHCHARTREVIEWVS_PSY_A_CORE FT
Vital Signs Last 24 Hrs  T(C): 35.8 (09-11-23 @ 06:20), Max: 35.9 (09-10-23 @ 15:50)  T(F): 96.5 (09-11-23 @ 06:20), Max: 96.6 (09-10-23 @ 15:50)  HR: 97 (09-11-23 @ 06:20) (65 - 97)  BP: 115/64 (09-11-23 @ 06:20) (115/64 - 119/79)  BP(mean): --  RR: 20 (09-11-23 @ 06:20) (16 - 20)  SpO2: --

## 2023-09-11 NOTE — BH TREATMENT PLAN - NSTXDEPRESINTERRN_PSY_ALL_CORE
RN to encourage verbalization of feelings.  RN to encourage medication compliance and provide support and education as needed on Dx, coping skills, medication, and safety planning.  RN to encourage daily ADL's  RN to encourage group attendance  RN to assess and intervene for any depressive behaviors
RN to encourage verbalization of feelings.  RN to encourage medication compliance and provide support and education as needed on Dx, coping skills, medication, and safety planning.  RN to encourage daily ADL's  RN to encourage group attendance  RN to assess and intervene for any depressive behaviors

## 2023-09-11 NOTE — BH INPATIENT PSYCHIATRY PROGRESS NOTE - OTHER
Slightly odd He identifies himself as having depression which may or may not require medications, which he has concerns may cause side effects, although he notes he does not have any at this time. "Ok." Borderline circumstantial at times; sometimes overinclusive

## 2023-09-11 NOTE — BH TREATMENT PLAN - NSDCCRITERIA_PSY_ALL_CORE
Patient is no longer a safety risk to himself or others
Patient is no longer a safety risk to himself or others

## 2023-09-11 NOTE — BH TREATMENT PLAN - NSTXDEPRESINTERSW_PSY_ALL_CORE
will continue to meet with patient and treatment team to encourage he be visible on the unit, engage with peers and attend groups on the unit.
 will continue to meet with patient and treatment team to encourage he be visible on the unit, engage with peers and attend groups on the unit.

## 2023-09-12 RX ADMIN — MIRTAZAPINE 30 MILLIGRAM(S): 45 TABLET, ORALLY DISINTEGRATING ORAL at 20:04

## 2023-09-12 RX ADMIN — BUPROPION HYDROCHLORIDE 150 MILLIGRAM(S): 150 TABLET, EXTENDED RELEASE ORAL at 08:32

## 2023-09-12 RX ADMIN — LIDOCAINE 15 MILLILITER(S): 4 CREAM TOPICAL at 08:36

## 2023-09-12 RX ADMIN — Medication 1 TABLET(S): at 08:32

## 2023-09-12 RX ADMIN — LIDOCAINE 15 MILLILITER(S): 4 CREAM TOPICAL at 20:04

## 2023-09-12 NOTE — BH INPATIENT PSYCHIATRY PROGRESS NOTE - NSBHMETABOLIC_PSY_ALL_CORE_FT
BMI: BMI (kg/m2): 24.8 (09-03-23 @ 20:30)  HbA1c: A1C with Estimated Average Glucose Result: 5.7 % (09-04-23 @ 08:54)    Glucose:   BP: 122/65 (09-12-23 @ 08:17) (101/71 - 155/75)  Lipid Panel: Date/Time: 09-04-23 @ 08:54  Cholesterol, Serum: 115  Direct LDL: --  HDL Cholesterol, Serum: 28  Total Cholesterol/HDL Ration Measurement: --  Triglycerides, Serum: 146

## 2023-09-12 NOTE — BH INPATIENT PSYCHIATRY PROGRESS NOTE - NSBHCHARTREVIEWVS_PSY_A_CORE FT
Vital Signs Last 24 Hrs  T(C): 35.6 (09-12-23 @ 08:17), Max: 35.7 (09-11-23 @ 15:57)  T(F): 96 (09-12-23 @ 08:17), Max: 96.3 (09-11-23 @ 15:57)  HR: 96 (09-12-23 @ 08:17) (86 - 96)  BP: 122/65 (09-12-23 @ 08:17) (122/65 - 155/75)  BP(mean): --  RR: 18 (09-12-23 @ 08:17) (16 - 20)  SpO2: --    Orthostatic VS  09-11-23 @ 16:04  Lying BP: --/-- HR: --  Sitting BP: --/-- HR: --  Standing BP: --/-- HR: --  Site: --  Mode: electronic

## 2023-09-12 NOTE — BH INPATIENT PSYCHIATRY PROGRESS NOTE - NSBHFUPINTERVALHXFT_PSY_A_CORE
Chart was reviewed and no acute events were noted per nursing staff. Patient did not require any PRN medications for agitation or anxiety. He took his Wellbutrin this morning, which he usually refuses. He is compliant with his other medications.    Upon approach by treatment team, patient is awake and alert, laying in bed. He sits up to speak with the team, appearing slightly groggy from sleep but is otherwise pleasant and cooperative. He describes his mood this morning as "ok," and goes on to state "I'm not a morning person, you know me." He reports his appetite is "getting better." Patient estimates that he got 5 hours of sleep, but upon further questioning reveals that he slept from midnight to 7AM. He still demonstrates linear thought process, full-ranged affect, and remains oddly related. Patient denies any current SI/HI or AVH. He denies any adverse effects from medications or other medical concerns at this time. Patient remains agreeable and enthusiastic about discharge this week.

## 2023-09-12 NOTE — BH INPATIENT PSYCHIATRY PROGRESS NOTE - NSBHASSESSSUMMFT_PSY_ALL_CORE
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, does odd jobs for a living, domiciled with an acquaintance named Nhan, educated up to 12th grade in [place], no developmental delay/past IEP/OT/special education reported, with past psychiatric history depression and multiple (3-4) inpatient hospitalizations, last time being to [facility] in July 2022 for depression and SI, though the pt denies hospitalizations initially, no suicide attempts, denies substance abuse, no legal/criminal hx, no hx of physical or sexual abuse, +trauma from multiple deaths in family from a young age, with PMH significant for Asthma (uses albuterol inhaler prn), who self-presents to the ED with suicidal ideation. Patient was admitted for depression with suicidal ideation.     Patient presented to ED with report of a three week episode of an episode most consistent with MDD, evidenced by neurovegetative Sx including severely poor appetite, insomnia, low mood, low energy, fatigue, anhedonia, with episodes of passive suicidal ideation during this time. Of note, patient appears to present information contraindicated in the chart as in interview this morning, patient reported no past IPP admission though he was discharged from [facility] on July 12th. Patient also endorsed suicidal ideation with intent to jump into a lake which patient walked back today and denied any history of active suicidal ideation. Also of note, patient lives in [place] but came to [place] to come to the ED; reported that he was visiting a friend. Patient's differential includes MDD, substance induced mood disorder, adjustment disorder, and unspecified personality traits. Pt could not contract for safety and reports poor social supports and lack of coping skills or support network that could distract the pt from his emotional suffering and suicidal thoughts in the context of recent bereavement from the two people who raised him. Patient can continue previously prescribed medication of Remeron as further evaluation is continued. Pt would benefit from medication management and supportive therapy on IPP until safety and stabilization are established.     9–5–23   Patient continues to present as slightly oddly related,  evasive but friendly.    9/6/2023: Patient is pleasant, displays full-ranged affect and is oddly related. His sleep is improving and appetite remains a work in progress at this time. Remeron dose increased to 30mg last night, with no reported adverse effects. He still reports lingering suicidal thoughts but denies any intent or plan to act upon them, denies any homicidal ideations or auditory/visual hallucinations.     9/7/2023: Patient's remains pleasant, oddly related, with full-ranged affect and linear thought process. He reports slight improvements with regards to appetite and suicidality this morning as compared to yesterday. He reports fewer suicidal thoughts and continues to deny any intent or plan to act upon them. He continues to tolerate Remeron well. Patient agreeable to trying Wellbutrin to further improve mood symptoms.      9/8/2023: Patient continues to improve clinically with increased appetite, improved sleep quality and quantity and fewer suicidal thoughts. Patient has been seen socializing with other patients during the day, and spontaneously makes conversation with staff as well. Treatment team will discuss antidepressant regimen with patient; otherwise he tolerated the increased Remeron dose from yesterday, with no adverse effects noted.    9/11/2023: Patient continues to demonstrate improved mood, sleep and appetite. He was seen socializing with other patient's on the unit and is talkative and friendly with others. He reports that the Remeron has been helping him and he has not noted any adverse effects. Patient is enthusiastic about discharge this morning.    9/12/2023: Patient has shown improvement clinically in terms of self-reported sleep and appetite, as well as energy. He continues to socialize well with others and denies any suicidal thoughts or ideations at this time. Treatment team will continue to monitor and prepare for discharge.    #MDD  #R/o traits secondary to personality disorder  - Start Wellbutrin 150mg XR PO QD for depressive symptoms on 9/8  - C/w Remeron 30 mg PO QHs for sleep, weight loss, and depression  - Start melatonin 3 mg PRN for sleep aid  - Start Atarax 50 mg q6h for anxiety/insomnia  - For severe agitation not responding to behavioral intervention, may give haldol 5 mg po q6h prn with escalation to IM if patient refusing PO and remains an imminent danger to self or others. If IM Haldol is administered, please perform follow-up ECG for QTc monitoring.  ***Pt reports allergies to Ativan.

## 2023-09-12 NOTE — BH INPATIENT PSYCHIATRY PROGRESS NOTE - CURRENT MEDICATION
MEDICATIONS  (STANDING):  buPROPion XL (24-Hour) 150 milliGRAM(s) Oral daily  lidocaine 2% Viscous 15 milliLiter(s) Swish and Spit two times a day  mirtazapine 30 milliGRAM(s) Oral at bedtime  multivitamin  Chewable 1 Tablet(s) Oral daily    MEDICATIONS  (PRN):  acetaminophen   Oral Liquid .. 650 milliGRAM(s) Oral every 6 hours PRN Temp greater or equal to 38C (100.4F), Mild Pain (1 - 3)  haloperidol     Tablet 5 milliGRAM(s) Oral every 6 hours PRN agitation  hydrOXYzine hydrochloride 50 milliGRAM(s) Oral every 6 hours PRN anxiety, agitation  melatonin. 3 milliGRAM(s) Oral at bedtime PRN Insomnia

## 2023-09-12 NOTE — BH INPATIENT PSYCHIATRY PROGRESS NOTE - OTHER
He identifies himself as having depression which may or may not require medications, which he has concerns may cause side effects, although he notes he does not have any at this time. Borderline circumstantial at times; sometimes overinclusive Slightly odd "Ok."

## 2023-09-13 RX ADMIN — Medication 1 TABLET(S): at 08:21

## 2023-09-13 RX ADMIN — LIDOCAINE 15 MILLILITER(S): 4 CREAM TOPICAL at 08:18

## 2023-09-13 RX ADMIN — MIRTAZAPINE 30 MILLIGRAM(S): 45 TABLET, ORALLY DISINTEGRATING ORAL at 22:08

## 2023-09-13 NOTE — BH INPATIENT PSYCHIATRY PROGRESS NOTE - PRN MEDS
MEDICATIONS  (PRN):  acetaminophen   Oral Liquid .. 650 milliGRAM(s) Oral every 6 hours PRN Temp greater or equal to 38C (100.4F), Mild Pain (1 - 3)  haloperidol     Tablet 5 milliGRAM(s) Oral every 6 hours PRN agitation  hydrOXYzine hydrochloride 50 milliGRAM(s) Oral every 6 hours PRN anxiety, agitation  melatonin. 3 milliGRAM(s) Oral at bedtime PRN Insomnia  

## 2023-09-13 NOTE — BH INPATIENT PSYCHIATRY PROGRESS NOTE - NSTXSUICIDGOAL_PSY_ALL_CORE
Will develop a suicide prevention/safety plan
Will identify and utilize 2 coping skills
Will identify and utilize 2 coping skills
Will develop a suicide prevention/safety plan

## 2023-09-13 NOTE — BH INPATIENT PSYCHIATRY PROGRESS NOTE - NSBHATTESTTYPEVISIT_PSY_A_CORE
Attending with Resident/Fellow/Student

## 2023-09-13 NOTE — BH INPATIENT PSYCHIATRY PROGRESS NOTE - NSBHFUPINTERVALHXFT_PSY_A_CORE
Chart was reviewed and no acute events were noted per nursing staff. Patient did not require any PRN medications for agitation or anxiety. Patient refused Wellbutrin. He is compliant with his other medications.    Upon approach by treatment team, patient is awake, laying in bed. He remains supine when speaking with the team, appearing slightly groggy from sleep but is otherwise cooperative. He describes his mood this morning as "ok." He continues to endorse improving appetite and sleep. He still demonstrates linear thought process, full-ranged affect, and remains oddly related. Patient denies any current SI/HI or AVH. He denies any adverse effects from medications or other medical concerns at this time. Patient remains agreeable and enthusiastic about discharge this week. When asked about taking yesterday morning's dose of Wellbutrin, patient states that he actually did not take it.

## 2023-09-13 NOTE — BH INPATIENT PSYCHIATRY PROGRESS NOTE - NSBHMETABOLIC_PSY_ALL_CORE_FT
BMI: BMI (kg/m2): 24.8 (09-03-23 @ 20:30)  HbA1c: A1C with Estimated Average Glucose Result: 5.7 % (09-04-23 @ 08:54)    Glucose:   BP: 114/58 (09-13-23 @ 07:59) (114/58 - 155/75)  Lipid Panel: Date/Time: 09-04-23 @ 08:54  Cholesterol, Serum: 115  Direct LDL: --  HDL Cholesterol, Serum: 28  Total Cholesterol/HDL Ration Measurement: --  Triglycerides, Serum: 146

## 2023-09-13 NOTE — BH INPATIENT PSYCHIATRY PROGRESS NOTE - NSDCCRITERIA_PSY_ALL_CORE
Patient is no longer a safety risk to himself or others

## 2023-09-13 NOTE — BH INPATIENT PSYCHIATRY PROGRESS NOTE - NSBHCHARTREVIEWVS_PSY_A_CORE FT
Vital Signs Last 24 Hrs  T(C): 35.3 (09-13-23 @ 07:59), Max: 35.9 (09-12-23 @ 17:09)  T(F): 95.6 (09-13-23 @ 07:59), Max: 96.6 (09-12-23 @ 17:09)  HR: 83 (09-13-23 @ 07:59) (83 - 105)  BP: 114/58 (09-13-23 @ 07:59) (114/58 - 143/92)  BP(mean): --  RR: 18 (09-13-23 @ 07:59) (18 - 18)  SpO2: --    Orthostatic VS  09-11-23 @ 16:04  Lying BP: --/-- HR: --  Sitting BP: --/-- HR: --  Standing BP: --/-- HR: --  Site: --  Mode: electronic

## 2023-09-13 NOTE — BH INPATIENT PSYCHIATRY PROGRESS NOTE - OTHER
He identifies himself as having depression which may or may not require medications, which he has concerns may cause side effects, although he notes he does not have any at this time. Slightly odd "Ok." Borderline circumstantial at times; sometimes overinclusive

## 2023-09-13 NOTE — BH INPATIENT PSYCHIATRY PROGRESS NOTE - NSBHADMITIPREASONDETAILS_PSY_A_CORE FT
Thoughts of suicide are unformed despite previous statement about jumping [place] a lake

## 2023-09-13 NOTE — BH INPATIENT PSYCHIATRY PROGRESS NOTE - NSBHFUPINTERVALCCFT_PSY_A_CORE
"Good morning."
"Wow everybody's coming in today."
"As long as I'm not here another weekend."
". I am okay, I am doing better"
"It seems like you guys come see me first everyday."
"Why did you knock like that?"
"Good morning."

## 2023-09-13 NOTE — BH INPATIENT PSYCHIATRY PROGRESS NOTE - NSBHASSESSSUMMFT_PSY_ALL_CORE
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, does odd jobs for a living, domiciled with an acquaintance named Nhan, educated up to 12th grade in [place], no developmental delay/past IEP/OT/special education reported, with past psychiatric history depression and multiple (3-4) inpatient hospitalizations, last time being to [facility] in July 2022 for depression and SI, though the pt denies hospitalizations initially, no suicide attempts, denies substance abuse, no legal/criminal hx, no hx of physical or sexual abuse, +trauma from multiple deaths in family from a young age, with PMH significant for Asthma (uses albuterol inhaler prn), who self-presents to the ED with suicidal ideation. Patient was admitted for depression with suicidal ideation.     Patient presented to ED with report of a three week episode of an episode most consistent with MDD, evidenced by neurovegetative Sx including severely poor appetite, insomnia, low mood, low energy, fatigue, anhedonia, with episodes of passive suicidal ideation during this time. Of note, patient appears to present information contraindicated in the chart as in interview this morning, patient reported no past IPP admission though he was discharged from [facility] on July 12th. Patient also endorsed suicidal ideation with intent to jump into a lake which patient walked back today and denied any history of active suicidal ideation. Also of note, patient lives in [place] but came to [place] to come to the ED; reported that he was visiting a friend. Patient's differential includes MDD, substance induced mood disorder, adjustment disorder, and unspecified personality traits. Pt could not contract for safety and reports poor social supports and lack of coping skills or support network that could distract the pt from his emotional suffering and suicidal thoughts in the context of recent bereavement from the two people who raised him. Patient can continue previously prescribed medication of Remeron as further evaluation is continued. Pt would benefit from medication management and supportive therapy on IPP until safety and stabilization are established.     9–5–23   Patient continues to present as slightly oddly related,  evasive but friendly.    9/6/2023: Patient is pleasant, displays full-ranged affect and is oddly related. His sleep is improving and appetite remains a work in progress at this time. Remeron dose increased to 30mg last night, with no reported adverse effects. He still reports lingering suicidal thoughts but denies any intent or plan to act upon them, denies any homicidal ideations or auditory/visual hallucinations.     9/7/2023: Patient's remains pleasant, oddly related, with full-ranged affect and linear thought process. He reports slight improvements with regards to appetite and suicidality this morning as compared to yesterday. He reports fewer suicidal thoughts and continues to deny any intent or plan to act upon them. He continues to tolerate Remeron well. Patient agreeable to trying Wellbutrin to further improve mood symptoms.      9/8/2023: Patient continues to improve clinically with increased appetite, improved sleep quality and quantity and fewer suicidal thoughts. Patient has been seen socializing with other patients during the day, and spontaneously makes conversation with staff as well. Treatment team will discuss antidepressant regimen with patient; otherwise he tolerated the increased Remeron dose from yesterday, with no adverse effects noted.    9/11/2023: Patient continues to demonstrate improved mood, sleep and appetite. He was seen socializing with other patient's on the unit and is talkative and friendly with others. He reports that the Remeron has been helping him and he has not noted any adverse effects. Patient is enthusiastic about discharge this morning.    9/12/2023: Patient has shown improvement clinically in terms of self-reported sleep and appetite, as well as energy. He continues to socialize well with others and denies any suicidal thoughts or ideations at this time. Treatment team will continue to monitor and prepare for discharge.    9/13/2023: Patient's symptoms continue to improve. He is awaiting discharge arrangements at this time and remains enthusiastic about leaving.    #MDD  #R/o traits secondary to personality disorder  - Start Wellbutrin 150mg XR PO QD for depressive symptoms on 9/8  - C/w Remeron 30 mg PO QHs for sleep, weight loss, and depression  - Start melatonin 3 mg PRN for sleep aid  - Start Atarax 50 mg q6h for anxiety/insomnia  - For severe agitation not responding to behavioral intervention, may give haldol 5 mg po q6h prn with escalation to IM if patient refusing PO and remains an imminent danger to self or others. If IM Haldol is administered, please perform follow-up ECG for QTc monitoring.  ***Pt reports allergies to Ativan. 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, does odd jobs for a living, domiciled with an acquaintance named Nhan, educated up to 12th grade in [place], no developmental delay/past IEP/OT/special education reported, with past psychiatric history depression and multiple (3-4) inpatient hospitalizations, last time being to [facility] in July 2022 for depression and SI, though the pt denies hospitalizations initially, no suicide attempts, denies substance abuse, no legal/criminal hx, no hx of physical or sexual abuse, +trauma from multiple deaths in family from a young age, with PMH significant for Asthma (uses albuterol inhaler prn), who self-presents to the ED with suicidal ideation. Patient was admitted for depression with suicidal ideation.     Patient presented to ED with report of a three week episode of an episode most consistent with MDD, evidenced by neurovegetative Sx including severely poor appetite, insomnia, low mood, low energy, fatigue, anhedonia, with episodes of passive suicidal ideation during this time. Of note, patient appears to present information contraindicated in the chart as in interview this morning, patient reported no past IPP admission though he was discharged from [facility] on July 12th. Patient also endorsed suicidal ideation with intent to jump into a lake which patient walked back today and denied any history of active suicidal ideation. Also of note, patient lives in [place] but came to [place] to come to the ED; reported that he was visiting a friend. Patient's differential includes MDD, substance induced mood disorder, adjustment disorder, and unspecified personality traits. Pt could not contract for safety and reports poor social supports and lack of coping skills or support network that could distract the pt from his emotional suffering and suicidal thoughts in the context of recent bereavement from the two people who raised him. Patient can continue previously prescribed medication of Remeron as further evaluation is continued. Pt would benefit from medication management and supportive therapy on IPP until safety and stabilization are established.     9–5–23   Patient continues to present as slightly oddly related,  evasive but friendly.    9/6/2023: Patient is pleasant, displays full-ranged affect and is oddly related. His sleep is improving and appetite remains a work in progress at this time. Remeron dose increased to 30mg last night, with no reported adverse effects. He still reports lingering suicidal thoughts but denies any intent or plan to act upon them, denies any homicidal ideations or auditory/visual hallucinations.     9/7/2023: Patient's remains pleasant, oddly related, with full-ranged affect and linear thought process. He reports slight improvements with regards to appetite and suicidality this morning as compared to yesterday. He reports fewer suicidal thoughts and continues to deny any intent or plan to act upon them. He continues to tolerate Remeron well. Patient agreeable to trying Wellbutrin to further improve mood symptoms.      9/8/2023: Patient continues to improve clinically with increased appetite, improved sleep quality and quantity and fewer suicidal thoughts. Patient has been seen socializing with other patients during the day, and spontaneously makes conversation with staff as well. Treatment team will discuss antidepressant regimen with patient; otherwise he tolerated the increased Remeron dose from yesterday, with no adverse effects noted.    9/11/2023: Patient continues to demonstrate improved mood, sleep and appetite. He was seen socializing with other patient's on the unit and is talkative and friendly with others. He reports that the Remeron has been helping him and he has not noted any adverse effects. Patient is enthusiastic about discharge this morning.    9/12/2023: Patient has shown improvement clinically in terms of self-reported sleep and appetite, as well as energy. He continues to socialize well with others and denies any suicidal thoughts or ideations at this time. Treatment team will continue to monitor and prepare for discharge.    9/13/2023: Patient's symptoms continue to improve. He is awaiting discharge arrangements at this time and remains enthusiastic about leaving.    #MDD  #R/o traits secondary to personality disorder  - C/w Remeron 30 mg PO QHs for sleep, weight loss, and depression  - Start melatonin 3 mg PRN for sleep aid  - Start Atarax 50 mg q6h for anxiety/insomnia  - Wellbutrin discontinued due to patient noncompliance  - For severe agitation not responding to behavioral intervention, may give haldol 5 mg po q6h prn with escalation to IM if patient refusing PO and remains an imminent danger to self or others. If IM Haldol is administered, please perform follow-up ECG for QTc monitoring.  ***Pt reports allergies to Ativan.

## 2023-09-14 VITALS
HEART RATE: 90 BPM | TEMPERATURE: 96 F | DIASTOLIC BLOOD PRESSURE: 68 MMHG | RESPIRATION RATE: 16 BRPM | SYSTOLIC BLOOD PRESSURE: 126 MMHG

## 2023-09-14 RX ORDER — MIRTAZAPINE 45 MG/1
1 TABLET, ORALLY DISINTEGRATING ORAL
Qty: 30 | Refills: 0
Start: 2023-09-14 | End: 2023-10-13

## 2023-09-14 RX ADMIN — Medication 1 TABLET(S): at 08:00

## 2023-09-14 NOTE — BH INPATIENT PSYCHIATRY DISCHARGE NOTE - NSBHFUPINTERVALHXFT_PSY_A_CORE
Patient was seen and evaluated this morning. Chart was reviewed and no acute events were noted. No PRN medications were administered overnight. Upon approach, patient is seated in bed. Patient is alert, oriented and engages with the team. Patient is cooperative and answers all questions appropriately. He describes his mood today as "ok." He denies any current passive or active suicidal ideation, intent or plan and denies any homicidal ideation. Patient denies any persecutory delusions and denies any auditory or visual hallucinations. Patient reports continuously improving sleep and appetite. Denies side effects from medications.

## 2023-09-14 NOTE — BH INPATIENT PSYCHIATRY DISCHARGE NOTE - NSBHMETABOLIC_PSY_ALL_CORE_FT
BMI: BMI (kg/m2): 24.8 (09-03-23 @ 20:30)  HbA1c: A1C with Estimated Average Glucose Result: 5.7 % (09-04-23 @ 08:54)    Glucose:   BP: 126/68 (09-14-23 @ 08:07) (114/58 - 155/75)  Lipid Panel: Date/Time: 09-04-23 @ 08:54  Cholesterol, Serum: 115  Direct LDL: --  HDL Cholesterol, Serum: 28  Total Cholesterol/HDL Ration Measurement: --  Triglycerides, Serum: 146

## 2023-09-14 NOTE — BH INPATIENT PSYCHIATRY DISCHARGE NOTE - OTHER PAST PSYCHIATRIC HISTORY (INCLUDE DETAILS REGARDING ONSET, COURSE OF ILLNESS, INPATIENT/OUTPATIENT TREATMENT)
History of prior inpatient psychiatric admissions.  Last admission was at [facility] for treatment of depression and suicidal ideation in July of 2022.

## 2023-09-14 NOTE — BH INPATIENT PSYCHIATRY DISCHARGE NOTE - DESCRIPTION
single, does odd jobs for a living, domiciled with an acquaintance named Nhan, educated up to 12th grade in [place], no developmental delay/past IEP/OT/special education reported

## 2023-09-14 NOTE — BH INPATIENT PSYCHIATRY DISCHARGE NOTE - HOSPITAL COURSE
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, does odd jobs for a living, domiciled with an acquaintance named Nhan, educated up to 12th grade in [place], no developmental delay/past IEP/OT/special education reported, with past psychiatric history depression and multiple (3-4) inpatient hospitalizations, last time being to [facility] in July 2022 for depression and SI, though the pt denies hospitalizations initially, no suicide attempts, denies substance abuse, no legal/criminal hx, no hx of physical or sexual abuse, +trauma from multiple deaths in family from a young age, with PMH significant for Asthma (uses albuterol inhaler prn), who self-presents to the ED with suicidal ideation. Patient was admitted for depression with suicidal ideation.     Patient presented to ED with report of a three week episode of an episode most consistent with MDD, evidenced by neurovegetative Sx including severely poor appetite, insomnia, low mood, low energy, fatigue, anhedonia, with episodes of passive suicidal ideation during this time. Of note, patient appears to present information contraindicated in the chart as in interview this morning, patient reported no past IPP admission though he was discharged from [facility] on July 12th. Patient also endorsed suicidal ideation with intent to jump into a lake which patient walked back today and denied any history of active suicidal ideation. Also of note, patient lives in [place] but came to [place] to come to the ED; reported that he was visiting a friend. Patient's differential includes MDD, substance induced mood disorder, adjustment disorder, and unspecified personality traits. Pt could not contract for safety and reports poor social supports and lack of coping skills or support network that could distract the pt from his emotional suffering and suicidal thoughts in the context of recent bereavement from the two people who raised him. Patient can continue previously prescribed medication of Remeron as further evaluation is continued. Pt would benefit from medication management and supportive therapy on IPP until safety and stabilization are established.     9–5–23   Patient continues to present as slightly oddly related,  evasive but friendly.    9/6/2023: Patient is pleasant, displays full-ranged affect and is oddly related. His sleep is improving and appetite remains a work in progress at this time. Remeron dose increased to 30mg last night, with no reported adverse effects. He still reports lingering suicidal thoughts but denies any intent or plan to act upon them, denies any homicidal ideations or auditory/visual hallucinations.     9/7/2023: Patient's remains pleasant, oddly related, with full-ranged affect and linear thought process. He reports slight improvements with regards to appetite and suicidality this morning as compared to yesterday. He reports fewer suicidal thoughts and continues to deny any intent or plan to act upon them. He continues to tolerate Remeron well. Patient agreeable to trying Wellbutrin to further improve mood symptoms.      9/8/2023: Patient continues to improve clinically with increased appetite, improved sleep quality and quantity and fewer suicidal thoughts. Patient has been seen socializing with other patients during the day, and spontaneously makes conversation with staff as well. Treatment team will discuss antidepressant regimen with patient; otherwise he tolerated the increased Remeron dose from yesterday, with no adverse effects noted.    9/11/2023: Patient continues to demonstrate improved mood, sleep and appetite. He was seen socializing with other patient's on the unit and is talkative and friendly with others. He reports that the Remeron has been helping him and he has not noted any adverse effects. Patient is enthusiastic about discharge this morning.    9/12/2023: Patient has shown improvement clinically in terms of self-reported sleep and appetite, as well as energy. He continues to socialize well with others and denies any suicidal thoughts or ideations at this time. Treatment team will continue to monitor and prepare for discharge.    9/13/2023: Patient's symptoms continue to improve. He is awaiting discharge arrangements at this time and remains enthusiastic about leaving.    9/14/23: Patient is amenable to discharge. Denies any SI/HI or AVH.    Over the course of his hospitalization, the patient's depressive symptoms have continued to resolve. He is eating (occasionally requesting double portions) and sleeping well overnight. Patient has been seen by staff socializing with others, making conversation with staff and patients alike. His mood symptoms have responded well to his prescribed dose of Remeron. Patient has not required any PRN medications for agitation or aggression on the unit. Patient has never been physically or verbally aggressive towards others on the unit, and has not made any suicidal gestures. Today, he denies any passive or active suicidal ideation, intent or plan. Patient denies any homicidal ideation. Patient denies any persecutory delusions and denies any auditory or visual hallucinations. Patient is amenable to discharge and no longer requires inpatient level of care.    #MDD  #R/o traits secondary to personality disorder  - Remeron 30 mg PO QHs for sleep, weight loss, and depression  - Melatonin 3 mg PRN for sleep aid  - Atarax 50 mg q6h PRN for anxiety/insomnia  - Wellbutrin discontinued due to patient noncompliance  - For severe agitation not responding to behavioral intervention, may give haldol 5 mg po q6h prn with escalation to IM if patient refusing PO and remains an imminent danger to self or others. If IM Haldol is administered, please perform follow-up ECG for QTc monitoring.  ***Pt reports allergies to Ativan.

## 2023-09-14 NOTE — BH INPATIENT PSYCHIATRY DISCHARGE NOTE - NSBHASSESSSUMMFT_PSY_ALL_CORE
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 in July 2022 for depression and SI, though the pt denies hospitalizations initially, no suicide attempts, denies substance abuse, no legal/criminal hx, no hx of physical or sexual abuse, +trauma from multiple deaths in family from a young age, with PMH significant for Asthma (uses albuterol inhaler prn), who self-presents to the ED with suicidal ideation. Patient was admitted for depression with suicidal ideation.     Patient presented to ED with report of a three week episode of an episode most consistent with MDD, evidenced by neurovegetative Sx including severely poor appetite, insomnia, low mood, low energy, fatigue, anhedonia, with episodes of passive suicidal ideation during this time. Of note, patient appears to present information contraindicated in the chart as in interview this morning, patient reported no past IPP admission though he was discharged from [facility] on July 12th. Patient also endorsed suicidal ideation with intent to jump into a lake which patient walked back today and denied any history of active suicidal ideation. Also of note, patient lives in [place] but came to [place] to come to the ED; reported that he was visiting a friend. Patient's differential includes MDD, substance induced mood disorder, adjustment disorder, and unspecified personality traits. Pt could not contract for safety and reports poor social supports and lack of coping skills or support network that could distract the pt from his emotional suffering and suicidal thoughts in the context of recent bereavement from the two people who raised him. Patient can continue previously prescribed medication of Remeron as further evaluation is continued. Pt would benefit from medication management and supportive therapy on IPP until safety and stabilization are established.     9–5–23   Patient continues to present as slightly oddly related,  evasive but friendly.    9/6/2023: Patient is pleasant, displays full-ranged affect and is oddly related. His sleep is improving and appetite remains a work in progress at this time. Remeron dose increased to 30mg last night, with no reported adverse effects. He still reports lingering suicidal thoughts but denies any intent or plan to act upon them, denies any homicidal ideations or auditory/visual hallucinations.     9/7/2023: Patient's remains pleasant, oddly related, with full-ranged affect and linear thought process. He reports slight improvements with regards to appetite and suicidality this morning as compared to yesterday. He reports fewer suicidal thoughts and continues to deny any intent or plan to act upon them. He continues to tolerate Remeron well. Patient agreeable to trying Wellbutrin to further improve mood symptoms.      9/8/2023: Patient continues to improve clinically with increased appetite, improved sleep quality and quantity and fewer suicidal thoughts. Patient has been seen socializing with other patients during the day, and spontaneously makes conversation with staff as well. Treatment team will discuss antidepressant regimen with patient; otherwise he tolerated the increased Remeron dose from yesterday, with no adverse effects noted.    9/11/2023: Patient continues to demonstrate improved mood, sleep and appetite. He was seen socializing with other patient's on the unit and is talkative and friendly with others. He reports that the Remeron has been helping him and he has not noted any adverse effects. Patient is enthusiastic about discharge this morning.    9/12/2023: Patient has shown improvement clinically in terms of self-reported sleep and appetite, as well as energy. He continues to socialize well with others and denies any suicidal thoughts or ideations at this time. Treatment team will continue to monitor and prepare for discharge.    9/13/2023: Patient's symptoms continue to improve. He is awaiting discharge arrangements at this time and remains enthusiastic about leaving.    9/14/23: Patient is amenable to discharge. Denies any SI/HI or AVH.    Over the course of his hospitalization, the patient's depressive symptoms have continued to resolve. He is eating (occasionally requesting double portions) and sleeping well overnight. Patient has been seen by staff socializing with others, making conversation with staff and patients alike. His mood symptoms have responded well to his prescribed dose of Remeron. Patient has not required any PRN medications for agitation or aggression on the unit. Patient has never been physically or verbally aggressive towards others on the unit, and has not made any suicidal gestures. Today, he denies any passive or active suicidal ideation, intent or plan. Patient denies any homicidal ideation. Patient denies any persecutory delusions and denies any auditory or visual hallucinations. Patient is amenable to discharge and no longer requires inpatient level of care.

## 2023-09-14 NOTE — BH INPATIENT PSYCHIATRY DISCHARGE NOTE - NSDCMRMEDTOKEN_GEN_ALL_CORE_FT
albuterol 90 mcg/inh inhalation aerosol: 2 puff(s) inhaled every 6 hours, As needed, Bronchospasm  albuterol 90 mcg/inh inhalation aerosol: 2 puff(s) inhaled every 6 hours   albuterol 90 mcg/inh inhalation aerosol: 2 puff(s) inhaled every 6 hours, As needed, sob/wheezing  budesonide-formoterol 80 mcg-4.5 mcg/inh inhalation aerosol: 2 puff(s) inhaled 2 times a day   doxycycline monohydrate 100 mg oral tablet: 1 tab(s) orally 2 times a day MDD: 2  Fleet Enema 19 g-7 g rectal enema: 133 milliliter(s) rectally once   meclizine 25 mg oral tablet: 1 tab(s) orally 3 times a day PRN vertigo  melatonin 3 mg oral tablet: 1 tab(s) orally once a day (at bedtime)  MiraLax oral powder for reconstitution: 17 gram(s) orally once a day   mirtazapine 15 mg oral tablet: 1 tab(s) orally once a day (at bedtime)  mirtazapine 45 mg oral tablet: 1 tab(s) orally once a day (at bedtime)  Multiple Vitamins oral tablet: 1 tab(s) orally once a day  Multiple Vitamins with Minerals oral tablet: 1 tab(s) orally once a day  predniSONE 20 mg oral tablet: 2 tab(s) orally once a day   Tessalon Perles 100 mg oral capsule: 1 cap(s) orally 3 times a day, As Needed -for cough    albuterol 90 mcg/inh inhalation aerosol: 2 puff(s) inhaled every 6 hours, As needed, Bronchospasm  albuterol 90 mcg/inh inhalation aerosol: 2 puff(s) inhaled every 6 hours   albuterol 90 mcg/inh inhalation aerosol: 2 puff(s) inhaled every 6 hours, As needed, sob/wheezing  budesonide-formoterol 80 mcg-4.5 mcg/inh inhalation aerosol: 2 puff(s) inhaled 2 times a day   Fleet Enema 19 g-7 g rectal enema: 133 milliliter(s) rectally once   meclizine 25 mg oral tablet: 1 tab(s) orally 3 times a day PRN vertigo  melatonin 3 mg oral tablet: 1 tab(s) orally once a day (at bedtime)  MiraLax oral powder for reconstitution: 17 gram(s) orally once a day   mirtazapine 30 mg oral tablet: 1 tab(s) orally once a day (at bedtime)  Multiple Vitamins oral tablet: 1 tab(s) orally once a day  Multiple Vitamins with Minerals oral tablet: 1 tab(s) orally once a day  Tessalon Perles 100 mg oral capsule: 1 cap(s) orally 3 times a day, As Needed -for cough

## 2023-09-14 NOTE — BH INPATIENT PSYCHIATRY DISCHARGE NOTE - HPI (INCLUDE ILLNESS QUALITY, SEVERITY, DURATION, TIMING, CONTEXT, MODIFYING FACTORS, ASSOCIATED SIGNS AND SYMPTOMS)
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, does odd jobs for a living, domiciled with an acquaintance named Nhan, educated up to 12th grade in [place], no developmental delay/past IEP/OT/special education reported, with past psychiatric history depression and multiple (3-4) inpatient hospitalizations, last time being to [facility] in July 2022 for depression and SI, though the pt denies hospitalizations initially, no suicide attempts, denies substance abuse, no legal/criminal hx, no hx of physical or sexual abuse, +trauma from multiple deaths in family from a young age, with PMH significant for Asthma (uses albuterol inhaler prn), who self-presents to the ED with suicidal ideation. Patient was admitted for depression with suicidal ideation.    Patient was reported to present to the ED with odd affect. Pt reports that he came to the ED because he has been feeling suicidal after the loss of his two grandmothers- weeks apart- 2 months ago. Reports that grandmothers raised him, as his parents passed away at a young age. Reports a vague plan that came to him in the form of a dream wherein pt drowned himself in a lake. Pt reports that he has never attempted to hurt himself or engaged in suicidal behavior before, but is afraid to be alone at this time. Reports poor social supports, as he lives with acquaintances from [place] and his family "has splintered apart" after the death of his family members over time; notes that his little brother drowned in a pool when he was a child- notes extensive trauma from these losses but denies any nightmares or flashbacks. Does report hypervigilance. Also reports severely low mood, weight loss of 37 lbs from 207 to 170 lbs 2/2 poor appetite, only 3-4 hours of sleep per night, and low energy. Denies Sx of namrata, psychosis or severe anxiety. At first pt denies that he was ever treated for these Sx by a psychiatrist before, but reports with some prompting that pt was at [facility] ED for a psychiatric eval; also admits that the pt was started on remeron at the time for "sleep and appetite" but endorses that he did not continue the medication due to wariness about side effects. Curiously, pt adamantly denies all other psychiatric admissions. Pt reports he saw counselors at school for minor arguments with peers, but denies any form of outpatient psychiatric Tx. When outpatient Tx is proposed, pt endorses skepticism that he would be able to keep himself safe for the time leading up to an appointment, and ultimately could not safety plan.     On presentation in [facility], patient was found sleeping in bed on unit. He was woken up and reported that he wanted to sleep more but was willing to answer questions. Patient presented with fair energy, overinclusive in speech, with fast but not pressured speech. Patient endorsed feeling better this morning with no current passive or active suicidal ideation. He reported feeling OK this morning and had received breakfast. He reported that he had no sleep overnight and was interested in receiving sleep aid. He endorsed that he had been upset over the last two months since the death of his grandmothers with him endorsing low mood, poor sleep, 50 pounds of weight loss due to decreased appetite, poor energy, poor concentration, lack of interest in his hobbies of making music or playing sports over the last 3 weeks. He also endorsed multiple episodes of passive suicidal thoughts over this time but denied any active suicidal ideation. When asked about the report of wanting to jump into a lake, he reported that it was only a dream and that he had no intention to act on the event he saw in this dream. Patient denied any history of periods of elevated mood, any auditory or visual hallucinations, any history of substance use other than alcohol, last used one year ago, or any past suicide attempts.

## 2023-09-14 NOTE — BH INPATIENT PSYCHIATRY DISCHARGE NOTE - REASON FOR ADMISSION
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 in July 2022 for depression and SI, though the pt denies hospitalizations initially, no suicide attempts, denies substance abuse, no legal/criminal hx, no hx of physical or sexual abuse, +trauma from multiple deaths in family from a young age, with PMH significant for Asthma (uses albuterol inhaler prn), who self-presents to the ED with suicidal ideation. Patient was admitted for depression with suicidal ideation.

## 2023-09-21 ENCOUNTER — EMERGENCY (EMERGENCY)
Facility: HOSPITAL | Age: 38
LOS: 1 days | Discharge: HOME | End: 2023-09-21
Attending: EMERGENCY MEDICINE | Admitting: EMERGENCY MEDICINE
Payer: MEDICAID

## 2023-09-21 VITALS
SYSTOLIC BLOOD PRESSURE: 116 MMHG | HEART RATE: 72 BPM | OXYGEN SATURATION: 98 % | DIASTOLIC BLOOD PRESSURE: 79 MMHG | TEMPERATURE: 99 F | RESPIRATION RATE: 18 BRPM

## 2023-09-21 VITALS
HEART RATE: 91 BPM | TEMPERATURE: 98 F | RESPIRATION RATE: 18 BRPM | OXYGEN SATURATION: 99 % | SYSTOLIC BLOOD PRESSURE: 115 MMHG | DIASTOLIC BLOOD PRESSURE: 60 MMHG

## 2023-09-21 PROCEDURE — 12011 RPR F/E/E/N/L/M 2.5 CM/<: CPT

## 2023-09-21 PROCEDURE — 99284 EMERGENCY DEPT VISIT MOD MDM: CPT | Mod: 25

## 2023-09-21 PROCEDURE — 70450 CT HEAD/BRAIN W/O DYE: CPT | Mod: 26

## 2023-09-21 PROCEDURE — 70486 CT MAXILLOFACIAL W/O DYE: CPT | Mod: 26

## 2023-09-21 RX ORDER — ACETAMINOPHEN 500 MG
650 TABLET ORAL ONCE
Refills: 0 | Status: COMPLETED | OUTPATIENT
Start: 2023-09-21 | End: 2023-09-21

## 2023-09-21 RX ADMIN — Medication 650 MILLIGRAM(S): at 05:54

## 2023-09-21 NOTE — ED PROCEDURE NOTE - SUBCUTANEOUS SUTURE TYPE
Routing refill request to provider for review/approval because:  Drug not on the [facility] refill protocol     zolpidem (AMBIEN) 5 MG tablet 30 tablet 0 8/19/2017  No      Sig: Take 1 tablet (5 mg) by mouth nightly as needed for sleep         Last office visit: 5/1/17    Tori Melton RN,   [facility]       chromic
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
## 2023-09-21 NOTE — ED PROVIDER NOTE - PATIENT PORTAL LINK FT
You can access the FollowMyHealth Patient Portal offered by [facility] by registering at the following website: [email or web address]. By joining [employer]’s FollowMyHealth portal, you will also be able to view your health information using other applications (apps) compatible with our system.

## 2023-09-21 NOTE — ED PROVIDER NOTE - PHYSICAL EXAMINATION
Constitutional:  Well appearing, awake, alert, oriented  and in no apparent distress.  HEENT: 2cm laceration to R lower lip, 2cm laceration in oral mucosa on R (not through and through) that is gaping and 1.5cm in depth  Head: Abrasions to occiput; 1cm linear laceration to L eyebrow  Eyes: Clear bilaterally, pupils equal, round and reactive to light.  Cardiac: Normal rate, regular rhythm.  Respiratory: Breath sounds clear and equal bilaterally.  GI: Abdomen soft, non-tender, no guarding.  MSK: Spine appears normal, range of motion is not limited, no muscle or joint tenderness  Neuro: Alert and oriented, no focal deficits, no motor or sensory deficits.  Skin: Skin normal color for race, warm, dry and intact. No evidence of rash.
No
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
## 2023-09-21 NOTE — ED PROVIDER NOTE - CARE PROVIDER_API CALL
Vinod Valencia  Plastic Surgery  [address], [address]  [place], [place] [address]  Phone: [phone]  Fax: [phone]  Follow Up Time: 7-10 Days

## 2023-09-21 NOTE — ED PROVIDER NOTE - NSFOLLOWUPINSTRUCTIONS_ED_ALL_ED_FT
Head Injury, Adult    There are many types of head injuries. Head injuries can be as minor as a small bump, or they can be a serious medical issue. More severe head injuries include:  A jarring injury to the brain (concussion).  A bruise (contusion) of the brain. This means there is bleeding in the brain that can cause swelling.  A cracked skull (skull fracture).  Bleeding in the brain that collects, clots, and forms a bump (hematoma).  After a head injury, most problems occur within the first 24 hours, but side effects may occur up to 7–10 days after the injury. It is important to watch your condition for any changes. You may need to be observed in the emergency department or urgent care, or you may be admitted to the hospital.    What are the causes?  There are many possible causes of a head injury. Serious head injuries may be caused by car accidents, bicycle or motorcycle accidents, sports injuries, falls, or being struck by an object.    What are the symptoms?  Symptoms of a head injury include a contusion, bump, or bleeding at the site of the injury. Other physical symptoms may include:  Headache.  Nausea or vomiting.  Dizziness.  Blurred or double vision.  Being uncomfortable around bright lights or loud noises.  Seizures.  Feeling tired.  Trouble being awakened.  Loss of consciousness.  Mental or emotional symptoms may include:  Irritability.  Confusion and memory problems.  Poor attention and concentration.  Changes in eating or sleeping habits.  Anxiety or depression.  How is this diagnosed?  This condition can usually be diagnosed based on your symptoms, a description of the injury, and a physical exam. You may also have imaging tests done, such as a CT scan or an MRI.    How is this treated?  Treatment for this condition depends on the severity and type of injury you have. The main goal of treatment is to prevent complications and allow the brain time to heal.    Mild head injury    If you have a mild head injury, you may be sent home, and treatment may include:  Observation. A responsible adult should stay with you for 24 hours after your injury and check on you often.  Physical rest.  Brain rest.  Pain medicines.  Severe head injury    If you have a severe head injury, treatment may include:  Close observation. This includes hospitalization with the following care:  Frequent physical exams.  Frequent checks of how your brain and nervous system are working (neurological status).  Checking your blood pressure and oxygen levels.  Medicines to relieve pain, prevent seizures, and decrease brain swelling.  Airway protection and breathing support. This may include using a ventilator.  Treatments that monitor and manage swelling inside the brain.  Brain surgery. This may be needed to:  Remove a collection of blood or blood clots.  Stop the bleeding.  Remove a part of the skull to allow room for the brain to swell.  Follow these instructions at home:  Activity    Rest and avoid activities that are physically hard or tiring.  Make sure you get enough sleep.  Let your brain rest by limiting activities that require a lot of thought or attention, such as:  Watching TV.  Playing memory games and puzzles.  Job-related work or homework.  Working on the computer, using social media, and texting.  Avoid activities that could cause another head injury, such as playing sports, until your health care provider approves. Having another head injury, especially before the first one has healed, can be dangerous.  Ask your health care provider when it is safe for you to return to your regular activities, including work or school. Ask your health care provider for a step-by-step plan for gradually returning to activities.  Ask your health care provider when you can drive, ride a bicycle, or use heavy machinery. Your ability to react may be slower after a brain injury. Do not do these activities if you are dizzy.  Lifestyle      Do not drink alcohol until your health care provider approves. Do not use drugs. Alcohol and certain drugs may slow your recovery and can put you at risk of further injury.  If it is harder than usual to remember things, write them down.  If you are easily distracted, try to do one thing at a time.  Talk with family members or close friends when making important decisions.  Tell your friends, family, a trusted colleague, and  about your injury, symptoms, and restrictions. Have them watch for any new or worsening problems.  General instructions    Take over-the-counter and prescription medicines only as told by your health care provider.  Have someone stay with you for 24 hours after your head injury. This person should watch you for any changes in your symptoms and be ready to seek medical help.  Keep all follow-up visits as told by your health care provider. This is important.  How is this prevented?  Work on improving your balance and strength to avoid falls.  Wear a seat belt when you are in a moving vehicle.  Wear a helmet when riding a bicycle, skiing, or doing any other sport or activity that has a risk of injury.  If you drink alcohol:  Limit how much you use to:  0–1 drink a day for nonpregnant women.  0–2 drinks a day for men.  Be aware of how much alcohol is in your drink. In the [place] one drink equals one 12 oz bottle of beer (355 mL), one 5 oz glass of wine (148 mL), or one 1½ oz glass of hard liquor (44 mL).  Take safety measures in your home, such as:  Removing clutter and tripping hazards from floors and stairways.  Using grab bars in bathrooms and handrails by stairs.  Placing non-slip mats on floors and in bathtubs.  Improving lighting in dim areas.  Where to find more information  Centers for Disease Control and Prevention: www.cdc.gov  Get help right away if:  You have:  A severe headache that is not helped by medicine.  Trouble walking or weakness in your arms and legs.  Clear or bloody fluid coming from your nose or ears.  Changes in your vision.  A seizure.  Increased confusion or irritability.  Your symptoms get worse.  You are sleepier than normal and have trouble staying awake.  You lose your balance.  Your pupils change size.  Your speech is slurred.  Your dizziness gets worse.  You vomit.  These symptoms may represent a serious problem that is an emergency. Do not wait to see if the symptoms will go away. Get medical help right away. Call your local emergency services (911 in the [place] Do not drive yourself to the hospital.    Summary  Head injuries can be minor, or they can be a serious medical issue requiring immediate attention.  Treatment for this condition depends on the severity and type of injury you have.  Have someone stay with you for 24 hours after your injury and check on you often.  Ask your health care provider when it is safe for you to return to your regular activities, including work or school.  Head injury prevention includes wearing a seat belt in a motor vehicle, using a helmet on a bicycle, limiting alcohol use, and taking safety measures in your home.  This information is not intended to replace advice given to you by your health care provider. Make sure you discuss any questions you have with your health care provider.    Laceration Care, Adult  A laceration is a cut that may go through all layers of the skin and into the tissue that is right under the skin. Some lacerations heal on their own. Others need to be closed with stitches (sutures), staples, skin adhesive strips, or skin glue.    Proper care of a laceration reduces the risk for infection, helps the laceration heal better, and may prevent scarring.    General tips  Keep the wound clean and dry.  Do not scratch or pick at the wound.  Wash your hands with soap and water for at least 20 seconds before and after touching your wound or changing your bandage (dressing). If soap and water are not available, use hand .  Do not usedisinfectants or antiseptics, such as rubbing alcohol, to clean your wound unless told by your health care provider.  If you were given a dressing, you should change it at least once a day, or as told by your health care provider. You should also change it if it becomes wet or dirty.  How to care for your laceration  If sutures or staples were used:    Keep the wound completely dry for the first 24 hours, or as told by your health care provider. After that time, you may shower or bathe. Do not soak your wound in water until after the sutures or staples have been removed.  Clean the wound once each day, or as told by your health care provider. To do this:  Wash the wound with soap and water.  Rinse the wound with water to remove all soap.  Pat the wound dry with a clean towel. Do not rub the wound.  After cleaning the wound, apply a thin layer of antibiotic ointment, other topical ointments, or a non-adherent dressing as told by your health care provider. This will help prevent infection and keep the dressing from sticking to the wound.  Have the sutures or staples removed as told by your health care provider. Do not remove sutures or staples yourself.  If skin adhesive strips were used:    Do not get the skin adhesive strips wet. You may shower or bathe, but keep the wound dry.  If the wound gets wet, pat it dry with a clean towel. Do not rub the wound.  Skin adhesive strips fall off on their own. If adhesive strip edges start to loosen and curl up, you may trim the loose edges. Do not remove adhesive strips completely unless your health care provider tells you to do that.  If skin glue was used:    You may shower or bathe, but try to keep the wound dry. Do not soak the wound in water.  After showering or bathing, pat the wound dry with a clean towel. Do not rub the wound.  Do not do any activities that will make you sweat a lot until the skin glue has fallen off.  Do not apply liquid, cream, or ointment medicine to the wound while the skin glue is in place. Doing this may loosen the film before the wound has healed.  If a dressing is placed over the wound, do not apply tape directly over the skin glue. Doing this may cause the glue to be pulled off before the wound has healed.  Do not pick at the glue. Skin glue usually remains in place for 5–10 days and then falls off the skin.  Follow these instructions at home:  Medicines    Take over-the-counter and prescription medicines only as told by your health care provider.  If you were prescribed an antibiotic medicine or ointment, take or apply it as told by your health care provider. Do not stop using it even if your condition improves.  Managing pain and swelling    If directed, put ice on the injured area. To do this:  Put ice in a plastic bag.  Place a towel between your skin and the bag.  Leave the ice on for 20 minutes, 2–3 times a day.  Remove the ice if your skin turns bright red. This is very important. If you cannot feel pain, heat, or cold, you have a greater risk of damage to the area.  Raise (elevate) the injured area above the level of your heart while you are sitting or lying down for the first 24–48 hours after the laceration is repaired.  General instructions    Two wounds closed with skin glue. One is normal. The other is red with pus and infected.  Avoid any activity that could cause your wound to reopen.  Check your wound every day for signs of infection. Watch for:  More redness, swelling, or pain.  Fluid or blood.  Warmth.  Pus or a bad smell.  Keep all follow-up visits. This is important.  Contact a health care provider if:  You received a tetanus shot and you have swelling, severe pain, redness, or bleeding at the injection site.  Your closed wound breaks open.  You have any of these signs of infection:  More redness, swelling, or pain around your wound.  Fluid or blood coming from your wound.  Warmth coming from your wound.  Pus or a bad smell coming from your wound.  A fever.  You notice something coming out of the wound, such as wood or glass.  Your pain is not controlled with medicine.  You notice a change in the color of your skin near your wound.  You need to change the dressing often.  You develop a new rash.  You have numbness around the wound.  Get help right away if:  You develop severe swelling around the wound.  Your pain suddenly increases and is severe.  You develop painful lumps near the wound or on skin anywhere else on your body.  You have a red streak going away from your wound.  The wound is on your hand or foot, and you cannot properly move a finger or toe.  The wound is on your hand or foot, and you notice that your fingers or toes look pale or bluish.  Summary  A laceration is a cut that may go through all layers of the skin and into the tissue that is right under the skin.  Some lacerations heal on their own. Others need to be closed with stitches (sutures), staples, skin adhesive strips, or skin glue.  Proper care of a laceration reduces the risk of infection, helps the laceration heal better, and may prevent scarring.  This information is not intended to replace advice given to you by your health care provider. Make sure you discuss any questions you have with your health care provider.
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
## 2023-09-23 DIAGNOSIS — S01.112A LACERATION WITHOUT FOREIGN BODY OF LEFT EYELID AND PERIOCULAR AREA, INITIAL ENCOUNTER: ICD-10-CM

## 2023-09-23 DIAGNOSIS — Z91.018 ALLERGY TO OTHER FOODS: ICD-10-CM

## 2023-09-23 DIAGNOSIS — J45.909 UNSPECIFIED ASTHMA, UNCOMPLICATED: ICD-10-CM

## 2023-09-23 DIAGNOSIS — S09.90XA UNSPECIFIED INJURY OF HEAD, INITIAL ENCOUNTER: ICD-10-CM

## 2023-09-23 DIAGNOSIS — Z88.0 ALLERGY STATUS TO PENICILLIN: ICD-10-CM
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
## 2023-09-23 DIAGNOSIS — S01.511A LACERATION WITHOUT FOREIGN BODY OF LIP, INITIAL ENCOUNTER: ICD-10-CM
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
## 2023-09-23 DIAGNOSIS — S01.512A LACERATION WITHOUT FOREIGN BODY OF ORAL CAVITY, INITIAL ENCOUNTER: ICD-10-CM

## 2023-09-23 DIAGNOSIS — Z91.010 ALLERGY TO PEANUTS: ICD-10-CM

## 2023-09-23 DIAGNOSIS — Z91.041 RADIOGRAPHIC DYE ALLERGY STATUS: ICD-10-CM

## 2023-09-23 DIAGNOSIS — Z88.8 ALLERGY STATUS TO OTHER DRUGS, MEDICAMENTS AND BIOLOGICAL SUBSTANCES: ICD-10-CM

## 2023-09-23 DIAGNOSIS — S00.81XA ABRASION OF OTHER PART OF HEAD, INITIAL ENCOUNTER: ICD-10-CM

## 2023-09-23 DIAGNOSIS — Z88.6 ALLERGY STATUS TO ANALGESIC AGENT: ICD-10-CM

## 2023-09-23 DIAGNOSIS — F32.A DEPRESSION, UNSPECIFIED: ICD-10-CM

## 2023-09-26 DIAGNOSIS — J45.909 UNSPECIFIED ASTHMA, UNCOMPLICATED: ICD-10-CM

## 2023-09-26 DIAGNOSIS — F32.9 MAJOR DEPRESSIVE DISORDER, SINGLE EPISODE, UNSPECIFIED: ICD-10-CM

## 2023-09-26 DIAGNOSIS — R45.851 SUICIDAL IDEATIONS: ICD-10-CM

## 2023-10-04 NOTE — BH SOCIAL WORK CONFIRMATION FOLLOW UP NOTE - NSCOMMENTS_PSY_ALL_CORE
Brendon was discharged from [facility] IPP Unit on 9/14. He broke his outpatient mental health appointment with April at [facility] on 9/20, [phone].  sent a referral for a wellness check via [facility] mobile crisis on 9/20. According to mobile crisis unit, patient provided the address "of a local food pantry". His whereabouts are unknown.

## 2023-10-12 NOTE — ED PROVIDER NOTE - MUSCULOSKELETAL NEGATIVE STATEMENT, MLM
there is no prior EKG available for comparison no back pain, no gout, no musculoskeletal pain, no neck pain, and no weakness.

## 2023-10-23 NOTE — ED BEHAVIORAL HEALTH ASSESSMENT NOTE - PREPARATORY ACTS:
From: Ryan Viveros  To: Jose Bella  Sent: 10/23/2023 8:58 AM CDT  Subject: Raymundo pooping every half hour    Hello,    We started the clean out process with Raymundo on Friday. His poops gradually became more liquified and on Saturday they were the consistency of chicken broth, so we stopped the exlax and miralax on Sunday, but Raymundo continued to poop liquid every half hour all day long on Sunday. He has already had two liquid poops this morning since waking up. Will this stop soon? It is making his diaper rash even worse.    Thank you,    Michael   Yes past 3 months None known

## 2023-11-16 NOTE — ED ADULT NURSE NOTE - DISCHARGE DATE/TIME
Last appointment: 9/1/2023  Next appointment: 12/5/2023  Last refill: 9/29/2023 # for 6 months    Patient advised there should be refills on file & to contact pharmacy 27-Mar-2020 15:20

## 2023-11-21 NOTE — ED PROVIDER NOTE - NS_ACPWITHSCRIBE_ED_ALL_ED
atrial fibrillation/occasional PVCs
I personally performed the service described in the documentation  recorded by the scribe in my presence, and it accurately and completely records my words and action.

## 2024-02-05 NOTE — ED ADULT TRIAGE NOTE - NS ED TRIAGE AVPU SCALE
-- DO NOT REPLY / DO NOT REPLY ALL --  -- Message is from [facility] ([facility]) --    General Patient Message: Patient calling back, patient called in on Friday. Needs needles for diabetic insulin. Patient states reading was 224 this morning and have not taken is insulin    Caller Information         Type Contact Phone/Fax    02/02/2024 03:23 PM CST Phone (Incoming) Zana Davis (Self) [phone] (M)    02/02/2024 03:45 PM CST Phone (Outgoing) Zana Davis (Self) [phone] (M)    No Answer/Busy     02/02/2024 03:54 PM CST Phone (Outgoing) Zana Davis (Self) [phone] (M)    02/05/2024 10:49 AM CST Phone (Incoming) RyanZana (Self) [phone] (M)          Alternative phone number: no     Can a detailed message be left? Yes    Message Turnaround:     Is it Working Hours? Yes - Working Hours                      Alert-The patient is alert, awake and responds to voice. The patient is oriented to time, place, and person. The triage nurse is able to obtain subjective information.

## 2024-04-15 NOTE — ED PROVIDER NOTE - WR ORDER DATE AND TIME 1
Epidural Block    Date/Time: 4/15/2024 11:48 AM    Performed by: Debora Gipson MD  Authorized by: Debora Gipson 
24-Aug-2021 00:29

## 2024-05-14 NOTE — ED PROVIDER NOTE - CPE EDP CARDIAC NORM
05/14/24 1742 05/14/24 1746 05/14/24 1747   Vital Signs   Pulse 66 62 71   /51 124/50 128/51   MAP (mmHg) (!) 63 69 68   BP Location Right arm Right arm Right arm   BP Method Automatic Automatic Automatic   Patient Position Lying Sitting Standing      Orthostatic vitals.   normal...

## 2024-07-16 NOTE — ED ADULT TRIAGE NOTE - DOMESTIC TRAVEL HIGH RISK QUESTION
"Behavioral Health Psychotherapy Progress Note    Psychotherapy Provided: Family Therapy    1. ADHD (attention deficit hyperactivity disorder), combined type        2. Generalized anxiety disorder            Goals addressed in session: Goal 1     DATA: Met with Scottie, his mom, and older brother for session. Mom discussed some of the behaviors and challenges she is seeing at home. She reports that she continues to struggle with Scottie's OCD behaviors such as his obsession to wash his hands. Discussed ways to work with Scottie through modeling ways to \"talk to his brain\" that he doesn't need to wash his hands right now.  Mom discussed exposure therapy and wanting this therapist to connect with psychiatric nurse practitioner next week.    During this session, this clinician used the following therapeutic modalities: Client-centered Therapy    Substance Abuse was not addressed during this session. If the client is diagnosed with a co-occurring substance use disorder, please indicate any changes in the frequency or amount of use: n/a. Stage of change for addressing substance use diagnoses: No substance use/Not applicable    ASSESSMENT:  Scottie Powell presents with a Euthymic/ normal mood.     his affect is Normal range and intensity, which is congruent, with his mood and the content of the session. The client has made progress on their goals.     Scottie Powell presents with a none risk of suicide, none risk of self-harm, and none risk of harm to others.    For any risk assessment that surpasses a \"low\" rating, a safety plan must be developed.    A safety plan was indicated: no  If yes, describe in detail n/a    PLAN: Between sessions, Scottie Powell will utilize coping skills to regulate strong feelings and emotions. At the next session, the therapist will use Client-centered Therapy to address emotional regulation.    Behavioral Health Treatment Plan and Discharge Planning: Scottie Powell is aware of and agrees to continue to " work on their treatment plan. They have identified and are working toward their discharge goals. yes    Visit start and stop times:    07/16/24  Start Time: 1400  Stop Time: 1430  Total Visit Time: 30 minutes   No

## 2024-07-25 NOTE — BH INPATIENT PSYCHIATRY PROGRESS NOTE - CURRENT MEDICATION
[de-identified] : Complete ABT Adhere to all medications including demonstrating proper use of nebulizers. Increase activity as tolerated and maintain optimal activity levels. Continue coughing and deep breathing exercises including use of Incentive Spirometry. Receive routine pneumococcal and influenza vaccinations. Maintain proper nutrition and adequate hydration. Notify NP for worsening symptoms including fever, chills, SOB, CP, increased cough and secretions. Follow up with MD within 7 days of discharge.  MEDICATIONS  (STANDING):  mirtazapine 30 milliGRAM(s) Oral at bedtime  multivitamin  Chewable 1 Tablet(s) Oral daily    MEDICATIONS  (PRN):  acetaminophen   Oral Liquid .. 650 milliGRAM(s) Oral every 6 hours PRN Temp greater or equal to 38C (100.4F), Mild Pain (1 - 3)  haloperidol     Tablet 5 milliGRAM(s) Oral every 6 hours PRN agitation  hydrOXYzine hydrochloride 50 milliGRAM(s) Oral every 6 hours PRN anxiety, agitation  melatonin. 3 milliGRAM(s) Oral at bedtime PRN Insomnia   MEDICATIONS  (STANDING):  buPROPion XL (24-Hour) 150 milliGRAM(s) Oral daily  mirtazapine 30 milliGRAM(s) Oral at bedtime  multivitamin  Chewable 1 Tablet(s) Oral daily    MEDICATIONS  (PRN):  acetaminophen   Oral Liquid .. 650 milliGRAM(s) Oral every 6 hours PRN Temp greater or equal to 38C (100.4F), Mild Pain (1 - 3)  haloperidol     Tablet 5 milliGRAM(s) Oral every 6 hours PRN agitation  hydrOXYzine hydrochloride 50 milliGRAM(s) Oral every 6 hours PRN anxiety, agitation  melatonin. 3 milliGRAM(s) Oral at bedtime PRN Insomnia

## 2024-08-14 ENCOUNTER — EMERGENCY (EMERGENCY)
Age: 39
LOS: 1 days | Discharge: HOME | End: 2024-08-14
Attending: STUDENT IN AN ORGANIZED HEALTH CARE EDUCATION/TRAINING PROGRAM | Admitting: EMERGENCY MEDICINE
Payer: MEDICAID

## 2024-08-14 VITALS
TEMPERATURE: 98 F | WEIGHT: 164.91 LBS | OXYGEN SATURATION: 99 % | HEIGHT: 74 IN | HEART RATE: 93 BPM | SYSTOLIC BLOOD PRESSURE: 112 MMHG | RESPIRATION RATE: 18 BRPM | DIASTOLIC BLOOD PRESSURE: 70 MMHG

## 2024-08-14 VITALS
HEART RATE: 96 BPM | RESPIRATION RATE: 18 BRPM | SYSTOLIC BLOOD PRESSURE: 116 MMHG | OXYGEN SATURATION: 99 % | DIASTOLIC BLOOD PRESSURE: 83 MMHG

## 2024-08-14 DIAGNOSIS — J45.901 UNSPECIFIED ASTHMA WITH (ACUTE) EXACERBATION: ICD-10-CM

## 2024-08-14 DIAGNOSIS — Z88.0 ALLERGY STATUS TO PENICILLIN: ICD-10-CM

## 2024-08-14 DIAGNOSIS — Z20.2 CONTACT WITH AND (SUSPECTED) EXPOSURE TO INFECTIONS WITH A PREDOMINANTLY SEXUAL MODE OF TRANSMISSION: ICD-10-CM

## 2024-08-14 DIAGNOSIS — Z91.018 ALLERGY TO OTHER FOODS: ICD-10-CM

## 2024-08-14 DIAGNOSIS — Z20.822 CONTACT WITH AND (SUSPECTED) EXPOSURE TO COVID-19: ICD-10-CM

## 2024-08-14 DIAGNOSIS — Z88.6 ALLERGY STATUS TO ANALGESIC AGENT: ICD-10-CM

## 2024-08-14 DIAGNOSIS — Z91.010 ALLERGY TO PEANUTS: ICD-10-CM

## 2024-08-14 DIAGNOSIS — Z88.8 ALLERGY STATUS TO OTHER DRUGS, MEDICAMENTS AND BIOLOGICAL SUBSTANCES: ICD-10-CM

## 2024-08-14 LAB
ALBUMIN SERPL ELPH-MCNC: 3.8 G/DL — SIGNIFICANT CHANGE UP (ref 3.4–5)
ALP SERPL-CCNC: 88 U/L — SIGNIFICANT CHANGE UP (ref 40–120)
ALT FLD-CCNC: 22 U/L — SIGNIFICANT CHANGE UP (ref 12–42)
ANION GAP SERPL CALC-SCNC: 6 MMOL/L — LOW (ref 9–16)
APPEARANCE UR: CLEAR — SIGNIFICANT CHANGE UP
AST SERPL-CCNC: 28 U/L — SIGNIFICANT CHANGE UP (ref 15–37)
BACTERIA # UR AUTO: ABNORMAL /HPF
BASOPHILS # BLD AUTO: 0.03 K/UL — SIGNIFICANT CHANGE UP (ref 0–0.2)
BASOPHILS NFR BLD AUTO: 0.5 % — SIGNIFICANT CHANGE UP (ref 0–2)
BILIRUB SERPL-MCNC: 0.5 MG/DL — SIGNIFICANT CHANGE UP (ref 0.2–1.2)
BILIRUB UR-MCNC: NEGATIVE — SIGNIFICANT CHANGE UP
BUN SERPL-MCNC: 9 MG/DL — SIGNIFICANT CHANGE UP (ref 7–23)
CALCIUM SERPL-MCNC: 9.4 MG/DL — SIGNIFICANT CHANGE UP (ref 8.5–10.5)
CHLORIDE SERPL-SCNC: 105 MMOL/L — SIGNIFICANT CHANGE UP (ref 96–108)
CO2 SERPL-SCNC: 29 MMOL/L — SIGNIFICANT CHANGE UP (ref 22–31)
COLOR SPEC: YELLOW — SIGNIFICANT CHANGE UP
COMMENT - URINE: SIGNIFICANT CHANGE UP
CREAT SERPL-MCNC: 0.84 MG/DL — SIGNIFICANT CHANGE UP (ref 0.5–1.3)
DIFF PNL FLD: NEGATIVE — SIGNIFICANT CHANGE UP
EGFR: 116 ML/MIN/1.73M2 — SIGNIFICANT CHANGE UP
EGFR: 116 ML/MIN/1.73M2 — SIGNIFICANT CHANGE UP
EOSINOPHIL # BLD AUTO: 0.16 K/UL — SIGNIFICANT CHANGE UP (ref 0–0.5)
EOSINOPHIL NFR BLD AUTO: 2.4 % — SIGNIFICANT CHANGE UP (ref 0–6)
EPI CELLS # UR: PRESENT
GLUCOSE SERPL-MCNC: 83 MG/DL — SIGNIFICANT CHANGE UP (ref 70–99)
GLUCOSE UR QL: NEGATIVE MG/DL — SIGNIFICANT CHANGE UP
HCT VFR BLD CALC: 42.3 % — SIGNIFICANT CHANGE UP (ref 39–50)
HGB BLD-MCNC: 13.2 G/DL — SIGNIFICANT CHANGE UP (ref 13–17)
IMM GRANULOCYTES NFR BLD AUTO: 0 % — SIGNIFICANT CHANGE UP (ref 0–0.9)
KETONES UR-MCNC: ABNORMAL MG/DL
LEUKOCYTE ESTERASE UR-ACNC: ABNORMAL
LYMPHOCYTES # BLD AUTO: 1.48 K/UL — SIGNIFICANT CHANGE UP (ref 1–3.3)
LYMPHOCYTES # BLD AUTO: 22.6 % — SIGNIFICANT CHANGE UP (ref 13–44)
MCHC RBC-ENTMCNC: 23.8 PG — LOW (ref 27–34)
MCHC RBC-ENTMCNC: 31.2 GM/DL — LOW (ref 32–36)
MCV RBC AUTO: 76.2 FL — LOW (ref 80–100)
MONOCYTES # BLD AUTO: 0.77 K/UL — SIGNIFICANT CHANGE UP (ref 0–0.9)
MONOCYTES NFR BLD AUTO: 11.7 % — SIGNIFICANT CHANGE UP (ref 2–14)
NEUTROPHILS # BLD AUTO: 4.12 K/UL — SIGNIFICANT CHANGE UP (ref 1.8–7.4)
NEUTROPHILS NFR BLD AUTO: 62.8 % — SIGNIFICANT CHANGE UP (ref 43–77)
NITRITE UR-MCNC: NEGATIVE — SIGNIFICANT CHANGE UP
NRBC # BLD: 0 /100 WBCS — SIGNIFICANT CHANGE UP (ref 0–0)
NRBC BLD-RTO: 0 /100 WBCS — SIGNIFICANT CHANGE UP (ref 0–0)
PH UR: 7 — SIGNIFICANT CHANGE UP (ref 5–8)
PLATELET # BLD AUTO: 264 K/UL — SIGNIFICANT CHANGE UP (ref 150–400)
POTASSIUM SERPL-MCNC: 4.9 MMOL/L — SIGNIFICANT CHANGE UP (ref 3.5–5.3)
POTASSIUM SERPL-SCNC: 4.9 MMOL/L — SIGNIFICANT CHANGE UP (ref 3.5–5.3)
PROT SERPL-MCNC: 8.4 G/DL — HIGH (ref 6.4–8.2)
PROT UR-MCNC: NEGATIVE MG/DL — SIGNIFICANT CHANGE UP
RBC # BLD: 5.55 M/UL — SIGNIFICANT CHANGE UP (ref 4.2–5.8)
RBC # FLD: 14.4 % — SIGNIFICANT CHANGE UP (ref 10.3–14.5)
RBC CASTS # UR COMP ASSIST: 0 /HPF — SIGNIFICANT CHANGE UP (ref 0–4)
SODIUM SERPL-SCNC: 140 MMOL/L — SIGNIFICANT CHANGE UP (ref 132–145)
SP GR SPEC: 1.03 — SIGNIFICANT CHANGE UP (ref 1–1.03)
UROBILINOGEN FLD QL: 1 MG/DL — SIGNIFICANT CHANGE UP (ref 0.2–1)
WBC # BLD: 6.56 K/UL — SIGNIFICANT CHANGE UP (ref 3.8–10.5)
WBC # FLD AUTO: 6.56 K/UL — SIGNIFICANT CHANGE UP (ref 3.8–10.5)
WBC UR QL: 4 /HPF — SIGNIFICANT CHANGE UP (ref 0–5)

## 2024-08-14 PROCEDURE — 71046 X-RAY EXAM CHEST 2 VIEWS: CPT | Mod: 26

## 2024-08-14 PROCEDURE — 99284 EMERGENCY DEPT VISIT MOD MDM: CPT

## 2024-08-14 RX ORDER — IPRATROPIUM BROMIDE AND ALBUTEROL SULFATE .5; 2.5 MG/3ML; MG/3ML
3 SOLUTION RESPIRATORY (INHALATION) ONCE
Refills: 0 | Status: COMPLETED | OUTPATIENT
Start: 2024-08-14 | End: 2024-08-14

## 2024-08-14 RX ORDER — CEFTRIAXONE 500 MG/1
500 INJECTION, POWDER, FOR SOLUTION INTRAMUSCULAR; INTRAVENOUS ONCE
Refills: 0 | Status: COMPLETED | OUTPATIENT
Start: 2024-08-14 | End: 2024-08-14

## 2024-08-14 RX ORDER — DOXYCYCLINE HYCLATE 100 MG
100 TABLET ORAL ONCE
Refills: 0 | Status: COMPLETED | OUTPATIENT
Start: 2024-08-14 | End: 2024-08-14

## 2024-08-14 RX ORDER — DOXYCYCLINE HYCLATE 100 MG
1 TABLET ORAL
Qty: 28 | Refills: 0
Start: 2024-08-14 | End: 2024-08-27

## 2024-08-14 RX ORDER — PREDNISONE 20 MG/1
1 TABLET ORAL
Qty: 4 | Refills: 0
Start: 2024-08-14 | End: 2024-08-17

## 2024-08-14 RX ORDER — PREDNISONE 20 MG/1
1 TABLET ORAL
Qty: 4 | Refills: 0
Start: 2024-08-14 | End: 2024-08-23

## 2024-08-14 RX ORDER — METHYLPREDNISOLONE ACETATE 80 MG/ML
125 INJECTION, SUSPENSION INTRA-ARTICULAR; INTRALESIONAL; INTRAMUSCULAR; SOFT TISSUE ONCE
Refills: 0 | Status: COMPLETED | OUTPATIENT
Start: 2024-08-14 | End: 2024-08-14

## 2024-08-14 RX ORDER — DOXYCYCLINE HYCLATE 100 MG
1 TABLET ORAL
Qty: 28 | Refills: 0
Start: 2024-08-14 | End: 2024-09-02

## 2024-08-15 LAB
C TRACH RRNA SPEC QL NAA+PROBE: SIGNIFICANT CHANGE UP
FLUAV AG NPH QL: SIGNIFICANT CHANGE UP
FLUBV AG NPH QL: SIGNIFICANT CHANGE UP
HAV IGM SER-ACNC: SIGNIFICANT CHANGE UP
HBV CORE IGM SER-ACNC: SIGNIFICANT CHANGE UP
HBV SURFACE AG SER-ACNC: SIGNIFICANT CHANGE UP
HCV AB S/CO SERPL IA: 0.13 S/CO — SIGNIFICANT CHANGE UP (ref 0–0.99)
HCV AB SERPL-IMP: SIGNIFICANT CHANGE UP
HIV 1 & 2 AB SERPL IA.RAPID: SIGNIFICANT CHANGE UP
N GONORRHOEA RRNA SPEC QL NAA+PROBE: SIGNIFICANT CHANGE UP
RSV RNA NPH QL NAA+NON-PROBE: SIGNIFICANT CHANGE UP
SARS-COV-2 RNA SPEC QL NAA+PROBE: SIGNIFICANT CHANGE UP
SPECIMEN SOURCE: SIGNIFICANT CHANGE UP

## 2024-08-16 LAB
RPR SER-TITR: (no result)
RPR SERPL-ACNC: REACTIVE
T PALLIDUM AB TITR SER: POSITIVE

## 2024-10-07 NOTE — ED PROVIDER NOTE - CARE PLAN
Principal Discharge DX:	Closed head injury, initial encounter  Secondary Diagnosis:	Laceration of face, initial encounter  Secondary Diagnosis:	Dental infection
Patient/surrogate refused vaccine...

## 2024-11-06 ENCOUNTER — EMERGENCY (EMERGENCY)
Facility: HOSPITAL | Age: 39
LOS: 1 days | Discharge: HOME | End: 2024-11-06
Attending: EMERGENCY MEDICINE | Admitting: EMERGENCY MEDICINE
Payer: MEDICAID

## 2024-11-06 VITALS
RESPIRATION RATE: 16 BRPM | TEMPERATURE: 98 F | HEART RATE: 87 BPM | WEIGHT: 175.05 LBS | DIASTOLIC BLOOD PRESSURE: 58 MMHG | OXYGEN SATURATION: 96 % | SYSTOLIC BLOOD PRESSURE: 110 MMHG

## 2024-11-06 DIAGNOSIS — Z88.8 ALLERGY STATUS TO OTHER DRUGS, MEDICAMENTS AND BIOLOGICAL SUBSTANCES: ICD-10-CM

## 2024-11-06 DIAGNOSIS — Z88.0 ALLERGY STATUS TO PENICILLIN: ICD-10-CM

## 2024-11-06 DIAGNOSIS — Z88.6 ALLERGY STATUS TO ANALGESIC AGENT: ICD-10-CM

## 2024-11-06 DIAGNOSIS — R06.02 SHORTNESS OF BREATH: ICD-10-CM

## 2024-11-06 DIAGNOSIS — R07.89 OTHER CHEST PAIN: ICD-10-CM

## 2024-11-06 DIAGNOSIS — Z88.1 ALLERGY STATUS TO OTHER ANTIBIOTIC AGENTS: ICD-10-CM

## 2024-11-06 DIAGNOSIS — Z91.010 ALLERGY TO PEANUTS: ICD-10-CM

## 2024-11-06 DIAGNOSIS — K12.0 RECURRENT ORAL APHTHAE: ICD-10-CM

## 2024-11-06 PROCEDURE — 99284 EMERGENCY DEPT VISIT MOD MDM: CPT | Mod: 25

## 2024-11-06 RX ORDER — ALBUTEROL 90 MCG
2 AEROSOL (GRAM) INHALATION
Qty: 1 | Refills: 0
Start: 2024-11-06

## 2024-11-06 RX ORDER — FLUTICASONE PROPIONATE AND SALMETEROL XINAFOATE 230; 21 UG/1; UG/1
1 AEROSOL, METERED RESPIRATORY (INHALATION)
Qty: 1 | Refills: 0
Start: 2024-11-06

## 2024-11-06 RX ORDER — IPRATROPIUM BROMIDE AND ALBUTEROL SULFATE .5; 2.5 MG/3ML; MG/3ML
3 SOLUTION RESPIRATORY (INHALATION) ONCE
Refills: 0 | Status: COMPLETED | OUTPATIENT
Start: 2024-11-06 | End: 2024-11-06

## 2024-11-06 RX ADMIN — IPRATROPIUM BROMIDE AND ALBUTEROL SULFATE 3 MILLILITER(S): .5; 2.5 SOLUTION RESPIRATORY (INHALATION) at 01:28

## 2024-11-06 NOTE — ED PROVIDER NOTE - NSFOLLOWUPINSTRUCTIONS_ED_ALL_ED_FT
Please read all handouts provided to you from the emergency department. Seek immediate medical attention for any new/worsening signs or symptoms.  Take any prescribed medications as directed. Please follow up with  your primary physician in the next 3-5 days.    To access your record on the patient portal [facility], please visit:  [email or web address]  If you are having difficulties setting this up, call [phone] and someone can assist you over the phone.      Canker Sores (Aphthous Ulcers)    Canker sores are small, painful sores that develop inside your mouth. They may also be called aphthous ulcers. You can get canker sores on the inside of your lips or cheeks, on your tongue, or anywhere inside your mouth. You can have just one canker sore or several of them. Canker sores cannot be passed from one person to another (noncontagious). These sores are different than the sores that you may get on the outside of your lips (cold sores or fever blisters).     Canker sores usually start as painful red bumps. Then they turn into small white, yellow, or gray ulcers that have red borders. The ulcers may be quite painful. The pain may be worse when you eat or drink.    CAUSES  The cause of this condition is not known.    RISK FACTORS  This condition is more likely to develop in:    Women.  People in their teens or 20s.  Women who are having their menstrual period.  People who are under a lot of emotional stress.  People who do not get enough iron or B vitamins.  People who have poor oral hygiene.  People who have an injury inside the mouth. This can happen after having dental work or from chewing something hard.    SYMPTOMS  Along with the canker sore, symptoms may also include:    Fever.  Fatigue.  Swollen lymph nodes in your neck.    DIAGNOSIS  This condition can be diagnosed based on your symptoms. Your health care provider will also examine your mouth. Your health care provider may also do tests if you get canker sores often or if they are very bad. Tests may include:    Blood tests to rule out other causes of canker sores.  Taking swabs from the sore to check for infection.  Taking a small piece of skin from the sore (biopsy) to test it for cancer.    TREATMENT  Most canker sores clear up without treatment in about 10 days. Home care is usually the only treatment that you will need. Over-the-counter medicines can relieve discomfort. If you have severe canker sores, your health care provider may prescribe:    Numbing ointment to relieve pain.  Vitamins.  Steroid medicines. These may be given as:  Oral pills.  Mouth rinses.  Gels.  Antibiotic mouth rinse.    HOME CARE INSTRUCTIONS  Apply, take, or use medicines only as directed by your health care provider. These include vitamins.  If you were prescribed an antibiotic mouth rinse, finish all of it even if you start to feel better.  Until the sores are healed:  Do not drink coffee or citrus juices.  Do not eat spicy or salty foods.  Use a mild, over-the-counter mouth rinse as directed by your health care provider.  Practice good oral hygiene.  Floss your teeth every day.  Brush your teeth with a soft brush twice each day.    SEEK MEDICAL CARE IF:  Your symptoms do not get better after two weeks.  You also have a fever or swollen glands.  You get canker sores often.  You have a canker sore that is getting larger.  You cannot eat or drink due to your canker sores.    ADDITIONAL NOTES AND INSTRUCTIONS    Please follow up with your Primary MD in 24-48 hr.  Seek immediate medical care for any new/worsening signs or symptoms. Please read all handouts provided to you from the emergency department. Seek immediate medical attention for any new/worsening signs or symptoms.  Take any prescribed medications as directed. Please follow up with  your primary physician in the next 3-5 days.    To access your record on the patient portal [facility], please visit:  [email or web address]  If you are having difficulties setting this up, call [phone] and someone can assist you over the phone.    [facility]:  Address: [address] [place] [address] (Formerly [facility])  Phone: [phone]  Hours:   Monday	   8:30AM–4PM  Tuesday	    8:30AM–4PM  Wednesday 8:30AM–4PM  Thursday     8:30AM–4PM  Friday	   8:30AM–3PM  Saturday	   Closed  Sunday	   Closed    For emergency care when [facility] is closed:  During times when [facility] is closed, patients with pain, excessive bleeding, swelling, oral infection and/or trauma should seek treatment at their nearest hospital emergency room. The closest emergency room to [facility] is the [facility] located at [address] [place] [address] (at [facility]). You can call [facility] directly at [phone]. Please note that emergency room fees and related expenses incurred at [facility] are the responsibility of the patient.      Fees Associated with Emergency Services/Urgent Care  Patients presenting for Emergency Services/Urgent Care will be assessed a $75 fee, which covers the cost of a limited examination and applicable radiographs. Palliative procedures to relieve patients from pain are charged additionally.       Canker Sores (Aphthous Ulcers)    Canker sores are small, painful sores that develop inside your mouth. They may also be called aphthous ulcers. You can get canker sores on the inside of your lips or cheeks, on your tongue, or anywhere inside your mouth. You can have just one canker sore or several of them. Canker sores cannot be passed from one person to another (noncontagious). These sores are different than the sores that you may get on the outside of your lips (cold sores or fever blisters).     Canker sores usually start as painful red bumps. Then they turn into small white, yellow, or gray ulcers that have red borders. The ulcers may be quite painful. The pain may be worse when you eat or drink.    CAUSES  The cause of this condition is not known.    RISK FACTORS  This condition is more likely to develop in:    Women.  People in their teens or 20s.  Women who are having their menstrual period.  People who are under a lot of emotional stress.  People who do not get enough iron or B vitamins.  People who have poor oral hygiene.  People who have an injury inside the mouth. This can happen after having dental work or from chewing something hard.    SYMPTOMS  Along with the canker sore, symptoms may also include:    Fever.  Fatigue.  Swollen lymph nodes in your neck.    DIAGNOSIS  This condition can be diagnosed based on your symptoms. Your health care provider will also examine your mouth. Your health care provider may also do tests if you get canker sores often or if they are very bad. Tests may include:    Blood tests to rule out other causes of canker sores.  Taking swabs from the sore to check for infection.  Taking a small piece of skin from the sore (biopsy) to test it for cancer.    TREATMENT  Most canker sores clear up without treatment in about 10 days. Home care is usually the only treatment that you will need. Over-the-counter medicines can relieve discomfort. If you have severe canker sores, your health care provider may prescribe:    Numbing ointment to relieve pain.  Vitamins.  Steroid medicines. These may be given as:  Oral pills.  Mouth rinses.  Gels.  Antibiotic mouth rinse.    HOME CARE INSTRUCTIONS  Apply, take, or use medicines only as directed by your health care provider. These include vitamins.  If you were prescribed an antibiotic mouth rinse, finish all of it even if you start to feel better.  Until the sores are healed:  Do not drink coffee or citrus juices.  Do not eat spicy or salty foods.  Use a mild, over-the-counter mouth rinse as directed by your health care provider.  Practice good oral hygiene.  Floss your teeth every day.  Brush your teeth with a soft brush twice each day.    SEEK MEDICAL CARE IF:  Your symptoms do not get better after two weeks.  You also have a fever or swollen glands.  You get canker sores often.  You have a canker sore that is getting larger.  You cannot eat or drink due to your canker sores.    ADDITIONAL NOTES AND INSTRUCTIONS    Please follow up with your Primary MD in 24-48 hr.  Seek immediate medical care for any new/worsening signs or symptoms.

## 2024-11-06 NOTE — ED PROVIDER NOTE - PATIENT PORTAL LINK FT
You can access the FollowMyHealth Patient Portal offered by [facility] by registering at the following website: [email or web address]. By joining [facility] FollowMyHealth portal, you will also be able to view your health information using other applications (apps) compatible with our system.

## 2024-11-06 NOTE — ED PROVIDER NOTE - CLINICAL SUMMARY MEDICAL DECISION MAKING FREE TEXT BOX
39-year-old male history of asthma presents with 3 days of asthma symptoms of chest tightness and shortness of breath along with 1 day of painful oral lesions on the lower gumline worse on the right side.  Patient states that he has albuterol at home which he uses in the morning and evening, but does not like to take it more frequently than that.  States that since he did not feel like his symptoms were improving much with the twice daily albuterol, he would come for a nebulizer treatment.  He denies fever/chills, sore throat, nausea/vomiting, abdominal pain, bowel/bladder habit changes.    PE: Well-appearing, no acute distress, easily agitated (when asked how can I help you, patient responds defensively).  Oral exam with shallow ulcerations along the right lower gingiva; no areas of swelling or fluctuance.  Respirations clear to auscultation bilaterally.  Heart regular rate rhythm.    MDM: Patient presents for concern of asthma symptoms worsened with the cooling weather and painful oral lesions for the past day.  Lungs are clear to auscultation on exam, however, patient insisting on nebulizer treatment.  Will give 1 treatment to see if this affects his symptoms.  Oral lesions consistent with aphthous ulcers; will order Magic mouthwash.  Of note, patient found to have syphilis in his most recent visit to our facility with a note attempting to contact patient regarding this finding without success.  Will bring this up to patient and discuss if there are any further concerns.

## 2024-11-06 NOTE — ED ADULT NURSE NOTE - OBJECTIVE STATEMENT
Pt presents to ed Pt AOX4, speaking in full clear sentences, breathing equal and unlabored  male patient walk in to ED for eval of mouth sores in bilateral lower gums and shortness of breath x 1 day; patient endorses using his pump but with minimal relief. patient is able to speak in complete sentences at 96% RA.  asthma exacerbation, mouth sores  Neb tx given and ordered mouth wash  Plan of care ongoing

## 2024-11-06 NOTE — ED ADULT TRIAGE NOTE - CHIEF COMPLAINT QUOTE
male patient walk in to ED for eval of mouth sores in bilateral lower gums and shortness of breath x 1 day; patient endorses using his pump but with minimal relief. patient is able to speak in complete sentences at 96% RA.

## 2024-11-06 NOTE — ED ADULT TRIAGE NOTE - STATUS:
Here is the plan from today's visit    1. Type 2 diabetes mellitus with microalbuminuria, with long-term current use of insulin (H)  - insulin glargine (LANTUS) 100 UNIT/ML injection; Inject 20 Units Subcutaneous At Bedtime  Dispense: 8 mL; Refill: 11  - We will decrease your Lantus from 22U to 20U    2. Morbid obesity due to excess calories (H)  - You are doing a great job! You are down 7 pounds since 1/8/2018    3. Hypothyroidism  - no changes today. The next time we need to check your thyroid is 09/2018.     4. Hypertension, essential  - your repeat blood pressure is slightly elevated. Have the staff recheck your blood pressure today. If it continues to be >150/90, have the staff contact me. I will not change any medications at this time. As you increase your activity, your blood pressure should go down. Just remember to bring in the recorded blood pressures at your next visit.   - continue to have your blood pressure checked twice a week.     Please call or return to clinic if your symptoms don't go away.    Follow up plan  Please make a clinic appointment for follow up with me (GALA DIA) in 3  months for Blood pressure, diabetes follow up.    Thank you for coming to [facility] today.  Lab Testing:  **If you had lab testing today and your results are reassuring or normal they will be mailed to you or sent through [employer] within 7 days.   **If the lab tests need quick action we will call you with the results.  The phone number we will call with results is # [phone] (home) [phone] (work). If this is not the best number please call our clinic and change the number.  Medication Refills:  If you need any refills please call your pharmacy and they will contact us.   If you need to  your refill at a new pharmacy, please contact the new pharmacy directly. The new pharmacy will help you get your medications transferred faster.   Scheduling:  If you have any concerns about today's visit  or wish to schedule another appointment please call our office during normal business hours [phone] (8-5:00 M-F)  If a referral was made to a [facility] and you don't get a call from central scheduling please call [phone].  If a Mammogram was ordered for you at [facility] call [phone] to schedule or change your appointment.  If you had an XRay/CT/Ultrasound/MRI ordered the number is [phone] to schedule or change your radiology appointment.   Medical Concerns:  If you have urgent medical concerns please call [phone] at any time of the day.     [employer]

## 2024-11-12 NOTE — PROGRESS NOTE BEHAVIORAL HEALTH - PERCEPTIONS
Subjective   History of Present Illness  Patient is a 69-year-old male who presents emergency room with left foot and ankle swelling.  Patient denies any injury or trauma.  Patient states that it really does not hurt him.  He states that it is stiff and uncomfortable because of the swelling.  No redness.  No calf tenderness.        Review of Systems   Constitutional: Negative.  Negative for chills, fatigue and fever.   Eyes: Negative.    Respiratory:  Negative for cough, chest tightness and shortness of breath.    Cardiovascular:  Negative for chest pain and palpitations.   Gastrointestinal:  Negative for abdominal pain, diarrhea, nausea and vomiting.   Genitourinary: Negative.    Musculoskeletal:  Positive for arthralgias and joint swelling.   Skin: Negative.  Negative for rash.   Neurological: Negative.  Negative for syncope, weakness, numbness and headaches.   Psychiatric/Behavioral: Negative.     All other systems reviewed and are negative.      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 wound    Hx of hiatal hernia     Hyperlipidemia     Hypertension     Pre-diabetes     Pulmonary embolism     SAH (subarachnoid hemorrhage) 2020       Allergies   Allergen Reactions    Heparin Other (See Comments)     CAUSE THROMBOCYTOPENIA       Past Surgical History:   Procedure Laterality Date    BACK SURGERY      2    CEREBRAL ANGIOGRAM N/A 8/18/2020    Procedure: CEREBRAL ANGIOGRAM;  Surgeon: Shahbaz Burton MD;  Location: [facility] OR 18/19;  Service: Neurosurgery;  Laterality: N/A;    CEREBRAL ANGIOGRAM N/A 11/5/2020    Procedure: CEREBRAL ANGIOGRAM history of HIT needs angiomax instead of heparin;  Surgeon: Shahbaz Burton MD;  Location: [facility] OR 18/19;  Service: Neurosurgery;  Laterality: N/A;    COLONOSCOPY  2011    COLONOSCOPY N/A 1/26/2021    Procedure: COLONOSCOPY with 
polypectomy;  Surgeon: Arsenio Resendiz MD;  Location: [facility] ENDOSCOPY;  Service: Gastroenterology;  Laterality: N/A;  colon polyp    CONDYLOMA REMOVAL      2    EMBOLIZATION CEREBRAL N/A 7/31/2020    Procedure: CEREBRAL ANGIOGRAM;  Surgeon: Ciro Gibbons MD;  Location: [facility] HYBRID OR 18/19;  Service: Interventional Radiology;  Laterality: N/A;    ENDOSCOPY      ENDOSCOPY N/A 1/26/2021    Procedure: ESOPHAGOGASTRODUODENOSCOPY;  Surgeon: Arsenio Resendiz MD;  Location: [facility] ENDOSCOPY;  Service: Gastroenterology;  Laterality: N/A;  normal    ENDOSCOPY W/ PEG TUBE PLACEMENT N/A 8/27/2020    Procedure: ESOPHAGOGASTRODUODENOSCOPY WITH PERCUTANEOUS ENDOSCOPIC GASTROSTOMY TUBE INSERTION;  Surgeon: Zain Bonilla MD;  Location: [facility] ENDOSCOPY;  Service: Gastroenterology;  Laterality: N/A;  pre: dysphagia   post: mild duodenitits, normal endoscopy peg placed and varified     HERNIA REPAIR      INGUINAL HERNIA REPAIR N/A 9/14/2023    Procedure: Robotic assisted laparoscopic extensive lysis of adhesions, serosal repair x3,  right inguinal hernia repair with mesh;  Surgeon: Aleena Flor MD;  Location: [facility] MAIN OR;  Service: Robotics - DaVinci;  Laterality: N/A;    PEG TUBE REMOVAL  10/2020    TONSILLECTOMY       SHUNT INSERTION N/A 7/31/2020    Procedure: EXTERNAL VENTRICULAR DRAIN;  Surgeon: Shahbaz Burton MD;  Location: [facility] MAIN OR;  Service: Neurosurgery;  Laterality: N/A;       Family History   Problem Relation Age of Onset    Cancer Mother     Heart disease Father     Heart disease Sister     Diabetes Paternal Uncle     Diabetes Paternal Grandfather     Allergies Other     Arthritis Other     Bleeding Disorder Other     Cancer Other     Diabetes Other     Heart disease Other     Malig Hyperthermia Neg Hx        Social History     Socioeconomic History    Marital status:    Tobacco Use    Smoking status: Former     Current packs/day: 0.00     Average packs/day: 2.0 packs/day 
for 40.0 years (80.0 ttl pk-yrs)     Types: Cigarettes     Start date: 1973     Quit date: 2013     Years since quittin.2     Passive exposure: Past    Smokeless tobacco: Never   Vaping Use    Vaping status: Never Used   Substance and Sexual Activity    Alcohol use: Yes     Alcohol/week: 12.0 standard drinks of alcohol     Types: 12 Cans of beer per week    Drug use: No    Sexual activity: Defer           Objective   Physical Exam  Vitals and nursing note reviewed.   Constitutional:       General: He is not in acute distress.     Appearance: He is not ill-appearing.   HENT:      Head: Normocephalic.      Right Ear: External ear normal.      Left Ear: External ear normal.      Nose: Nose normal.      Mouth/Throat:      Mouth: Mucous membranes are moist.   Eyes:      Extraocular Movements: Extraocular movements intact.   Cardiovascular:      Rate and Rhythm: Normal rate and regular rhythm.      Pulses: Normal pulses.   Pulmonary:      Effort: Pulmonary effort is normal. No respiratory distress.   Abdominal:      General: Abdomen is flat.   Musculoskeletal:         General: No swelling, tenderness, deformity or signs of injury. Normal range of motion.      Cervical back: No tenderness.      Left ankle: Swelling present. No deformity, ecchymosis or lacerations. No tenderness. Decreased range of motion. Anterior drawer test negative. Normal pulse.      Left foot: Swelling present. No tenderness or bony tenderness.   Skin:     General: Skin is warm.      Capillary Refill: Capillary refill takes less than 2 seconds.   Neurological:      General: No focal deficit present.      Mental Status: He is alert and oriented to person, place, and time. Mental status is at baseline.   Psychiatric:         Mood and Affect: Mood normal.         Procedures           ED Course  ED Course as of 24 No identified abnormality on the x-ray. [KZ]      ED Course User Index  [KZ] Nicholas Arce 
MD IRAJ                                           Medical Decision Making  Patient presents to the emergency room with nontraumatic joint pain.  Differential diagnosis is vast.  It includes inflammatory arthropathies such as gout and pseudogout.  Inflammatory osteoarthritis also considered.  Septic joint is in the differential, but based on the patient's clinical exam, this is very unlikely.  Nontraumatic fracture, sprain or strain also considered.  Systemic conditions also considered, but the patient denies any systemic complaints.  X-rays were ordered here in the emergency department.    X-rays do not show anything worrisome.  Patient placed in a boot and given podiatry follow-up.  I suspect some sort of soft tissue injury.      Problems Addressed:  Localized swelling of left foot: complicated acute illness or injury    Amount and/or Complexity of Data Reviewed  Radiology: ordered and independent interpretation performed. Decision-making details documented in ED Course.    Risk  OTC drugs.        Final diagnoses:   Localized swelling of left foot       ED Disposition  ED Disposition       ED Disposition   Discharge    Condition   Stable    Comment   --               DEMARCUS Bay DPM  [address] [address]  [phone]    Call in 1 day  For repeat evaluation         Medication List        Changed      metFORMIN 500 MG tablet  Commonly known as: GLUCOPHAGE  TAKE 1 TABLET DAILY  What changed: when to take this            Stop      predniSONE 20 MG tablet  Commonly known as: DELTASONE              
No abnormalities

## 2024-11-21 ENCOUNTER — EMERGENCY (EMERGENCY)
Facility: HOSPITAL | Age: 39
LOS: 1 days | Discharge: HOME | End: 2024-11-21
Admitting: EMERGENCY MEDICINE
Payer: MEDICAID

## 2024-11-21 VITALS
WEIGHT: 171.96 LBS | TEMPERATURE: 98 F | HEIGHT: 74 IN | DIASTOLIC BLOOD PRESSURE: 62 MMHG | HEART RATE: 86 BPM | SYSTOLIC BLOOD PRESSURE: 98 MMHG | RESPIRATION RATE: 17 BRPM | OXYGEN SATURATION: 98 %

## 2024-11-21 DIAGNOSIS — K08.89 OTHER SPECIFIED DISORDERS OF TEETH AND SUPPORTING STRUCTURES: ICD-10-CM

## 2024-11-21 DIAGNOSIS — Z91.041 RADIOGRAPHIC DYE ALLERGY STATUS: ICD-10-CM

## 2024-11-21 DIAGNOSIS — Z98.818 OTHER DENTAL PROCEDURE STATUS: ICD-10-CM

## 2024-11-21 DIAGNOSIS — Z88.1 ALLERGY STATUS TO OTHER ANTIBIOTIC AGENTS: ICD-10-CM

## 2024-11-21 DIAGNOSIS — Z91.018 ALLERGY TO OTHER FOODS: ICD-10-CM

## 2024-11-21 DIAGNOSIS — Z88.6 ALLERGY STATUS TO ANALGESIC AGENT: ICD-10-CM

## 2024-11-21 DIAGNOSIS — Z88.8 ALLERGY STATUS TO OTHER DRUGS, MEDICAMENTS AND BIOLOGICAL SUBSTANCES: ICD-10-CM

## 2024-11-21 DIAGNOSIS — R30.0 DYSURIA: ICD-10-CM

## 2024-11-21 DIAGNOSIS — Z91.010 ALLERGY TO PEANUTS: ICD-10-CM

## 2024-11-21 PROCEDURE — 99284 EMERGENCY DEPT VISIT MOD MDM: CPT | Mod: 25

## 2024-11-21 PROCEDURE — 87591 N.GONORRHOEAE DNA AMP PROB: CPT

## 2024-11-21 PROCEDURE — 99283 EMERGENCY DEPT VISIT LOW MDM: CPT | Mod: 25

## 2024-11-21 PROCEDURE — 96372 THER/PROPH/DIAG INJ SC/IM: CPT

## 2024-11-21 PROCEDURE — 87491 CHLMYD TRACH DNA AMP PROBE: CPT

## 2024-11-21 RX ORDER — IBUPROFEN 200 MG
800 TABLET ORAL ONCE
Refills: 0 | Status: COMPLETED | OUTPATIENT
Start: 2024-11-21 | End: 2024-11-21

## 2024-11-21 RX ORDER — DOXYCYCLINE HYCLATE 100 MG
1 TABLET ORAL
Qty: 14 | Refills: 0
Start: 2024-11-21 | End: 2024-11-27

## 2024-11-21 RX ORDER — CEFTRIAXONE 500 MG/1
500 INJECTION, POWDER, FOR SOLUTION INTRAMUSCULAR; INTRAVENOUS ONCE
Refills: 0 | Status: COMPLETED | OUTPATIENT
Start: 2024-11-21 | End: 2024-11-21

## 2024-11-21 RX ORDER — DOXYCYCLINE HYCLATE 100 MG
100 TABLET ORAL EVERY 12 HOURS
Refills: 0 | Status: DISCONTINUED | OUTPATIENT
Start: 2024-11-21 | End: 2024-11-24

## 2024-11-21 RX ORDER — IBUPROFEN 200 MG
1 TABLET ORAL
Qty: 30 | Refills: 0
Start: 2024-11-21

## 2024-11-21 RX ADMIN — Medication 800 MILLIGRAM(S): at 06:14

## 2024-11-21 RX ADMIN — Medication 100 MILLIGRAM(S): at 05:42

## 2024-11-21 RX ADMIN — CEFTRIAXONE 500 MILLIGRAM(S): 500 INJECTION, POWDER, FOR SOLUTION INTRAMUSCULAR; INTRAVENOUS at 05:44

## 2024-11-25 ENCOUNTER — EMERGENCY (EMERGENCY)
Facility: HOSPITAL | Age: 39
LOS: 1 days | Discharge: HOME | End: 2024-11-25
Attending: EMERGENCY MEDICINE | Admitting: EMERGENCY MEDICINE
Payer: MEDICAID

## 2024-11-25 VITALS
HEIGHT: 74 IN | SYSTOLIC BLOOD PRESSURE: 116 MMHG | WEIGHT: 179.9 LBS | TEMPERATURE: 101 F | HEART RATE: 113 BPM | DIASTOLIC BLOOD PRESSURE: 66 MMHG | OXYGEN SATURATION: 99 % | RESPIRATION RATE: 22 BRPM

## 2024-11-25 LAB
ALBUMIN SERPL ELPH-MCNC: 3.5 G/DL — SIGNIFICANT CHANGE UP (ref 3.4–5)
ALP SERPL-CCNC: 89 U/L — SIGNIFICANT CHANGE UP (ref 40–120)
ALT FLD-CCNC: 22 U/L — SIGNIFICANT CHANGE UP (ref 12–42)
ANION GAP SERPL CALC-SCNC: 7 MMOL/L — LOW (ref 9–16)
APTT BLD: 27.5 SEC — SIGNIFICANT CHANGE UP (ref 24.5–35.6)
AST SERPL-CCNC: 21 U/L — SIGNIFICANT CHANGE UP (ref 15–37)
BILIRUB SERPL-MCNC: 0.2 MG/DL — SIGNIFICANT CHANGE UP (ref 0.2–1.2)
BUN SERPL-MCNC: 14 MG/DL — SIGNIFICANT CHANGE UP (ref 7–23)
CALCIUM SERPL-MCNC: 9.1 MG/DL — SIGNIFICANT CHANGE UP (ref 8.5–10.5)
CHLORIDE SERPL-SCNC: 102 MMOL/L — SIGNIFICANT CHANGE UP (ref 96–108)
CO2 SERPL-SCNC: 29 MMOL/L — SIGNIFICANT CHANGE UP (ref 22–31)
CREAT SERPL-MCNC: 1.06 MG/DL — SIGNIFICANT CHANGE UP (ref 0.5–1.3)
EGFR: 93 ML/MIN/1.73M2 — SIGNIFICANT CHANGE UP
GLUCOSE SERPL-MCNC: 97 MG/DL — SIGNIFICANT CHANGE UP (ref 70–99)
INR BLD: 1.13 — SIGNIFICANT CHANGE UP (ref 0.85–1.16)
LACTATE BLDV-MCNC: 1.6 MMOL/L — SIGNIFICANT CHANGE UP (ref 0.5–2)
POTASSIUM SERPL-MCNC: 4.2 MMOL/L — SIGNIFICANT CHANGE UP (ref 3.5–5.3)
POTASSIUM SERPL-SCNC: 4.2 MMOL/L — SIGNIFICANT CHANGE UP (ref 3.5–5.3)
PROT SERPL-MCNC: 8 G/DL — SIGNIFICANT CHANGE UP (ref 6.4–8.2)
PROTHROM AB SERPL-ACNC: 13.2 SEC — SIGNIFICANT CHANGE UP (ref 9.9–13.4)
SODIUM SERPL-SCNC: 138 MMOL/L — SIGNIFICANT CHANGE UP (ref 132–145)

## 2024-11-25 PROCEDURE — 99285 EMERGENCY DEPT VISIT HI MDM: CPT | Mod: 25

## 2024-11-25 RX ORDER — CEFTRIAXONE SODIUM 10 G
1000 VIAL (EA) INJECTION ONCE
Refills: 0 | Status: COMPLETED | OUTPATIENT
Start: 2024-11-25 | End: 2024-11-25

## 2024-11-25 RX ORDER — SODIUM CHLORIDE 9 MG/ML
2500 INJECTION, SOLUTION INTRAMUSCULAR; INTRAVENOUS; SUBCUTANEOUS ONCE
Refills: 0 | Status: COMPLETED | OUTPATIENT
Start: 2024-11-25 | End: 2024-11-25

## 2024-11-25 RX ORDER — ACETAMINOPHEN 500 MG
650 TABLET ORAL ONCE
Refills: 0 | Status: COMPLETED | OUTPATIENT
Start: 2024-11-25 | End: 2024-11-25

## 2024-11-25 RX ADMIN — Medication 650 MILLIGRAM(S): at 23:50

## 2024-11-25 RX ADMIN — SODIUM CHLORIDE 2500 MILLILITER(S): 9 INJECTION, SOLUTION INTRAMUSCULAR; INTRAVENOUS; SUBCUTANEOUS at 23:52

## 2024-11-25 NOTE — ED ADULT TRIAGE NOTE - CHIEF COMPLAINT QUOTE
Pt walked into ER c/o fever off and on x1 week after dental work (upper jaw). Pt reports headache, chills, nausea, decreased appetite.

## 2024-11-26 VITALS
TEMPERATURE: 99 F | SYSTOLIC BLOOD PRESSURE: 119 MMHG | HEART RATE: 92 BPM | OXYGEN SATURATION: 97 % | DIASTOLIC BLOOD PRESSURE: 71 MMHG | RESPIRATION RATE: 18 BRPM

## 2024-11-26 LAB
APPEARANCE UR: CLEAR — SIGNIFICANT CHANGE UP
BASOPHILS # BLD AUTO: 0.04 K/UL — SIGNIFICANT CHANGE UP (ref 0–0.2)
BASOPHILS NFR BLD AUTO: 0.4 % — SIGNIFICANT CHANGE UP (ref 0–2)
BILIRUB UR-MCNC: NEGATIVE — SIGNIFICANT CHANGE UP
COLOR SPEC: YELLOW — SIGNIFICANT CHANGE UP
DIFF PNL FLD: NEGATIVE — SIGNIFICANT CHANGE UP
EOSINOPHIL # BLD AUTO: 0.06 K/UL — SIGNIFICANT CHANGE UP (ref 0–0.5)
EOSINOPHIL NFR BLD AUTO: 0.7 % — SIGNIFICANT CHANGE UP (ref 0–6)
FLUAV AG NPH QL: SIGNIFICANT CHANGE UP
FLUBV AG NPH QL: SIGNIFICANT CHANGE UP
GLUCOSE UR QL: NEGATIVE MG/DL — SIGNIFICANT CHANGE UP
HCT VFR BLD CALC: 39.1 % — SIGNIFICANT CHANGE UP (ref 39–50)
HGB BLD-MCNC: 12.1 G/DL — LOW (ref 13–17)
IMM GRANULOCYTES NFR BLD AUTO: 0.4 % — SIGNIFICANT CHANGE UP (ref 0–0.9)
KETONES UR-MCNC: NEGATIVE MG/DL — SIGNIFICANT CHANGE UP
LEUKOCYTE ESTERASE UR-ACNC: NEGATIVE — SIGNIFICANT CHANGE UP
LYMPHOCYTES # BLD AUTO: 1.75 K/UL — SIGNIFICANT CHANGE UP (ref 1–3.3)
LYMPHOCYTES # BLD AUTO: 19.4 % — SIGNIFICANT CHANGE UP (ref 13–44)
MCHC RBC-ENTMCNC: 23.1 PG — LOW (ref 27–34)
MCHC RBC-ENTMCNC: 30.9 G/DL — LOW (ref 32–36)
MCV RBC AUTO: 74.8 FL — LOW (ref 80–100)
MONOCYTES # BLD AUTO: 0.99 K/UL — HIGH (ref 0–0.9)
MONOCYTES NFR BLD AUTO: 11 % — SIGNIFICANT CHANGE UP (ref 2–14)
NEUTROPHILS # BLD AUTO: 6.12 K/UL — SIGNIFICANT CHANGE UP (ref 1.8–7.4)
NEUTROPHILS NFR BLD AUTO: 68.1 % — SIGNIFICANT CHANGE UP (ref 43–77)
NITRITE UR-MCNC: NEGATIVE — SIGNIFICANT CHANGE UP
NRBC # BLD: 0 /100 WBCS — SIGNIFICANT CHANGE UP (ref 0–0)
PH UR: 6.5 — SIGNIFICANT CHANGE UP (ref 5–8)
PLATELET # BLD AUTO: 277 K/UL — SIGNIFICANT CHANGE UP (ref 150–400)
PROT UR-MCNC: NEGATIVE MG/DL — SIGNIFICANT CHANGE UP
RBC # BLD: 5.23 M/UL — SIGNIFICANT CHANGE UP (ref 4.2–5.8)
RBC # FLD: 13.9 % — SIGNIFICANT CHANGE UP (ref 10.3–14.5)
RSV RNA NPH QL NAA+NON-PROBE: SIGNIFICANT CHANGE UP
SARS-COV-2 RNA SPEC QL NAA+PROBE: SIGNIFICANT CHANGE UP
SP GR SPEC: 1.02 — SIGNIFICANT CHANGE UP (ref 1–1.03)
UROBILINOGEN FLD QL: 0.2 MG/DL — SIGNIFICANT CHANGE UP (ref 0.2–1)
WBC # BLD: 9 K/UL — SIGNIFICANT CHANGE UP (ref 3.8–10.5)
WBC # FLD AUTO: 9 K/UL — SIGNIFICANT CHANGE UP (ref 3.8–10.5)

## 2024-11-26 PROCEDURE — 71045 X-RAY EXAM CHEST 1 VIEW: CPT | Mod: 26,59

## 2024-11-26 PROCEDURE — 71046 X-RAY EXAM CHEST 2 VIEWS: CPT | Mod: 26

## 2024-11-26 RX ADMIN — Medication 100 MILLIGRAM(S): at 00:25

## 2024-11-26 NOTE — ED PROVIDER NOTE - OBJECTIVE STATEMENT
39-year-old male with complaint of fever chills, body aches, dry cough, congestion.  No abdominal pain, no back pain, no fall no trauma, no vomiting or diarrhea.  No recent travel, no sick contacts.

## 2024-11-26 NOTE — ED ADULT NURSE NOTE - CCCP TRG CHIEF CMPLNT
LM, sent [facility] and letter advising patient to call clinic to schedule NV to apply LibrePro sensor (and then schedule with Dr. Adi Araujo 2 weeks after sensor applied)
Please call patient - her appointment was cancelled on 12/22 due to weather. I think we have tried to reach her to reschedule but no answer. Please try again then send letter and MyChart. Thanks.
fever

## 2024-11-26 NOTE — ED PROVIDER NOTE - CLINICAL SUMMARY MEDICAL DECISION MAKING FREE TEXT BOX
URI, fever, improved in the ED.  Labs unremarkable.  Flu and COVID-negative.  Chest x-ray negative.  Feeling better after IV fluid, supportive care, stable for DC home.

## 2024-11-26 NOTE — ED ADULT NURSE NOTE - OBJECTIVE STATEMENT
Pt presents to ed ambulatory, Pt AOX4, speaking in full clear sentences, breathing equal and unlabored  Pt walked into ER c/o fever off and on x1 week after dental work (upper jaw). Pt reports headache, chills, nausea, decreased appetite.  20grac initaited and labs sent with BC  Fluids initiated as ordered and all medications administered as ordered. All pertinent patient education given and patient gave verbal confirmation they understand the reason and purpose for medication/fluid administration. Plan of care ongoing

## 2024-11-26 NOTE — ED PROVIDER NOTE - PATIENT PORTAL LINK FT
You can access the FollowMyHealth Patient Portal offered by [facility] by registering at the following website: [email or web address]. By joining [facility]’s FollowMyHealth portal, you will also be able to view your health information using other applications (apps) compatible with our system.

## 2024-11-26 NOTE — ED ADULT NURSE NOTE - CAS EDN DISCHARGE INTERVENTIONS
Failureto dilate  Failure to descend  Inadequate uterine contraction
IV discontinued, cath removed intact

## 2024-11-26 NOTE — ED ADULT NURSE NOTE - NSFALLUNIVINTERV_ED_ALL_ED
Bed/Stretcher in lowest position, wheels locked, appropriate side rails in place/Call bell, personal items and telephone in reach/Instruct patient to call for assistance before getting out of bed/chair/stretcher/Non-slip footwear applied when patient is off stretcher/[place] to call system/Physically safe environment - no spills, clutter or unnecessary equipment/Purposeful proactive rounding/Room/bathroom lighting operational, light cord in reach

## 2024-11-27 DIAGNOSIS — Z91.018 ALLERGY TO OTHER FOODS: ICD-10-CM

## 2024-11-27 DIAGNOSIS — Z88.8 ALLERGY STATUS TO OTHER DRUGS, MEDICAMENTS AND BIOLOGICAL SUBSTANCES: ICD-10-CM

## 2024-11-27 DIAGNOSIS — J06.9 ACUTE UPPER RESPIRATORY INFECTION, UNSPECIFIED: ICD-10-CM

## 2024-11-27 DIAGNOSIS — Z88.6 ALLERGY STATUS TO ANALGESIC AGENT: ICD-10-CM

## 2024-11-27 DIAGNOSIS — Z88.5 ALLERGY STATUS TO NARCOTIC AGENT: ICD-10-CM

## 2024-11-27 DIAGNOSIS — Z91.010 ALLERGY TO PEANUTS: ICD-10-CM

## 2024-11-27 DIAGNOSIS — R50.9 FEVER, UNSPECIFIED: ICD-10-CM

## 2024-11-27 DIAGNOSIS — Z91.041 RADIOGRAPHIC DYE ALLERGY STATUS: ICD-10-CM

## 2024-11-27 DIAGNOSIS — Z88.0 ALLERGY STATUS TO PENICILLIN: ICD-10-CM

## 2024-12-31 NOTE — ED ADULT NURSE NOTE - EXTENSIONS OF SELF_ADULT
Medication Refill Request    Deric Mabry is requesting a refill of the following medication(s):   Requested Prescriptions     Pending Prescriptions Disp Refills    amLODIPine (NORVASC) 10 MG tablet 90 tablet 3     Sig: Take 1 tablet by mouth daily        Listed PCP is Joby Leigh MD   Last provider to prescribe medication: Reese  Last Date of Medication Prescribed: 1/4/24   Date of Last Office Visit at [facility]: 11/26/24   FUTURE APPOINTMENT:   Future Appointments   Date Time Provider Department Center   2/27/2025  1:00 PM Joby Leigh MD [facility] ECC DEP       Please send refill to:    [facility] [id number] [place], [place] - [address] [phone] - F [phone]  [address]  [facility] [address]  Phone: [phone] Fax: [phone]      Please review request and approve or deny with recommendations.    
None

## 2025-02-26 NOTE — ED PROVIDER NOTE - CROS ED CARDIOVAS ALL NEG
[Well Developed] : well developed [Well Nourished] : well nourished [No Acute Distress] : no acute distress [Normal Conjunctiva] : normal conjunctiva [Normal Venous Pressure] : normal venous pressure [No Carotid Bruit] : no carotid bruit [Normal S1, S2] : normal S1, S2 [No Murmur] : no murmur [No Rub] : no rub [No Gallop] : no gallop [Clear Lung Fields] : clear lung fields [Good Air Entry] : good air entry [No Respiratory Distress] : no respiratory distress  - - - [Soft] : abdomen soft [Non Tender] : non-tender [No Masses/organomegaly] : no masses/organomegaly [Normal Bowel Sounds] : normal bowel sounds [Normal Gait] : normal gait [No Edema] : no edema [No Cyanosis] : no cyanosis [No Clubbing] : no clubbing [No Varicosities] : no varicosities [No Rash] : no rash [No Skin Lesions] : no skin lesions [Moves all extremities] : moves all extremities [No Focal Deficits] : no focal deficits [Normal Speech] : normal speech [Alert and Oriented] : alert and oriented [Normal memory] : normal memory

## 2025-03-28 NOTE — ED ADULT TRIAGE NOTE - SOURCE OF INFORMATION
Rx Refill Note  Requested Prescriptions     Pending Prescriptions Disp Refills    zolpidem (AMBIEN) 5 MG tablet [Pharmacy Med Name: ZOLPIDEM 5MG TABLETS] 50 tablet      Sig: TAKE 1 TO 2 TABLETS BY MOUTH EVERY NIGHT AT BEDTIME AS NEEDED FOR SLEEP      Last office visit with prescribing clinician: 2/27/2025   Last telemedicine visit with prescribing clinician: Visit date not found   Next office visit with prescribing clinician: 5/30/2025                         Would you like a call back once the refill request has been completed: [] Yes [] No    If the office needs to give you a call back, can they leave a voicemail: [] Yes [] No    Bonita Enriquez MA  03/28/25, 11:20 EDT  
Patient
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
## 2025-06-10 NOTE — BH INPATIENT PSYCHIATRY PROGRESS NOTE - NSBHMETABOLIC_PSY_ALL_CORE_FT
Patient Name: Americo Rg  Patient : 1959  Patient MRN: [id number]     Primary Oncologist: Uzair Cummings MD  Referring Provider: Mariel Meneses MD     Date of Service: 2025      Chief Complaint:   Chief Complaint   Patient presents with    Follow-up     Patient Active Problem List:     Polycythemia, primary     Diabetes mellitus without complication (HCC)    HPI:   Mr. Rg ( 3/5/6dd1) was diagnosed with JAK2 gene mutation positive Polycythemia vera in 2013. He had an uncontrolled bleeding after tooth extraction.  Found elevated counts. W/U including H/H elevated in the 21/60 range and BRIGHT-2 gene mutation positive 43.4 percent cells confirmed Dx of Polycythemia Vera. No evidence of bcr/abl 1 translocation by FISH.    He was started on phlebotomies and felt much better after a couple of phlebotomies and his hemoglobin dropped down from 22 to 16 and hematocrit from 64 to 47.    Platelet counts are hovering around 600- 700,000 per cubic millimeter.  Thus he was also started on Hydrea in 2014.  Trying to maintain Platelet counts close to normal range.Need for Phlebotomies has gone down to every 2-3 months    On 2025, he presented to me for followup. I have been following Mr. Rg for JAK2  positive polycythemia vera and he has been on therapeutic phlebotomies as needed.  He stated that his general sense of wellbeing is getting better with the phlebotomy.  He is tolerating the phlebotomy well and he does not encounter any major side effects from it.      I recognized that his hematocrit was 54.7 on 6/10/25. I recommend him to have 2 phlebotomies this time. I will try to keep his hematocrit less than 45%.    He is also on hydroxyurea 500 mg daily for his thrombocytosis. Since his platelet count was 482, I will increase hydrea dose to 500 mg and 1000 mg alternate day starting from today.     He is here for close monitoring of toxicity and side effects from hydroxyurea. He is  BMI: BMI (kg/m2): 24.8 (09-03-23 @ 20:30)  HbA1c: A1C with Estimated Average Glucose Result: 5.7 % (09-04-23 @ 08:54)    Glucose:   BP: 115/64 (09-11-23 @ 06:20) (101/71 - 121/88)  Lipid Panel: Date/Time: 09-04-23 @ 08:54  Cholesterol, Serum: 115  Direct LDL: --  HDL Cholesterol, Serum: 28  Total Cholesterol/HDL Ration Measurement: --  Triglycerides, Serum: 146

## 2025-07-01 NOTE — ED CDU PROVIDER INITIAL DAY NOTE - CARDIAC, MLM
Patient is a 47 year-old-male with pmh ALS, cerebellar ataxia and sacral pressure ulcers who presented to the ED on 6/29 for one day of altered mental status. He was found to have an oxygen saturation of 74% which improved with solumedrol and duonebs. CXR in the ED showed patchy opacities concerning for multifocal pneumonia. On presentation WBC was 7.11, Hgb was 12, and lactic acid was 2.6. There was concern for sepsis without end organ failure, so blood cultures were drawn and he was started on vanc/cefepime. On 6/30 he was noted to be unresponsive to sternal rub and hypotensive. He was started on levophed and upgraded to the MICU. His abx were switched to zosyn. By the end of the day he was weaned off the levo. Today his abx were switched to azithromycin and ceftriaxone. Since presentation, he has remained afebrile. WBC peaked at 10.54 on 6/30 but was 9.35 today. Repeat lactate was 1.3. Blood cultures remain no growth to date.   Normal rate, regular rhythm.  Heart sounds S1, S2.  No murmurs, rubs or gallops.

## 2025-07-13 NOTE — BH INPATIENT PSYCHIATRY DISCHARGE NOTE - NSICDXPASTMEDICALHX_GEN_ALL_CORE_FT
PAST MEDICAL HISTORY:  Asthma     Depression     
0 (no pain/absence of nonverbal indicators of pain)